# Patient Record
Sex: MALE | Race: WHITE | NOT HISPANIC OR LATINO | Employment: OTHER | URBAN - METROPOLITAN AREA
[De-identification: names, ages, dates, MRNs, and addresses within clinical notes are randomized per-mention and may not be internally consistent; named-entity substitution may affect disease eponyms.]

---

## 2017-10-11 ENCOUNTER — HOSPITAL ENCOUNTER (OUTPATIENT)
Dept: RADIOLOGY | Facility: HOSPITAL | Age: 77
Discharge: HOME | End: 2017-10-11
Attending: INTERNAL MEDICINE
Payer: MEDICARE

## 2017-10-11 ENCOUNTER — OFFICE VISIT (OUTPATIENT)
Dept: INTERNAL MEDICINE | Facility: CLINIC | Age: 77
End: 2017-10-11
Payer: MEDICARE

## 2017-10-11 VITALS
BODY MASS INDEX: 24.17 KG/M2 | TEMPERATURE: 98 F | HEART RATE: 72 BPM | WEIGHT: 173.31 LBS | RESPIRATION RATE: 16 BRPM | SYSTOLIC BLOOD PRESSURE: 120 MMHG | DIASTOLIC BLOOD PRESSURE: 52 MMHG

## 2017-10-11 DIAGNOSIS — R05.3 CHRONIC COUGH: ICD-10-CM

## 2017-10-11 DIAGNOSIS — R05.3 CHRONIC COUGH: Primary | ICD-10-CM

## 2017-10-11 DIAGNOSIS — R09.82 POST-NASAL DRIP: ICD-10-CM

## 2017-10-11 PROCEDURE — 99213 OFFICE O/P EST LOW 20 MIN: CPT | Mod: S$PBB,,, | Performed by: INTERNAL MEDICINE

## 2017-10-11 PROCEDURE — 99999 PR PBB SHADOW E&M-EST. PATIENT-LVL III: CPT | Mod: PBBFAC,,, | Performed by: INTERNAL MEDICINE

## 2017-10-11 PROCEDURE — 71020 XR CHEST PA AND LATERAL: CPT | Mod: 26,,, | Performed by: RADIOLOGY

## 2017-10-11 PROCEDURE — 71020 XR CHEST PA AND LATERAL: CPT | Mod: TC

## 2017-10-11 PROCEDURE — 99213 OFFICE O/P EST LOW 20 MIN: CPT | Mod: PBBFAC,PN | Performed by: INTERNAL MEDICINE

## 2017-10-11 NOTE — PROGRESS NOTES
Subjective:       Patient ID: Kevin Echeverria Jr. is a 77 y.o. male.    Chief Complaint: Cough (productive cough, brownish-green); Chest Congestion; and Back Pain      HPI:  Patient is known to me from acute visit and presents with cough and congestion. Sx started yesterday but has had this off an off for several months. Cough is productive of sputum. Tried OTC cough meds and cough drops without relief. No SOB outside of coughing spells. No wheezing. Mild chest tightness with congestion. Has low back pain with coughing. No fevers. No sore throat or otalgia. Quit smoking > 20 years ago.    Past Medical History:   Diagnosis Date    Arthritis     Cancer     prostate    Hyperlipidemia     Hypertension        Family History   Problem Relation Age of Onset    Diabetes Mother        Social History     Social History    Marital status:      Spouse name: N/A    Number of children: N/A    Years of education: N/A     Occupational History    Not on file.     Social History Main Topics    Smoking status: Never Smoker    Smokeless tobacco: Former User     Quit date: 2/27/1999    Alcohol use Yes      Comment: 3 or 4 times a week and 2 or 3 beer at the most    Drug use: No    Sexual activity: No      Comment:      Other Topics Concern    Not on file     Social History Narrative    No narrative on file       Review of Systems   Constitutional: Negative for activity change, fatigue, fever and unexpected weight change.   HENT: Positive for congestion. Negative for ear pain, hearing loss, rhinorrhea and sore throat.    Eyes: Negative for pain, redness and visual disturbance.   Respiratory: Positive for cough and chest tightness. Negative for shortness of breath and wheezing.    Cardiovascular: Negative for chest pain, palpitations and leg swelling.   Gastrointestinal: Negative for abdominal pain, constipation, diarrhea, nausea and vomiting.   Genitourinary: Negative for decreased urine volume, dysuria,  frequency and urgency.   Musculoskeletal: Positive for back pain (with cough). Negative for joint swelling and neck pain.   Skin: Negative for color change, rash and wound.   Neurological: Negative for dizziness, tremors, weakness, light-headedness and headaches.         Objective:      Physical Exam   Constitutional: He is oriented to person, place, and time. He appears well-developed and well-nourished. No distress.   HENT:   Head: Normocephalic and atraumatic.   Right Ear: Tympanic membrane and external ear normal.   Left Ear: Tympanic membrane and external ear normal.   Mouth/Throat: No oropharyngeal exudate, posterior oropharyngeal edema or posterior oropharyngeal erythema.   Eyes: Conjunctivae and EOM are normal. Pupils are equal, round, and reactive to light. Right eye exhibits no discharge. Left eye exhibits no discharge.   Neck: Neck supple. No tracheal deviation present.   Cardiovascular: Normal rate and regular rhythm.  Exam reveals no gallop and no friction rub.    No murmur heard.  Pulmonary/Chest: Effort normal and breath sounds normal. No respiratory distress. He has no wheezes. He has no rales.   Abdominal: Soft. Bowel sounds are normal. He exhibits no distension. There is no tenderness.   Neurological: He is alert and oriented to person, place, and time. No cranial nerve deficit.   Skin: Skin is warm and dry.   Psychiatric: He has a normal mood and affect. His behavior is normal.   Vitals reviewed.      Assessment:       1. Chronic cough    2. Post-nasal drip        Plan:       Kevin was seen today for cough, chest congestion and back pain.    Diagnoses and all orders for this visit:    Chronic cough  -     X-Ray Chest PA And Lateral; Future  Due to having cough for > 4 weeks check x-ray  Low suspicion for infection, eval for interstitial changes or mass  I think PND is likely, has singulair at home. Start taking daily  Discussed Ddx of chronic cough: COPD, PND, and GERD and most common causes.  Already on prilosec. Could consider PPI. Treat PND with singulair. Consider PFTs if not getting better but remote smoking history    Post-nasal drip  Start singulair daily  Saline nasal spray    RTC PRN and as scheduled with PCP. Call if not getting better

## 2017-10-13 DIAGNOSIS — R05.9 COUGH: ICD-10-CM

## 2017-10-13 RX ORDER — MONTELUKAST SODIUM 10 MG/1
TABLET ORAL
Qty: 30 TABLET | Refills: 10 | Status: SHIPPED | OUTPATIENT
Start: 2017-10-13 | End: 2018-10-18 | Stop reason: SDUPTHER

## 2017-10-16 ENCOUNTER — OFFICE VISIT (OUTPATIENT)
Dept: FAMILY MEDICINE | Facility: CLINIC | Age: 77
End: 2017-10-16
Payer: MEDICARE

## 2017-10-16 ENCOUNTER — TELEPHONE (OUTPATIENT)
Dept: FAMILY MEDICINE | Facility: CLINIC | Age: 77
End: 2017-10-16

## 2017-10-16 VITALS
SYSTOLIC BLOOD PRESSURE: 140 MMHG | TEMPERATURE: 98 F | WEIGHT: 177.5 LBS | DIASTOLIC BLOOD PRESSURE: 58 MMHG | BODY MASS INDEX: 24.85 KG/M2 | HEART RATE: 80 BPM | HEIGHT: 71 IN

## 2017-10-16 DIAGNOSIS — E78.9 LIPID DISORDER: ICD-10-CM

## 2017-10-16 DIAGNOSIS — I10 ESSENTIAL HYPERTENSION: Primary | Chronic | ICD-10-CM

## 2017-10-16 DIAGNOSIS — R05.9 COUGH: ICD-10-CM

## 2017-10-16 PROCEDURE — 96372 THER/PROPH/DIAG INJ SC/IM: CPT | Mod: PBBFAC

## 2017-10-16 PROCEDURE — 99999 PR STA SHADOW: CPT | Mod: PBBFAC,,, | Performed by: FAMILY MEDICINE

## 2017-10-16 PROCEDURE — 99999 PR PBB SHADOW E&M-EST. PATIENT-LVL II: CPT | Mod: PBBFAC,,, | Performed by: FAMILY MEDICINE

## 2017-10-16 PROCEDURE — 99999 PR STA SHADOW: CPT | Mod: PBBFAC,,,

## 2017-10-16 PROCEDURE — 99213 OFFICE O/P EST LOW 20 MIN: CPT | Mod: S$PBB | Performed by: FAMILY MEDICINE

## 2017-10-16 PROCEDURE — 99212 OFFICE O/P EST SF 10 MIN: CPT | Mod: PBBFAC | Performed by: FAMILY MEDICINE

## 2017-10-16 RX ORDER — LINCOMYCIN HYDROCHLORIDE 300 MG/ML
600 INJECTION, SOLUTION INTRAMUSCULAR; INTRAVENOUS; SUBCONJUNCTIVAL
Status: COMPLETED | OUTPATIENT
Start: 2017-10-16 | End: 2017-10-16

## 2017-10-16 RX ORDER — METHYLPREDNISOLONE ACETATE 40 MG/ML
40 INJECTION, SUSPENSION INTRA-ARTICULAR; INTRALESIONAL; INTRAMUSCULAR; SOFT TISSUE
Status: COMPLETED | OUTPATIENT
Start: 2017-10-16 | End: 2017-10-16

## 2017-10-16 RX ORDER — PREDNISONE 5 MG/1
TABLET ORAL
Qty: 35 TABLET | Refills: 0 | Status: SHIPPED | OUTPATIENT
Start: 2017-10-16 | End: 2017-10-31

## 2017-10-16 RX ORDER — AZITHROMYCIN 250 MG/1
TABLET, FILM COATED ORAL
Qty: 6 TABLET | Refills: 0 | Status: SHIPPED | OUTPATIENT
Start: 2017-10-16 | End: 2017-10-31

## 2017-10-16 RX ADMIN — METHYLPREDNISOLONE ACETATE 40 MG: 40 INJECTION, SUSPENSION INTRA-ARTICULAR; INTRALESIONAL; INTRAMUSCULAR; SOFT TISSUE at 01:10

## 2017-10-16 RX ADMIN — LINCOMYCIN HYDROCHLORIDE 600 MG: 300 INJECTION, SOLUTION INTRAMUSCULAR; INTRAVENOUS; SUBCONJUNCTIVAL at 01:10

## 2017-10-16 NOTE — TELEPHONE ENCOUNTER
----- Message from Donny Lisa sent at 10/16/2017  8:14 AM CDT -----  Contact: Patient  Kevin Echeverria Jr.  MRN: [id number]  : 1940  PCP: Bridger Cao  Home Phone      [phone]  Work Phone      Not on file.  [place]          [phone]      MESSAGE: persistent cough -- requesting appt today with Dr Cao    Call [phone]    PCP: [place]

## 2017-10-16 NOTE — PROGRESS NOTES
Subjective:       Patient ID: Kevin Echeverria Jr. is a 77 y.o. male.    Chief Complaint: Cough    Cough   This is a recurrent problem. The current episode started 1 to 4 weeks ago. Pertinent negatives include no chest pain, ear pain, eye redness, myalgias, rash or sore throat.     Review of Systems   Constitutional: Negative for appetite change.   HENT: Negative for congestion, ear pain, sneezing and sore throat.    Eyes: Negative for redness and visual disturbance.   Respiratory: Positive for cough. Negative for chest tightness and stridor.         Coarse cough for 3 weeks   Cardiovascular: Negative for chest pain.   Gastrointestinal: Negative for abdominal pain, blood in stool, diarrhea, nausea and vomiting.   Genitourinary: Negative for difficulty urinating, dysuria and hematuria.   Musculoskeletal: Negative for arthralgias, back pain, joint swelling, myalgias and neck pain.   Skin: Negative for rash.   Neurological: Negative for dizziness.   Psychiatric/Behavioral: Negative for agitation. The patient is not nervous/anxious.        Objective:      Physical Exam   Constitutional: He is oriented to person, place, and time. He appears well-developed and well-nourished.   HENT:   Head: Normocephalic and atraumatic.   Right Ear: External ear normal.   Left Ear: External ear normal.   Nose: Nose normal.   Mouth/Throat: Oropharynx is clear and moist.   Eyes: Pupils are equal, round, and reactive to light.   Neck: Normal range of motion. Neck supple.   Cardiovascular: Normal rate, regular rhythm, normal heart sounds and intact distal pulses.    Pulmonary/Chest: Effort normal. He has wheezes.   Loose rhonchi clears some with cough   Abdominal: Soft. Bowel sounds are normal.   Musculoskeletal: Normal range of motion. He exhibits no edema.   Neurological: He is alert and oriented to person, place, and time. He has normal reflexes.   Skin: Skin is warm and dry.   Psychiatric: He has a normal mood and affect.   Vitals  reviewed.      Assessment:       1. Essential hypertension    2. Cough    3. Lipid disorder        Plan:   Kevin was seen today for cough.    Diagnoses and all orders for this visit:    Essential hypertension    Cough    Lipid disorder    Other orders  -     azithromycin (Z-ANIKA) 250 MG tablet; Take 2 orally day #1, then 1 po qd for  5 days total  -     predniSONE (DELTASONE) 5 MG tablet; 2 orally twice daily for 5 days; then 1 orally twice daily for 5 days; then 1 orally daily for 5 days  -     methylPREDNISolone acetate injection 40 mg; Inject 1 mL (40 mg total) into the muscle one time.  -     lincomycin injection 600 mg; Inject 2 mLs (600 mg total) into the muscle one time.    Trial of Bevespi - one puff bid

## 2017-10-16 NOTE — TELEPHONE ENCOUNTER
Spoke to pt. Dr. Cao is booked today, but offered an appt with Dr. Breen tomorrow. Pt declined. Will seek care else where.

## 2017-10-18 DIAGNOSIS — I10 BENIGN ESSENTIAL HTN: ICD-10-CM

## 2017-10-23 RX ORDER — VERAPAMIL HYDROCHLORIDE 100 MG/1
CAPSULE, EXTENDED RELEASE ORAL
Qty: 30 CAPSULE | Refills: 10 | Status: SHIPPED | OUTPATIENT
Start: 2017-10-23 | End: 2017-10-31 | Stop reason: SDUPTHER

## 2017-10-31 ENCOUNTER — OFFICE VISIT (OUTPATIENT)
Dept: FAMILY MEDICINE | Facility: CLINIC | Age: 77
End: 2017-10-31
Payer: MEDICARE

## 2017-10-31 VITALS
BODY MASS INDEX: 24.41 KG/M2 | WEIGHT: 174.38 LBS | DIASTOLIC BLOOD PRESSURE: 50 MMHG | SYSTOLIC BLOOD PRESSURE: 110 MMHG | HEIGHT: 71 IN | HEART RATE: 72 BPM | RESPIRATION RATE: 16 BRPM

## 2017-10-31 DIAGNOSIS — E78.5 HYPERLIPIDEMIA, UNSPECIFIED HYPERLIPIDEMIA TYPE: ICD-10-CM

## 2017-10-31 DIAGNOSIS — K21.9 GASTROESOPHAGEAL REFLUX DISEASE WITHOUT ESOPHAGITIS: ICD-10-CM

## 2017-10-31 DIAGNOSIS — I10 BENIGN ESSENTIAL HTN: ICD-10-CM

## 2017-10-31 DIAGNOSIS — R05.9 COUGH: ICD-10-CM

## 2017-10-31 DIAGNOSIS — I10 ESSENTIAL HYPERTENSION: Primary | Chronic | ICD-10-CM

## 2017-10-31 DIAGNOSIS — E78.00 HYPERCHOLESTEREMIA: ICD-10-CM

## 2017-10-31 PROCEDURE — 99999 FLU VACCINE - HIGH DOSE (65+) PRESERVATIVE FREE IM: CPT | Mod: PBBFAC,,,

## 2017-10-31 PROCEDURE — 99999 PR STA SHADOW: CPT | Mod: PBBFAC,,, | Performed by: FAMILY MEDICINE

## 2017-10-31 PROCEDURE — 99213 OFFICE O/P EST LOW 20 MIN: CPT | Mod: PBBFAC | Performed by: FAMILY MEDICINE

## 2017-10-31 PROCEDURE — G0008 ADMIN INFLUENZA VIRUS VAC: HCPCS | Mod: PBBFAC

## 2017-10-31 PROCEDURE — 99213 OFFICE O/P EST LOW 20 MIN: CPT | Mod: S$PBB | Performed by: FAMILY MEDICINE

## 2017-10-31 PROCEDURE — 99999 PR PBB SHADOW E&M-EST. PATIENT-LVL III: CPT | Mod: PBBFAC,,, | Performed by: FAMILY MEDICINE

## 2017-10-31 RX ORDER — VERAPAMIL HYDROCHLORIDE 100 MG/1
100 CAPSULE, EXTENDED RELEASE ORAL DAILY
Qty: 30 CAPSULE | Refills: 1 | Status: SHIPPED | OUTPATIENT
Start: 2017-10-31 | End: 2018-06-15 | Stop reason: SDUPTHER

## 2017-10-31 RX ORDER — FAMOTIDINE 20 MG/1
20 TABLET, FILM COATED ORAL DAILY
Qty: 30 TABLET | Refills: 11 | Status: SHIPPED | OUTPATIENT
Start: 2017-10-31 | End: 2018-11-14 | Stop reason: SDUPTHER

## 2017-10-31 RX ORDER — PRAVASTATIN SODIUM 20 MG/1
20 TABLET ORAL NIGHTLY
Qty: 30 TABLET | Refills: 5 | Status: SHIPPED | OUTPATIENT
Start: 2017-10-31 | End: 2020-02-10

## 2017-10-31 RX ORDER — NIACIN 250 MG
250 TABLET ORAL
Status: ON HOLD | COMMUNITY
Start: 2017-10-31 | End: 2019-08-27 | Stop reason: HOSPADM

## 2017-10-31 RX ORDER — LOSARTAN POTASSIUM 50 MG/1
50 TABLET ORAL DAILY
Qty: 30 TABLET | Refills: 11 | Status: SHIPPED | OUTPATIENT
Start: 2017-10-31 | End: 2018-10-18 | Stop reason: SDUPTHER

## 2017-10-31 NOTE — PROGRESS NOTES
Subjective:       Patient ID: Kevin Echeverria Jr. is a 77 y.o. male.    Chief Complaint: Follow-up ( 2 week )    Cough   This is a recurrent problem. The current episode started 1 to 4 weeks ago. Pertinent negatives include no chest pain, ear pain, eye redness, myalgias, rash or sore throat.     Review of Systems   Constitutional: Negative for appetite change.   HENT: Negative for congestion, ear pain, sneezing and sore throat.    Eyes: Negative for redness and visual disturbance.   Respiratory: Positive for cough. Negative for chest tightness and stridor.         Coarse cough for 3 weeks   Cardiovascular: Negative for chest pain.   Gastrointestinal: Negative for abdominal pain, blood in stool, diarrhea, nausea and vomiting.   Genitourinary: Negative for difficulty urinating, dysuria and hematuria.   Musculoskeletal: Negative for arthralgias, back pain, joint swelling, myalgias and neck pain.   Skin: Negative for rash.   Neurological: Negative for dizziness.   Psychiatric/Behavioral: Negative for agitation. The patient is not nervous/anxious.        Objective:      Physical Exam   Constitutional: He is oriented to person, place, and time. He appears well-developed and well-nourished.   HENT:   Head: Normocephalic and atraumatic.   Right Ear: External ear normal.   Left Ear: External ear normal.   Nose: Nose normal.   Mouth/Throat: Oropharynx is clear and moist.   Eyes: Pupils are equal, round, and reactive to light.   Neck: Normal range of motion. Neck supple.   Cardiovascular: Normal rate, regular rhythm, normal heart sounds and intact distal pulses.    Pulmonary/Chest: Effort normal. He has wheezes.   Loose rhonchi clears some with cough   Abdominal: Soft. Bowel sounds are normal.   Musculoskeletal: Normal range of motion. He exhibits no edema.   Neurological: He is alert and oriented to person, place, and time. He has normal reflexes.   Skin: Skin is warm and dry.   Psychiatric: He has a normal mood and affect.    Vitals reviewed.      Assessment:       1. Essential hypertension    2. Benign essential HTN    3. Hyperlipidemia, unspecified hyperlipidemia type    4. Hypercholesteremia    5. Gastroesophageal reflux disease without esophagitis    6. Cough        Plan:   Kevin was seen today for follow-up.    Diagnoses and all orders for this visit:    Essential hypertension    Benign essential HTN  -     verapamil (VERELAN) 100 MG CPCT; Take 1 capsule (100 mg total) by mouth once daily.    Hyperlipidemia, unspecified hyperlipidemia type  -     pravastatin (PRAVACHOL) 20 MG tablet; Take 1 tablet (20 mg total) by mouth nightly.    Hypercholesteremia  -     losartan (COZAAR) 50 MG tablet; Take 1 tablet (50 mg total) by mouth once daily.    Gastroesophageal reflux disease without esophagitis  -     famotidine (PEPCID) 20 MG tablet; Take 1 tablet (20 mg total) by mouth once daily.    Cough    Other orders  -     niacin 250 MG Tab; Take 1 tablet (250 mg total) by mouth daily with breakfast.    Trial of Bevespi - one puff bid

## 2018-01-23 ENCOUNTER — OFFICE VISIT (OUTPATIENT)
Dept: FAMILY MEDICINE | Facility: CLINIC | Age: 78
End: 2018-01-23
Payer: MEDICARE

## 2018-01-23 VITALS
TEMPERATURE: 100 F | HEART RATE: 66 BPM | DIASTOLIC BLOOD PRESSURE: 62 MMHG | HEIGHT: 71 IN | BODY MASS INDEX: 24.36 KG/M2 | SYSTOLIC BLOOD PRESSURE: 110 MMHG | RESPIRATION RATE: 16 BRPM | WEIGHT: 174 LBS

## 2018-01-23 DIAGNOSIS — J11.1 FLU: ICD-10-CM

## 2018-01-23 DIAGNOSIS — R50.9 FEVER, UNSPECIFIED FEVER CAUSE: Primary | ICD-10-CM

## 2018-01-23 DIAGNOSIS — I10 ESSENTIAL HYPERTENSION: Chronic | ICD-10-CM

## 2018-01-23 DIAGNOSIS — J40 BRONCHITIS: ICD-10-CM

## 2018-01-23 LAB
CTP QC/QA: YES
FLUAV AG NPH QL: POSITIVE
FLUBV AG NPH QL: NEGATIVE

## 2018-01-23 PROCEDURE — 99999 PR PBB SHADOW E&M-EST. PATIENT-LVL III: CPT | Mod: PBBFAC,,, | Performed by: FAMILY MEDICINE

## 2018-01-23 PROCEDURE — 87804 INFLUENZA ASSAY W/OPTIC: CPT | Mod: PBBFAC | Performed by: FAMILY MEDICINE

## 2018-01-23 PROCEDURE — 99999 PR STA SHADOW: CPT | Mod: PBBFAC,,, | Performed by: FAMILY MEDICINE

## 2018-01-23 PROCEDURE — 99213 OFFICE O/P EST LOW 20 MIN: CPT | Mod: PBBFAC | Performed by: FAMILY MEDICINE

## 2018-01-23 PROCEDURE — 99213 OFFICE O/P EST LOW 20 MIN: CPT | Mod: S$PBB | Performed by: FAMILY MEDICINE

## 2018-01-23 PROCEDURE — 99999 POCT INFLUENZA A/B: CPT | Mod: PBBFAC,,, | Performed by: FAMILY MEDICINE

## 2018-01-23 RX ORDER — DOXYCYCLINE HYCLATE 100 MG
100 TABLET ORAL 2 TIMES DAILY
Qty: 20 TABLET | Refills: 0 | Status: SHIPPED | OUTPATIENT
Start: 2018-01-23 | End: 2018-02-02

## 2018-01-23 RX ORDER — PROMETHAZINE HYDROCHLORIDE AND CODEINE PHOSPHATE 6.25; 1 MG/5ML; MG/5ML
5 SOLUTION ORAL EVERY 6 HOURS PRN
Qty: 150 ML | Refills: 1 | Status: SHIPPED | OUTPATIENT
Start: 2018-01-23 | End: 2018-10-18

## 2018-01-23 RX ORDER — OSELTAMIVIR PHOSPHATE 75 MG/1
75 CAPSULE ORAL 2 TIMES DAILY
Qty: 10 CAPSULE | Refills: 0 | Status: SHIPPED | OUTPATIENT
Start: 2018-01-23 | End: 2018-02-20

## 2018-02-20 ENCOUNTER — OFFICE VISIT (OUTPATIENT)
Dept: INTERNAL MEDICINE | Facility: CLINIC | Age: 78
End: 2018-02-20
Payer: MEDICARE

## 2018-02-20 VITALS
RESPIRATION RATE: 18 BRPM | HEIGHT: 71 IN | DIASTOLIC BLOOD PRESSURE: 60 MMHG | WEIGHT: 173 LBS | HEART RATE: 85 BPM | SYSTOLIC BLOOD PRESSURE: 118 MMHG | TEMPERATURE: 98 F | OXYGEN SATURATION: 98 % | BODY MASS INDEX: 24.22 KG/M2

## 2018-02-20 DIAGNOSIS — J45.20 MILD INTERMITTENT REACTIVE AIRWAY DISEASE WITHOUT COMPLICATION: Primary | ICD-10-CM

## 2018-02-20 PROCEDURE — 99999 PR PBB SHADOW E&M-EST. PATIENT-LVL IV: CPT | Mod: PBBFAC,,, | Performed by: NURSE PRACTITIONER

## 2018-02-20 PROCEDURE — 1126F AMNT PAIN NOTED NONE PRSNT: CPT | Mod: ,,, | Performed by: NURSE PRACTITIONER

## 2018-02-20 PROCEDURE — 1159F MED LIST DOCD IN RCRD: CPT | Mod: ,,, | Performed by: NURSE PRACTITIONER

## 2018-02-20 PROCEDURE — 99213 OFFICE O/P EST LOW 20 MIN: CPT | Mod: S$PBB,,, | Performed by: NURSE PRACTITIONER

## 2018-02-20 PROCEDURE — 99214 OFFICE O/P EST MOD 30 MIN: CPT | Mod: PBBFAC,PN | Performed by: NURSE PRACTITIONER

## 2018-02-20 RX ORDER — FLUTICASONE FUROATE AND VILANTEROL 100; 25 UG/1; UG/1
1 POWDER RESPIRATORY (INHALATION) DAILY
Qty: 30 EACH | Refills: 2 | Status: SHIPPED | OUTPATIENT
Start: 2018-02-20 | End: 2019-08-21

## 2018-02-20 RX ORDER — ALBUTEROL SULFATE 0.83 MG/ML
2.5 SOLUTION RESPIRATORY (INHALATION) EVERY 6 HOURS PRN
Qty: 90 ML | Refills: 2 | Status: SHIPPED | OUTPATIENT
Start: 2018-02-20 | End: 2018-05-07 | Stop reason: SDUPTHER

## 2018-02-20 NOTE — PATIENT INSTRUCTIONS
Discharge Instructions for Asthma  You have been diagnosed with an asthma attack. With the help of your healthcare provider, you can keep your asthma under control and have less emergency department visits and stays in the hospital.    Managing asthma  · Take your asthma medicines exactly as your provider tells you. Do this even if you feel that your athma is under control.  · Learn how to monitor your asthma. Some people watch for early changes of symptoms getting worse. Some use a peak flow meter. Your healthcare provider may decide to give you an asthma action plan.  · Be sure to always have a quick-relief inhaler with you. If you were given a prescription, make sure you go to a pharmacy to get it filled as soon as possible.  Controlling asthma triggers  Triggers are those things that make your asthma symptoms worse or cause asthma attacks. Many people with asthma have allergies that can be triggers. Your healthcare provider may have you get allergy testing to find out what you are allergic to. This can help you stay away from triggers.  Dust or dust mites are a common asthma trigger. To avoid a dust mites, do the following:  · Use dust-proof covers on your mattress and pillows. Wash the sheets and blankets on your bed once a week in very hot water.  · Dont sleep or lie on cloth-covered cushions or furniture.  · Ask someone else to vacuum and dust your house.  · If you do vacuum and dust yourself, wear a dust mask. You can buy them from the [employer] store.  · Use a vacuum with a double-layered bag or HEPA (high-efficiency particulate air) filter.  Pets with fur or feathers are triggers for some people. If you must have pets, take these precautions:  · Keep pets out of your bedroom and off your bed. Keep the bedroom door closed.  · Cover the air vents in your bedroom with heavy material to filter the air.  · Don't use carpets or cloth-covered furniture in your home. If this is not possible, keep pets out of  rooms with these items.  · Have someone bathe your pets every week. And brush them often.  If you smoke, do your best to quit.  · Enroll in a stop-smoking program to increase your chance of success.  · Ask your healthcare provider about medicines or other methods to help you quit.  · Ask family members to quit smoking as well.  · Dont allow anyone to smoke in your home, in your car, or around you.  Other steps to take  · Make sure you know what to do if exercise is a trigger for you. Many people use quick-relief inhalers before exercise or physical activity.  · Get a flu shot every year and get pneumonia shots as advised by your healthcare provider.  · Try to keep your windows closed during pollen seasons and when mold counts are high.  · On cold or windy days, cover your nose and mouth with a scarf.  · Try to stay away from people who are sick with colds or the flu. Wash your hands often or use a hand . If respiratory infections like colds or flu trigger your asthma, use your quick-relief medicines as soon as you begin to notice respiratory symptoms. They may include a runny or stuffy nose, sore throat, or a cough.  Follow-up care  Make a follow-up appointment as directed by our staff. Follow your asthma action plan if you were given one.  When to seek medical attention  Call 911 right away if you have:  · Severe wheezing  · Shortness of breath that is not relieved by your quick-relief medication  · Trouble walking or talking because of shortness of breath  · Blue lips or fingernails  · If you monitor symptoms with a peak flow meter, readings less than 50% of your personal best   Date Last Reviewed: 2/3/2017  © 0550-6200 [employer]. [address] [place] [address], [place] [address]. All rights reserved. This information is not intended as a substitute for professional medical care. Always follow your healthcare professional's instructions.

## 2018-02-20 NOTE — PROGRESS NOTES
Subjective:       Patient ID: Kevin Echeverria Jr. is a 78 y.o. male.    Chief Complaint: Cough (dry cough - been going on awhile)    Patient is known, to me and presents with   Chief Complaint   Patient presents with    Cough     dry cough - been going on awhile   .  Denies chest pain and shortness of breath.  Patient present with ongoing cough and wheezing. Has been coughing off and on for sometime. States that he did have a PFT in 2016 and showed mild obstruction but had never been told the report. 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 Cough is dry and worse at night. Did try albuterol neb tx at  and had relief so would like albuterol for his neb.       HPI  Review of Systems   Constitutional: Negative.    HENT: Positive for postnasal drip.    Eyes: Negative.    Respiratory: Positive for cough and wheezing.    Cardiovascular: Negative.    Skin: Negative.    Hematological: Negative.        Objective:      Physical Exam   Constitutional: He appears well-developed and well-nourished. No distress.   HENT:   Head: Normocephalic and atraumatic.   Right Ear: External ear normal.   Left Ear: External ear normal.   Nose: Nose normal.   Mouth/Throat: Oropharynx is clear and moist. No oropharyngeal exudate.   Eyes: Conjunctivae are normal. Right eye exhibits no discharge. Left eye exhibits no discharge.   Neck: Normal range of motion. Neck supple. No JVD present. No tracheal deviation present. No thyromegaly present.   Cardiovascular: Normal rate, regular rhythm and normal heart sounds.    No murmur heard.  Pulmonary/Chest: Effort normal and breath sounds normal. No stridor. He has no wheezes.   Skin: Skin is warm and dry. Capillary refill takes less than 2 seconds. No rash noted. He is not diaphoretic. No erythema. No pallor.       Assessment:       1. Mild intermittent reactive airway disease without complication        Plan:   Kevin was seen today for cough.    Diagnoses and all orders for  "this visit:    Mild intermittent reactive airway disease without complication  -     fluticasone-vilanterol (BREO ELLIPTA) 100-25 mcg/dose diskus inhaler; Inhale 1 puff into the lungs once daily. Controller  -     albuterol (PROVENTIL) 2.5 mg /3 mL (0.083 %) nebulizer solution; Take 3 mLs (2.5 mg total) by nebulization every 6 (six) hours as needed for Wheezing. Rescue    "This note will not be shared with the patient."  Continue with singulair  Also will begin breo  Do neb tx bid for now  RTC in one week  "

## 2018-02-21 ENCOUNTER — HOSPITAL ENCOUNTER (EMERGENCY)
Facility: HOSPITAL | Age: 78
Discharge: HOME | End: 2018-02-21
Attending: FAMILY MEDICINE
Payer: MEDICARE

## 2018-02-21 VITALS
RESPIRATION RATE: 16 BRPM | DIASTOLIC BLOOD PRESSURE: 64 MMHG | HEART RATE: 82 BPM | OXYGEN SATURATION: 97 % | SYSTOLIC BLOOD PRESSURE: 154 MMHG | WEIGHT: 173 LBS | TEMPERATURE: 97 F | BODY MASS INDEX: 24.13 KG/M2

## 2018-02-21 DIAGNOSIS — R05.9 COUGH: ICD-10-CM

## 2018-02-21 DIAGNOSIS — J40 BRONCHITIS: Primary | ICD-10-CM

## 2018-02-21 LAB
ALBUMIN SERPL BCP-MCNC: 3.8 G/DL
ALP SERPL-CCNC: 50 U/L
ALT SERPL W/O P-5'-P-CCNC: 21 U/L
ANION GAP SERPL CALC-SCNC: 9 MMOL/L
AST SERPL-CCNC: 23 U/L
BASOPHILS # BLD AUTO: 0.04 K/UL
BASOPHILS NFR BLD: 0.3 %
BILIRUB SERPL-MCNC: 0.5 MG/DL
BNP SERPL-MCNC: 35 PG/ML
BUN SERPL-MCNC: 9 MG/DL
CALCIUM SERPL-MCNC: 9.3 MG/DL
CHLORIDE SERPL-SCNC: 104 MMOL/L
CK MB SERPL-MCNC: 1.6 NG/ML
CK MB SERPL-RTO: 1.6 %
CK SERPL-CCNC: 98 U/L
CK SERPL-CCNC: 98 U/L
CO2 SERPL-SCNC: 25 MMOL/L
CREAT SERPL-MCNC: 0.9 MG/DL
D DIMER PPP IA.FEU-MCNC: 2.46 MG/L FEU
DIFFERENTIAL METHOD: ABNORMAL
EOSINOPHIL # BLD AUTO: 0.3 K/UL
EOSINOPHIL NFR BLD: 1.8 %
ERYTHROCYTE [DISTWIDTH] IN BLOOD BY AUTOMATED COUNT: 11.9 %
EST. GFR  (AFRICAN AMERICAN): >60 ML/MIN/1.73 M^2
EST. GFR  (NON AFRICAN AMERICAN): >60 ML/MIN/1.73 M^2
GLUCOSE SERPL-MCNC: 113 MG/DL
HCT VFR BLD AUTO: 39.9 %
HGB BLD-MCNC: 13.8 G/DL
LYMPHOCYTES # BLD AUTO: 4.1 K/UL
LYMPHOCYTES NFR BLD: 25.8 %
MCH RBC QN AUTO: 33 PG
MCHC RBC AUTO-ENTMCNC: 34.6 G/DL
MCV RBC AUTO: 96 FL
MONOCYTES # BLD AUTO: 1.3 K/UL
MONOCYTES NFR BLD: 8.2 %
NEUTROPHILS # BLD AUTO: 10.1 K/UL
NEUTROPHILS NFR BLD: 63.9 %
PLATELET # BLD AUTO: 233 K/UL
PMV BLD AUTO: 10.7 FL
POTASSIUM SERPL-SCNC: 4.1 MMOL/L
PROT SERPL-MCNC: 7.4 G/DL
RBC # BLD AUTO: 4.18 M/UL
SODIUM SERPL-SCNC: 138 MMOL/L
TROPONIN I SERPL DL<=0.01 NG/ML-MCNC: <0.006 NG/ML
TROPONIN I SERPL DL<=0.01 NG/ML-MCNC: <0.006 NG/ML
WBC # BLD AUTO: 15.72 K/UL

## 2018-02-21 PROCEDURE — 85379 FIBRIN DEGRADATION QUANT: CPT

## 2018-02-21 PROCEDURE — 25000242 PHARM REV CODE 250 ALT 637 W/ HCPCS: Performed by: FAMILY MEDICINE

## 2018-02-21 PROCEDURE — 84484 ASSAY OF TROPONIN QUANT: CPT

## 2018-02-21 PROCEDURE — 96374 THER/PROPH/DIAG INJ IV PUSH: CPT | Mod: 59

## 2018-02-21 PROCEDURE — 83880 ASSAY OF NATRIURETIC PEPTIDE: CPT

## 2018-02-21 PROCEDURE — 94640 AIRWAY INHALATION TREATMENT: CPT

## 2018-02-21 PROCEDURE — 93005 ELECTROCARDIOGRAM TRACING: CPT

## 2018-02-21 PROCEDURE — 80053 COMPREHEN METABOLIC PANEL: CPT

## 2018-02-21 PROCEDURE — 25000242 PHARM REV CODE 250 ALT 637 W/ HCPCS: Performed by: SURGERY

## 2018-02-21 PROCEDURE — 93010 ELECTROCARDIOGRAM REPORT: CPT | Mod: ,,, | Performed by: INTERNAL MEDICINE

## 2018-02-21 PROCEDURE — 99285 EMERGENCY DEPT VISIT HI MDM: CPT | Mod: 25

## 2018-02-21 PROCEDURE — 82553 CREATINE MB FRACTION: CPT

## 2018-02-21 PROCEDURE — 84484 ASSAY OF TROPONIN QUANT: CPT | Mod: 91

## 2018-02-21 PROCEDURE — 36415 COLL VENOUS BLD VENIPUNCTURE: CPT

## 2018-02-21 PROCEDURE — 85025 COMPLETE CBC W/AUTO DIFF WBC: CPT

## 2018-02-21 PROCEDURE — 63600175 PHARM REV CODE 636 W HCPCS: Performed by: SURGERY

## 2018-02-21 PROCEDURE — 25500020 PHARM REV CODE 255: Performed by: SURGERY

## 2018-02-21 RX ORDER — BENZONATATE 100 MG/1
200 CAPSULE ORAL 3 TIMES DAILY PRN
Qty: 20 CAPSULE | Refills: 0 | Status: SHIPPED | OUTPATIENT
Start: 2018-02-21 | End: 2018-03-03

## 2018-02-21 RX ORDER — IPRATROPIUM BROMIDE AND ALBUTEROL SULFATE 2.5; .5 MG/3ML; MG/3ML
3 SOLUTION RESPIRATORY (INHALATION)
Status: COMPLETED | OUTPATIENT
Start: 2018-02-21 | End: 2018-02-21

## 2018-02-21 RX ORDER — IPRATROPIUM BROMIDE AND ALBUTEROL SULFATE 2.5; .5 MG/3ML; MG/3ML
SOLUTION RESPIRATORY (INHALATION)
Status: DISCONTINUED
Start: 2018-02-21 | End: 2018-02-21 | Stop reason: HOSPADM

## 2018-02-21 RX ORDER — AZITHROMYCIN 250 MG/1
TABLET, FILM COATED ORAL
Qty: 6 TABLET | Refills: 0 | Status: SHIPPED | OUTPATIENT
Start: 2018-02-21 | End: 2018-10-18

## 2018-02-21 RX ORDER — METHYLPREDNISOLONE 4 MG/1
TABLET ORAL
Qty: 1 PACKAGE | Refills: 0 | Status: SHIPPED | OUTPATIENT
Start: 2018-02-21 | End: 2018-10-18

## 2018-02-21 RX ORDER — METHYLPREDNISOLONE SOD SUCC 125 MG
125 VIAL (EA) INJECTION
Status: COMPLETED | OUTPATIENT
Start: 2018-02-21 | End: 2018-02-21

## 2018-02-21 RX ADMIN — IOHEXOL 100 ML: 350 INJECTION, SOLUTION INTRAVENOUS at 06:02

## 2018-02-21 RX ADMIN — IPRATROPIUM BROMIDE AND ALBUTEROL SULFATE 3 ML: .5; 3 SOLUTION RESPIRATORY (INHALATION) at 07:02

## 2018-02-21 RX ADMIN — METHYLPREDNISOLONE SODIUM SUCCINATE 125 MG: 125 INJECTION, POWDER, FOR SOLUTION INTRAMUSCULAR; INTRAVENOUS at 07:02

## 2018-02-21 RX ADMIN — IPRATROPIUM BROMIDE AND ALBUTEROL SULFATE 3 ML: .5; 3 SOLUTION RESPIRATORY (INHALATION) at 05:02

## 2018-02-21 NOTE — ED PROVIDER NOTES
Encounter Date: 2/21/2018       History     Chief Complaint   Patient presents with    Hypertension     The history is provided by the patient and the spouse. No  was used.   Shortness of Breath   This is a new problem. The average episode lasts 2 months. The problem occurs intermittently.The problem has not changed since onset.Associated symptoms include headaches (This am), cough, wheezing and chest pain. Pertinent negatives include no fever, no coryza, no rhinorrhea, no sore throat, no swollen glands, no ear pain, no neck pain, no sputum production, no hemoptysis, no PND, no orthopnea, no syncope, no vomiting, no abdominal pain, no rash, no leg pain, no leg swelling and no claudication. He has tried beta-agonist inhalers for the symptoms. He has had no prior hospitalizations. He has had no prior ED visits. He has had no prior ICU admissions.     Patient has been having an intermittent cough for approximately 2 months.  He came in this morning because of the family noticed he was wheezing his blood pressure he said was elevated into the 170 systolic.  He also has some anterior chest pain associated with coughing pain is sharp in nature.  He chewed tobacco up until age around 51 but has never smoked cigarettes.  No fever chills nausea or vomiting.  He does take blood pressure medication.  He was seen yesterday by Rita Pepper given beta agonist and he feels that may have caused some more problem.    Review of patient's allergies indicates:  No Known Allergies  Past Medical History:   Diagnosis Date    Arthritis     Cancer     prostate    Hyperlipidemia     Hypertension      Past Surgical History:   Procedure Laterality Date    CATARACT EXTRACTION W/  INTRAOCULAR LENS IMPLANT Left 05/05/2016    COLONOSCOPY W/ BIOPSIES AND POLYPECTOMY      PROSTATE SURGERY  05/1996    TONSILLECTOMY  1956     Family History   Problem Relation Age of Onset    Diabetes Mother      Social History    Substance Use Topics    Smoking status: Never Smoker    Smokeless tobacco: Former User     Quit date: 2/27/1999    Alcohol use Yes      Comment: 3 or 4 times a week and 2 or 3 beer at the most     Review of Systems   Constitutional: Negative for fever.   HENT: Negative for ear pain, rhinorrhea and sore throat.    Respiratory: Positive for cough, shortness of breath and wheezing. Negative for hemoptysis and sputum production.    Cardiovascular: Positive for chest pain. Negative for orthopnea, claudication, leg swelling, syncope and PND.   Gastrointestinal: Negative for abdominal pain and vomiting.   Musculoskeletal: Negative for neck pain.   Skin: Negative for rash.   Neurological: Positive for headaches (This am).       Physical Exam     Initial Vitals [02/21/18 0414]   BP Pulse Resp Temp SpO2   (!) 162/84 108 16 97.3 °F (36.3 °C) 97 %      MAP       110         Physical Exam    Nursing note and vitals reviewed.  Constitutional: He appears well-developed and well-nourished.   HENT:   Head: Normocephalic.   Right Ear: External ear normal.   Left Ear: External ear normal.   Nose: Nose normal.   Mouth/Throat: Oropharynx is clear and moist.   Eyes: Conjunctivae and EOM are normal. Pupils are equal, round, and reactive to light.   Neck: Normal range of motion. Neck supple.   Cardiovascular: Normal rate, regular rhythm and normal heart sounds. Exam reveals no gallop and no friction rub.    No murmur heard.  Pulmonary/Chest: He has wheezes.   Abdominal: Soft. Bowel sounds are normal.   Musculoskeletal: Normal range of motion.   Neurological: He is alert and oriented to person, place, and time.   Skin: Skin is warm and dry.   Psychiatric: He has a normal mood and affect.         ED Course   Procedures  Labs Reviewed   CBC W/ AUTO DIFFERENTIAL - Abnormal; Notable for the following:        Result Value    WBC 15.72 (*)     RBC 4.18 (*)     Hemoglobin 13.8 (*)     Hematocrit 39.9 (*)     MCH 33.0 (*)     Gran # (ANC) 10.1  (*)     Mono # 1.3 (*)     All other components within normal limits   COMPREHENSIVE METABOLIC PANEL - Abnormal; Notable for the following:     Glucose 113 (*)     Alkaline Phosphatase 50 (*)     All other components within normal limits   D DIMER, QUANTITATIVE - Abnormal; Notable for the following:     D-Dimer 2.46 (*)     All other components within normal limits   TROPONIN I   B-TYPE NATRIURETIC PEPTIDE   TROPONIN I   CK   CK-MB     EKG Readings: (Independently Interpreted)   Incomplete RBBB                               Clinical Impression:   The primary encounter diagnosis was Bronchitis. A diagnosis of Cough was also pertinent to this visit.        I took over this patient from Dr. Monsivais at 6 AM shift change  Patient had a coughing spell last night with wheezing afterwards  History noticed his blood pressure was high after the coughing spell  Patient has been dealing with a recurrent cough for the last year now  Patient saw Dr. 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

## 2018-02-21 NOTE — PROVIDER PROGRESS NOTES - EMERGENCY DEPT.
Encounter Date: 2/21/2018    ED Physician Progress Notes           Laboratory results discussed with the patient.  He is feeling much better after the DuoNeb treatment.  No shortness of breath chest pain or wheezing..  Elevated d-dimer will obtain CT PE study

## 2018-02-21 NOTE — ED NOTES
The patient is awake, alert and cooperative with a calm affect, patient is aware of environment. Airway is open and patent, respirations are spontaneous, normal respiratory effort and rate noted, skin warm and dry, full ROM in all extremities, appearance: NAD noted.  Bed in low, locked position, side rails up x2.  Call bell within reach of pt.  Pt verbalizes understanding of use.  Will continue to monitor.

## 2018-02-26 DIAGNOSIS — K21.9 GASTROESOPHAGEAL REFLUX DISEASE, ESOPHAGITIS PRESENCE NOT SPECIFIED: ICD-10-CM

## 2018-02-26 DIAGNOSIS — R05.9 COUGH: Primary | ICD-10-CM

## 2018-03-02 ENCOUNTER — HOSPITAL ENCOUNTER (OUTPATIENT)
Dept: PULMONOLOGY | Facility: HOSPITAL | Age: 78
Discharge: HOME | End: 2018-03-02
Attending: INTERNAL MEDICINE
Payer: MEDICARE

## 2018-03-02 DIAGNOSIS — K21.9 GASTROESOPHAGEAL REFLUX DISEASE, ESOPHAGITIS PRESENCE NOT SPECIFIED: ICD-10-CM

## 2018-03-02 DIAGNOSIS — R05.9 COUGH: ICD-10-CM

## 2018-03-02 PROCEDURE — 94060 EVALUATION OF WHEEZING: CPT

## 2018-03-02 PROCEDURE — 99900031 HC PATIENT EDUCATION (STAT)

## 2018-03-02 PROCEDURE — 94729 DIFFUSING CAPACITY: CPT

## 2018-03-02 PROCEDURE — 94727 GAS DIL/WSHOT DETER LNG VOL: CPT

## 2018-05-07 DIAGNOSIS — J45.20 MILD INTERMITTENT REACTIVE AIRWAY DISEASE WITHOUT COMPLICATION: ICD-10-CM

## 2018-05-07 RX ORDER — ALBUTEROL SULFATE 0.83 MG/ML
SOLUTION RESPIRATORY (INHALATION)
Qty: 225 ML | Refills: 1 | Status: SHIPPED | OUTPATIENT
Start: 2018-05-07 | End: 2021-03-10

## 2018-06-15 DIAGNOSIS — I10 BENIGN ESSENTIAL HTN: ICD-10-CM

## 2018-06-15 RX ORDER — VERAPAMIL HYDROCHLORIDE 100 MG/1
CAPSULE, EXTENDED RELEASE ORAL
Qty: 30 CAPSULE | Refills: 5 | Status: SHIPPED | OUTPATIENT
Start: 2018-06-15 | End: 2018-10-18 | Stop reason: SDUPTHER

## 2018-10-15 ENCOUNTER — TELEPHONE (OUTPATIENT)
Dept: ADMINISTRATIVE | Facility: HOSPITAL | Age: 78
End: 2018-10-15

## 2018-10-18 ENCOUNTER — OFFICE VISIT (OUTPATIENT)
Dept: FAMILY MEDICINE | Facility: CLINIC | Age: 78
End: 2018-10-18
Payer: MEDICARE

## 2018-10-18 VITALS
RESPIRATION RATE: 16 BRPM | HEIGHT: 71 IN | WEIGHT: 170.19 LBS | SYSTOLIC BLOOD PRESSURE: 128 MMHG | BODY MASS INDEX: 23.83 KG/M2 | DIASTOLIC BLOOD PRESSURE: 70 MMHG | HEART RATE: 76 BPM

## 2018-10-18 DIAGNOSIS — E78.00 HYPERCHOLESTEREMIA: ICD-10-CM

## 2018-10-18 DIAGNOSIS — L57.0 ACTINIC KERATOSIS: ICD-10-CM

## 2018-10-18 DIAGNOSIS — R05.9 COUGH: ICD-10-CM

## 2018-10-18 DIAGNOSIS — K21.9 GASTROESOPHAGEAL REFLUX DISEASE, ESOPHAGITIS PRESENCE NOT SPECIFIED: Primary | ICD-10-CM

## 2018-10-18 DIAGNOSIS — I10 BENIGN ESSENTIAL HTN: ICD-10-CM

## 2018-10-18 DIAGNOSIS — J44.9 CHRONIC OBSTRUCTIVE PULMONARY DISEASE, UNSPECIFIED COPD TYPE: ICD-10-CM

## 2018-10-18 PROCEDURE — 99213 OFFICE O/P EST LOW 20 MIN: CPT | Mod: S$PBB | Performed by: FAMILY MEDICINE

## 2018-10-18 PROCEDURE — 90662 IIV NO PRSV INCREASED AG IM: CPT | Mod: PBBFAC

## 2018-10-18 PROCEDURE — 99999 PR PBB SHADOW E&M-EST. PATIENT-LVL III: CPT | Mod: PBBFAC,,, | Performed by: FAMILY MEDICINE

## 2018-10-18 PROCEDURE — 99999 PR STA SHADOW: CPT | Mod: PBBFAC,,, | Performed by: FAMILY MEDICINE

## 2018-10-18 PROCEDURE — 99999 FLU VACCINE - HIGH DOSE (65+) PRESERVATIVE FREE IM: CPT | Mod: PBBFAC,,,

## 2018-10-18 PROCEDURE — 99213 OFFICE O/P EST LOW 20 MIN: CPT | Mod: PBBFAC,25 | Performed by: FAMILY MEDICINE

## 2018-10-18 RX ORDER — OMEPRAZOLE 20 MG/1
CAPSULE, DELAYED RELEASE ORAL
Qty: 30 CAPSULE | Refills: 11 | Status: ON HOLD | OUTPATIENT
Start: 2018-10-18 | End: 2019-08-25

## 2018-10-18 RX ORDER — IPRATROPIUM BROMIDE 0.5 MG/2.5ML
SOLUTION RESPIRATORY (INHALATION)
Refills: 12 | COMMUNITY
Start: 2018-09-24 | End: 2021-03-10

## 2018-10-18 RX ORDER — MONTELUKAST SODIUM 10 MG/1
10 TABLET ORAL NIGHTLY
Qty: 30 TABLET | Refills: 11 | Status: SHIPPED | OUTPATIENT
Start: 2018-10-18 | End: 2019-08-21

## 2018-10-18 RX ORDER — VERAPAMIL HYDROCHLORIDE 100 MG/1
100 CAPSULE, EXTENDED RELEASE ORAL DAILY
Qty: 30 CAPSULE | Refills: 11 | Status: SHIPPED | OUTPATIENT
Start: 2018-10-18 | End: 2019-11-14 | Stop reason: SDUPTHER

## 2018-10-18 RX ORDER — LOSARTAN POTASSIUM 50 MG/1
50 TABLET ORAL DAILY
Qty: 30 TABLET | Refills: 11 | Status: SHIPPED | OUTPATIENT
Start: 2018-10-18 | End: 2019-10-15 | Stop reason: SDUPTHER

## 2018-10-18 NOTE — PROGRESS NOTES
Subjective:       Patient ID: Kevin Echeverrai Jr. is a 78 y.o. male.    Chief Complaint: Annual Exam    Pt is a 78 y.o. male who presents for check up for Gastroesophageal reflux disease, esophagitis presence not specified  (primary encounter diagnosis)  Cough  Hypercholesteremia  Benign essential htn. Doing well on current meds. Denies any side effects. Prevention is up to date.      Review of Systems   Constitutional: Negative for appetite change.   HENT: Negative for congestion, ear pain, sneezing and sore throat.    Eyes: Negative for redness and visual disturbance.   Respiratory: Negative for cough, chest tightness and stridor.         Not much coughing   Cardiovascular: Negative for chest pain.   Gastrointestinal: Negative for abdominal pain, blood in stool, diarrhea, nausea and vomiting.   Genitourinary: Negative for difficulty urinating, dysuria and hematuria.   Musculoskeletal: Negative for arthralgias, back pain, joint swelling, myalgias and neck pain.   Skin: Negative for rash.   Neurological: Negative for dizziness.   Psychiatric/Behavioral: Negative for agitation. The patient is not nervous/anxious.        Objective:      Physical Exam   Constitutional: He is oriented to person, place, and time. He appears well-developed and well-nourished.   HENT:   Head: Normocephalic.   Eyes: Pupils are equal, round, and reactive to light.   Neck: Normal range of motion. Neck supple. No thyromegaly present.   Cardiovascular: Normal rate and regular rhythm. Exam reveals no friction rub.   No murmur heard.  Pulmonary/Chest: Effort normal. No respiratory distress. He has no wheezes.   Abdominal: There is no tenderness. There is no rebound and no guarding.   Musculoskeletal: Normal range of motion. He exhibits no edema or tenderness.   Lymphadenopathy:     He has no cervical adenopathy.   Neurological: He is alert and oriented to person, place, and time. He has normal reflexes. No cranial nerve deficit.   Skin: Skin is  warm and dry.    L facial  .5 cm keratosis   Psychiatric: He has a normal mood and affect. Judgment and thought content normal.       Assessment:       1. Gastroesophageal reflux disease, esophagitis presence not specified    2. Cough    3. Hypercholesteremia    4. Benign essential HTN    5. Chronic obstructive pulmonary disease, unspecified COPD type    6. Actinic keratosis        Plan:   Kevin was seen today for annual exam.    Diagnoses and all orders for this visit:    Gastroesophageal reflux disease, esophagitis presence not specified  -     omeprazole (PRILOSEC) 20 MG capsule; Take one po q am    Cough  -     montelukast (SINGULAIR) 10 mg tablet; Take 1 tablet (10 mg total) by mouth every evening.    Hypercholesteremia  -     losartan (COZAAR) 50 MG tablet; Take 1 tablet (50 mg total) by mouth once daily.    Benign essential HTN  -     verapamil (VERELAN) 100 MG CPCT; Take 1 capsule (100 mg total) by mouth once daily.    Chronic obstructive pulmonary disease, unspecified COPD type    Actinic keratosis

## 2018-11-14 DIAGNOSIS — K21.9 GASTROESOPHAGEAL REFLUX DISEASE WITHOUT ESOPHAGITIS: ICD-10-CM

## 2018-11-15 RX ORDER — FAMOTIDINE 20 MG/1
TABLET, FILM COATED ORAL
Qty: 30 TABLET | Refills: 11 | Status: ON HOLD | OUTPATIENT
Start: 2018-11-15 | End: 2019-08-27 | Stop reason: HOSPADM

## 2019-04-19 NOTE — PROGRESS NOTES
In x ray triage for thoracic and lumbar myelogram,   Subjective:       Patient ID: Kevin Echeverria Jr. is a 78 y.o. male.    Chief Complaint: Cough and Fever    Pt is a 78 y.o. male who presents for check up for Fever, unspecified fever cause  (primary encounter diagnosis)  Essential hypertension  Bronchitis. Doing well on current meds. Denies any side effects. Prevention is up to date.      Cough   Associated symptoms include a fever, shortness of breath and wheezing. Pertinent negatives include no chest pain, chills, ear pain, eye redness, headaches, myalgias, postnasal drip, rash, rhinorrhea or sore throat.   Fever    Associated symptoms include coughing and wheezing. Pertinent negatives include no abdominal pain, chest pain, congestion, diarrhea, ear pain, headaches, nausea, rash, sore throat, urinary pain or vomiting.     Review of Systems   Constitutional: Positive for fatigue and fever. Negative for activity change, appetite change and chills.        Low grade fever   HENT: Negative for congestion, ear pain, postnasal drip, rhinorrhea, sneezing and sore throat.    Eyes: Negative for pain, discharge, redness and visual disturbance.   Respiratory: Positive for cough, shortness of breath and wheezing. Negative for chest tightness and stridor.    Cardiovascular: Negative for chest pain, palpitations and leg swelling.   Gastrointestinal: Negative for abdominal distention, abdominal pain, blood in stool, constipation, diarrhea, nausea and vomiting.   Genitourinary: Negative for difficulty urinating, dysuria and hematuria.   Musculoskeletal: Negative for arthralgias, back pain, joint swelling, myalgias and neck pain.   Skin: Negative for rash and wound.   Neurological: Negative for dizziness, syncope, weakness, light-headedness and headaches.   Psychiatric/Behavioral: Negative for agitation and confusion. The patient is not nervous/anxious.        Objective:      Physical Exam   Constitutional: He is oriented to person, place, and time. He appears well-developed and  well-nourished.   HENT:   Head: Normocephalic.   Eyes: Pupils are equal, round, and reactive to light.   Neck: Normal range of motion. Neck supple. No thyromegaly present.   Cardiovascular: Normal rate and regular rhythm.  Exam reveals no friction rub.    No murmur heard.  Pulmonary/Chest: Effort normal. No respiratory distress. He has no wheezes. He has rales.   Coarse cough   Abdominal: There is no tenderness. There is no rebound and no guarding.   Musculoskeletal: Normal range of motion. He exhibits no edema or tenderness.   Lymphadenopathy:     He has no cervical adenopathy.   Neurological: He is alert and oriented to person, place, and time. He has normal reflexes. No cranial nerve deficit.   Skin: Skin is warm and dry.   Psychiatric: He has a normal mood and affect. Judgment and thought content normal.       Assessment:       1. Fever, unspecified fever cause    2. Essential hypertension    3. Bronchitis    4. Flu        Plan:   Kevin was seen today for cough and fever.    Diagnoses and all orders for this visit:    Fever, unspecified fever cause  -     POCT Influenza A/B    Essential hypertension    Bronchitis    Flu    Trial of Bevespi

## 2019-08-21 ENCOUNTER — OFFICE VISIT (OUTPATIENT)
Dept: INTERNAL MEDICINE | Facility: CLINIC | Age: 79
End: 2019-08-21
Payer: MEDICARE

## 2019-08-21 VITALS
BODY MASS INDEX: 23.89 KG/M2 | WEIGHT: 170.63 LBS | SYSTOLIC BLOOD PRESSURE: 110 MMHG | HEART RATE: 85 BPM | OXYGEN SATURATION: 98 % | DIASTOLIC BLOOD PRESSURE: 70 MMHG | HEIGHT: 71 IN | TEMPERATURE: 98 F | RESPIRATION RATE: 18 BRPM

## 2019-08-21 DIAGNOSIS — T78.40XA ALLERGIC REACTION, INITIAL ENCOUNTER: Primary | ICD-10-CM

## 2019-08-21 DIAGNOSIS — J02.9 SORE THROAT: ICD-10-CM

## 2019-08-21 PROCEDURE — 3074F SYST BP LT 130 MM HG: CPT | Mod: CPTII,S$GLB,, | Performed by: INTERNAL MEDICINE

## 2019-08-21 PROCEDURE — 99999 PR PBB SHADOW E&M-EST. PATIENT-LVL III: CPT | Mod: PBBFAC,,, | Performed by: INTERNAL MEDICINE

## 2019-08-21 PROCEDURE — 96372 PR INJECTION,THERAP/PROPH/DIAG2ST, IM OR SUBCUT: ICD-10-PCS | Mod: S$GLB,,, | Performed by: INTERNAL MEDICINE

## 2019-08-21 PROCEDURE — 96372 THER/PROPH/DIAG INJ SC/IM: CPT | Mod: S$GLB,,, | Performed by: INTERNAL MEDICINE

## 2019-08-21 PROCEDURE — 99999 PR PBB SHADOW E&M-EST. PATIENT-LVL III: ICD-10-PCS | Mod: PBBFAC,,, | Performed by: INTERNAL MEDICINE

## 2019-08-21 PROCEDURE — 99214 OFFICE O/P EST MOD 30 MIN: CPT | Mod: 25,S$GLB,, | Performed by: INTERNAL MEDICINE

## 2019-08-21 PROCEDURE — 1101F PR PT FALLS ASSESS DOC 0-1 FALLS W/OUT INJ PAST YR: ICD-10-PCS | Mod: CPTII,S$GLB,, | Performed by: INTERNAL MEDICINE

## 2019-08-21 PROCEDURE — 1101F PT FALLS ASSESS-DOCD LE1/YR: CPT | Mod: CPTII,S$GLB,, | Performed by: INTERNAL MEDICINE

## 2019-08-21 PROCEDURE — 99214 PR OFFICE/OUTPT VISIT, EST, LEVL IV, 30-39 MIN: ICD-10-PCS | Mod: 25,S$GLB,, | Performed by: INTERNAL MEDICINE

## 2019-08-21 PROCEDURE — 3078F DIAST BP <80 MM HG: CPT | Mod: CPTII,S$GLB,, | Performed by: INTERNAL MEDICINE

## 2019-08-21 PROCEDURE — 3074F PR MOST RECENT SYSTOLIC BLOOD PRESSURE < 130 MM HG: ICD-10-PCS | Mod: CPTII,S$GLB,, | Performed by: INTERNAL MEDICINE

## 2019-08-21 PROCEDURE — 3078F PR MOST RECENT DIASTOLIC BLOOD PRESSURE < 80 MM HG: ICD-10-PCS | Mod: CPTII,S$GLB,, | Performed by: INTERNAL MEDICINE

## 2019-08-21 RX ORDER — METHYLPREDNISOLONE ACETATE 80 MG/ML
80 INJECTION, SUSPENSION INTRA-ARTICULAR; INTRALESIONAL; INTRAMUSCULAR; SOFT TISSUE
Status: COMPLETED | OUTPATIENT
Start: 2019-08-21 | End: 2019-08-21

## 2019-08-21 RX ORDER — BUDESONIDE AND FORMOTEROL FUMARATE DIHYDRATE 160; 4.5 UG/1; UG/1
2 AEROSOL RESPIRATORY (INHALATION) EVERY 12 HOURS
COMMUNITY
End: 2021-08-23

## 2019-08-21 RX ADMIN — METHYLPREDNISOLONE ACETATE 80 MG: 80 INJECTION, SUSPENSION INTRA-ARTICULAR; INTRALESIONAL; INTRAMUSCULAR; SOFT TISSUE at 10:08

## 2019-08-21 NOTE — PROGRESS NOTES
Subjective:       Patient ID: Kevin Echeverria is a 79 y.o. male.    Chief Complaint: Hand Pain (R. hand- x monday morning swelling with pain PS:2) and Sore Throat (pain with swallowing- x few weeks comes and goes)      HPI:  Patient is known to me from acute visit and present with right hand swelling. Sx started 3 days ago. Thinks he got bitten by something. Took benadryl with mild improvement. First day had pain from the swelling but there is no longer pain as the swelling has improved. No itching. Reports had chills but didn't check temperature.     He also reports sore throat. This has been intermittent for several weeks. Feels it mostly on the left side. Sometimes pain with swallowing. No known fevers. No otalgia, congestion, sneezing. Has chronic cough a/w COPD which is not worsening.     Past Medical History:   Diagnosis Date    Arthritis     Cancer     prostate    COPD (chronic obstructive pulmonary disease)     Hyperlipidemia     Hypertension        Family History   Problem Relation Age of Onset    Diabetes Mother        Social History     Socioeconomic History    Marital status:      Spouse name: Not on file    Number of children: Not on file    Years of education: Not on file    Highest education level: Not on file   Occupational History    Not on file   Social Needs    Financial resource strain: Not on file    Food insecurity:     Worry: Not on file     Inability: Not on file    Transportation needs:     Medical: Not on file     Non-medical: Not on file   Tobacco Use    Smoking status: Never Smoker    Smokeless tobacco: Former User   Substance and Sexual Activity    Alcohol use: Yes     Comment: 3 or 4 times a week and 2 or 3 beer at the most    Drug use: No    Sexual activity: Never     Comment:    Lifestyle    Physical activity:     Days per week: Not on file     Minutes per session: Not on file    Stress: Not on file   Relationships    Social connections:     Talks  on phone: Not on file     Gets together: Not on file     Attends Faith service: Not on file     Active member of club or organization: Not on file     Attends meetings of clubs or organizations: Not on file     Relationship status: Not on file   Other Topics Concern    Not on file   Social History Narrative    Not on file       Review of Systems   Constitutional: Negative for activity change, fatigue, fever and unexpected weight change.   HENT: Positive for sore throat. Negative for congestion, ear pain, hearing loss and rhinorrhea.    Eyes: Negative for pain, redness and visual disturbance.   Respiratory: Negative for cough, shortness of breath and wheezing.    Cardiovascular: Negative for chest pain, palpitations and leg swelling.   Gastrointestinal: Negative for abdominal pain, constipation, diarrhea, nausea and vomiting.   Genitourinary: Negative for decreased urine volume, dysuria, frequency and urgency.   Musculoskeletal: Negative for back pain, joint swelling and neck pain.   Skin: Positive for rash. Negative for color change and wound.   Neurological: Negative for dizziness, tremors, weakness, light-headedness and headaches.         Objective:      Physical Exam   Constitutional: He is oriented to person, place, and time. He appears well-developed and well-nourished. No distress.   HENT:   Head: Normocephalic and atraumatic.   Right Ear: Tympanic membrane and external ear normal.   Left Ear: Tympanic membrane and external ear normal.   Mouth/Throat: Oropharynx is clear and moist. No oropharyngeal exudate, posterior oropharyngeal edema or posterior oropharyngeal erythema.   Eyes: Pupils are equal, round, and reactive to light. Conjunctivae and EOM are normal. Right eye exhibits no discharge. Left eye exhibits no discharge.   Neck: Neck supple. No tracheal deviation present.   Cardiovascular: Normal rate and regular rhythm.   No murmur heard.  Pulmonary/Chest: Effort normal and breath sounds normal. No  respiratory distress. He has no wheezes.   Abdominal: Soft. Bowel sounds are normal. He exhibits no distension. There is no tenderness.   Lymphadenopathy:     He has no cervical adenopathy.   Neurological: He is alert and oriented to person, place, and time. No cranial nerve deficit.   Skin: Skin is warm and dry. There is erythema (warmth and swelling of right forarm which is IMPROVING per patient).   Psychiatric: He has a normal mood and affect. His behavior is normal.   Vitals reviewed.      Assessment:       1. Allergic reaction, initial encounter    2. Sore throat        Plan:       Kevin was seen today for hand pain and sore throat.    Diagnoses and all orders for this visit:    Allergic reaction, initial encounter  -     methylPREDNISolone acetate injection 80 mg  New problem  improvng some with benadryl therefore not likely a cellulitis  Will do steroids, benadryl PO  On pepcid  Call if no improvement    Sore throat  New problem  ? Etiology  Could be viral  Could be ulcer  Exam is NORMAL making diagnosis difficult  Low suspicion bacterial infection  Gargle warm salt water, throat lozenges  Monitor and call if worsening sx  Wife very worried about prostate cancer reoccurrence in the throat. Reassured patient and wife unlikely at this time. There is no palpable mass. Sx are intermittent. Throat is unlikely place for prostate cancer to reoccur.

## 2019-08-24 ENCOUNTER — HOSPITAL ENCOUNTER (OUTPATIENT)
Facility: HOSPITAL | Age: 79
Discharge: HOME | DRG: 603 | End: 2019-08-27
Attending: SURGERY | Admitting: FAMILY MEDICINE
Payer: MEDICARE

## 2019-08-24 DIAGNOSIS — L03.90 CELLULITIS, UNSPECIFIED CELLULITIS SITE: Primary | ICD-10-CM

## 2019-08-24 DIAGNOSIS — M19.90 ARTHRITIS: ICD-10-CM

## 2019-08-24 DIAGNOSIS — M25.531 WRIST PAIN, ACUTE, RIGHT: ICD-10-CM

## 2019-08-24 DIAGNOSIS — R53.83 FATIGUE: ICD-10-CM

## 2019-08-24 LAB
ALBUMIN SERPL BCP-MCNC: 3.5 G/DL (ref 3.5–5.2)
ALP SERPL-CCNC: 52 U/L (ref 55–135)
ALT SERPL W/O P-5'-P-CCNC: 17 U/L (ref 10–44)
ANION GAP SERPL CALC-SCNC: 10 MMOL/L (ref 8–16)
AST SERPL-CCNC: 17 U/L (ref 10–40)
BASOPHILS NFR BLD: 0 % (ref 0–1.9)
BILIRUB SERPL-MCNC: 0.2 MG/DL (ref 0.1–1)
BILIRUB UR QL STRIP: NEGATIVE
BUN SERPL-MCNC: 12 MG/DL (ref 8–23)
CALCIUM SERPL-MCNC: 9.5 MG/DL (ref 8.7–10.5)
CHLORIDE SERPL-SCNC: 105 MMOL/L (ref 95–110)
CK MB SERPL-MCNC: 1.1 NG/ML (ref 0.1–6.5)
CK MB SERPL-RTO: 2 % (ref 0–5)
CK SERPL-CCNC: 55 U/L (ref 20–200)
CK SERPL-CCNC: 55 U/L (ref 20–200)
CLARITY UR: CLEAR
CO2 SERPL-SCNC: 21 MMOL/L (ref 23–29)
COLOR UR: YELLOW
CREAT SERPL-MCNC: 1.1 MG/DL (ref 0.5–1.4)
DEPRECATED S PYO AG THROAT QL EIA: NEGATIVE
DIFFERENTIAL METHOD: ABNORMAL
EOSINOPHIL NFR BLD: 2 % (ref 0–8)
ERYTHROCYTE [DISTWIDTH] IN BLOOD BY AUTOMATED COUNT: 11.6 % (ref 11.5–14.5)
ERYTHROCYTE [SEDIMENTATION RATE] IN BLOOD BY WESTERGREN METHOD: 52 MM/HR (ref 0–10)
EST. GFR  (AFRICAN AMERICAN): >60 ML/MIN/1.73 M^2
EST. GFR  (NON AFRICAN AMERICAN): >60 ML/MIN/1.73 M^2
GLUCOSE SERPL-MCNC: 115 MG/DL (ref 70–110)
GLUCOSE UR QL STRIP: NEGATIVE
HCT VFR BLD AUTO: 37.3 % (ref 40–54)
HGB BLD-MCNC: 12.5 G/DL (ref 14–18)
HGB UR QL STRIP: NEGATIVE
IMM GRANULOCYTES # BLD AUTO: ABNORMAL K/UL (ref 0–0.04)
IMM GRANULOCYTES NFR BLD AUTO: ABNORMAL % (ref 0–0.5)
INFLUENZA A, MOLECULAR: NEGATIVE
INFLUENZA B, MOLECULAR: NEGATIVE
KETONES UR QL STRIP: ABNORMAL
LACTATE SERPL-SCNC: 0.6 MMOL/L (ref 0.5–2.2)
LEUKOCYTE ESTERASE UR QL STRIP: NEGATIVE
LYMPHOCYTES NFR BLD: 40 % (ref 18–48)
MAGNESIUM SERPL-MCNC: 1.8 MG/DL (ref 1.6–2.6)
MCH RBC QN AUTO: 32.5 PG (ref 27–31)
MCHC RBC AUTO-ENTMCNC: 33.5 G/DL (ref 32–36)
MCV RBC AUTO: 97 FL (ref 82–98)
MONOCYTES NFR BLD: 9 % (ref 4–15)
NEUTROPHILS NFR BLD: 48 % (ref 38–73)
NEUTS BAND NFR BLD MANUAL: 1 %
NITRITE UR QL STRIP: NEGATIVE
NRBC BLD-RTO: 0 /100 WBC
PH UR STRIP: 7 [PH] (ref 5–8)
PHOSPHATE SERPL-MCNC: 2.2 MG/DL (ref 2.7–4.5)
PLATELET # BLD AUTO: 287 K/UL (ref 150–350)
PLATELET BLD QL SMEAR: ABNORMAL
PMV BLD AUTO: 10.5 FL (ref 9.2–12.9)
POLYCHROMASIA BLD QL SMEAR: ABNORMAL
POTASSIUM SERPL-SCNC: 4 MMOL/L (ref 3.5–5.1)
PROT SERPL-MCNC: 7.6 G/DL (ref 6–8.4)
PROT UR QL STRIP: NEGATIVE
RBC # BLD AUTO: 3.85 M/UL (ref 4.6–6.2)
SCHISTOCYTES BLD QL SMEAR: ABNORMAL
SCHISTOCYTES BLD QL SMEAR: PRESENT
SODIUM SERPL-SCNC: 136 MMOL/L (ref 136–145)
SP GR UR STRIP: 1.02 (ref 1–1.03)
SPECIMEN SOURCE: NORMAL
TROPONIN I SERPL DL<=0.01 NG/ML-MCNC: <0.006 NG/ML (ref 0–0.03)
TSH SERPL DL<=0.005 MIU/L-ACNC: 4.51 UIU/ML (ref 0.4–4)
URATE SERPL-MCNC: 3.8 MG/DL (ref 3.4–7)
URN SPEC COLLECT METH UR: ABNORMAL
UROBILINOGEN UR STRIP-ACNC: NEGATIVE EU/DL
WBC # BLD AUTO: 25.83 K/UL (ref 3.9–12.7)

## 2019-08-24 PROCEDURE — 84145 PROCALCITONIN (PCT): CPT

## 2019-08-24 PROCEDURE — 93010 EKG 12-LEAD: ICD-10-PCS | Mod: ,,, | Performed by: INTERNAL MEDICINE

## 2019-08-24 PROCEDURE — G0378 HOSPITAL OBSERVATION PER HR: HCPCS

## 2019-08-24 PROCEDURE — 86038 ANTINUCLEAR ANTIBODIES: CPT

## 2019-08-24 PROCEDURE — 85027 COMPLETE CBC AUTOMATED: CPT

## 2019-08-24 PROCEDURE — 83605 ASSAY OF LACTIC ACID: CPT

## 2019-08-24 PROCEDURE — 84100 ASSAY OF PHOSPHORUS: CPT

## 2019-08-24 PROCEDURE — 87081 CULTURE SCREEN ONLY: CPT

## 2019-08-24 PROCEDURE — 87040 BLOOD CULTURE FOR BACTERIA: CPT | Mod: 59

## 2019-08-24 PROCEDURE — 82550 ASSAY OF CK (CPK): CPT

## 2019-08-24 PROCEDURE — 85060 PATHOLOGIST REVIEW: ICD-10-PCS | Mod: ,,, | Performed by: PATHOLOGY

## 2019-08-24 PROCEDURE — 36415 COLL VENOUS BLD VENIPUNCTURE: CPT

## 2019-08-24 PROCEDURE — 86431 RHEUMATOID FACTOR QUANT: CPT

## 2019-08-24 PROCEDURE — 85651 RBC SED RATE NONAUTOMATED: CPT

## 2019-08-24 PROCEDURE — 80053 COMPREHEN METABOLIC PANEL: CPT

## 2019-08-24 PROCEDURE — 86788 WEST NILE VIRUS AB IGM: CPT

## 2019-08-24 PROCEDURE — 85007 BL SMEAR W/DIFF WBC COUNT: CPT

## 2019-08-24 PROCEDURE — 93005 ELECTROCARDIOGRAM TRACING: CPT

## 2019-08-24 PROCEDURE — 86618 LYME DISEASE ANTIBODY: CPT

## 2019-08-24 PROCEDURE — 93010 ELECTROCARDIOGRAM REPORT: CPT | Mod: ,,, | Performed by: INTERNAL MEDICINE

## 2019-08-24 PROCEDURE — 82553 CREATINE MB FRACTION: CPT

## 2019-08-24 PROCEDURE — 96365 THER/PROPH/DIAG IV INF INIT: CPT

## 2019-08-24 PROCEDURE — 99285 EMERGENCY DEPT VISIT HI MDM: CPT | Mod: 25

## 2019-08-24 PROCEDURE — 87880 STREP A ASSAY W/OPTIC: CPT

## 2019-08-24 PROCEDURE — 87502 INFLUENZA DNA AMP PROBE: CPT

## 2019-08-24 PROCEDURE — 84439 ASSAY OF FREE THYROXINE: CPT

## 2019-08-24 PROCEDURE — 84550 ASSAY OF BLOOD/URIC ACID: CPT

## 2019-08-24 PROCEDURE — 11000001 HC ACUTE MED/SURG PRIVATE ROOM

## 2019-08-24 PROCEDURE — 84484 ASSAY OF TROPONIN QUANT: CPT

## 2019-08-24 PROCEDURE — 84443 ASSAY THYROID STIM HORMONE: CPT

## 2019-08-24 PROCEDURE — 85060 BLOOD SMEAR INTERPRETATION: CPT | Mod: ,,, | Performed by: PATHOLOGY

## 2019-08-24 PROCEDURE — 86140 C-REACTIVE PROTEIN: CPT

## 2019-08-24 PROCEDURE — 81003 URINALYSIS AUTO W/O SCOPE: CPT

## 2019-08-24 PROCEDURE — 83735 ASSAY OF MAGNESIUM: CPT

## 2019-08-24 RX ORDER — MONTELUKAST SODIUM 10 MG/1
10 TABLET ORAL DAILY
COMMUNITY
End: 2019-10-15 | Stop reason: SDUPTHER

## 2019-08-24 RX ORDER — CLINDAMYCIN PHOSPHATE 600 MG/50ML
600 INJECTION, SOLUTION INTRAVENOUS
Status: DISCONTINUED | OUTPATIENT
Start: 2019-08-25 | End: 2019-08-27 | Stop reason: HOSPADM

## 2019-08-24 RX ADMIN — CLINDAMYCIN IN 5 PERCENT DEXTROSE 600 MG: 12 INJECTION, SOLUTION INTRAVENOUS at 11:08

## 2019-08-25 PROBLEM — M19.90 ARTHRITIS: Status: ACTIVE | Noted: 2019-08-25

## 2019-08-25 LAB
ALBUMIN SERPL BCP-MCNC: 3.1 G/DL (ref 3.5–5.2)
ALP SERPL-CCNC: 54 U/L (ref 55–135)
ALT SERPL W/O P-5'-P-CCNC: 26 U/L (ref 10–44)
ANION GAP SERPL CALC-SCNC: 10 MMOL/L (ref 8–16)
AST SERPL-CCNC: 28 U/L (ref 10–40)
BASOPHILS # BLD AUTO: ABNORMAL K/UL (ref 0–0.2)
BASOPHILS NFR BLD: 0 % (ref 0–1.9)
BILIRUB SERPL-MCNC: 0.5 MG/DL (ref 0.1–1)
BUN SERPL-MCNC: 10 MG/DL (ref 8–23)
CALCIUM SERPL-MCNC: 9.2 MG/DL (ref 8.7–10.5)
CHLORIDE SERPL-SCNC: 104 MMOL/L (ref 95–110)
CO2 SERPL-SCNC: 22 MMOL/L (ref 23–29)
CREAT SERPL-MCNC: 0.9 MG/DL (ref 0.5–1.4)
CRP SERPL-MCNC: 71.2 MG/L (ref 0–8.2)
DIFFERENTIAL METHOD: ABNORMAL
EOSINOPHIL # BLD AUTO: ABNORMAL K/UL (ref 0–0.5)
EOSINOPHIL NFR BLD: 1 % (ref 0–8)
ERYTHROCYTE [DISTWIDTH] IN BLOOD BY AUTOMATED COUNT: 11.5 % (ref 11.5–14.5)
EST. GFR  (AFRICAN AMERICAN): >60 ML/MIN/1.73 M^2
EST. GFR  (NON AFRICAN AMERICAN): >60 ML/MIN/1.73 M^2
GLUCOSE SERPL-MCNC: 99 MG/DL (ref 70–110)
HCT VFR BLD AUTO: 35.1 % (ref 40–54)
HGB BLD-MCNC: 11.5 G/DL (ref 14–18)
IMM GRANULOCYTES # BLD AUTO: ABNORMAL K/UL (ref 0–0.04)
IMM GRANULOCYTES NFR BLD AUTO: ABNORMAL % (ref 0–0.5)
LACTATE SERPL-SCNC: 0.6 MMOL/L (ref 0.5–2.2)
LACTATE SERPL-SCNC: 0.8 MMOL/L (ref 0.5–2.2)
LYMPHOCYTES # BLD AUTO: ABNORMAL K/UL (ref 1–4.8)
LYMPHOCYTES NFR BLD: 56 % (ref 18–48)
MCH RBC QN AUTO: 31.8 PG (ref 27–31)
MCHC RBC AUTO-ENTMCNC: 32.8 G/DL (ref 32–36)
MCV RBC AUTO: 97 FL (ref 82–98)
MONOCYTES # BLD AUTO: ABNORMAL K/UL (ref 0.3–1)
MONOCYTES NFR BLD: 3 % (ref 4–15)
MYELOCYTES NFR BLD MANUAL: 2 %
NEUTROPHILS NFR BLD: 37 % (ref 38–73)
NRBC BLD-RTO: 0 /100 WBC
PLATELET # BLD AUTO: 236 K/UL (ref 150–350)
PLATELET BLD QL SMEAR: ABNORMAL
PMV BLD AUTO: 10.8 FL (ref 9.2–12.9)
POLYCHROMASIA BLD QL SMEAR: ABNORMAL
POTASSIUM SERPL-SCNC: 4.1 MMOL/L (ref 3.5–5.1)
PROCALCITONIN SERPL IA-MCNC: 0.06 NG/ML
PROT SERPL-MCNC: 6.8 G/DL (ref 6–8.4)
RBC # BLD AUTO: 3.62 M/UL (ref 4.6–6.2)
RHEUMATOID FACT SERPL-ACNC: 12 IU/ML (ref 0–15)
SCHISTOCYTES BLD QL SMEAR: ABNORMAL
SODIUM SERPL-SCNC: 136 MMOL/L (ref 136–145)
T4 FREE SERPL-MCNC: 0.86 NG/DL (ref 0.71–1.51)
WBC # BLD AUTO: 21.8 K/UL (ref 3.9–12.7)
WBC OTHER NFR BLD MANUAL: 1 %

## 2019-08-25 PROCEDURE — 86747 PARVOVIRUS ANTIBODY: CPT

## 2019-08-25 PROCEDURE — 99222 1ST HOSP IP/OBS MODERATE 55: CPT | Mod: AI,,, | Performed by: FAMILY MEDICINE

## 2019-08-25 PROCEDURE — 25000242 PHARM REV CODE 250 ALT 637 W/ HCPCS: Performed by: FAMILY MEDICINE

## 2019-08-25 PROCEDURE — 11000001 HC ACUTE MED/SURG PRIVATE ROOM

## 2019-08-25 PROCEDURE — 80053 COMPREHEN METABOLIC PANEL: CPT

## 2019-08-25 PROCEDURE — 85007 BL SMEAR W/DIFF WBC COUNT: CPT

## 2019-08-25 PROCEDURE — 25000003 PHARM REV CODE 250: Performed by: SURGERY

## 2019-08-25 PROCEDURE — 25000003 PHARM REV CODE 250: Performed by: FAMILY MEDICINE

## 2019-08-25 PROCEDURE — 36415 COLL VENOUS BLD VENIPUNCTURE: CPT

## 2019-08-25 PROCEDURE — G0378 HOSPITAL OBSERVATION PER HR: HCPCS

## 2019-08-25 PROCEDURE — 99222 PR INITIAL HOSPITAL CARE,LEVL II: ICD-10-PCS | Mod: AI,,, | Performed by: FAMILY MEDICINE

## 2019-08-25 PROCEDURE — 94640 AIRWAY INHALATION TREATMENT: CPT

## 2019-08-25 PROCEDURE — S0077 INJECTION, CLINDAMYCIN PHOSP: HCPCS | Performed by: FAMILY MEDICINE

## 2019-08-25 PROCEDURE — 83605 ASSAY OF LACTIC ACID: CPT | Mod: 91

## 2019-08-25 PROCEDURE — S0077 INJECTION, CLINDAMYCIN PHOSP: HCPCS | Performed by: SURGERY

## 2019-08-25 PROCEDURE — 63600175 PHARM REV CODE 636 W HCPCS: Performed by: FAMILY MEDICINE

## 2019-08-25 PROCEDURE — 94761 N-INVAS EAR/PLS OXIMETRY MLT: CPT

## 2019-08-25 PROCEDURE — 85027 COMPLETE CBC AUTOMATED: CPT

## 2019-08-25 RX ORDER — IPRATROPIUM BROMIDE AND ALBUTEROL SULFATE 2.5; .5 MG/3ML; MG/3ML
3 SOLUTION RESPIRATORY (INHALATION) EVERY 6 HOURS PRN
Status: DISCONTINUED | OUTPATIENT
Start: 2019-08-25 | End: 2019-08-25

## 2019-08-25 RX ORDER — IPRATROPIUM BROMIDE AND ALBUTEROL SULFATE 2.5; .5 MG/3ML; MG/3ML
3 SOLUTION RESPIRATORY (INHALATION)
Status: DISCONTINUED | OUTPATIENT
Start: 2019-08-25 | End: 2019-08-27 | Stop reason: HOSPADM

## 2019-08-25 RX ORDER — PANTOPRAZOLE SODIUM 40 MG/1
40 TABLET, DELAYED RELEASE ORAL DAILY
Status: DISCONTINUED | OUTPATIENT
Start: 2019-08-25 | End: 2019-08-27 | Stop reason: HOSPADM

## 2019-08-25 RX ORDER — ONDANSETRON 2 MG/ML
4 INJECTION INTRAMUSCULAR; INTRAVENOUS EVERY 8 HOURS PRN
Status: DISCONTINUED | OUTPATIENT
Start: 2019-08-25 | End: 2019-08-27 | Stop reason: HOSPADM

## 2019-08-25 RX ORDER — ASPIRIN 81 MG/1
81 TABLET ORAL DAILY
Status: DISCONTINUED | OUTPATIENT
Start: 2019-08-25 | End: 2019-08-27 | Stop reason: HOSPADM

## 2019-08-25 RX ORDER — HYDROCODONE BITARTRATE AND ACETAMINOPHEN 5; 325 MG/1; MG/1
1 TABLET ORAL EVERY 4 HOURS PRN
Status: DISCONTINUED | OUTPATIENT
Start: 2019-08-25 | End: 2019-08-27 | Stop reason: HOSPADM

## 2019-08-25 RX ORDER — KETOROLAC TROMETHAMINE 15 MG/ML
15 INJECTION, SOLUTION INTRAMUSCULAR; INTRAVENOUS ONCE
Status: COMPLETED | OUTPATIENT
Start: 2019-08-25 | End: 2019-08-25

## 2019-08-25 RX ORDER — MONTELUKAST SODIUM 10 MG/1
10 TABLET ORAL DAILY
Status: DISCONTINUED | OUTPATIENT
Start: 2019-08-25 | End: 2019-08-27 | Stop reason: HOSPADM

## 2019-08-25 RX ORDER — LOSARTAN POTASSIUM 50 MG/1
50 TABLET ORAL DAILY
Status: DISCONTINUED | OUTPATIENT
Start: 2019-08-25 | End: 2019-08-27 | Stop reason: HOSPADM

## 2019-08-25 RX ORDER — SODIUM CHLORIDE 0.9 % (FLUSH) 0.9 %
10 SYRINGE (ML) INJECTION
Status: DISCONTINUED | OUTPATIENT
Start: 2019-08-25 | End: 2019-08-27 | Stop reason: HOSPADM

## 2019-08-25 RX ORDER — ACETAMINOPHEN 325 MG/1
650 TABLET ORAL EVERY 8 HOURS PRN
Status: DISCONTINUED | OUTPATIENT
Start: 2019-08-25 | End: 2019-08-27 | Stop reason: HOSPADM

## 2019-08-25 RX ORDER — PRAVASTATIN SODIUM 20 MG/1
20 TABLET ORAL NIGHTLY
Status: DISCONTINUED | OUTPATIENT
Start: 2019-08-25 | End: 2019-08-27 | Stop reason: HOSPADM

## 2019-08-25 RX ADMIN — IPRATROPIUM BROMIDE AND ALBUTEROL SULFATE 3 ML: .5; 3 SOLUTION RESPIRATORY (INHALATION) at 01:08

## 2019-08-25 RX ADMIN — CLINDAMYCIN IN 5 PERCENT DEXTROSE 600 MG: 12 INJECTION, SOLUTION INTRAVENOUS at 01:08

## 2019-08-25 RX ADMIN — PANTOPRAZOLE SODIUM 40 MG: 40 TABLET, DELAYED RELEASE ORAL at 08:08

## 2019-08-25 RX ADMIN — MONTELUKAST SODIUM 10 MG: 10 TABLET, FILM COATED ORAL at 09:08

## 2019-08-25 RX ADMIN — ASPIRIN 81 MG: 81 TABLET ORAL at 09:08

## 2019-08-25 RX ADMIN — LOSARTAN POTASSIUM 50 MG: 50 TABLET, FILM COATED ORAL at 09:08

## 2019-08-25 RX ADMIN — CLINDAMYCIN IN 5 PERCENT DEXTROSE 600 MG: 12 INJECTION, SOLUTION INTRAVENOUS at 08:08

## 2019-08-25 RX ADMIN — IPRATROPIUM BROMIDE AND ALBUTEROL SULFATE 3 ML: .5; 3 SOLUTION RESPIRATORY (INHALATION) at 07:08

## 2019-08-25 RX ADMIN — PRAVASTATIN SODIUM 20 MG: 20 TABLET ORAL at 09:08

## 2019-08-25 RX ADMIN — CLINDAMYCIN IN 5 PERCENT DEXTROSE 600 MG: 12 INJECTION, SOLUTION INTRAVENOUS at 06:08

## 2019-08-25 RX ADMIN — KETOROLAC TROMETHAMINE 15 MG: 15 INJECTION, SOLUTION INTRAMUSCULAR; INTRAVENOUS at 09:08

## 2019-08-25 NOTE — ED PROVIDER NOTES
[facility]                                                 Chief Complaint  79 y.o. male with Generalized Body Aches    History of Present Illness  Kevin Echeverria presents to the emergency room with right forearm cellulitis  The patient states that he was seen by MD last week for insect sting then  Pt states the arm has become more red with fever at [facility] this afternoon  Wife is concerned that the patient has cellulitis vs other, severe fatigue now    The history is provided by the patient   device was not used during this ER visit  Medical history: Arthritis, prostate cancer, COPD, HLD, HTN  Surgeries: Cataract, colonoscopy, prostate, tonsils  No known allergies    I have reviewed all of this patient's past medical, surgical, family, and social   histories as well as active allergies and medications documented in the  electronic medical record    Review of Systems and Physical Exam      Review of Systems  -- Constitution - fever and body aches with chills and no weakness  -- Eyes - no tearing or redness, no visual disturbance  -- Ear, Nose - no tinnitus or earache, no nasal congestion or discharge  -- Mouth,Throat - sore throat, no toothache, normal voice, normal swallowing  -- Respiratory - denies cough and congestion, no shortness of breath, no MORA  -- Cardiovascular - denies chest pain, no palpitations, denies claudication  -- Gastrointestinal - denies abdominal pain, nausea, vomiting, or diarrhea  -- Genitourinary - no dysuria, denies flank pain, no hematuria, no STD risk  -- Musculoskeletal - denies back pain, negative for trauma or injury  -- Neurological - no headache, denies weakness or seizure; no LOC  -- Skin - right dorsal forearm and hand swelling with erythema    Vital Signs  His oral temperature is 99.5 °F (37.5 °C).   His blood pressure is 161/72 and his pulse is 98.   His respiration is 20 and oxygen saturation is 96%.     Physical Exam  -- Nursing note and  vitals reviewed  -- Constitutional: Appears well-developed and well-nourished  -- Head: Atraumatic. Normocephalic. No obvious abnormality  -- Eyes: Pupils are equal and reactive to light. Normal conjunctiva and lids  -- Cardiac: Normal rate, regular rhythm and normal heart sounds  -- Pulmonary: Normal respiratory effort, breath sounds clear to auscultation  -- Abdominal: Soft, no tenderness. Normal bowel sounds. Normal liver edge  -- Musculoskeletal: Normal range of motion, no effusions. Joints stable   -- Neurological: No focal deficits. Showed good interaction with staff  -- Vascular: Posterior tibial, dorsalis pedis and radial pulses 2+ bilaterally    -- Lymphatics: No cervical or peripheral lymphadenopathy. No edema noted  -- Skin: Right forearm and dorsal hand swelling erythema, no fluctuance    Emergency Room Course      Lab Results     K 4.0      CO2 21 (L)   BUN 12   CREATININE 1.1    (H)   ALKPHOS 52 (L)   AST 17   ALT 17   BILITOT 0.2   ALBUMIN 3.5   PROT 7.6   WBC 25.83 (H)   HGB 12.5 (L)   HCT 37.3 (L)      CPK 55   CPK 55   CPKMB 1.1   TROPONINI <0.006   LACTATE 0.6   MG 1.8   TSH 4.507 (H)     Urinalysis  -- Urinalysis performed during this ER visit showed no signs of infection      EKG   -- The EKG findings today were without concerning findings from baseline     Radiology  -- Preliminary ER x-ray readings showed no evidence of fracture or dislocation  -- All x-rays are reviewed with a final disposition given by the radiologist     Additional Work up  -- ESR is 52, CRP is pending, West Nile virus also pending at patient's request  -- blood cultures are pending  -- the patient tested negative for influenza A in the emergency room today  -- The strep screen was negative    Medications Given  clindamycin 600 MG/50 ML D5W 600 mg/50 mL IVPB 600 mg (has no administration in time range)     MDM  Number of Diagnoses or Management Options  Cellulitis, unspecified cellulitis site: new,  needed workup  Fatigue: new, needed workup  Wrist pain, acute, right: new, needed workup     Amount and/or Complexity of Data Reviewed  Clinical lab tests: reviewed  Tests in the radiology section of CPT®: ordered and reviewed  Tests in the medicine section of CPT®: reviewed  Discuss the patient with other providers: yes    Risk of Complications, Morbidity, and/or Mortality  Presenting problems: moderate  Diagnostic procedures: moderate  Management options: moderate       ED Physician Management  -- Diagnosis management comments: 79 y.o. male with right forearm erythema  -- patient has a [place] of [id number] possibly from the steroid injection on 21st  -- patient has elevated ESR, negative lactic acid, the patient does not appear septic  -- we are not considering this patient's septic will put in hospital with IV clindamycin    Diagnosis  -- The primary encounter diagnosis was Cellulitis, unspecified cellulitis site.   -- Diagnoses of Fatigue and Wrist pain, acute, right were also pertinent to this visit.    Disposition and Plan  -- Disposition: admit  -- Condition: stable    This note is dictated on M*Modal word recognition program.  There are word recognition mistakes that are occasionally missed on review.                Rene Montaño MD  08/26/19 0805

## 2019-08-25 NOTE — H&P
[facility] Medicine  History & Physical    Patient Name: Kevin Echeverria  MRN: [id number]  Admission Date: 8/24/2019  Attending Physician: Rachelle Nolasco MD   Primary Care Provider: Bridger Cao MD         Patient information was obtained from patient and ER records.     Subjective:     Principal Problem:Cellulitis    Chief Complaint:   Chief Complaint   Patient presents with    Generalized Body Aches        HPI: 79 year old male with known h/o htn, dyslipidemia and copd presented to the er after a week of indolent redness and swelling as well as pain to his right wrist that did not improve with steroids. He notes that he was working outside and subsequently noticed redness and swelling to his right wrist. He didn't have any open lesions or obvious insect bites, but took benadryl and ibuprofen. By the next day, it was more red and swollen so he saw dr. Mario. At that point, he was given steroids. However, over the next days, he started with pain and swelling in the left hand into the shoulder and continued redness in the right hand, so he came to the ER. While in the Er, he had a fever and was also noted to have elevated WBC ct. The only other complaint as of late was a sore throat over the last 2 weeks.    Past Medical History:   Diagnosis Date    Arthritis     Cancer     prostate    COPD (chronic obstructive pulmonary disease)     Hyperlipidemia     Hypertension        Past Surgical History:   Procedure Laterality Date    CATARACT EXTRACTION W/  INTRAOCULAR LENS IMPLANT Left 05/05/2016    COLONOSCOPY W/ BIOPSIES AND POLYPECTOMY      INSERTION-INTRAOCULAR LENS (IOL) Right 6/9/2016    [facility]    INSERTION-INTRAOCULAR LENS (IOL) Left 5/12/2016    [facility]    PHACOEMULSIFICATION-ASPIRATION-CATARACT Right 6/9/2016    [facility]    PHACOEMULSIFICATION-ASPIRATION-CATARACT  Left 5/12/2016    [facility] OR    PROSTATE SURGERY  05/1996    TONSILLECTOMY  1956       Review of patient's allergies indicates:  No Known Allergies    No current facility-administered medications on file prior to encounter.      Current Outpatient Medications on File Prior to Encounter   Medication Sig    albuterol (PROVENTIL) 2.5 mg /3 mL (0.083 %) nebulizer solution USE 1 VIAL PER NEBULIZER TREATMENT EVERY 6 HOURS  AS NEEDED FOR WHEEZING    aspirin (ECOTRIN) 81 MG EC tablet Take 81 mg by mouth once daily.      budesonide-formoterol 160-4.5 mcg (SYMBICORT) 160-4.5 mcg/actuation HFAA Inhale 2 puffs into the lungs every 12 (twelve) hours. Controller    famotidine (PEPCID) 20 MG tablet TAKE ONE TABLET BY MOUTH EVERY DAY    fish oil-omega-3 fatty acids 300-1,000 mg capsule Take 1 g by mouth once daily.      ipratropium (ATROVENT) 0.02 % nebulizer solution     losartan (COZAAR) 50 MG tablet Take 1 tablet (50 mg total) by mouth once daily.    montelukast (SINGULAIR) 10 mg tablet Take 10 mg by mouth once daily.    multivitamin with minerals tablet Take 1 tablet by mouth once daily. Equate Brand with Iron    niacin 250 MG Tab Take 1 tablet (250 mg total) by mouth daily with breakfast.    pravastatin (PRAVACHOL) 20 MG tablet Take 1 tablet (20 mg total) by mouth nightly.    verapamil (VERELAN) 100 MG CPCT Take 1 capsule (100 mg total) by mouth once daily.    [DISCONTINUED] omeprazole (PRILOSEC) 20 MG capsule Take one po q am     Family History     Problem Relation (Age of Onset)    Diabetes Mother        Tobacco Use    Smoking status: Never Smoker    Smokeless tobacco: Former User   Substance and Sexual Activity    Alcohol use: Yes     Comment: 3 or 4 times a week and 2 or 3 beer at the most    Drug use: No    Sexual activity: Not on file     Comment:      Review of Systems   Constitutional: Negative for chills and fever.   HENT: Positive for sore throat. Negative for  congestion, ear pain, postnasal drip, rhinorrhea and trouble swallowing.    Eyes: Negative for redness and itching.   Respiratory: Positive for cough. Negative for shortness of breath and wheezing.    Cardiovascular: Negative for chest pain and palpitations.   Gastrointestinal: Negative for abdominal pain, diarrhea, nausea and vomiting.   Genitourinary: Negative for dysuria and frequency.   Musculoskeletal: Positive for arthralgias.   Skin: Positive for color change. Negative for rash.   Neurological: Negative for weakness and headaches.     Objective:     Vital Signs (Most Recent):  Temp: 97.8 °F (36.6 °C) (08/25/19 1102)  Pulse: 73 (08/25/19 1102)  Resp: 18 (08/25/19 0716)  BP: (!) 142/63 (08/25/19 1102)  SpO2: 97 % (08/25/19 1102) Vital Signs (24h Range):  Temp:  [96.6 °F (35.9 °C)-99.6 °F (37.6 °C)] 97.8 °F (36.6 °C)  Pulse:  [73-98] 73  Resp:  [16-20] 18  SpO2:  [94 %-98 %] 97 %  BP: (137-161)/(63-88) 142/63     Weight: 80.1 kg (176 lb 9.4 oz)  Body mass index is 25.34 kg/m².    Physical Exam   Constitutional: He is oriented to person, place, and time. He appears well-developed and well-nourished. No distress.   HENT:   Head: Normocephalic and atraumatic.   Throat completely clear   Eyes: Pupils are equal, round, and reactive to light. Conjunctivae are normal.   Neck: Normal range of motion. Neck supple. No thyromegaly present.   Cardiovascular: Normal rate, regular rhythm, normal heart sounds and intact distal pulses.   Pulmonary/Chest: Effort normal and breath sounds normal. No respiratory distress. He has no wheezes.   Abdominal: Soft. Bowel sounds are normal. There is no tenderness.   Musculoskeletal: Normal range of motion. He exhibits deformity (swelling to 2/3 MCP joints on left hand). He exhibits no edema.   Lymphadenopathy:     He has no cervical adenopathy.   Neurological: He is alert and oriented to person, place, and time.   Skin: Skin is warm and dry. No rash noted. There is erythema (demarcated  erythema to right distal ulnar area without puncture wound/break in skin).   Psychiatric: He has a normal mood and affect. His behavior is normal.   Nursing note and vitals reviewed.        CRANIAL NERVES     CN III, IV, VI   Pupils are equal, round, and reactive to light.       Significant Labs:   CBC:   Recent Labs   Lab 08/24/19 2214 08/25/19  0637   WBC 25.83* 21.80*   HGB 12.5* 11.5*   HCT 37.3* 35.1*    236     CMP:   Recent Labs   Lab 08/24/19 2214 08/25/19  0637    136   K 4.0 4.1    104   CO2 21* 22*   * 99   BUN 12 10   CREATININE 1.1 0.9   CALCIUM 9.5 9.2   PROT 7.6 6.8   ALBUMIN 3.5 3.1*   BILITOT 0.2 0.5   ALKPHOS 52* 54*   AST 17 28   ALT 17 26   ANIONGAP 10 10   EGFRNONAA >60 >60     Lactic Acid:   Recent Labs   Lab 08/24/19 2256 08/25/19  0317 08/25/19  0637   LACTATE 0.6 0.8 0.6       Significant Imaging: I have reviewed all pertinent imaging results/findings within the past 24 hours.     Wrist XR:      No acute radiographic findings of the wrist.    Assessment/Plan:     * Cellulitis  On Clinda, redness improving.  Follow up blood cultures.   Monitor for fevers, WBC ct - trending down. (noted is lack of left shift and also noted that patient received depomedrol in clinic).       Arthritis  Osteoarthritis hx only.    Lyn pending.    RF normal.    Give dose of toradol today.  Will h/o recent sore throat and fevers, check for parvo.      Benign essential HTN  Resume home meds.        VTE Risk Mitigation (From admission, onward)        Ordered     Place sequential compression device  Until discontinued      08/25/19 0904     Place sequential compression device  Until discontinued      08/25/19 0117             Rachelle Nolasco MD  Department of Hospital Medicine   [facility]

## 2019-08-25 NOTE — ASSESSMENT & PLAN NOTE
On Clinda, redness improving.  Follow up blood cultures.   Monitor for fevers, WBC ct - trending down. (noted is lack of left shift and also noted that patient received depomedrol in clinic).

## 2019-08-25 NOTE — ED TRIAGE NOTES
79 y.o. male presents to ER ED 05/ED 05   Chief Complaint   Patient presents with    Generalized Body Aches   Pt reports aching to joints and fever onset today. No acute distress noted.

## 2019-08-25 NOTE — PLAN OF CARE
Problem: Adult Inpatient Plan of Care  Goal: Plan of Care Review  Admit from ER early this am with right arm cellulitis; iv antibiotics given as per orders.  Afebrile with vital signs stable.  Lab tests in progress for further evaluation.  Denies c/o pain or distress at present.  Plan of care reviewed with patient; verbalized understanding.

## 2019-08-25 NOTE — NURSING
0110:  Received from ER to room 306 in stable condition.  Beside report from MAIDA Estrada.  Oriented patient to room and surroundings; admit assessment initiated.

## 2019-08-25 NOTE — ED NOTES
Care assumed. The patient is awake, alert and cooperative with a calm affect, patient is aware of environment. Airway is open and patent, respirations are spontaneous, normal respiratory effort and rate noted, skin warm and dry, full ROM in all extremities, appearance: NAD noted.  Bed in low, locked position, side rails up x2.  Call bell within reach of pt.  Pt verbalizes understanding of use. Family at bedside. Patient aware of admission; awaiting bed availability.

## 2019-08-25 NOTE — SUBJECTIVE & OBJECTIVE
Past Medical History:   Diagnosis Date    Arthritis     Cancer     prostate    COPD (chronic obstructive pulmonary disease)     Hyperlipidemia     Hypertension        Past Surgical History:   Procedure Laterality Date    CATARACT EXTRACTION W/  INTRAOCULAR LENS IMPLANT Left 05/05/2016    COLONOSCOPY W/ BIOPSIES AND POLYPECTOMY      INSERTION-INTRAOCULAR LENS (IOL) Right 6/9/2016    [facility]    INSERTION-INTRAOCULAR LENS (IOL) Left 5/12/2016    [facility]    PHACOEMULSIFICATION-ASPIRATION-CATARACT Right 6/9/2016    Per[facility]    PHACOEMULSIFICATION-ASPIRATION-CATARACT Left 5/12/2016    [facility]    PROSTATE SURGERY  05/1996    TONSILLECTOMY  1956       Review of patient's allergies indicates:  No Known Allergies    No current facility-administered medications on file prior to encounter.      Current Outpatient Medications on File Prior to Encounter   Medication Sig    albuterol (PROVENTIL) 2.5 mg /3 mL (0.083 %) nebulizer solution USE 1 VIAL PER NEBULIZER TREATMENT EVERY 6 HOURS  AS NEEDED FOR WHEEZING    aspirin (ECOTRIN) 81 MG EC tablet Take 81 mg by mouth once daily.      budesonide-formoterol 160-4.5 mcg (SYMBICORT) 160-4.5 mcg/actuation HFAA Inhale 2 puffs into the lungs every 12 (twelve) hours. Controller    famotidine (PEPCID) 20 MG tablet TAKE ONE TABLET BY MOUTH EVERY DAY    fish oil-omega-3 fatty acids 300-1,000 mg capsule Take 1 g by mouth once daily.      ipratropium (ATROVENT) 0.02 % nebulizer solution     losartan (COZAAR) 50 MG tablet Take 1 tablet (50 mg total) by mouth once daily.    montelukast (SINGULAIR) 10 mg tablet Take 10 mg by mouth once daily.    multivitamin with minerals tablet Take 1 tablet by mouth once daily. Equate Brand with Iron    niacin 250 MG Tab Take 1 tablet (250 mg total) by mouth daily with breakfast.    pravastatin (PRAVACHOL)  20 MG tablet Take 1 tablet (20 mg total) by mouth nightly.    verapamil (VERELAN) 100 MG CPCT Take 1 capsule (100 mg total) by mouth once daily.    [DISCONTINUED] omeprazole (PRILOSEC) 20 MG capsule Take one po q am     Family History     Problem Relation (Age of Onset)    Diabetes Mother        Tobacco Use    Smoking status: Never Smoker    Smokeless tobacco: Former User   Substance and Sexual Activity    Alcohol use: Yes     Comment: 3 or 4 times a week and 2 or 3 beer at the most    Drug use: No    Sexual activity: Not on file     Comment:      Review of Systems   Constitutional: Negative for chills and fever.   HENT: Positive for sore throat. Negative for congestion, ear pain, postnasal drip, rhinorrhea and trouble swallowing.    Eyes: Negative for redness and itching.   Respiratory: Positive for cough. Negative for shortness of breath and wheezing.    Cardiovascular: Negative for chest pain and palpitations.   Gastrointestinal: Negative for abdominal pain, diarrhea, nausea and vomiting.   Genitourinary: Negative for dysuria and frequency.   Musculoskeletal: Positive for arthralgias.   Skin: Positive for color change. Negative for rash.   Neurological: Negative for weakness and headaches.     Objective:     Vital Signs (Most Recent):  Temp: 97.8 °F (36.6 °C) (08/25/19 1102)  Pulse: 73 (08/25/19 1102)  Resp: 18 (08/25/19 0716)  BP: (!) 142/63 (08/25/19 1102)  SpO2: 97 % (08/25/19 1102) Vital Signs (24h Range):  Temp:  [96.6 °F (35.9 °C)-99.6 °F (37.6 °C)] 97.8 °F (36.6 °C)  Pulse:  [73-98] 73  Resp:  [16-20] 18  SpO2:  [94 %-98 %] 97 %  BP: (137-161)/(63-88) 142/63     Weight: 80.1 kg (176 lb 9.4 oz)  Body mass index is 25.34 kg/m².    Physical Exam   Constitutional: He is oriented to person, place, and time. He appears well-developed and well-nourished. No distress.   HENT:   Head: Normocephalic and atraumatic.   Throat completely clear   Eyes: Pupils are equal, round, and reactive to light.  Conjunctivae are normal.   Neck: Normal range of motion. Neck supple. No thyromegaly present.   Cardiovascular: Normal rate, regular rhythm, normal heart sounds and intact distal pulses.   Pulmonary/Chest: Effort normal and breath sounds normal. No respiratory distress. He has no wheezes.   Abdominal: Soft. Bowel sounds are normal. There is no tenderness.   Musculoskeletal: Normal range of motion. He exhibits deformity (swelling to 2/3 MCP joints on left hand). He exhibits no edema.   Lymphadenopathy:     He has no cervical adenopathy.   Neurological: He is alert and oriented to person, place, and time.   Skin: Skin is warm and dry. No rash noted. There is erythema (demarcated erythema to right distal ulnar area without puncture wound/break in skin).   Psychiatric: He has a normal mood and affect. His behavior is normal.   Nursing note and vitals reviewed.        CRANIAL NERVES     CN III, IV, VI   Pupils are equal, round, and reactive to light.       Significant Labs:   CBC:   Recent Labs   Lab 08/24/19 2214 08/25/19  0637   WBC 25.83* 21.80*   HGB 12.5* 11.5*   HCT 37.3* 35.1*    236     CMP:   Recent Labs   Lab 08/24/19  2214 08/25/19  0637    136   K 4.0 4.1    104   CO2 21* 22*   * 99   BUN 12 10   CREATININE 1.1 0.9   CALCIUM 9.5 9.2   PROT 7.6 6.8   ALBUMIN 3.5 3.1*   BILITOT 0.2 0.5   ALKPHOS 52* 54*   AST 17 28   ALT 17 26   ANIONGAP 10 10   EGFRNONAA >60 >60     Lactic Acid:   Recent Labs   Lab 08/24/19  2256 08/25/19  0317 08/25/19  0637   LACTATE 0.6 0.8 0.6       Significant Imaging: I have reviewed all pertinent imaging results/findings within the past 24 hours.     Wrist XR:      No acute radiographic findings of the wrist.

## 2019-08-25 NOTE — HPI
79 year old male with known h/o htn, dyslipidemia and copd presented to the er after a week of indolent redness and swelling as well as pain to his right wrist that did not improve with steroids. He notes that he was working outside and subsequently noticed redness and swelling to his right wrist. He didn't have any open lesions or obvious insect bites, but took benadryl and ibuprofen. By the next day, it was more red and swollen so he saw dr. Mario. At that point, he was given steroids. However, over the next days, he started with pain and swelling in the left hand into the shoulder and continued redness in the right hand, so he came to the ER. While in the Er, he had a fever and was also noted to have elevated WBC ct. The only other complaint as of late was a sore throat over the last 2 weeks.

## 2019-08-25 NOTE — PLAN OF CARE
Problem: Adult Inpatient Plan of Care  Goal: Plan of Care Review  Outcome: Ongoing (interventions implemented as appropriate)  Patient is compliant with fluid and medication management.  Patient is compliant with with all lab testing and procedures.  Patient remains free from all falls and injury.  VSS. Patient reports less pain to hands and left shoulder.  Swelling of right wrist and left hand has decreased. Plan of care reviewed and agreed upon with patient.  Will continue to monitor.

## 2019-08-25 NOTE — ASSESSMENT & PLAN NOTE
Osteoarthritis hx only.    Lyn pending.    RF normal.    Give dose of toradol today.  Will h/o recent sore throat and fevers, check for parvo.

## 2019-08-25 NOTE — ED NOTES
Patient transported to room 306 via wheelchair per myself. Wife present. Bedside report given to Carmen BEY. Care relinquished.

## 2019-08-26 ENCOUNTER — TELEPHONE (OUTPATIENT)
Dept: INTERNAL MEDICINE | Facility: CLINIC | Age: 79
End: 2019-08-26

## 2019-08-26 LAB
ALBUMIN SERPL BCP-MCNC: 3.1 G/DL (ref 3.5–5.2)
ALP SERPL-CCNC: 54 U/L (ref 55–135)
ALT SERPL W/O P-5'-P-CCNC: 24 U/L (ref 10–44)
ANA SER QL IF: NORMAL
ANION GAP SERPL CALC-SCNC: 10 MMOL/L (ref 8–16)
AST SERPL-CCNC: 20 U/L (ref 10–40)
BASOPHILS # BLD AUTO: ABNORMAL K/UL (ref 0–0.2)
BASOPHILS NFR BLD: 0 % (ref 0–1.9)
BILIRUB SERPL-MCNC: 0.4 MG/DL (ref 0.1–1)
BLASTS NFR BLD MANUAL: 10 %
BUN SERPL-MCNC: 15 MG/DL (ref 8–23)
CALCIUM SERPL-MCNC: 9.3 MG/DL (ref 8.7–10.5)
CHLORIDE SERPL-SCNC: 101 MMOL/L (ref 95–110)
CO2 SERPL-SCNC: 23 MMOL/L (ref 23–29)
CREAT SERPL-MCNC: 1 MG/DL (ref 0.5–1.4)
DIFFERENTIAL METHOD: ABNORMAL
EOSINOPHIL # BLD AUTO: ABNORMAL K/UL (ref 0–0.5)
EOSINOPHIL NFR BLD: 3 % (ref 0–8)
ERYTHROCYTE [DISTWIDTH] IN BLOOD BY AUTOMATED COUNT: 11.4 % (ref 11.5–14.5)
EST. GFR  (AFRICAN AMERICAN): >60 ML/MIN/1.73 M^2
EST. GFR  (NON AFRICAN AMERICAN): >60 ML/MIN/1.73 M^2
GLUCOSE SERPL-MCNC: 102 MG/DL (ref 70–110)
HCT VFR BLD AUTO: 34.8 % (ref 40–54)
HGB BLD-MCNC: 11.7 G/DL (ref 14–18)
IMM GRANULOCYTES # BLD AUTO: ABNORMAL K/UL (ref 0–0.04)
IMM GRANULOCYTES NFR BLD AUTO: ABNORMAL % (ref 0–0.5)
LYMPHOCYTES # BLD AUTO: ABNORMAL K/UL (ref 1–4.8)
LYMPHOCYTES NFR BLD: 35 % (ref 18–48)
MCH RBC QN AUTO: 32.1 PG (ref 27–31)
MCHC RBC AUTO-ENTMCNC: 33.6 G/DL (ref 32–36)
MCV RBC AUTO: 96 FL (ref 82–98)
MONOCYTES # BLD AUTO: ABNORMAL K/UL (ref 0.3–1)
MONOCYTES NFR BLD: 5 % (ref 4–15)
NEUTROPHILS NFR BLD: 47 % (ref 38–73)
NRBC BLD-RTO: 0 /100 WBC
PATH REV BLD -IMP: NORMAL
PLATELET # BLD AUTO: 257 K/UL (ref 150–350)
PLATELET BLD QL SMEAR: ABNORMAL
PMV BLD AUTO: 10.5 FL (ref 9.2–12.9)
POLYCHROMASIA BLD QL SMEAR: ABNORMAL
POTASSIUM SERPL-SCNC: 4 MMOL/L (ref 3.5–5.1)
PROT SERPL-MCNC: 7 G/DL (ref 6–8.4)
RBC # BLD AUTO: 3.64 M/UL (ref 4.6–6.2)
SODIUM SERPL-SCNC: 134 MMOL/L (ref 136–145)
WBC # BLD AUTO: 22.66 K/UL (ref 3.9–12.7)

## 2019-08-26 PROCEDURE — 25000003 PHARM REV CODE 250: Performed by: FAMILY MEDICINE

## 2019-08-26 PROCEDURE — 85027 COMPLETE CBC AUTOMATED: CPT

## 2019-08-26 PROCEDURE — 94640 AIRWAY INHALATION TREATMENT: CPT

## 2019-08-26 PROCEDURE — 94761 N-INVAS EAR/PLS OXIMETRY MLT: CPT

## 2019-08-26 PROCEDURE — 25000242 PHARM REV CODE 250 ALT 637 W/ HCPCS: Performed by: FAMILY MEDICINE

## 2019-08-26 PROCEDURE — 99232 PR SUBSEQUENT HOSPITAL CARE,LEVL II: ICD-10-PCS | Mod: ,,, | Performed by: INTERNAL MEDICINE

## 2019-08-26 PROCEDURE — 11000001 HC ACUTE MED/SURG PRIVATE ROOM

## 2019-08-26 PROCEDURE — 36415 COLL VENOUS BLD VENIPUNCTURE: CPT

## 2019-08-26 PROCEDURE — S0077 INJECTION, CLINDAMYCIN PHOSP: HCPCS | Performed by: FAMILY MEDICINE

## 2019-08-26 PROCEDURE — 99232 SBSQ HOSP IP/OBS MODERATE 35: CPT | Mod: ,,, | Performed by: INTERNAL MEDICINE

## 2019-08-26 PROCEDURE — 85007 BL SMEAR W/DIFF WBC COUNT: CPT

## 2019-08-26 PROCEDURE — 80053 COMPREHEN METABOLIC PANEL: CPT

## 2019-08-26 PROCEDURE — G0378 HOSPITAL OBSERVATION PER HR: HCPCS

## 2019-08-26 RX ADMIN — LOSARTAN POTASSIUM 50 MG: 50 TABLET, FILM COATED ORAL at 09:08

## 2019-08-26 RX ADMIN — IPRATROPIUM BROMIDE AND ALBUTEROL SULFATE 3 ML: .5; 3 SOLUTION RESPIRATORY (INHALATION) at 01:08

## 2019-08-26 RX ADMIN — CLINDAMYCIN IN 5 PERCENT DEXTROSE 600 MG: 12 INJECTION, SOLUTION INTRAVENOUS at 05:08

## 2019-08-26 RX ADMIN — IPRATROPIUM BROMIDE AND ALBUTEROL SULFATE 3 ML: .5; 3 SOLUTION RESPIRATORY (INHALATION) at 07:08

## 2019-08-26 RX ADMIN — CLINDAMYCIN IN 5 PERCENT DEXTROSE 600 MG: 12 INJECTION, SOLUTION INTRAVENOUS at 12:08

## 2019-08-26 RX ADMIN — MONTELUKAST SODIUM 10 MG: 10 TABLET, FILM COATED ORAL at 09:08

## 2019-08-26 RX ADMIN — PANTOPRAZOLE SODIUM 40 MG: 40 TABLET, DELAYED RELEASE ORAL at 09:08

## 2019-08-26 RX ADMIN — CLINDAMYCIN IN 5 PERCENT DEXTROSE 600 MG: 12 INJECTION, SOLUTION INTRAVENOUS at 06:08

## 2019-08-26 RX ADMIN — PRAVASTATIN SODIUM 20 MG: 20 TABLET ORAL at 09:08

## 2019-08-26 RX ADMIN — ASPIRIN 81 MG: 81 TABLET ORAL at 09:08

## 2019-08-26 RX ADMIN — CLINDAMYCIN IN 5 PERCENT DEXTROSE 600 MG: 12 INJECTION, SOLUTION INTRAVENOUS at 01:08

## 2019-08-26 NOTE — PLAN OF CARE
"Problem: Adult Inpatient Plan of Care  Goal: Plan of Care Review  Outcome: Ongoing (interventions implemented as appropriate)  Pt admitted with possible cellulitis right wrist / forearm area. Redness and swelling resolved. WBC remains elevated at 22. Only complaint of "aching" is to left shoulder. Elevated sed rate and CRP. Blood cultures NTD. [place], lyme disease and parvo still pending. Vitals stable. Afebrile. Remains on IV clindamycin. Telemetry discontinued today. Ambulating well; tolerating diet. Reviewed plan of care with pt and wife; state agreement.       "

## 2019-08-26 NOTE — ASSESSMENT & PLAN NOTE
On Clinda, redness improving.  Follow up blood cultures.   Monitor for fevers, WBC ct - trending down. (noted is lack of left shift and also noted that patient received depomedrol in clinic).   8/26 Afebrile last 24 hrs, Blood cultures negative x 2 d   Clinda day 2  Still with leukocytosis  Very rapid development and then resolution. I'm unclear if this is truly bacterial infection but as sx resolved today will cont antibx. I'm in favor of monitoring another 24 hours to ensure no further flare. Would complete 7 day course of clinda PO but I am considering other rheumatologic causes of his presentation. Several labs sent this weekend and pending.

## 2019-08-26 NOTE — PLAN OF CARE
08/26/19 1107   Medicare Message   Important Message from Medicare regarding Discharge Appeal Rights Given to patient/caregiver;Explained to patient/caregiver;Signed/date by patient/caregiver   Date IMM was signed 08/25/19   Time IMM was signed 1214

## 2019-08-26 NOTE — TELEPHONE ENCOUNTER
----- Message from Madison Delaney sent at 2019  8:09 AM CDT -----  Contact: wife-carmina Echeverria  MRN: [id number]  : 1940  PCP: Bridger Cao  Home Phone      [phone]  Work Phone      Not on file.  Mobile          [phone]      MESSAGE:   Patient is currently in the hospital,His wife just wanted to let  know. His arm was swollen last week and he went get a shot at the doctor and his other arm became swollen.     [phone]

## 2019-08-26 NOTE — PROGRESS NOTES
[facility] Medicine  Progress Note    Patient Name: Kevin Echeverria  MRN: [id number]  Patient Class: IP- Inpatient   Admission Date: 8/24/2019  Length of Stay: 2 days  Attending Physician: Rachelle Nolasco MD  Primary Care Provider: Bridger Cao MD        Subjective:     Principal Problem:Cellulitis        HPI:  79 year old male with known h/o htn, dyslipidemia and copd presented to the er after a week of indolent redness and swelling as well as pain to his right wrist that did not improve with steroids. He notes that he was working outside and subsequently noticed redness and swelling to his right wrist. He didn't have any open lesions or obvious insect bites, but took benadryl and ibuprofen. By the next day, it was more red and swollen so he saw dr. Mario. At that point, he was given steroids. However, over the next days, he started with pain and swelling in the left hand into the shoulder and continued redness in the right hand, so he came to the ER. While in the Er, he had a fever and was also noted to have elevated WBC ct. The only other complaint as of late was a sore throat over the last 2 weeks.    Overview/Hospital Course:  8/26/19 redness to wrist much better, still with c/o joint pain ; mainly left shoulder pain, but overall feeling much better today  WBC 25.83>21.80>22.66, afebrile, last 24 hrs  Sed rate and CRP 71.2, sed rate 52 , RF- 12  Awaiting, Parvo, West Nile and EVE  Will continue ABX for now; monitor for 24 more hours; possible D/C tomorrow     Past Medical History:   Diagnosis Date    Arthritis     Cancer     prostate    COPD (chronic obstructive pulmonary disease)     Hyperlipidemia     Hypertension        Past Surgical History:   Procedure Laterality Date    CATARACT EXTRACTION W/  INTRAOCULAR LENS IMPLANT Left 05/05/2016    COLONOSCOPY W/ BIOPSIES AND POLYPECTOMY      INSERTION-INTRAOCULAR LENS (IOL) Right 6/9/2016    Performed by [facility]man [facility]  at [facility]    INSERTION-INTRAOCULAR LENS (IOL) Left 5/12/2016    [facility] at [facility]    PHACOEMULSIFICATION-ASPIRATION-CATARACT Right 6/9/2016    Performed by Avel Huffman MD at [facility]    PHACOEMULSIFICATION-ASPIRATION-CATARACT Left 5/12/2016    Performed by [facility] at [facility]    PROSTATE SURGERY  05/1996    TONSILLECTOMY  1956       Review of patient's allergies indicates:  No Known Allergies    No current facility-administered medications on file prior to encounter.      Current Outpatient Medications on File Prior to Encounter   Medication Sig    albuterol (PROVENTIL) 2.5 mg /3 mL (0.083 %) nebulizer solution USE 1 VIAL PER NEBULIZER TREATMENT EVERY 6 HOURS  AS NEEDED FOR WHEEZING    aspirin (ECOTRIN) 81 MG EC tablet Take 81 mg by mouth once daily.      budesonide-formoterol 160-4.5 mcg (SYMBICORT) 160-4.5 mcg/actuation HFAA Inhale 2 puffs into the lungs every 12 (twelve) hours. Controller    famotidine (PEPCID) 20 MG tablet TAKE ONE TABLET BY MOUTH EVERY DAY    fish oil-omega-3 fatty acids 300-1,000 mg capsule Take 1 g by mouth once daily.      ipratropium (ATROVENT) 0.02 % nebulizer solution     losartan (COZAAR) 50 MG tablet Take 1 tablet (50 mg total) by mouth once daily.    montelukast (SINGULAIR) 10 mg tablet Take 10 mg by mouth once daily.    multivitamin with minerals tablet Take 1 tablet by mouth once daily. Equate Brand with Iron    niacin 250 MG Tab Take 1 tablet (250 mg total) by mouth daily with breakfast.    pravastatin (PRAVACHOL) 20 MG tablet Take 1 tablet (20 mg total) by mouth nightly.    verapamil (VERELAN) 100 MG CPCT Take 1 capsule (100 mg total) by mouth once daily.     Family History     Problem Relation (Age of Onset)    Diabetes Mother        Tobacco Use    Smoking status: Never Smoker    Smokeless tobacco: Former User   Substance and Sexual Activity    Alcohol use: Yes     Comment: 3 or 4 times a week and 2 or 3 beer at the most     Drug use: No    Sexual activity: Not on file     Comment:      Review of Systems   Constitutional: Negative for chills and fever.   HENT: Positive for sore throat. Negative for congestion, ear pain, postnasal drip, rhinorrhea and trouble swallowing.    Eyes: Negative for redness and itching.   Respiratory: Negative for cough, shortness of breath and wheezing.    Cardiovascular: Negative for chest pain and palpitations.   Gastrointestinal: Negative for abdominal pain, diarrhea, nausea and vomiting.   Genitourinary: Negative for dysuria and frequency.   Musculoskeletal: Positive for arthralgias (b/l hands pain and swelling and left shoulder pain).   Skin: Positive for color change (redness right arm, now improved). Negative for rash.   Neurological: Negative for weakness and headaches.     Objective:     Vital Signs (Most Recent):  Temp: 96.5 °F (35.8 °C) (08/26/19 0720)  Pulse: 99 (08/26/19 0722)  Resp: 18 (08/26/19 0722)  BP: (!) 141/80 (08/26/19 0720)  SpO2: 96 % (08/26/19 0722) Vital Signs (24h Range):  Temp:  [96.5 °F (35.8 °C)-98.3 °F (36.8 °C)] 96.5 °F (35.8 °C)  Pulse:  [67-99] 99  Resp:  [17-20] 18  SpO2:  [94 %-98 %] 96 %  BP: (105-148)/(60-80) 141/80     Weight: 80.1 kg (176 lb 9.4 oz)  Body mass index is 25.34 kg/m².    Physical Exam   Constitutional: He is oriented to person, place, and time. He appears well-developed and well-nourished. No distress.   HENT:   Head: Normocephalic and atraumatic.   Throat completely clear   Eyes: Pupils are equal, round, and reactive to light. Conjunctivae and EOM are normal.   Neck: Normal range of motion. Neck supple. No tracheal deviation present.   Cardiovascular: Normal rate, regular rhythm, normal heart sounds and intact distal pulses.   Pulmonary/Chest: Effort normal and breath sounds normal. No respiratory distress. He has no wheezes.   Abdominal: Soft. Bowel sounds are normal. There is no tenderness.   Musculoskeletal: Normal range of motion. He exhibits  no edema or deformity.   Neurological: He is alert and oriented to person, place, and time.   Skin: Skin is warm and dry. No rash noted. There is erythema (demarcated erythema to right distal ulnar area without puncture wound/break in skin--now resolved).   Psychiatric: He has a normal mood and affect. His behavior is normal.   Nursing note and vitals reviewed.        CRANIAL NERVES     CN III, IV, VI   Pupils are equal, round, and reactive to light.  Extraocular motions are normal.        Significant Labs:   CBC:   Recent Labs   Lab 08/24/19 2214 08/25/19  0637 08/26/19  0545   WBC 25.83* 21.80* 22.66*   HGB 12.5* 11.5* 11.7*   HCT 37.3* 35.1* 34.8*    236 257     CMP:   Recent Labs   Lab 08/24/19 2214 08/25/19  0637 08/26/19  0545    136 134*   K 4.0 4.1 4.0    104 101   CO2 21* 22* 23   * 99 102   BUN 12 10 15   CREATININE 1.1 0.9 1.0   CALCIUM 9.5 9.2 9.3   PROT 7.6 6.8 7.0   ALBUMIN 3.5 3.1* 3.1*   BILITOT 0.2 0.5 0.4   ALKPHOS 52* 54* 54*   AST 17 28 20   ALT 17 26 24   ANIONGAP 10 10 10   EGFRNONAA >60 >60 >60     Lactic Acid:   Recent Labs   Lab 08/24/19  2256 08/25/19  0317 08/25/19  0637   LACTATE 0.6 0.8 0.6     RF- 12   Significant Imaging: I have reviewed all pertinent imaging results/findings within the past 24 hours.     Wrist XR:      No acute radiographic findings of the wrist.      Assessment/Plan:      * Cellulitis  On Clinda, redness improving.  Follow up blood cultures.   Monitor for fevers, WBC ct - trending down. (noted is lack of left shift and also noted that patient received depomedrol in clinic).   8/26 Afebrile last 24 hrs, Blood cultures negative x 2 d   Clinda day 2  Still with leukocytosis  Very rapid development and then resolution. I'm unclear if this is truly bacterial infection but as sx resolved today will cont antibx. I'm in favor of monitoring another 24 hours to ensure no further flare. Would complete 7 day course of clinda PO but I am considering  other rheumatologic causes of his presentation. Several labs sent this weekend and pending.       Arthritis  Osteoarthritis hx only.    Lyn pending.    RF normal.    Give dose of toradol today.  Will h/o recent sore throat and fevers, check for parvo.      Chronic obstructive pulmonary disease        Benign essential HTN  Resume home meds; losartan 50mg .  8/26 BP 14os systolic,       VTE Risk Mitigation (From admission, onward)        Ordered     Place sequential compression device  Until discontinued      08/25/19 0904     Place sequential compression device  Until discontinued      08/25/19 0117                Concepcion Mario MD  Department of Hospital Medicine   [facility]

## 2019-08-26 NOTE — SUBJECTIVE & OBJECTIVE
Past Medical History:   Diagnosis Date    Arthritis     Cancer     prostate    COPD (chronic obstructive pulmonary disease)     Hyperlipidemia     Hypertension        Past Surgical History:   Procedure Laterality Date    CATARACT EXTRACTION W/  INTRAOCULAR LENS IMPLANT Left 05/05/2016    COLONOSCOPY W/ BIOPSIES AND POLYPECTOMY      INSERTION-INTRAOCULAR LENS (IOL) Right 6/9/2016    [facility]    INSERTION-INTRAOCULAR LENS (IOL) Left 5/12/2016    [facility]    PHACOEMULSIFICATION-ASPIRATION-CATARACT Right 6/9/2016    [facility]    PHACOEMULSIFICATION-ASPIRATION-CATARACT Left 5/12/2016    Per[facility] at [facility]    PROSTATE SURGERY  05/1996    TONSILLECTOMY  1956       Review of patient's allergies indicates:  No Known Allergies    No current facility-administered medications on file prior to encounter.      Current Outpatient Medications on File Prior to Encounter   Medication Sig    albuterol (PROVENTIL) 2.5 mg /3 mL (0.083 %) nebulizer solution USE 1 VIAL PER NEBULIZER TREATMENT EVERY 6 HOURS  AS NEEDED FOR WHEEZING    aspirin (ECOTRIN) 81 MG EC tablet Take 81 mg by mouth once daily.      budesonide-formoterol 160-4.5 mcg (SYMBICORT) 160-4.5 mcg/actuation HFAA Inhale 2 puffs into the lungs every 12 (twelve) hours. Controller    famotidine (PEPCID) 20 MG tablet TAKE ONE TABLET BY MOUTH EVERY DAY    fish oil-omega-3 fatty acids 300-1,000 mg capsule Take 1 g by mouth once daily.      ipratropium (ATROVENT) 0.02 % nebulizer solution     losartan (COZAAR) 50 MG tablet Take 1 tablet (50 mg total) by mouth once daily.    montelukast (SINGULAIR) 10 mg tablet Take 10 mg by mouth once daily.    multivitamin with minerals tablet Take 1 tablet by mouth once daily. Equate Brand with Iron    niacin 250 MG Tab Take 1 tablet (250 mg total) by mouth daily with breakfast.    pravastatin (PRAVACHOL)  20 MG tablet Take 1 tablet (20 mg total) by mouth nightly.    verapamil (VERELAN) 100 MG CPCT Take 1 capsule (100 mg total) by mouth once daily.     Family History     Problem Relation (Age of Onset)    Diabetes Mother        Tobacco Use    Smoking status: Never Smoker    Smokeless tobacco: Former User   Substance and Sexual Activity    Alcohol use: Yes     Comment: 3 or 4 times a week and 2 or 3 beer at the most    Drug use: No    Sexual activity: Not on file     Comment:      Review of Systems   Constitutional: Negative for chills and fever.   HENT: Positive for sore throat. Negative for congestion, ear pain, postnasal drip, rhinorrhea and trouble swallowing.    Eyes: Negative for redness and itching.   Respiratory: Negative for cough, shortness of breath and wheezing.    Cardiovascular: Negative for chest pain and palpitations.   Gastrointestinal: Negative for abdominal pain, diarrhea, nausea and vomiting.   Genitourinary: Negative for dysuria and frequency.   Musculoskeletal: Positive for arthralgias (b/l hands pain and swelling and left shoulder pain).   Skin: Positive for color change (redness right arm, now improved). Negative for rash.   Neurological: Negative for weakness and headaches.     Objective:     Vital Signs (Most Recent):  Temp: 96.5 °F (35.8 °C) (08/26/19 0720)  Pulse: 99 (08/26/19 0722)  Resp: 18 (08/26/19 0722)  BP: (!) 141/80 (08/26/19 0720)  SpO2: 96 % (08/26/19 0722) Vital Signs (24h Range):  Temp:  [96.5 °F (35.8 °C)-98.3 °F (36.8 °C)] 96.5 °F (35.8 °C)  Pulse:  [67-99] 99  Resp:  [17-20] 18  SpO2:  [94 %-98 %] 96 %  BP: (105-148)/(60-80) 141/80     Weight: 80.1 kg (176 lb 9.4 oz)  Body mass index is 25.34 kg/m².    Physical Exam   Constitutional: He is oriented to person, place, and time. He appears well-developed and well-nourished. No distress.   HENT:   Head: Normocephalic and atraumatic.   Throat completely clear   Eyes: Pupils are equal, round, and reactive to light.  Conjunctivae and EOM are normal.   Neck: Normal range of motion. Neck supple. No tracheal deviation present.   Cardiovascular: Normal rate, regular rhythm, normal heart sounds and intact distal pulses.   Pulmonary/Chest: Effort normal and breath sounds normal. No respiratory distress. He has no wheezes.   Abdominal: Soft. Bowel sounds are normal. There is no tenderness.   Musculoskeletal: Normal range of motion. He exhibits no edema or deformity.   Neurological: He is alert and oriented to person, place, and time.   Skin: Skin is warm and dry. No rash noted. There is erythema (demarcated erythema to right distal ulnar area without puncture wound/break in skin--now resolved).   Psychiatric: He has a normal mood and affect. His behavior is normal.   Nursing note and vitals reviewed.        CRANIAL NERVES     CN III, IV, VI   Pupils are equal, round, and reactive to light.  Extraocular motions are normal.        Significant Labs:   CBC:   Recent Labs   Lab 08/24/19 2214 08/25/19  0637 08/26/19  0545   WBC 25.83* 21.80* 22.66*   HGB 12.5* 11.5* 11.7*   HCT 37.3* 35.1* 34.8*    236 257     CMP:   Recent Labs   Lab 08/24/19 2214 08/25/19  0637 08/26/19  0545    136 134*   K 4.0 4.1 4.0    104 101   CO2 21* 22* 23   * 99 102   BUN 12 10 15   CREATININE 1.1 0.9 1.0   CALCIUM 9.5 9.2 9.3   PROT 7.6 6.8 7.0   ALBUMIN 3.5 3.1* 3.1*   BILITOT 0.2 0.5 0.4   ALKPHOS 52* 54* 54*   AST 17 28 20   ALT 17 26 24   ANIONGAP 10 10 10   EGFRNONAA >60 >60 >60     Lactic Acid:   Recent Labs   Lab 08/24/19 2256 08/25/19  0317 08/25/19  0637   LACTATE 0.6 0.8 0.6     RF- 12   Significant Imaging: I have reviewed all pertinent imaging results/findings within the past 24 hours.     Wrist XR:      No acute radiographic findings of the wrist.

## 2019-08-26 NOTE — HOSPITAL COURSE
8/26/19 redness to wrist much better, still with c/o joint pain ; mainly left shoulder pain, but overall feeling much better today  WBC 25.83>21.80>22.66, afebrile, last 24 hrs  ^Sed rate and CRP 71.2, sed rate 52 , RF- 12  Awaiting, Parvo, West Nile and EVE  Will continue ABX for now; monitor for 24 more hours; possible D/C tomorrow   8/27/19 Clinda day 3 for cellulitis. Uric acid 3.8 on 8/24   Afebrile last 24 hrs, Blood cultures negative x 3d  Still with leukocytosis; WBC 22.66>21.73   EVE negative, Lyme disease negative   Awaiting, Parvo, [place]   Reports he is feeling good now. C/o opposite wrist hurting some now  But no redness ; no swelling

## 2019-08-26 NOTE — PROGRESS NOTES
Spoke to Mr. Echeverria about the purpose of pharmacy education. We discussed his abx and side effects. Patient denies n/v/d. States bm was normal. We also discussed pain control and antiemetics. Patient elected bedside delivery of any discharge medications.

## 2019-08-26 NOTE — PLAN OF CARE
Problem: Adult Inpatient Plan of Care  Goal: Plan of Care Review  Outcome: Ongoing (interventions implemented as appropriate)  Right hand with minimal swelling and redness; shoulder joint pain decreased.  White blood cells elevated; IV antibiotics in progress; send out lab testing pending.  Afebrile with no complaints.  Plan of care reviewed with patient; verbalized understanding.

## 2019-08-27 VITALS
WEIGHT: 176.56 LBS | TEMPERATURE: 98 F | DIASTOLIC BLOOD PRESSURE: 62 MMHG | OXYGEN SATURATION: 95 % | HEIGHT: 70 IN | SYSTOLIC BLOOD PRESSURE: 137 MMHG | BODY MASS INDEX: 25.28 KG/M2 | RESPIRATION RATE: 18 BRPM | HEART RATE: 76 BPM

## 2019-08-27 LAB
ALBUMIN SERPL BCP-MCNC: 3 G/DL (ref 3.5–5.2)
ALP SERPL-CCNC: 55 U/L (ref 55–135)
ALT SERPL W/O P-5'-P-CCNC: 19 U/L (ref 10–44)
ANION GAP SERPL CALC-SCNC: 9 MMOL/L (ref 8–16)
AST SERPL-CCNC: 18 U/L (ref 10–40)
B BURGDOR AB SER IA-ACNC: 0.05 INDEX VALUE
BACTERIA THROAT CULT: NORMAL
BASOPHILS # BLD AUTO: ABNORMAL K/UL (ref 0–0.2)
BASOPHILS NFR BLD: 0 % (ref 0–1.9)
BILIRUB SERPL-MCNC: 0.3 MG/DL (ref 0.1–1)
BUN SERPL-MCNC: 13 MG/DL (ref 8–23)
CALCIUM SERPL-MCNC: 9.2 MG/DL (ref 8.7–10.5)
CHLORIDE SERPL-SCNC: 101 MMOL/L (ref 95–110)
CO2 SERPL-SCNC: 23 MMOL/L (ref 23–29)
CREAT SERPL-MCNC: 0.8 MG/DL (ref 0.5–1.4)
DIFFERENTIAL METHOD: ABNORMAL
EOSINOPHIL # BLD AUTO: ABNORMAL K/UL (ref 0–0.5)
EOSINOPHIL NFR BLD: 1 % (ref 0–8)
ERYTHROCYTE [DISTWIDTH] IN BLOOD BY AUTOMATED COUNT: 11.3 % (ref 11.5–14.5)
EST. GFR  (AFRICAN AMERICAN): >60 ML/MIN/1.73 M^2
EST. GFR  (NON AFRICAN AMERICAN): >60 ML/MIN/1.73 M^2
GLUCOSE SERPL-MCNC: 109 MG/DL (ref 70–110)
HCT VFR BLD AUTO: 36.5 % (ref 40–54)
HGB BLD-MCNC: 12.2 G/DL (ref 14–18)
IMM GRANULOCYTES # BLD AUTO: ABNORMAL K/UL (ref 0–0.04)
IMM GRANULOCYTES NFR BLD AUTO: ABNORMAL % (ref 0–0.5)
LYMPHOCYTES # BLD AUTO: ABNORMAL K/UL (ref 1–4.8)
LYMPHOCYTES NFR BLD: 42 % (ref 18–48)
MCH RBC QN AUTO: 31.7 PG (ref 27–31)
MCHC RBC AUTO-ENTMCNC: 33.4 G/DL (ref 32–36)
MCV RBC AUTO: 95 FL (ref 82–98)
MONOCYTES # BLD AUTO: ABNORMAL K/UL (ref 0.3–1)
MONOCYTES NFR BLD: 3 % (ref 4–15)
NEUTROPHILS NFR BLD: 53 % (ref 38–73)
NEUTS BAND NFR BLD MANUAL: 1 %
NRBC BLD-RTO: 0 /100 WBC
PLATELET # BLD AUTO: 283 K/UL (ref 150–350)
PLATELET BLD QL SMEAR: ABNORMAL
PMV BLD AUTO: 10.4 FL (ref 9.2–12.9)
POTASSIUM SERPL-SCNC: 4.2 MMOL/L (ref 3.5–5.1)
PROT SERPL-MCNC: 6.9 G/DL (ref 6–8.4)
RBC # BLD AUTO: 3.85 M/UL (ref 4.6–6.2)
SODIUM SERPL-SCNC: 133 MMOL/L (ref 136–145)
WBC # BLD AUTO: 21.73 K/UL (ref 3.9–12.7)

## 2019-08-27 PROCEDURE — S0077 INJECTION, CLINDAMYCIN PHOSP: HCPCS | Performed by: FAMILY MEDICINE

## 2019-08-27 PROCEDURE — 80053 COMPREHEN METABOLIC PANEL: CPT

## 2019-08-27 PROCEDURE — 63600175 PHARM REV CODE 636 W HCPCS: Performed by: FAMILY MEDICINE

## 2019-08-27 PROCEDURE — 25000242 PHARM REV CODE 250 ALT 637 W/ HCPCS: Performed by: FAMILY MEDICINE

## 2019-08-27 PROCEDURE — 36415 COLL VENOUS BLD VENIPUNCTURE: CPT

## 2019-08-27 PROCEDURE — 94640 AIRWAY INHALATION TREATMENT: CPT

## 2019-08-27 PROCEDURE — 99238 PR HOSPITAL DISCHARGE DAY,<30 MIN: ICD-10-PCS | Mod: ,,, | Performed by: INTERNAL MEDICINE

## 2019-08-27 PROCEDURE — 85007 BL SMEAR W/DIFF WBC COUNT: CPT

## 2019-08-27 PROCEDURE — 63600175 PHARM REV CODE 636 W HCPCS: Performed by: INTERNAL MEDICINE

## 2019-08-27 PROCEDURE — 25000003 PHARM REV CODE 250: Performed by: FAMILY MEDICINE

## 2019-08-27 PROCEDURE — 85027 COMPLETE CBC AUTOMATED: CPT

## 2019-08-27 PROCEDURE — 99238 HOSP IP/OBS DSCHRG MGMT 30/<: CPT | Mod: ,,, | Performed by: INTERNAL MEDICINE

## 2019-08-27 PROCEDURE — G0378 HOSPITAL OBSERVATION PER HR: HCPCS

## 2019-08-27 PROCEDURE — 25000003 PHARM REV CODE 250: Performed by: INTERNAL MEDICINE

## 2019-08-27 PROCEDURE — 94760 N-INVAS EAR/PLS OXIMETRY 1: CPT

## 2019-08-27 RX ORDER — MELOXICAM 15 MG/1
15 TABLET ORAL DAILY PRN
Qty: 30 TABLET | Refills: 0 | Status: SHIPPED | OUTPATIENT
Start: 2019-08-27 | End: 2020-03-10 | Stop reason: SDUPTHER

## 2019-08-27 RX ORDER — CLINDAMYCIN HYDROCHLORIDE 300 MG/1
300 CAPSULE ORAL 3 TIMES DAILY
Qty: 12 CAPSULE | Refills: 0 | Status: SHIPPED | OUTPATIENT
Start: 2019-08-27 | End: 2019-08-31

## 2019-08-27 RX ORDER — PANTOPRAZOLE SODIUM 40 MG/1
40 TABLET, DELAYED RELEASE ORAL DAILY
Qty: 30 TABLET | Refills: 0 | Status: ON HOLD | OUTPATIENT
Start: 2019-08-28 | End: 2019-12-22 | Stop reason: CLARIF

## 2019-08-27 RX ORDER — IBUPROFEN 800 MG/1
800 TABLET ORAL ONCE
Status: COMPLETED | OUTPATIENT
Start: 2019-08-27 | End: 2019-08-27

## 2019-08-27 RX ORDER — HYDROMORPHONE HYDROCHLORIDE 1 MG/ML
0.5 INJECTION, SOLUTION INTRAMUSCULAR; INTRAVENOUS; SUBCUTANEOUS ONCE
Status: COMPLETED | OUTPATIENT
Start: 2019-08-27 | End: 2019-08-27

## 2019-08-27 RX ADMIN — MONTELUKAST SODIUM 10 MG: 10 TABLET, FILM COATED ORAL at 08:08

## 2019-08-27 RX ADMIN — IBUPROFEN 800 MG: 800 TABLET ORAL at 02:08

## 2019-08-27 RX ADMIN — IPRATROPIUM BROMIDE AND ALBUTEROL SULFATE 3 ML: .5; 3 SOLUTION RESPIRATORY (INHALATION) at 07:08

## 2019-08-27 RX ADMIN — HYDROMORPHONE HYDROCHLORIDE 0.5 MG: 1 INJECTION, SOLUTION INTRAMUSCULAR; INTRAVENOUS; SUBCUTANEOUS at 03:08

## 2019-08-27 RX ADMIN — LOSARTAN POTASSIUM 50 MG: 50 TABLET, FILM COATED ORAL at 08:08

## 2019-08-27 RX ADMIN — HYDROCODONE BITARTRATE AND ACETAMINOPHEN 1 TABLET: 5; 325 TABLET ORAL at 12:08

## 2019-08-27 RX ADMIN — CLINDAMYCIN IN 5 PERCENT DEXTROSE 600 MG: 12 INJECTION, SOLUTION INTRAVENOUS at 12:08

## 2019-08-27 RX ADMIN — PANTOPRAZOLE SODIUM 40 MG: 40 TABLET, DELAYED RELEASE ORAL at 08:08

## 2019-08-27 RX ADMIN — ONDANSETRON 4 MG: 2 INJECTION INTRAMUSCULAR; INTRAVENOUS at 08:08

## 2019-08-27 RX ADMIN — CLINDAMYCIN IN 5 PERCENT DEXTROSE 600 MG: 12 INJECTION, SOLUTION INTRAVENOUS at 06:08

## 2019-08-27 RX ADMIN — ASPIRIN 81 MG: 81 TABLET ORAL at 08:08

## 2019-08-27 NOTE — DISCHARGE SUMMARY
[facility] Medicine  Discharge Summary      Patient Name: Kevin Echeverria  MRN: [id number]  Admission Date: 8/24/2019  Hospital Length of Stay: 3 days  Discharge Date and Time:  08/27/2019 11:16 AM  Attending Physician: Rachelle Nolasco MD   Discharging Provider: Mary Starr NP  Primary Care Provider: Bridger Cao MD      HPI:   79 year old male with known h/o htn, dyslipidemia and copd presented to the er after a week of indolent redness and swelling as well as pain to his right wrist that did not improve with steroids. He notes that he was working outside and subsequently noticed redness and swelling to his right wrist. He didn't have any open lesions or obvious insect bites, but took benadryl and ibuprofen. By the next day, it was more red and swollen so he saw dr. Mario. At that point, he was given steroids. However, over the next days, he started with pain and swelling in the left hand into the shoulder and continued redness in the right hand, so he came to the ER. While in the Er, he had a fever and was also noted to have elevated WBC ct. The only other complaint as of late was a sore throat over the last 2 weeks.    * No surgery found *      Hospital Course:   8/26/19 redness to wrist much better, still with c/o joint pain ; mainly left shoulder pain, but overall feeling much better today  WBC 25.83>21.80>22.66, afebrile, last 24 hrs  ^Sed rate and CRP 71.2, sed rate 52 , RF- 12  Awaiting, Parvo, [place] and EVE  Will continue ABX for now; monitor for 24 more hours; possible D/C tomorrow   8/27/19 Clinda day 3 for cellulitis. Uric acid 3.8 on 8/24   Afebrile last 24 hrs, Blood cultures negative x 3d  Still with leukocytosis; WBC 22.66>21.73   EVE negative, Lyme disease negative   Awaiting, Parvo, [place]   Reports he is feeling good now. C/o opposite wrist hurting some now  But no redness ; no swelling      Consults:     No new Assessment & Plan notes have been  filed under this hospital service since the last note was generated.  Service: Hospital Medicine    Final Active Diagnoses:    Diagnosis Date Noted POA    PRINCIPAL PROBLEM:  Cellulitis [L03.90] 08/24/2019 Yes    Arthritis [M19.90] 08/25/2019 Yes    Chronic obstructive pulmonary disease [J44.9] 10/18/2018 Yes    Benign essential HTN [I10] 05/15/2015 Yes      Problems Resolved During this Admission:       Discharged Condition: stable    Disposition: Home or Self Care    Follow Up:  Follow-up Information     Bridger Cao MD.    Specialty:  Family Medicine  Why:  has appnt on Friday with Dr. Heri Cao  Contact information:  [address] [place] [place] [id number]  [phone]                 Patient Instructions:      Ambulatory Referral to Rheumatology   Referral Priority: Routine Referral Type: Consultation   Referral Reason: Specialty Services Required   Requested Specialty: Rheumatology   Number of Visits Requested: 1       Significant Diagnostic Studies:   Significant Labs:   CBC:        Recent Labs   Lab 08/26/19  0545 08/27/19  0559   WBC 22.66* 21.73*   HGB 11.7* 12.2*   HCT 34.8* 36.5*    283      CMP:        Recent Labs   Lab 08/26/19  0545 08/27/19  0559   * 133*   K 4.0 4.2    101   CO2 23 23    109   BUN 15 13   CREATININE 1.0 0.8   CALCIUM 9.3 9.2   PROT 7.0 6.9   ALBUMIN 3.1* 3.0*   BILITOT 0.4 0.3   ALKPHOS 54* 55   AST 20 18   ALT 24 19   ANIONGAP 10 9   EGFRNONAA >60 >60      Lactic Acid:   No results for input(s): LACTATE in the last 48 hours.  RF- 12   EVE- negative  Lyme disease- negative   8/24 Uric acid 3.8  8/24/19 Sed rate- 52  8/24/19 CRP - 71.2   Lactate- 0.6   Procalcitonin- 0.06   peripheral Smear  PATHOLOGIST INTERPRETATION   RBC- Normocytic anemia, no significant morphologic abnormalities.   WBC- Leukocytosis with a predominance of granulocytes.  Occasional   atypical granulocytes are present.  No dysplastic cells or   circulating blasts are seen.    PLT- Normal in number and morphology.      Significant Imaging: I have reviewed all pertinent imaging results/findings within the past 24 hours.      Wrist XR:      No acute radiographic findings of the wrist.      Pending Diagnostic Studies:     Procedure Component Value Units Date/Time    Parvovirus B19 antibody, IgG and IgM [[id number]] Collected:  08/25/19 1448    Order Status:  Sent Lab Status:  In process Updated:  08/25/19 2222    Specimen:  Blood     West Nile antibodies, IgG and IgM [[id number]] Collected:  08/24/19 2255    Order Status:  Sent Lab Status:  In process Updated:  08/25/19 0421    Specimen:  Blood          Medications:  Reconciled Home Medications:      Medication List      START taking these medications    clindamycin 300 MG capsule  Commonly known as:  CLEOCIN  Take 1 capsule (300 mg total) by mouth 3 (three) times daily. for 4 days     meloxicam 15 MG tablet  Commonly known as:  MOBIC  Take 1 tablet (15 mg total) by mouth daily as needed for Pain.     pantoprazole 40 MG tablet  Commonly known as:  PROTONIX  Take 1 tablet (40 mg total) by mouth once daily.  Start taking on:  8/28/2019        CONTINUE taking these medications    albuterol 2.5 mg /3 mL (0.083 %) nebulizer solution  Commonly known as:  PROVENTIL  USE 1 VIAL PER NEBULIZER TREATMENT EVERY 6 HOURS  AS NEEDED FOR WHEEZING     aspirin 81 MG EC tablet  Commonly known as:  ECOTRIN  Take 81 mg by mouth once daily.     budesonide-formoterol 160-4.5 mcg 160-4.5 mcg/actuation Hfaa  Commonly known as:  SYMBICORT  Inhale 2 puffs into the lungs every 12 (twelve) hours. Controller     fish oil-omega-3 fatty acids 300-1,000 mg capsule  Take 1 g by mouth once daily.     ipratropium 0.02 % nebulizer solution  Commonly known as:  ATROVENT     losartan 50 MG tablet  Commonly known as:  COZAAR  Take 1 tablet (50 mg total) by mouth once daily.     montelukast 10 mg tablet  Commonly known as:  SINGULAIR  Take 10 mg by mouth once daily.      multivitamin with minerals tablet  Take 1 tablet by mouth once daily. Equate Brand with Iron     pravastatin 20 MG tablet  Commonly known as:  PRAVACHOL  Take 1 tablet (20 mg total) by mouth nightly.     verapamil 100 MG Cpct  Commonly known as:  VERELAN  Take 1 capsule (100 mg total) by mouth once daily.        STOP taking these medications    famotidine 20 MG tablet  Commonly known as:  PEPCID     niacin 250 MG Tab     omeprazole 20 MG capsule  Commonly known as:  PriLOSEC            Indwelling Lines/Drains at time of discharge:   Lines/Drains/Airways          None          Time spent on the discharge of patient: 25 minutes  Patient was seen and examined on the date of discharge and determined to be suitable for discharge.         Mary Starr, NP  Department of Hospital Medicine  [facility]

## 2019-08-27 NOTE — ASSESSMENT & PLAN NOTE
Osteoarthritis hx only.    Lyn pending.    RF normal.    Give dose of toradol today.  Will h/o recent sore throat and fevers, check for parvo.  8/27/19 c/o left shoulder pain yesterday, notes opposite wrist pain today.  Will need follow up with rheumatolgy as op  Meloxicam rx PRN , with PPI daily-   Completed 7d ABX

## 2019-08-27 NOTE — PLAN OF CARE
08/27/19 1216   Post-Acute Status   Post-Acute Authorization Other   Other Status No Post-Acute Service Needs

## 2019-08-27 NOTE — SUBJECTIVE & OBJECTIVE
Past Medical History:   Diagnosis Date    Arthritis     Cancer     prostate    COPD (chronic obstructive pulmonary disease)     Hyperlipidemia     Hypertension        Past Surgical History:   Procedure Laterality Date    CATARACT EXTRACTION W/  INTRAOCULAR LENS IMPLANT Left 05/05/2016    COLONOSCOPY W/ BIOPSIES AND POLYPECTOMY      INSERTION-INTRAOCULAR LENS (IOL) Right 6/9/2016    [facility]    INSERTION-INTRAOCULAR LENS (IOL) Left 5/12/2016    [facility]    PHACOEMULSIFICATION-ASPIRATION-CATARACT Right 6/9/2016    [facility]    PHACOEMULSIFICATION-ASPIRATION-CATARACT Left 5/12/2016    [facility]    PROSTATE SURGERY  05/1996    TONSILLECTOMY  1956       Review of patient's allergies indicates:  No Known Allergies    No current facility-administered medications on file prior to encounter.      Current Outpatient Medications on File Prior to Encounter   Medication Sig    albuterol (PROVENTIL) 2.5 mg /3 mL (0.083 %) nebulizer solution USE 1 VIAL PER NEBULIZER TREATMENT EVERY 6 HOURS  AS NEEDED FOR WHEEZING    aspirin (ECOTRIN) 81 MG EC tablet Take 81 mg by mouth once daily.      budesonide-formoterol 160-4.5 mcg (SYMBICORT) 160-4.5 mcg/actuation HFAA Inhale 2 puffs into the lungs every 12 (twelve) hours. Controller    famotidine (PEPCID) 20 MG tablet TAKE ONE TABLET BY MOUTH EVERY DAY    fish oil-omega-3 fatty acids 300-1,000 mg capsule Take 1 g by mouth once daily.      ipratropium (ATROVENT) 0.02 % nebulizer solution     losartan (COZAAR) 50 MG tablet Take 1 tablet (50 mg total) by mouth once daily.    montelukast (SINGULAIR) 10 mg tablet Take 10 mg by mouth once daily.    multivitamin with minerals tablet Take 1 tablet by mouth once daily. Equate Brand with Iron    niacin 250 MG Tab Take 1 tablet (250 mg total) by mouth daily with breakfast.    pravastatin (PRAVACHOL)  20 MG tablet Take 1 tablet (20 mg total) by mouth nightly.    verapamil (VERELAN) 100 MG CPCT Take 1 capsule (100 mg total) by mouth once daily.     Family History     Problem Relation (Age of Onset)    Diabetes Mother        Tobacco Use    Smoking status: Never Smoker    Smokeless tobacco: Former User   Substance and Sexual Activity    Alcohol use: Yes     Comment: 3 or 4 times a week and 2 or 3 beer at the most    Drug use: No    Sexual activity: Not on file     Comment:      Review of Systems   Constitutional: Negative for chills and fever.   HENT: Positive for sore throat. Negative for congestion, ear pain, postnasal drip, rhinorrhea and trouble swallowing.    Eyes: Negative for redness and itching.   Respiratory: Negative for cough, shortness of breath and wheezing.    Cardiovascular: Negative for chest pain and palpitations.   Gastrointestinal: Negative for abdominal pain, diarrhea, nausea and vomiting.   Genitourinary: Negative for dysuria and frequency.   Musculoskeletal: Positive for arthralgias (b/l hands pain and swelling and left shoulder pain).   Skin: Negative for rash. Color change: redness right arm, now improved.        No erythema   Neurological: Negative for weakness and headaches.     Objective:     Vital Signs (Most Recent):  Temp: 96.7 °F (35.9 °C) (08/27/19 0437)  Pulse: 76 (08/27/19 0728)  Resp: 18 (08/27/19 0728)  BP: 137/62 (08/27/19 0728)  SpO2: 95 % (08/27/19 0728) Vital Signs (24h Range):  Temp:  [96.3 °F (35.7 °C)-98.4 °F (36.9 °C)] 96.7 °F (35.9 °C)  Pulse:  [76-94] 76  Resp:  [16-20] 18  SpO2:  [94 %-99 %] 95 %  BP: (135-158)/(60-81) 137/62     Weight: 80.1 kg (176 lb 9.4 oz)  Body mass index is 25.34 kg/m².    Physical Exam   Constitutional: He is oriented to person, place, and time. He appears well-developed and well-nourished. No distress.   HENT:   Head: Normocephalic and atraumatic.   Throat completely clear   Eyes: Pupils are equal, round, and reactive to light.  Conjunctivae and EOM are normal.   Neck: Normal range of motion. Neck supple. No tracheal deviation present.   Cardiovascular: Normal rate, regular rhythm, normal heart sounds and intact distal pulses.   Pulmonary/Chest: Effort normal and breath sounds normal. No respiratory distress. He has no wheezes.   Abdominal: Soft. Bowel sounds are normal. There is no tenderness.   Musculoskeletal: Normal range of motion. He exhibits no edema or deformity.   Neurological: He is alert and oriented to person, place, and time.   Skin: Skin is warm and dry. No rash noted. Erythema: demarcated erythema to right distal ulnar area without puncture wound/break in skin--now resolved.   No redness; no swelling ;    Psychiatric: He has a normal mood and affect. His behavior is normal.   Nursing note and vitals reviewed.        CRANIAL NERVES     CN III, IV, VI   Pupils are equal, round, and reactive to light.  Extraocular motions are normal.        Significant Labs:   CBC:   Recent Labs   Lab 08/26/19  0545 08/27/19  0559   WBC 22.66* 21.73*   HGB 11.7* 12.2*   HCT 34.8* 36.5*    283     CMP:   Recent Labs   Lab 08/26/19  0545 08/27/19  0559   * 133*   K 4.0 4.2    101   CO2 23 23    109   BUN 15 13   CREATININE 1.0 0.8   CALCIUM 9.3 9.2   PROT 7.0 6.9   ALBUMIN 3.1* 3.0*   BILITOT 0.4 0.3   ALKPHOS 54* 55   AST 20 18   ALT 24 19   ANIONGAP 10 9   EGFRNONAA >60 >60     Lactic Acid:   No results for input(s): LACTATE in the last 48 hours.  RF- 12   EVE- negative  Lyme disease- negative   8/24 Uric acid 3.8  8/24/19 Sed rate- 52  8/24/19 CRP - 71.2   Lactate- 0.6   Procalcitonin- 0.06   peripheral Smear  PATHOLOGIST INTERPRETATION   RBC- Normocytic anemia, no significant morphologic abnormalities.   WBC- Leukocytosis with a predominance of granulocytes.  Occasional   atypical granulocytes are present.  No dysplastic cells or   circulating blasts are seen.   PLT- Normal in number and morphology.     Significant  Imaging: I have reviewed all pertinent imaging results/findings within the past 24 hours.     Wrist XR:      No acute radiographic findings of the wrist.

## 2019-08-27 NOTE — PLAN OF CARE
08/27/19 1152   Discharge Assessment   Assessment Type Discharge Planning Reassessment     Mr Echeverria will discharge home today. He has no concerns for post acute care at this time.

## 2019-08-27 NOTE — PLAN OF CARE
Problem: Adult Inpatient Plan of Care  Goal: Plan of Care Review  Outcome: Outcome(s) achieved Date Met: 08/27/19  Assessment complete per flow sheet, AAOX4, sitting up in chair, independent with ADL'S, no swelling or pain noted to JOSE wrist/ hand. WBC elevated, Clindamycin Q 6 hours continued. Complained of nausea, states he vomited, Zofran IV and medications administered per MAR, Free from falls / injury, safety precautions maintained, wife at bedside, call bell in reach, instructed to call for needs, will monitor

## 2019-08-27 NOTE — PROGRESS NOTES
Staff Handoff  Bedside report given from Betsy Estrada. Patient awake. No complaints of pain. No redness or swelling to Rt. hand. POC discussed. Call bell in reach. Asked to call for assistance.       Resident Handoff

## 2019-08-27 NOTE — PLAN OF CARE
08/27/19 1217   Final Note   Assessment Type Final Discharge Note   Anticipated Discharge Disposition Home   What phone number can be called within the next 1-3 days to see how you are doing after discharge? [phone]   Hospital Follow Up  Appt(s) scheduled? Yes   Discharge plans and expectations educations in teach back method with documentation complete? Yes     No post-acute care needs identified during this hospital stay.     Ashely Marie LMSW

## 2019-08-27 NOTE — PLAN OF CARE
08/27/19 1129   Discharge Assessment   Assessment Type Discharge Planning Assessment   Assessment information obtained from? Patient;Caregiver   Prior to hospitilization cognitive status: Alert/Oriented   Prior to hospitalization functional status: Independent   Current cognitive status: Alert/Oriented   Current Functional Status: Independent   Facility Arrived From: Home   Lives With spouse   Able to Return to Prior Arrangements yes   Is patient able to care for self after discharge? Yes   Who are your caregiver(s) and their phone number(s)? Saulo Echeverria (Son) [phone]   Patient's perception of discharge disposition home or selfcare   Readmission Within the Last 30 Days no previous admission in last 30 days   Patient currently being followed by outpatient case management? No   Patient currently receives any other outside agency services? No   Equipment Currently Used at Home nebulizer   Do you have any problems affording any of your prescribed medications? No   Does the patient have transportation home? Yes   Transportation Anticipated family or friend will provide   Discharge Plan A Home   Discharge Plan B Home with family   DME Needed Upon Discharge  none   Patient/Family in Agreement with Plan yes     No post-acute care needs identified at this time. SW to continue to monitor needs throughout hospital stay.     Ashely Marie LMSW

## 2019-08-27 NOTE — PLAN OF CARE
Problem: Adult Inpatient Plan of Care  Goal: Plan of Care Review  Outcome: Ongoing (interventions implemented as appropriate)  Patient recieves Clindamycin Q6h. Patient complains of constant hand pain to the Lt. Hand. No swelling or redness. Norco 5mg was given @ 0030. Ibuprofen at 800mg was given at 0215. Dilaudid 0.5mg was given at 0300. Free from falls and injury. Rt. Arm and hand free from swelling and redness. POC reviewed. Will continue to monitor.

## 2019-08-27 NOTE — ASSESSMENT & PLAN NOTE
On Clinda, redness improving.  Follow up blood cultures.   Monitor for fevers, WBC ct - trending down. (noted is lack of left shift and also noted that patient received depomedrol in clinic).   8/26 Afebrile last 24 hrs, Blood cultures negative x 2 d   Clinda day 2  Still with leukocytosis  Very rapid development and then resolution. I'm unclear if this is truly bacterial infection but as sx resolved today will cont antibx. I'm in favor of monitoring another 24 hours to ensure no further flare. Would complete 7 day course of clinda PO but I am considering other rheumatologic causes of his presentation. Several labs sent this weekend and pending.   8/27/19 Peripheral smear ok  WBC remains ^   Day 3 clinda- BC NGTD x 3d ; d/c with 4 more days of clinda .

## 2019-08-27 NOTE — PLAN OF CARE
08/27/19 1212   Medicare Message   Important Message from Medicare regarding Discharge Appeal Rights Given to patient/caregiver;Explained to patient/caregiver;Signed/date by patient/caregiver   Date IMM was signed 08/27/19   Time IMM was signed 1150

## 2019-08-27 NOTE — PROGRESS NOTES
[facility] Medicine  Progress Note    Patient Name: Kevin Echeverria  MRN: [id number]  Patient Class: IP- Inpatient   Admission Date: 8/24/2019  Length of Stay: 3 days  Attending Physician: Rachelle Nolasco MD  Primary Care Provider: Bridger Cao MD        Subjective:     Principal Problem:Cellulitis        HPI:  79 year old male with known h/o htn, dyslipidemia and copd presented to the er after a week of indolent redness and swelling as well as pain to his right wrist that did not improve with steroids. He notes that he was working outside and subsequently noticed redness and swelling to his right wrist. He didn't have any open lesions or obvious insect bites, but took benadryl and ibuprofen. By the next day, it was more red and swollen so he saw dr. Mario. At that point, he was given steroids. However, over the next days, he started with pain and swelling in the left hand into the shoulder and continued redness in the right hand, so he came to the ER. While in the Er, he had a fever and was also noted to have elevated WBC ct. The only other complaint as of late was a sore throat over the last 2 weeks.    Overview/Hospital Course:  8/26/19 redness to wrist much better, still with c/o joint pain ; mainly left shoulder pain, but overall feeling much better today  WBC 25.83>21.80>22.66, afebrile, last 24 hrs  ^Sed rate and CRP 71.2, sed rate 52 , RF- 12  Awaiting, Parvo, [place] and EVE  Will continue ABX for now; monitor for 24 more hours; possible D/C tomorrow   8/27/19 Clinda day 3 for cellulitis. Uric acid 3.8 on 8/24   Afebrile last 24 hrs, Blood cultures negative x 3d  Still with leukocytosis; WBC 22.66>21.73   EVE negative, Lyme disease negative   Awaiting, Parvo, [place]   Reports he is feeling good now. C/o opposite wrist hurting some now  But no redness ; no swelling     Past Medical History:   Diagnosis Date    Arthritis     Cancer     prostate    COPD (chronic  obstructive pulmonary disease)     Hyperlipidemia     Hypertension        Past Surgical History:   Procedure Laterality Date    CATARACT EXTRACTION W/  INTRAOCULAR LENS IMPLANT Left 05/05/2016    COLONOSCOPY W/ BIOPSIES AND POLYPECTOMY      INSERTION-INTRAOCULAR LENS (IOL) Right 6/9/2016    [facility]    INSERTION-INTRAOCULAR LENS (IOL) Left 5/12/2016    [facility]    PHACOEMULSIFICATION-ASPIRATION-CATARACT Right 6/9/2016    [facility]    PHACOEMULSIFICATION-ASPIRATION-CATARACT Left 5/12/2016    [facility]    PROSTATE SURGERY  05/1996    TONSILLECTOMY  1956       Review of patient's allergies indicates:  No Known Allergies    No current facility-administered medications on file prior to encounter.      Current Outpatient Medications on File Prior to Encounter   Medication Sig    albuterol (PROVENTIL) 2.5 mg /3 mL (0.083 %) nebulizer solution USE 1 VIAL PER NEBULIZER TREATMENT EVERY 6 HOURS  AS NEEDED FOR WHEEZING    aspirin (ECOTRIN) 81 MG EC tablet Take 81 mg by mouth once daily.      budesonide-formoterol 160-4.5 mcg (SYMBICORT) 160-4.5 mcg/actuation HFAA Inhale 2 puffs into the lungs every 12 (twelve) hours. Controller    famotidine (PEPCID) 20 MG tablet TAKE ONE TABLET BY MOUTH EVERY DAY    fish oil-omega-3 fatty acids 300-1,000 mg capsule Take 1 g by mouth once daily.      ipratropium (ATROVENT) 0.02 % nebulizer solution     losartan (COZAAR) 50 MG tablet Take 1 tablet (50 mg total) by mouth once daily.    montelukast (SINGULAIR) 10 mg tablet Take 10 mg by mouth once daily.    multivitamin with minerals tablet Take 1 tablet by mouth once daily. Equate Brand with Iron    niacin 250 MG Tab Take 1 tablet (250 mg total) by mouth daily with breakfast.    pravastatin (PRAVACHOL) 20 MG tablet Take 1 tablet (20 mg total) by mouth nightly.    verapamil (VERELAN) 100 MG CPCT  Take 1 capsule (100 mg total) by mouth once daily.     Family History     Problem Relation (Age of Onset)    Diabetes Mother        Tobacco Use    Smoking status: Never Smoker    Smokeless tobacco: Former User   Substance and Sexual Activity    Alcohol use: Yes     Comment: 3 or 4 times a week and 2 or 3 beer at the most    Drug use: No    Sexual activity: Not on file     Comment:      Review of Systems   Constitutional: Negative for chills and fever.   HENT: Positive for sore throat. Negative for congestion, ear pain, postnasal drip, rhinorrhea and trouble swallowing.    Eyes: Negative for redness and itching.   Respiratory: Negative for cough, shortness of breath and wheezing.    Cardiovascular: Negative for chest pain and palpitations.   Gastrointestinal: Negative for abdominal pain, diarrhea, nausea and vomiting.   Genitourinary: Negative for dysuria and frequency.   Musculoskeletal: Positive for arthralgias (b/l hands pain and swelling and left shoulder pain).   Skin: Negative for rash. Color change: redness right arm, now improved.        No erythema   Neurological: Negative for weakness and headaches.     Objective:     Vital Signs (Most Recent):  Temp: 96.7 °F (35.9 °C) (08/27/19 0437)  Pulse: 76 (08/27/19 0728)  Resp: 18 (08/27/19 0728)  BP: 137/62 (08/27/19 0728)  SpO2: 95 % (08/27/19 0728) Vital Signs (24h Range):  Temp:  [96.3 °F (35.7 °C)-98.4 °F (36.9 °C)] 96.7 °F (35.9 °C)  Pulse:  [76-94] 76  Resp:  [16-20] 18  SpO2:  [94 %-99 %] 95 %  BP: (135-158)/(60-81) 137/62     Weight: 80.1 kg (176 lb 9.4 oz)  Body mass index is 25.34 kg/m².    Physical Exam   Constitutional: He is oriented to person, place, and time. He appears well-developed and well-nourished. No distress.   HENT:   Head: Normocephalic and atraumatic.   Throat completely clear   Eyes: Pupils are equal, round, and reactive to light. Conjunctivae and EOM are normal.   Neck: Normal range of motion. Neck supple. No tracheal  deviation present.   Cardiovascular: Normal rate, regular rhythm, normal heart sounds and intact distal pulses.   Pulmonary/Chest: Effort normal and breath sounds normal. No respiratory distress. He has no wheezes.   Abdominal: Soft. Bowel sounds are normal. There is no tenderness.   Musculoskeletal: Normal range of motion. He exhibits no edema or deformity.   Neurological: He is alert and oriented to person, place, and time.   Skin: Skin is warm and dry. No rash noted. Erythema: demarcated erythema to right distal ulnar area without puncture wound/break in skin--now resolved.   No redness; no swelling ;    Psychiatric: He has a normal mood and affect. His behavior is normal.   Nursing note and vitals reviewed.        CRANIAL NERVES     CN III, IV, VI   Pupils are equal, round, and reactive to light.  Extraocular motions are normal.        Significant Labs:   CBC:   Recent Labs   Lab 08/26/19  0545 08/27/19  0559   WBC 22.66* 21.73*   HGB 11.7* 12.2*   HCT 34.8* 36.5*    283     CMP:   Recent Labs   Lab 08/26/19  0545 08/27/19  0559   * 133*   K 4.0 4.2    101   CO2 23 23    109   BUN 15 13   CREATININE 1.0 0.8   CALCIUM 9.3 9.2   PROT 7.0 6.9   ALBUMIN 3.1* 3.0*   BILITOT 0.4 0.3   ALKPHOS 54* 55   AST 20 18   ALT 24 19   ANIONGAP 10 9   EGFRNONAA >60 >60     Lactic Acid:   No results for input(s): LACTATE in the last 48 hours.  RF- 12   EVE- negative  Lyme disease- negative   8/24 Uric acid 3.8  8/24/19 Sed rate- 52  8/24/19 CRP - 71.2   Lactate- 0.6   Procalcitonin- 0.06   peripheral Smear  PATHOLOGIST INTERPRETATION   RBC- Normocytic anemia, no significant morphologic abnormalities.   WBC- Leukocytosis with a predominance of granulocytes.  Occasional   atypical granulocytes are present.  No dysplastic cells or   circulating blasts are seen.   PLT- Normal in number and morphology.     Significant Imaging: I have reviewed all pertinent imaging results/findings within the past 24 hours.      Wrist XR:      No acute radiographic findings of the wrist.      Assessment/Plan:      * Cellulitis  On Clinda, redness improving.  Follow up blood cultures.   Monitor for fevers, WBC ct - trending down. (noted is lack of left shift and also noted that patient received depomedrol in clinic).   8/26 Afebrile last 24 hrs, Blood cultures negative x 2 d   Clinda day 2  Still with leukocytosis  Very rapid development and then resolution. I'm unclear if this is truly bacterial infection but as sx resolved today will cont antibx. I'm in favor of monitoring another 24 hours to ensure no further flare. Would complete 7 day course of clinda PO but I am considering other rheumatologic causes of his presentation. Several labs sent this weekend and pending.   8/27/19 Peripheral smear ok  WBC remains ^   Day 3 clinda- BC NGTD x 3d ; d/c with 4 more days of clinda .      Arthritis  Osteoarthritis hx only.    Lyn pending.    RF normal.    Give dose of toradol today.  Will h/o recent sore throat and fevers, check for parvo.  8/27/19 c/o left shoulder pain yesterday, notes opposite wrist pain today.  Will need follow up with rheumatolgy as op  Meloxicam rx PRN , with PPI daily-   Completed 7d ABX      Chronic obstructive pulmonary disease  No acute symptoms .  Stable       Benign essential HTN  Resume home meds; losartan 50mg .  8/26 BP 14os systolic,   8/27/19 SBP 130s-140s - HR 70-80s .      VTE Risk Mitigation (From admission, onward)        Ordered     Place sequential compression device  Until discontinued      08/25/19 0904     Place sequential compression device  Until discontinued      08/25/19 0117                Vinod Shannon MD  Department of Hospital Medicine   [facility]

## 2019-08-28 ENCOUNTER — PATIENT OUTREACH (OUTPATIENT)
Dept: ADMINISTRATIVE | Facility: CLINIC | Age: 79
End: 2019-08-28

## 2019-08-28 ENCOUNTER — HOSPITAL ENCOUNTER (EMERGENCY)
Facility: HOSPITAL | Age: 79
Discharge: HOME | End: 2019-08-28
Attending: INTERNAL MEDICINE
Payer: MEDICARE

## 2019-08-28 VITALS
WEIGHT: 166.31 LBS | TEMPERATURE: 96 F | OXYGEN SATURATION: 97 % | HEART RATE: 75 BPM | BODY MASS INDEX: 23.87 KG/M2 | SYSTOLIC BLOOD PRESSURE: 148 MMHG | RESPIRATION RATE: 16 BRPM | DIASTOLIC BLOOD PRESSURE: 88 MMHG

## 2019-08-28 DIAGNOSIS — M79.641 BILATERAL HAND PAIN: Primary | ICD-10-CM

## 2019-08-28 DIAGNOSIS — M79.642 BILATERAL HAND PAIN: Primary | ICD-10-CM

## 2019-08-28 DIAGNOSIS — M25.612 SHOULDER JOINT STIFFNESS, BILATERAL: ICD-10-CM

## 2019-08-28 DIAGNOSIS — M25.611 SHOULDER JOINT STIFFNESS, BILATERAL: ICD-10-CM

## 2019-08-28 LAB
PARVOVIRUS B19 ABS IGG & IGM: ABNORMAL
PARVOVIRUS B19 IGG ANTIBODY: POSITIVE
PARVOVIRUS B19 IGM ANTIBODY: NEGATIVE

## 2019-08-28 PROCEDURE — 63600175 PHARM REV CODE 636 W HCPCS: Performed by: INTERNAL MEDICINE

## 2019-08-28 PROCEDURE — 96376 TX/PRO/DX INJ SAME DRUG ADON: CPT

## 2019-08-28 PROCEDURE — 96374 THER/PROPH/DIAG INJ IV PUSH: CPT

## 2019-08-28 PROCEDURE — 96375 TX/PRO/DX INJ NEW DRUG ADDON: CPT

## 2019-08-28 PROCEDURE — 99284 EMERGENCY DEPT VISIT MOD MDM: CPT | Mod: 25

## 2019-08-28 RX ORDER — METHYLPREDNISOLONE SOD SUCC 125 MG
125 VIAL (EA) INJECTION
Status: COMPLETED | OUTPATIENT
Start: 2019-08-28 | End: 2019-08-28

## 2019-08-28 RX ORDER — LORAZEPAM 2 MG/ML
0.5 INJECTION INTRAMUSCULAR
Status: COMPLETED | OUTPATIENT
Start: 2019-08-28 | End: 2019-08-28

## 2019-08-28 RX ORDER — ONDANSETRON 2 MG/ML
4 INJECTION INTRAMUSCULAR; INTRAVENOUS
Status: COMPLETED | OUTPATIENT
Start: 2019-08-28 | End: 2019-08-28

## 2019-08-28 RX ORDER — PREDNISONE 10 MG/1
TABLET ORAL
Qty: 15 TABLET | Refills: 0 | Status: SHIPPED | OUTPATIENT
Start: 2019-08-28 | End: 2019-11-08

## 2019-08-28 RX ORDER — HYDROMORPHONE HYDROCHLORIDE 1 MG/ML
0.25 INJECTION, SOLUTION INTRAMUSCULAR; INTRAVENOUS; SUBCUTANEOUS
Status: COMPLETED | OUTPATIENT
Start: 2019-08-28 | End: 2019-08-28

## 2019-08-28 RX ORDER — KETOROLAC TROMETHAMINE 30 MG/ML
15 INJECTION, SOLUTION INTRAMUSCULAR; INTRAVENOUS
Status: COMPLETED | OUTPATIENT
Start: 2019-08-28 | End: 2019-08-28

## 2019-08-28 RX ORDER — DEXAMETHASONE SODIUM PHOSPHATE 4 MG/ML
4 INJECTION, SOLUTION INTRA-ARTICULAR; INTRALESIONAL; INTRAMUSCULAR; INTRAVENOUS; SOFT TISSUE
Status: COMPLETED | OUTPATIENT
Start: 2019-08-28 | End: 2019-08-28

## 2019-08-28 RX ADMIN — METHYLPREDNISOLONE SODIUM SUCCINATE 125 MG: 125 INJECTION, POWDER, FOR SOLUTION INTRAMUSCULAR; INTRAVENOUS at 05:08

## 2019-08-28 RX ADMIN — HYDROMORPHONE HYDROCHLORIDE 0.25 MG: 1 INJECTION, SOLUTION INTRAMUSCULAR; INTRAVENOUS; SUBCUTANEOUS at 05:08

## 2019-08-28 RX ADMIN — DEXAMETHASONE SODIUM PHOSPHATE 4 MG: 4 INJECTION, SOLUTION INTRAMUSCULAR; INTRAVENOUS at 05:08

## 2019-08-28 RX ADMIN — ONDANSETRON 4 MG: 2 INJECTION INTRAMUSCULAR; INTRAVENOUS at 05:08

## 2019-08-28 RX ADMIN — LORAZEPAM 0.5 MG: 2 INJECTION INTRAMUSCULAR; INTRAVENOUS at 05:08

## 2019-08-28 RX ADMIN — KETOROLAC TROMETHAMINE 15 MG: 30 INJECTION, SOLUTION INTRAMUSCULAR at 05:08

## 2019-08-28 NOTE — ED PROVIDER NOTES
Encounter Date: 8/28/2019       History     Chief Complaint   Patient presents with    Hand Pain     bilateral     Pt is a 79 year old man who was discharged yesterday from hospital after having been admitted for pain and swelling of right hand/distal forearm and pain in the left hand. Discharge summary states probable rheumatologic cause, as ESR was elevated at 52 mm/hr. Tonight, he returns with recurrence of migratory pain and stiffness of the shoulders and hands. There is minimal swelling and no recurrent redness. The tests pending at discharge are neg: West Nile, EVE, and Parvo (only IgG positive). The bilat shoulder and hand pain/stiffness presentation in elderly patient suggest polymyalgia rheumatica. He is to see Dr. Cao in 2 days. He was unable to schedule a rheum appointment with Dr. Suazo. He continues to take the Clindamycin rxd at discharge, as the erythema of the right forearm/hand initially felt to be possibly cellulitis. At this time, he has had no headaches, visual changes, jaw claudication or fever.        Review of patient's allergies indicates:  No Known Allergies  Past Medical History:   Diagnosis Date    Arthritis     Cancer     prostate    COPD (chronic obstructive pulmonary disease)     Hyperlipidemia     Hypertension      Past Surgical History:   Procedure Laterality Date    CATARACT EXTRACTION W/  INTRAOCULAR LENS IMPLANT Left 05/05/2016    COLONOSCOPY W/ BIOPSIES AND POLYPECTOMY      INSERTION-INTRAOCULAR LENS (IOL) Right 6/9/2016    [facility]    INSERTION-INTRAOCULAR LENS (IOL) Left 5/12/2016    [facility]    PHACOEMULSIFICATION-ASPIRATION-CATARACT Right 6/9/2016    Performed [facility]    PHACOEMULSIFICATION-ASPIRATION-CATARACT Left 5/12/2016    [facility]    PROSTATE SURGERY  05/1996    TONSILLECTOMY  1956     Family History   Problem Relation Age of  Onset    Diabetes Mother      Social History     Tobacco Use    Smoking status: Never Smoker    Smokeless tobacco: Former User   Substance Use Topics    Alcohol use: Yes     Comment: 3 or 4 times a week and 2 or 3 beer at the most    Drug use: No     Review of Systems   Constitutional: Positive for fatigue. Negative for fever.   Eyes: Negative for photophobia and visual disturbance.   Musculoskeletal:        No jaw claudication   Neurological: Negative for headaches.   All other systems reviewed and are negative.      Physical Exam     Initial Vitals [08/28/19 0355]   BP Pulse Resp Temp SpO2   (!) 148/88 75 16 96.4 °F (35.8 °C) 97 %      MAP       --         Physical Exam    Nursing note and vitals reviewed.  Constitutional: He appears well-developed and well-nourished.   HENT:   Head: Normocephalic and atraumatic.   Nose: Nose normal.   Mouth/Throat: Oropharynx is clear and moist.   Eyes: Conjunctivae and EOM are normal. Pupils are equal, round, and reactive to light.   Neck: Normal range of motion. Neck supple. No thyromegaly present.   Cardiovascular: Normal rate, regular rhythm, normal heart sounds and intact distal pulses. Exam reveals no gallop and no friction rub.    No murmur heard.  Pulmonary/Chest: Breath sounds normal.   Abdominal: Soft. Bowel sounds are normal.   Musculoskeletal:   There is bilateral pain and stiffness of the shoulders without erythema or swelling. The pain extends down to both hands. There is redness and synovial swelling of the 2nd MCP joint. Peripheral pulses are intact. Tinel's sign is neg bilat. The previous area of erythema of the right wrist and forearm area shows minimal residual swelling but no erythema.   Neurological: He is alert and oriented to person, place, and time.   Skin: Skin is warm and dry. Capillary refill takes less than 2 seconds. No erythema.         ED Course   Procedures  Labs Reviewed - No data to display       Imaging Results    None          Medical  Decision Making:   History:   Old Records Summarized: records from previous admission(s).       <> Summary of Records: See my HPI  Initial Assessment:   Pt presents with recurrent shoulder and hand pain and stiffness c/w PMR or other rheumatological disorder  Differential Diagnosis:   PMR  Seronegative RA  Other rheumatologic cause                      Clinical Impression:       ICD-10-CM ICD-9-CM   1. Bilateral hand pain M79.641 729.5    M79.642    2. Shoulder joint stiffness, bilateral M25.611 719.51    M25.612          Disposition:   Disposition: Discharged  Condition: Stable                        Gala Iniguez MD  08/28/19 0602

## 2019-08-28 NOTE — PATIENT INSTRUCTIONS
Discharge Instructions for Cellulitis  You have been diagnosed with cellulitis. This is an infection in the deepest layer of the skin. In some cases, the infection also affects the muscle. Cellulitis is caused by bacteria. The bacteria can enter the body through broken skin. This can happen with a cut, scratch, animal bite, or an insect bite that has been scratched. You may have been treated in the hospital with antibiotics and fluids. You will likely be given a prescription for antibiotics to take at home. This sheet will help you take care of yourself at home.  Home care  When you are home:  · Take the prescribed antibiotic medicine you are given as directed until it is gone. Take it even if you feel better. It treats the infection and stops it from returning. Not taking all the medicine can make future infections hard to treat.  · Keep the infected area clean.  · When possible, raise the infected area above the level of your heart. This helps keep swelling down.  · Talk with your healthcare provider if you are in pain. Ask what kind of over-the-counter medicine you can take for pain.  · Apply clean bandages as advised.  · Take your temperature once a day for a week.  · Wash your hands often to prevent spreading the infection.  In the future, wash your hands before and after you touch cuts, scratches, or bandages. This will help prevent infection.   When to call your healthcare provider  Call your healthcare provider immediately if you have any of the following:  · Difficulty or pain when moving the joints above or below the infected area  · Discharge or pus draining from the area  · Fever of 100.4°F (38°C) or higher, or as directed by your healthcare provider  · Pain that gets worse in or around the infected   · Redness that gets worse in or around the infected area, particularly if the area of redness expands to a wider area  · Shaking chills  · Swelling of the infected area  · Vomiting   Date Last Reviewed:  8/1/2016  © 5748-4625 [employer]. [address] [place] [address], [place] [address]. All rights reserved. This information is not intended as a substitute for professional medical care. Always follow your healthcare professional's instructions.

## 2019-08-29 ENCOUNTER — PES CALL (OUTPATIENT)
Dept: ADMINISTRATIVE | Facility: CLINIC | Age: 79
End: 2019-08-29

## 2019-08-29 ENCOUNTER — OFFICE VISIT (OUTPATIENT)
Dept: FAMILY MEDICINE | Facility: CLINIC | Age: 79
End: 2019-08-29
Payer: MEDICARE

## 2019-08-29 VITALS
HEART RATE: 98 BPM | RESPIRATION RATE: 20 BRPM | DIASTOLIC BLOOD PRESSURE: 66 MMHG | BODY MASS INDEX: 24.39 KG/M2 | WEIGHT: 170.38 LBS | SYSTOLIC BLOOD PRESSURE: 120 MMHG | HEIGHT: 70 IN

## 2019-08-29 DIAGNOSIS — M25.541 ARTHRALGIA OF BOTH HANDS: Primary | ICD-10-CM

## 2019-08-29 DIAGNOSIS — M25.542 ARTHRALGIA OF BOTH HANDS: Primary | ICD-10-CM

## 2019-08-29 DIAGNOSIS — R50.9 FEVER, UNSPECIFIED FEVER CAUSE: ICD-10-CM

## 2019-08-29 LAB
WEST NILE VIRUS INTERPRETATION: NORMAL
WNV IGG SER QL IA: NEGATIVE
WNV IGM SER QL IA: NEGATIVE

## 2019-08-29 PROCEDURE — 1101F PT FALLS ASSESS-DOCD LE1/YR: CPT | Mod: CPTII,S$GLB,, | Performed by: FAMILY MEDICINE

## 2019-08-29 PROCEDURE — 99999 PR PBB SHADOW E&M-EST. PATIENT-LVL III: ICD-10-PCS | Mod: PBBFAC,,, | Performed by: FAMILY MEDICINE

## 2019-08-29 PROCEDURE — 3074F SYST BP LT 130 MM HG: CPT | Mod: CPTII,S$GLB,, | Performed by: FAMILY MEDICINE

## 2019-08-29 PROCEDURE — 99999 PR PBB SHADOW E&M-EST. PATIENT-LVL III: CPT | Mod: PBBFAC,,, | Performed by: FAMILY MEDICINE

## 2019-08-29 PROCEDURE — 99213 OFFICE O/P EST LOW 20 MIN: CPT | Mod: S$GLB,,, | Performed by: FAMILY MEDICINE

## 2019-08-29 PROCEDURE — 3078F DIAST BP <80 MM HG: CPT | Mod: CPTII,S$GLB,, | Performed by: FAMILY MEDICINE

## 2019-08-29 PROCEDURE — 1101F PR PT FALLS ASSESS DOC 0-1 FALLS W/OUT INJ PAST YR: ICD-10-PCS | Mod: CPTII,S$GLB,, | Performed by: FAMILY MEDICINE

## 2019-08-29 PROCEDURE — 3074F PR MOST RECENT SYSTOLIC BLOOD PRESSURE < 130 MM HG: ICD-10-PCS | Mod: CPTII,S$GLB,, | Performed by: FAMILY MEDICINE

## 2019-08-29 PROCEDURE — 3078F PR MOST RECENT DIASTOLIC BLOOD PRESSURE < 80 MM HG: ICD-10-PCS | Mod: CPTII,S$GLB,, | Performed by: FAMILY MEDICINE

## 2019-08-29 PROCEDURE — 99213 PR OFFICE/OUTPT VISIT, EST, LEVL III, 20-29 MIN: ICD-10-PCS | Mod: S$GLB,,, | Performed by: FAMILY MEDICINE

## 2019-08-29 RX ORDER — AMOXICILLIN AND CLAVULANATE POTASSIUM 875; 125 MG/1; MG/1
1 TABLET, FILM COATED ORAL EVERY 12 HOURS
Refills: 12 | COMMUNITY
Start: 2019-08-22 | End: 2019-08-29

## 2019-08-29 NOTE — PROGRESS NOTES
Subjective:       Patient ID: Kevin Echeverria is a 79 y.o. male.    Chief Complaint: Follow-up    Pt is a 79 y.o. male who presents for check up for recurring hand swelling and inflammation with severe pain. Doing well on current meds. Denies any side effects. Prevention is up to date.    Review of Systems   Constitutional: Negative for appetite change.   HENT: Negative for congestion, ear pain, sneezing and sore throat.    Eyes: Negative for redness and visual disturbance.   Respiratory: Negative for cough, chest tightness and stridor.    Cardiovascular: Negative for chest pain.   Gastrointestinal: Negative for abdominal pain, blood in stool, diarrhea, nausea and vomiting.   Genitourinary: Negative for difficulty urinating, dysuria and hematuria.   Musculoskeletal: Positive for arthralgias and joint swelling. Negative for back pain, myalgias and neck pain.        HIgh sed rate with both hands and shoulders hurting   Skin: Negative for rash.   Neurological: Negative for dizziness.   Psychiatric/Behavioral: Negative for agitation. The patient is not nervous/anxious.        Objective:      Physical Exam   Constitutional: He is oriented to person, place, and time. He appears well-developed and well-nourished.   HENT:   Head: Normocephalic.   Eyes: Pupils are equal, round, and reactive to light.   Neck: Normal range of motion. Neck supple. No thyromegaly present.   Cardiovascular: Normal rate and regular rhythm. Exam reveals no friction rub.   Murmur heard.  1/6 sys m   Pulmonary/Chest: Effort normal. No respiratory distress. He has no wheezes.   Abdominal: There is no tenderness. There is no rebound and no guarding.   Musculoskeletal: Normal range of motion. He exhibits no edema or tenderness.   Lymphadenopathy:     He has no cervical adenopathy.   Neurological: He is alert and oriented to person, place, and time. He has normal reflexes. No cranial nerve deficit.   Skin: Skin is warm and dry.   Psychiatric: He has a  normal mood and affect. Judgment and thought content normal.       Assessment:       1. Arthralgia of both hands    2. Fever, unspecified fever cause        Plan:   Kevin was seen today for follow-up.    Diagnoses and all orders for this visit:    Arthralgia of both hands  -     Ambulatory referral to Rheumatology  -     Echo Color Flow Doppler? Yes; Future    Fever, unspecified fever cause  -     Echo Color Flow Doppler? Yes; Future

## 2019-08-30 ENCOUNTER — TELEPHONE (OUTPATIENT)
Dept: FAMILY MEDICINE | Facility: CLINIC | Age: 79
End: 2019-08-30

## 2019-08-30 ENCOUNTER — HOSPITAL ENCOUNTER (OUTPATIENT)
Dept: PULMONOLOGY | Facility: HOSPITAL | Age: 79
Discharge: HOME | End: 2019-08-30
Attending: FAMILY MEDICINE
Payer: MEDICARE

## 2019-08-30 VITALS — BODY MASS INDEX: 24.34 KG/M2 | HEIGHT: 70 IN | WEIGHT: 170 LBS

## 2019-08-30 DIAGNOSIS — M25.541 ARTHRALGIA OF BOTH HANDS: ICD-10-CM

## 2019-08-30 DIAGNOSIS — M25.542 ARTHRALGIA OF BOTH HANDS: ICD-10-CM

## 2019-08-30 LAB
AV INDEX (PROSTH): 0.67
AV MEAN GRADIENT: 7 MMHG
AV PEAK GRADIENT: 12 MMHG
AV VALVE AREA: 2.1 CM2
AV VELOCITY RATIO: 0.59
BACTERIA BLD CULT: NORMAL
BACTERIA BLD CULT: NORMAL
BSA FOR ECHO PROCEDURE: 1.95 M2
CV ECHO LV RWT: 0.22 CM
DOP CALC AO PEAK VEL: 1.76 M/S
DOP CALC AO VTI: 33.73 CM
DOP CALC LVOT AREA: 3.1 CM2
DOP CALC LVOT DIAMETER: 2 CM
DOP CALC LVOT PEAK VEL: 1.04 M/S
DOP CALC LVOT STROKE VOLUME: 70.71 CM3
DOP CALCLVOT PEAK VEL VTI: 22.52 CM
E WAVE DECELERATION TIME: 105.14 MSEC
E/A RATIO: 0.79
ECHO LV POSTERIOR WALL: 0.63 CM (ref 0.6–1.1)
FRACTIONAL SHORTENING: 28 % (ref 28–44)
INTERVENTRICULAR SEPTUM: 0.9 CM (ref 0.6–1.1)
IVRT: 0.09 MSEC
LA MAJOR: 4.9 CM
LA MINOR: 4.9 CM
LA WIDTH: 4.4 CM
LEFT ATRIUM SIZE: 4.2 CM
LEFT ATRIUM VOLUME INDEX: 39.5 ML/M2
LEFT ATRIUM VOLUME: 76.97 CM3
LEFT INTERNAL DIMENSION IN SYSTOLE: 4.2 CM (ref 2.1–4)
LEFT VENTRICLE DIASTOLIC VOLUME INDEX: 59.32 ML/M2
LEFT VENTRICLE DIASTOLIC VOLUME: 115.55 ML
LEFT VENTRICLE MASS INDEX: 85 G/M2
LEFT VENTRICLE SYSTOLIC VOLUME INDEX: 19 ML/M2
LEFT VENTRICLE SYSTOLIC VOLUME: 36.97 ML
LEFT VENTRICULAR INTERNAL DIMENSION IN DIASTOLE: 5.8 CM (ref 3.5–6)
LEFT VENTRICULAR MASS: 165.94 G
MV PEAK A VEL: 1.16 M/S
MV PEAK E VEL: 0.92 M/S
PISA TR MAX VEL: 2.15 M/S
PULM VEIN S/D RATIO: 1.64
PV PEAK D VEL: 0.42 M/S
PV PEAK S VEL: 0.69 M/S
PV PEAK VELOCITY: 0.8 CM/S
RA MAJOR: 4.38 CM
RA PRESSURE: 3 MMHG
RIGHT VENTRICULAR END-DIASTOLIC DIMENSION: 2.86 CM
RV TISSUE DOPPLER FREE WALL SYSTOLIC VELOCITY 1 (APICAL 4 CHAMBER VIEW): 15 CM/S
TR MAX PG: 18 MMHG
TRICUSPID ANNULAR PLANE SYSTOLIC EXCURSION: 3.13 CM
TV REST PULMONARY ARTERY PRESSURE: 21 MMHG

## 2019-08-30 PROCEDURE — 93306 TTE W/DOPPLER COMPLETE: CPT | Mod: 26,,, | Performed by: INTERNAL MEDICINE

## 2019-08-30 PROCEDURE — 93306 ECHO (CUPID ONLY): ICD-10-PCS | Mod: 26,,, | Performed by: INTERNAL MEDICINE

## 2019-08-30 PROCEDURE — 93306 TTE W/DOPPLER COMPLETE: CPT

## 2019-08-30 NOTE — TELEPHONE ENCOUNTER
----- Message from Celine Sosa sent at 2019 12:15 PM CDT -----  Contact: self  Kevin Echeverria  MRN: [id number]  : 1940  PCP: Bridger Cao  Home Phone      [phone]  Work Phone      Not on file.  Mobile          [phone]      MESSAGE:   Patient is returning a phone call.  Who left a message for the patient: unsure  Does patient know what this is regarding:  No, said he missed a call from us. Just took a test  Comments:      Phone:  [phone]

## 2019-08-30 NOTE — TELEPHONE ENCOUNTER
Spoke to pt and informed him that im not sure who called.  Pt wanted to inform you Dr.Marcello owens he was able to get an appt with Dr. Eh Suazo on Wednesday at 9am.  Also he would like a call from you when you read is Echo results from this morning.

## 2019-08-31 ENCOUNTER — EXTERNAL CHRONIC CARE MANAGEMENT (OUTPATIENT)
Dept: PRIMARY CARE CLINIC | Facility: CLINIC | Age: 79
End: 2019-08-31
Payer: MEDICARE

## 2019-08-31 PROCEDURE — 99490 CHRNC CARE MGMT STAFF 1ST 20: CPT | Mod: S$GLB,,, | Performed by: FAMILY MEDICINE

## 2019-08-31 PROCEDURE — 99490 PR CHRONIC CARE MGMT, 1ST 20 MIN: ICD-10-PCS | Mod: S$GLB,,, | Performed by: FAMILY MEDICINE

## 2019-09-30 ENCOUNTER — EXTERNAL CHRONIC CARE MANAGEMENT (OUTPATIENT)
Dept: PRIMARY CARE CLINIC | Facility: CLINIC | Age: 79
End: 2019-09-30
Payer: MEDICARE

## 2019-09-30 PROCEDURE — 99490 PR CHRONIC CARE MGMT, 1ST 20 MIN: ICD-10-PCS | Mod: S$GLB,,, | Performed by: FAMILY MEDICINE

## 2019-09-30 PROCEDURE — 99490 CHRNC CARE MGMT STAFF 1ST 20: CPT | Mod: S$GLB,,, | Performed by: FAMILY MEDICINE

## 2019-10-15 DIAGNOSIS — E78.00 HYPERCHOLESTEREMIA: ICD-10-CM

## 2019-10-15 RX ORDER — LOSARTAN POTASSIUM 50 MG/1
TABLET ORAL
Qty: 30 TABLET | Refills: 11 | Status: SHIPPED | OUTPATIENT
Start: 2019-10-15 | End: 2020-11-09 | Stop reason: SDUPTHER

## 2019-10-15 RX ORDER — MONTELUKAST SODIUM 10 MG/1
TABLET ORAL
Qty: 30 TABLET | Refills: 11 | Status: SHIPPED | OUTPATIENT
Start: 2019-10-15 | End: 2020-10-19 | Stop reason: SDUPTHER

## 2019-10-22 ENCOUNTER — IMMUNIZATION (OUTPATIENT)
Dept: INTERNAL MEDICINE | Facility: CLINIC | Age: 79
End: 2019-10-22
Payer: MEDICARE

## 2019-10-22 PROCEDURE — 90662 IIV NO PRSV INCREASED AG IM: CPT | Mod: S$GLB,,, | Performed by: INTERNAL MEDICINE

## 2019-10-22 PROCEDURE — G0008 ADMIN INFLUENZA VIRUS VAC: HCPCS | Mod: S$GLB,,, | Performed by: INTERNAL MEDICINE

## 2019-10-22 PROCEDURE — G0008 FLU VACCINE - HIGH DOSE (65+) PRESERVATIVE FREE IM: ICD-10-PCS | Mod: S$GLB,,, | Performed by: INTERNAL MEDICINE

## 2019-10-22 PROCEDURE — 90662 FLU VACCINE - HIGH DOSE (65+) PRESERVATIVE FREE IM: ICD-10-PCS | Mod: S$GLB,,, | Performed by: INTERNAL MEDICINE

## 2019-10-31 ENCOUNTER — EXTERNAL CHRONIC CARE MANAGEMENT (OUTPATIENT)
Dept: PRIMARY CARE CLINIC | Facility: CLINIC | Age: 79
End: 2019-10-31
Payer: MEDICARE

## 2019-10-31 PROCEDURE — 99490 PR CHRONIC CARE MGMT, 1ST 20 MIN: ICD-10-PCS | Mod: S$GLB,,, | Performed by: FAMILY MEDICINE

## 2019-10-31 PROCEDURE — 99490 CHRNC CARE MGMT STAFF 1ST 20: CPT | Mod: S$GLB,,, | Performed by: FAMILY MEDICINE

## 2019-11-08 ENCOUNTER — OFFICE VISIT (OUTPATIENT)
Dept: FAMILY MEDICINE | Facility: CLINIC | Age: 79
End: 2019-11-08
Payer: MEDICARE

## 2019-11-08 VITALS
DIASTOLIC BLOOD PRESSURE: 70 MMHG | WEIGHT: 175 LBS | HEART RATE: 82 BPM | SYSTOLIC BLOOD PRESSURE: 142 MMHG | HEIGHT: 70 IN | RESPIRATION RATE: 20 BRPM | BODY MASS INDEX: 25.05 KG/M2

## 2019-11-08 DIAGNOSIS — E78.00 HYPERCHOLESTEREMIA: ICD-10-CM

## 2019-11-08 DIAGNOSIS — I10 BENIGN ESSENTIAL HTN: Primary | ICD-10-CM

## 2019-11-08 PROCEDURE — 3077F PR MOST RECENT SYSTOLIC BLOOD PRESSURE >= 140 MM HG: ICD-10-PCS | Mod: CPTII,S$GLB,, | Performed by: FAMILY MEDICINE

## 2019-11-08 PROCEDURE — 1101F PR PT FALLS ASSESS DOC 0-1 FALLS W/OUT INJ PAST YR: ICD-10-PCS | Mod: CPTII,S$GLB,, | Performed by: FAMILY MEDICINE

## 2019-11-08 PROCEDURE — 99999 PR PBB SHADOW E&M-EST. PATIENT-LVL III: ICD-10-PCS | Mod: PBBFAC,,, | Performed by: FAMILY MEDICINE

## 2019-11-08 PROCEDURE — 3078F PR MOST RECENT DIASTOLIC BLOOD PRESSURE < 80 MM HG: ICD-10-PCS | Mod: CPTII,S$GLB,, | Performed by: FAMILY MEDICINE

## 2019-11-08 PROCEDURE — 1101F PT FALLS ASSESS-DOCD LE1/YR: CPT | Mod: CPTII,S$GLB,, | Performed by: FAMILY MEDICINE

## 2019-11-08 PROCEDURE — 99999 PR PBB SHADOW E&M-EST. PATIENT-LVL III: CPT | Mod: PBBFAC,,, | Performed by: FAMILY MEDICINE

## 2019-11-08 PROCEDURE — 99213 PR OFFICE/OUTPT VISIT, EST, LEVL III, 20-29 MIN: ICD-10-PCS | Mod: S$GLB,,, | Performed by: FAMILY MEDICINE

## 2019-11-08 PROCEDURE — 99213 OFFICE O/P EST LOW 20 MIN: CPT | Mod: S$GLB,,, | Performed by: FAMILY MEDICINE

## 2019-11-08 PROCEDURE — 3078F DIAST BP <80 MM HG: CPT | Mod: CPTII,S$GLB,, | Performed by: FAMILY MEDICINE

## 2019-11-08 PROCEDURE — 3077F SYST BP >= 140 MM HG: CPT | Mod: CPTII,S$GLB,, | Performed by: FAMILY MEDICINE

## 2019-11-08 RX ORDER — GABAPENTIN 100 MG/1
100 CAPSULE ORAL 2 TIMES DAILY PRN
Refills: 1 | Status: ON HOLD | COMMUNITY
Start: 2019-10-24 | End: 2019-12-22 | Stop reason: SDUPTHER

## 2019-11-08 RX ORDER — FAMOTIDINE 20 MG/1
TABLET, FILM COATED ORAL
COMMUNITY
Start: 2019-10-10 | End: 2019-12-12 | Stop reason: SDUPTHER

## 2019-11-08 RX ORDER — OMEPRAZOLE 20 MG/1
TABLET, ORALLY DISINTEGRATING, DELAYED RELEASE ORAL
Status: ON HOLD | COMMUNITY
End: 2019-12-22 | Stop reason: CLARIF

## 2019-11-08 RX ORDER — AMOXICILLIN 125 MG/1
125 TABLET, CHEWABLE ORAL 3 TIMES DAILY
COMMUNITY
End: 2021-03-10

## 2019-11-08 NOTE — PROGRESS NOTES
Subjective:       Patient ID: Kevin Echeverria is a 79 y.o. male.    Chief Complaint: Follow-up    Pt is a 79 y.o. male who presents for recheck up for low grade leukemia. Doing well on current meds. Denies any side effects. Prevention is up to date.    Review of Systems   Constitutional: Negative for appetite change.   HENT: Negative for congestion, ear pain, sneezing and sore throat.    Eyes: Negative for redness and visual disturbance.   Respiratory: Negative for cough, chest tightness and stridor.    Cardiovascular: Negative for chest pain.   Gastrointestinal: Negative for abdominal pain, blood in stool, diarrhea, nausea and vomiting.   Genitourinary: Negative for difficulty urinating, dysuria and hematuria.   Musculoskeletal: Negative for arthralgias, back pain, joint swelling, myalgias and neck pain.   Skin: Negative for rash.   Neurological: Negative for dizziness.        Skin burning sensation has calmed down since on Neurontin and using Mobic   Psychiatric/Behavioral: Negative for agitation. The patient is not nervous/anxious.        Objective:      Physical Exam   Constitutional: He is oriented to person, place, and time. He appears well-developed and well-nourished.   HENT:   Head: Normocephalic.   Eyes: Pupils are equal, round, and reactive to light.   Neck: Normal range of motion. Neck supple. No thyromegaly present.   Cardiovascular: Normal rate and regular rhythm. Exam reveals no friction rub.   No murmur heard.  Pulmonary/Chest: Effort normal. No respiratory distress. He has no wheezes.   Abdominal: There is no tenderness. There is no rebound and no guarding.   Musculoskeletal: Normal range of motion. He exhibits no edema or tenderness.   Lymphadenopathy:     He has no cervical adenopathy.   Neurological: He is alert and oriented to person, place, and time. He has normal reflexes. No cranial nerve deficit.   Skin: Skin is warm and dry.   Psychiatric: He has a normal mood and affect. Judgment and  thought content normal.       Assessment:       1. Benign essential HTN    2. Hypercholesteremia        Plan:   Kevin was seen today for follow-up.    Diagnoses and all orders for this visit:    Benign essential HTN    Hypercholesteremia

## 2019-11-14 DIAGNOSIS — I10 BENIGN ESSENTIAL HTN: ICD-10-CM

## 2019-11-14 RX ORDER — VERAPAMIL HYDROCHLORIDE 100 MG/1
CAPSULE, EXTENDED RELEASE ORAL
Qty: 30 CAPSULE | Refills: 11 | Status: SHIPPED | OUTPATIENT
Start: 2019-11-14 | End: 2019-12-12

## 2019-11-14 RX ORDER — OMEPRAZOLE 20 MG/1
CAPSULE, DELAYED RELEASE ORAL
Qty: 30 CAPSULE | Refills: 11 | Status: ON HOLD | OUTPATIENT
Start: 2019-11-14 | End: 2019-12-22 | Stop reason: HOSPADM

## 2019-11-30 ENCOUNTER — EXTERNAL CHRONIC CARE MANAGEMENT (OUTPATIENT)
Dept: PRIMARY CARE CLINIC | Facility: CLINIC | Age: 79
End: 2019-11-30
Payer: MEDICARE

## 2019-11-30 PROCEDURE — 99490 PR CHRONIC CARE MGMT, 1ST 20 MIN: ICD-10-PCS | Mod: S$GLB,,, | Performed by: FAMILY MEDICINE

## 2019-11-30 PROCEDURE — 99490 CHRNC CARE MGMT STAFF 1ST 20: CPT | Mod: S$GLB,,, | Performed by: FAMILY MEDICINE

## 2019-12-10 ENCOUNTER — TELEPHONE (OUTPATIENT)
Dept: FAMILY MEDICINE | Facility: CLINIC | Age: 79
End: 2019-12-10

## 2019-12-12 ENCOUNTER — CLINICAL SUPPORT (OUTPATIENT)
Dept: FAMILY MEDICINE | Facility: CLINIC | Age: 79
End: 2019-12-12
Payer: MEDICARE

## 2019-12-12 VITALS — DIASTOLIC BLOOD PRESSURE: 90 MMHG | SYSTOLIC BLOOD PRESSURE: 160 MMHG | HEART RATE: 80 BPM

## 2019-12-12 PROCEDURE — 99999 PR PBB SHADOW E&M-EST. PATIENT-LVL II: CPT | Mod: PBBFAC,,,

## 2019-12-12 PROCEDURE — 99999 PR PBB SHADOW E&M-EST. PATIENT-LVL II: ICD-10-PCS | Mod: PBBFAC,,,

## 2019-12-12 RX ORDER — VERAPAMIL HYDROCHLORIDE 240 MG/1
CAPSULE, EXTENDED RELEASE ORAL
Qty: 30 CAPSULE | Refills: 11 | Status: SHIPPED | OUTPATIENT
Start: 2019-12-12 | End: 2020-09-09 | Stop reason: SDUPTHER

## 2019-12-12 RX ORDER — FAMOTIDINE 20 MG/1
TABLET, FILM COATED ORAL
Qty: 30 TABLET | Refills: 11 | Status: ON HOLD | OUTPATIENT
Start: 2019-12-12 | End: 2019-12-22 | Stop reason: HOSPADM

## 2019-12-12 NOTE — PROGRESS NOTES
Patient came in for BP check. See vitals.  Spoke with Dr Cao and he has increased this patient's verapamil and he is coming back in 2 weeks for a recheck.

## 2019-12-21 ENCOUNTER — HOSPITAL ENCOUNTER (OUTPATIENT)
Facility: HOSPITAL | Age: 79
Discharge: HOME | End: 2019-12-22
Attending: SURGERY | Admitting: FAMILY MEDICINE
Payer: MEDICARE

## 2019-12-21 DIAGNOSIS — M79.641 RIGHT HAND PAIN: Primary | ICD-10-CM

## 2019-12-21 DIAGNOSIS — I25.10 CARDIOVASCULAR DISEASE: ICD-10-CM

## 2019-12-21 DIAGNOSIS — R52 INTRACTABLE PAIN: ICD-10-CM

## 2019-12-21 DIAGNOSIS — M19.90 ARTHRITIS: ICD-10-CM

## 2019-12-21 LAB
ALBUMIN SERPL BCP-MCNC: 3.9 G/DL (ref 3.5–5.2)
ALP SERPL-CCNC: 49 U/L (ref 55–135)
ALT SERPL W/O P-5'-P-CCNC: 23 U/L (ref 10–44)
ANION GAP SERPL CALC-SCNC: 10 MMOL/L (ref 8–16)
AST SERPL-CCNC: 24 U/L (ref 10–40)
BASOPHILS NFR BLD: 1 % (ref 0–1.9)
BILIRUB SERPL-MCNC: 0.4 MG/DL (ref 0.1–1)
BUN SERPL-MCNC: 13 MG/DL (ref 8–23)
CALCIUM SERPL-MCNC: 9.5 MG/DL (ref 8.7–10.5)
CHLORIDE SERPL-SCNC: 98 MMOL/L (ref 95–110)
CO2 SERPL-SCNC: 25 MMOL/L (ref 23–29)
CREAT SERPL-MCNC: 1.1 MG/DL (ref 0.5–1.4)
DIFFERENTIAL METHOD: ABNORMAL
EOSINOPHIL NFR BLD: 0 % (ref 0–8)
ERYTHROCYTE [DISTWIDTH] IN BLOOD BY AUTOMATED COUNT: 11.7 % (ref 11.5–14.5)
ERYTHROCYTE [SEDIMENTATION RATE] IN BLOOD BY WESTERGREN METHOD: 13 MM/HR (ref 0–10)
EST. GFR  (AFRICAN AMERICAN): >60 ML/MIN/1.73 M^2
EST. GFR  (NON AFRICAN AMERICAN): >60 ML/MIN/1.73 M^2
GIANT PLATELETS BLD QL SMEAR: PRESENT
GLUCOSE SERPL-MCNC: 120 MG/DL (ref 70–110)
HCT VFR BLD AUTO: 38.1 % (ref 40–54)
HGB BLD-MCNC: 13 G/DL (ref 14–18)
IMM GRANULOCYTES # BLD AUTO: ABNORMAL K/UL (ref 0–0.04)
IMM GRANULOCYTES NFR BLD AUTO: ABNORMAL % (ref 0–0.5)
LYMPHOCYTES NFR BLD: 49 % (ref 18–48)
MCH RBC QN AUTO: 32.3 PG (ref 27–31)
MCHC RBC AUTO-ENTMCNC: 34.1 G/DL (ref 32–36)
MCV RBC AUTO: 95 FL (ref 82–98)
MONOCYTES NFR BLD: 4 % (ref 4–15)
NEUTROPHILS NFR BLD: 46 % (ref 38–73)
NRBC BLD-RTO: 0 /100 WBC
PLATELET # BLD AUTO: 190 K/UL (ref 150–350)
PLATELET BLD QL SMEAR: ABNORMAL
PMV BLD AUTO: 10.3 FL (ref 9.2–12.9)
POTASSIUM SERPL-SCNC: 3.8 MMOL/L (ref 3.5–5.1)
PROT SERPL-MCNC: 7.3 G/DL (ref 6–8.4)
RBC # BLD AUTO: 4.02 M/UL (ref 4.6–6.2)
SODIUM SERPL-SCNC: 133 MMOL/L (ref 136–145)
SPHEROCYTES BLD QL SMEAR: ABNORMAL
URATE SERPL-MCNC: 4.2 MG/DL (ref 3.4–7)
WBC # BLD AUTO: 30.98 K/UL (ref 3.9–12.7)
WBC TOXIC VACUOLES BLD QL SMEAR: PRESENT

## 2019-12-21 PROCEDURE — 96375 TX/PRO/DX INJ NEW DRUG ADDON: CPT

## 2019-12-21 PROCEDURE — 85007 BL SMEAR W/DIFF WBC COUNT: CPT

## 2019-12-21 PROCEDURE — 85060 PATHOLOGIST REVIEW: ICD-10-PCS | Mod: ,,, | Performed by: PATHOLOGY

## 2019-12-21 PROCEDURE — 85060 BLOOD SMEAR INTERPRETATION: CPT | Mod: ,,, | Performed by: PATHOLOGY

## 2019-12-21 PROCEDURE — G0378 HOSPITAL OBSERVATION PER HR: HCPCS

## 2019-12-21 PROCEDURE — 84550 ASSAY OF BLOOD/URIC ACID: CPT

## 2019-12-21 PROCEDURE — 99285 EMERGENCY DEPT VISIT HI MDM: CPT | Mod: 25

## 2019-12-21 PROCEDURE — 96374 THER/PROPH/DIAG INJ IV PUSH: CPT

## 2019-12-21 PROCEDURE — 96376 TX/PRO/DX INJ SAME DRUG ADON: CPT

## 2019-12-21 PROCEDURE — 86140 C-REACTIVE PROTEIN: CPT

## 2019-12-21 PROCEDURE — 80053 COMPREHEN METABOLIC PANEL: CPT

## 2019-12-21 PROCEDURE — 96361 HYDRATE IV INFUSION ADD-ON: CPT

## 2019-12-21 PROCEDURE — 25000003 PHARM REV CODE 250: Performed by: SURGERY

## 2019-12-21 PROCEDURE — 85027 COMPLETE CBC AUTOMATED: CPT

## 2019-12-21 PROCEDURE — 63600175 PHARM REV CODE 636 W HCPCS: Performed by: SURGERY

## 2019-12-21 PROCEDURE — 36415 COLL VENOUS BLD VENIPUNCTURE: CPT

## 2019-12-21 PROCEDURE — 85651 RBC SED RATE NONAUTOMATED: CPT

## 2019-12-21 RX ORDER — ONDANSETRON 2 MG/ML
4 INJECTION INTRAMUSCULAR; INTRAVENOUS
Status: COMPLETED | OUTPATIENT
Start: 2019-12-21 | End: 2019-12-21

## 2019-12-21 RX ORDER — HYDROMORPHONE HYDROCHLORIDE 1 MG/ML
0.5 INJECTION, SOLUTION INTRAMUSCULAR; INTRAVENOUS; SUBCUTANEOUS
Status: COMPLETED | OUTPATIENT
Start: 2019-12-21 | End: 2019-12-21

## 2019-12-21 RX ORDER — KETOROLAC TROMETHAMINE 30 MG/ML
15 INJECTION, SOLUTION INTRAMUSCULAR; INTRAVENOUS
Status: COMPLETED | OUTPATIENT
Start: 2019-12-21 | End: 2019-12-21

## 2019-12-21 RX ORDER — METHYLPREDNISOLONE SOD SUCC 125 MG
125 VIAL (EA) INJECTION EVERY 8 HOURS
Status: DISCONTINUED | OUTPATIENT
Start: 2019-12-21 | End: 2019-12-22 | Stop reason: HOSPADM

## 2019-12-21 RX ORDER — ONDANSETRON 2 MG/ML
4 INJECTION INTRAMUSCULAR; INTRAVENOUS
Status: DISCONTINUED | OUTPATIENT
Start: 2019-12-21 | End: 2019-12-21

## 2019-12-21 RX ORDER — MORPHINE SULFATE 2 MG/ML
2 INJECTION, SOLUTION INTRAMUSCULAR; INTRAVENOUS
Status: DISCONTINUED | OUTPATIENT
Start: 2019-12-21 | End: 2019-12-21

## 2019-12-21 RX ADMIN — ONDANSETRON 4 MG: 2 INJECTION INTRAMUSCULAR; INTRAVENOUS at 11:12

## 2019-12-21 RX ADMIN — ONDANSETRON 4 MG: 2 INJECTION INTRAMUSCULAR; INTRAVENOUS at 09:12

## 2019-12-21 RX ADMIN — LIDOCAINE HYDROCHLORIDE 50 ML: 20 SOLUTION ORAL; TOPICAL at 11:12

## 2019-12-21 RX ADMIN — KETOROLAC TROMETHAMINE 15 MG: 30 INJECTION, SOLUTION INTRAMUSCULAR; INTRAVENOUS at 10:12

## 2019-12-21 RX ADMIN — SODIUM CHLORIDE 500 ML: 0.9 INJECTION, SOLUTION INTRAVENOUS at 09:12

## 2019-12-21 RX ADMIN — METHYLPREDNISOLONE SODIUM SUCCINATE 125 MG: 125 INJECTION, POWDER, FOR SOLUTION INTRAMUSCULAR; INTRAVENOUS at 10:12

## 2019-12-21 RX ADMIN — HYDROMORPHONE HYDROCHLORIDE 0.5 MG: 1 INJECTION, SOLUTION INTRAMUSCULAR; INTRAVENOUS; SUBCUTANEOUS at 10:12

## 2019-12-21 RX ADMIN — HYDROMORPHONE HYDROCHLORIDE 0.5 MG: 1 INJECTION, SOLUTION INTRAMUSCULAR; INTRAVENOUS; SUBCUTANEOUS at 09:12

## 2019-12-22 VITALS
WEIGHT: 172 LBS | HEIGHT: 70 IN | HEART RATE: 101 BPM | BODY MASS INDEX: 24.62 KG/M2 | RESPIRATION RATE: 16 BRPM | OXYGEN SATURATION: 92 % | SYSTOLIC BLOOD PRESSURE: 140 MMHG | DIASTOLIC BLOOD PRESSURE: 66 MMHG | TEMPERATURE: 98 F

## 2019-12-22 PROBLEM — C92.10 CML (CHRONIC MYELOCYTIC LEUKEMIA): Status: ACTIVE | Noted: 2019-12-22

## 2019-12-22 LAB
ALBUMIN SERPL BCP-MCNC: 3.7 G/DL (ref 3.5–5.2)
ALP SERPL-CCNC: 50 U/L (ref 55–135)
ALT SERPL W/O P-5'-P-CCNC: 23 U/L (ref 10–44)
ANION GAP SERPL CALC-SCNC: 9 MMOL/L (ref 8–16)
AST SERPL-CCNC: 22 U/L (ref 10–40)
BASOPHILS # BLD AUTO: ABNORMAL K/UL (ref 0–0.2)
BASOPHILS NFR BLD: 0 % (ref 0–1.9)
BILIRUB SERPL-MCNC: 0.4 MG/DL (ref 0.1–1)
BUN SERPL-MCNC: 13 MG/DL (ref 8–23)
CALCIUM SERPL-MCNC: 9.5 MG/DL (ref 8.7–10.5)
CHLORIDE SERPL-SCNC: 101 MMOL/L (ref 95–110)
CO2 SERPL-SCNC: 23 MMOL/L (ref 23–29)
CREAT SERPL-MCNC: 1 MG/DL (ref 0.5–1.4)
CRP SERPL-MCNC: 18.8 MG/L (ref 0–8.2)
DIFFERENTIAL METHOD: ABNORMAL
EOSINOPHIL # BLD AUTO: ABNORMAL K/UL (ref 0–0.5)
EOSINOPHIL NFR BLD: 0 % (ref 0–8)
ERYTHROCYTE [DISTWIDTH] IN BLOOD BY AUTOMATED COUNT: 11.6 % (ref 11.5–14.5)
EST. GFR  (AFRICAN AMERICAN): >60 ML/MIN/1.73 M^2
EST. GFR  (NON AFRICAN AMERICAN): >60 ML/MIN/1.73 M^2
GLUCOSE SERPL-MCNC: 152 MG/DL (ref 70–110)
HCT VFR BLD AUTO: 39.2 % (ref 40–54)
HGB BLD-MCNC: 13.1 G/DL (ref 14–18)
IMM GRANULOCYTES # BLD AUTO: ABNORMAL K/UL (ref 0–0.04)
IMM GRANULOCYTES NFR BLD AUTO: ABNORMAL % (ref 0–0.5)
LYMPHOCYTES # BLD AUTO: ABNORMAL K/UL (ref 1–4.8)
LYMPHOCYTES NFR BLD: 50 % (ref 18–48)
MCH RBC QN AUTO: 31.9 PG (ref 27–31)
MCHC RBC AUTO-ENTMCNC: 33.4 G/DL (ref 32–36)
MCV RBC AUTO: 95 FL (ref 82–98)
MONOCYTES # BLD AUTO: ABNORMAL K/UL (ref 0.3–1)
MONOCYTES NFR BLD: 3 % (ref 4–15)
NEUTROPHILS NFR BLD: 47 % (ref 38–73)
NRBC BLD-RTO: 0 /100 WBC
PLATELET # BLD AUTO: 227 K/UL (ref 150–350)
PMV BLD AUTO: 10.7 FL (ref 9.2–12.9)
POTASSIUM SERPL-SCNC: 4.8 MMOL/L (ref 3.5–5.1)
PROT SERPL-MCNC: 7.3 G/DL (ref 6–8.4)
RBC # BLD AUTO: 4.11 M/UL (ref 4.6–6.2)
SODIUM SERPL-SCNC: 133 MMOL/L (ref 136–145)
WBC # BLD AUTO: 25.01 K/UL (ref 3.9–12.7)

## 2019-12-22 PROCEDURE — 99219 PR INITIAL OBSERVATION CARE,LEVL II: CPT | Mod: ,,, | Performed by: FAMILY MEDICINE

## 2019-12-22 PROCEDURE — 63600175 PHARM REV CODE 636 W HCPCS: Performed by: SURGERY

## 2019-12-22 PROCEDURE — 80053 COMPREHEN METABOLIC PANEL: CPT

## 2019-12-22 PROCEDURE — 85027 COMPLETE CBC AUTOMATED: CPT

## 2019-12-22 PROCEDURE — 93005 ELECTROCARDIOGRAM TRACING: CPT

## 2019-12-22 PROCEDURE — 36415 COLL VENOUS BLD VENIPUNCTURE: CPT

## 2019-12-22 PROCEDURE — 25000003 PHARM REV CODE 250: Performed by: SURGERY

## 2019-12-22 PROCEDURE — 85007 BL SMEAR W/DIFF WBC COUNT: CPT

## 2019-12-22 PROCEDURE — 93010 ELECTROCARDIOGRAM REPORT: CPT | Mod: ,,, | Performed by: INTERNAL MEDICINE

## 2019-12-22 PROCEDURE — G0378 HOSPITAL OBSERVATION PER HR: HCPCS

## 2019-12-22 PROCEDURE — 96376 TX/PRO/DX INJ SAME DRUG ADON: CPT

## 2019-12-22 PROCEDURE — 99219 PR INITIAL OBSERVATION CARE,LEVL II: ICD-10-PCS | Mod: ,,, | Performed by: FAMILY MEDICINE

## 2019-12-22 PROCEDURE — 93010 EKG 12-LEAD: ICD-10-PCS | Mod: ,,, | Performed by: INTERNAL MEDICINE

## 2019-12-22 RX ORDER — GABAPENTIN 300 MG/1
300 CAPSULE ORAL NIGHTLY
Status: DISCONTINUED | OUTPATIENT
Start: 2019-12-22 | End: 2019-12-22 | Stop reason: HOSPADM

## 2019-12-22 RX ORDER — HYDROCODONE BITARTRATE AND ACETAMINOPHEN 5; 325 MG/1; MG/1
1 TABLET ORAL EVERY 4 HOURS PRN
Status: DISCONTINUED | OUTPATIENT
Start: 2019-12-22 | End: 2019-12-22 | Stop reason: HOSPADM

## 2019-12-22 RX ORDER — ACETAMINOPHEN 325 MG/1
650 TABLET ORAL EVERY 8 HOURS PRN
Status: DISCONTINUED | OUTPATIENT
Start: 2019-12-22 | End: 2019-12-22 | Stop reason: HOSPADM

## 2019-12-22 RX ORDER — GABAPENTIN 300 MG/1
300 CAPSULE ORAL 3 TIMES DAILY
Qty: 30 CAPSULE | Refills: 5 | Status: SHIPPED | OUTPATIENT
Start: 2019-12-22 | End: 2020-03-10

## 2019-12-22 RX ORDER — SODIUM CHLORIDE 0.9 % (FLUSH) 0.9 %
10 SYRINGE (ML) INJECTION
Status: DISCONTINUED | OUTPATIENT
Start: 2019-12-22 | End: 2019-12-22 | Stop reason: HOSPADM

## 2019-12-22 RX ORDER — MELOXICAM 15 MG/1
15 TABLET ORAL DAILY
Qty: 30 TABLET | Refills: 1 | Status: SHIPPED | OUTPATIENT
Start: 2019-12-22 | End: 2020-03-10 | Stop reason: SDUPTHER

## 2019-12-22 RX ORDER — HYDROMORPHONE HYDROCHLORIDE 1 MG/ML
1 INJECTION, SOLUTION INTRAMUSCULAR; INTRAVENOUS; SUBCUTANEOUS EVERY 4 HOURS PRN
Status: DISCONTINUED | OUTPATIENT
Start: 2019-12-22 | End: 2019-12-22 | Stop reason: HOSPADM

## 2019-12-22 RX ORDER — HYDROCODONE BITARTRATE AND ACETAMINOPHEN 10; 325 MG/1; MG/1
1 TABLET ORAL EVERY 12 HOURS PRN
Qty: 60 TABLET | Refills: 0 | Status: SHIPPED | OUTPATIENT
Start: 2019-12-22 | End: 2019-12-27 | Stop reason: SDUPTHER

## 2019-12-22 RX ORDER — GABAPENTIN 100 MG/1
100 CAPSULE ORAL 2 TIMES DAILY PRN
Qty: 60 CAPSULE | Refills: 5 | Status: SHIPPED | OUTPATIENT
Start: 2019-12-22 | End: 2020-03-10

## 2019-12-22 RX ORDER — GABAPENTIN 300 MG/1
300 CAPSULE ORAL 3 TIMES DAILY
Qty: 30 CAPSULE | Refills: 5 | Status: SHIPPED | OUTPATIENT
Start: 2019-12-22 | End: 2019-12-22 | Stop reason: HOSPADM

## 2019-12-22 RX ORDER — ONDANSETRON 2 MG/ML
4 INJECTION INTRAMUSCULAR; INTRAVENOUS EVERY 8 HOURS PRN
Status: DISCONTINUED | OUTPATIENT
Start: 2019-12-22 | End: 2019-12-22 | Stop reason: HOSPADM

## 2019-12-22 RX ORDER — KETOROLAC TROMETHAMINE 15 MG/ML
15 INJECTION, SOLUTION INTRAMUSCULAR; INTRAVENOUS EVERY 6 HOURS PRN
Status: DISCONTINUED | OUTPATIENT
Start: 2019-12-22 | End: 2019-12-22 | Stop reason: HOSPADM

## 2019-12-22 RX ORDER — MELOXICAM 15 MG/1
15 TABLET ORAL DAILY
Qty: 30 TABLET | Refills: 2 | Status: SHIPPED | OUTPATIENT
Start: 2019-12-22 | End: 2020-03-10

## 2019-12-22 RX ORDER — PANTOPRAZOLE SODIUM 40 MG/1
40 TABLET, DELAYED RELEASE ORAL DAILY
Status: DISCONTINUED | OUTPATIENT
Start: 2019-12-22 | End: 2019-12-22 | Stop reason: HOSPADM

## 2019-12-22 RX ADMIN — HYDROCODONE BITARTRATE AND ACETAMINOPHEN 1 TABLET: 5; 325 TABLET ORAL at 04:12

## 2019-12-22 RX ADMIN — PANTOPRAZOLE SODIUM 40 MG: 40 TABLET, DELAYED RELEASE ORAL at 08:12

## 2019-12-22 RX ADMIN — HYDROCODONE BITARTRATE AND ACETAMINOPHEN 1 TABLET: 5; 325 TABLET ORAL at 12:12

## 2019-12-22 RX ADMIN — METHYLPREDNISOLONE SODIUM SUCCINATE 125 MG: 125 INJECTION, POWDER, FOR SOLUTION INTRAMUSCULAR; INTRAVENOUS at 06:12

## 2019-12-22 NOTE — ED PROVIDER NOTES
[facility]                                                 Chief Complaint  79 y.o. male with Hand Pain (Right hand pain x 1 day)    History of Present Illness  Kevin Echeverria presents to the emergency room with right hand pain today  Patient has right hand pain, denies any trauma fall, history of right hand pain  Patient was seen in August 2019 with right hand pain, leukemia diagnosis  Seen outpatient for leukemia, Dr. Torito Gallardo at [facility]  Wife states that he has a mild case of leukemia, currently not being treated  Patient states that he has had right hand pain since leukemia diagnosis  Patient was taken off of his meloxicam earlier this week by hematology    The history is provided by the patient   device was not used during this ER visit  Medical history: Arthritis, cancer, COPD, HLD, HTN, leukemia  Surgeries: Cataract, prostate, tonsils  No known allergies    I have reviewed all of this patient's past medical, surgical, family, and social   histories as well as active allergies and medications documented in the  electronic medical record    Review of Systems and Physical Exam      Review of Systems  -- Constitution - no fever, denies fatigue, no weakness, no chills  -- Eyes - no tearing or redness, no visual disturbance  -- Ear, Nose - no tinnitus or earache, no nasal congestion or discharge  -- Mouth,Throat - no sore throat, no toothache, normal voice, normal swallowing  -- Respiratory - denies cough and congestion, no shortness of breath, no MORA  -- Cardiovascular - denies chest pain, no palpitations, denies claudication  -- Gastrointestinal - denies abdominal pain, nausea, vomiting, or diarrhea  -- Musculoskeletal - right hand pain  -- Neurological - no headache, denies weakness or seizure; no LOC  -- Skin - denies pallor, rash, or changes in skin. no hives or welts noted    Vital Signs  His tympanic temperature is 99.1 °F (37.3 °C).   His blood  pressure is 147/77 and his pulse is 97.   His respiration is 18 and oxygen saturation is 96%.     Physical Exam  -- Nursing note and vitals reviewed  -- Constitutional: Appears well-developed and well-nourished  -- Head: Atraumatic. Normocephalic. No obvious abnormality  -- Eyes: Pupils are equal and reactive to light. Normal conjunctiva and lids  -- Cardiac: Normal rate, regular rhythm and normal heart sounds  -- Pulmonary: Normal respiratory effort, breath sounds clear to auscultation  -- Abdominal: Soft, no tenderness. Normal bowel sounds. Normal liver edge  -- Musculoskeletal: Normal range of motion, no effusions. Joints stable   -- Neurological: No focal deficits. Showed good interaction with staff  -- Vascular: Posterior tibial, dorsalis pedis and radial pulses 2+ bilaterally    -- Lymphatics: No cervical or peripheral lymphadenopathy. No edema noted  -- Skin: Warm and dry. No evidence of rash or cellulitis    Emergency Room Course      Lab Results   (L)   K 3.8   CL 98   CO2 25   BUN 13   CREATININE 1.1    (H)   ALKPHOS 49 (L)   AST 24   ALT 23   BILITOT 0.4   ALBUMIN 3.9   PROT 7.3   WBC 30.98 (H)   HGB 13.0 (L)   HCT 38.1 (L)        Radiology  -- Preliminary ER x-ray readings showed no evidence of fracture or dislocation  -- All x-rays are reviewed with a final disposition given by the radiologist     Additional Work up  -- ESR is 13 and uric acid is 4.6    Medications Given  methylPREDNISolone sodium succinate injection 125 mg (has no administration in time range)   sodium chloride 0.9% bolus 500 mL (500 mLs Intravenous New Bag 12/21/19 2150)   HYDROmorphone injection 0.5 mg (0.5 mg Intravenous Given 12/21/19 2152)   ondansetron injection 4 mg (4 mg Intravenous Given 12/21/19 2151)   ketorolac injection 15 mg (15 mg Intravenous Given 12/21/19 2214)   HYDROmorphone injection 0.5 mg (0.5 mg Intravenous Given 12/21/19 2213)     ED Physician Management  -- Diagnosis management comments: 79  y.o. male with right hand pain today  -- patient had right hand pain and August, was diagnosed with leukemia after   -- patient was taken off of blocks a came on Thursday, right hand pain after  -- patient [place] [address], normal uric acid, leukemia history noted  -- discussed this evening with Dr. Arnulfo Nayak, [facility] hematology  -- he advocates for conservative symptomatic treatment and Hematology follow-up  -- Dr. Nayak states that steroids would be appropriate with anti-inflammatory  -- patient discussed with Dr. Bridger Cao, the patient's primary physician  -- we will admit the patient for right hand pain and symptomatic care  -- he continues to be in significant pain in the ER, needs continued IV meds    Diagnosis  -- The primary encounter diagnosis was Right hand pain.   -- Diagnoses of Arthritis and Intractable pain were also pertinent to this visit.    Disposition and Plan  -- Disposition: observation  -- Condition: stable    This note is dictated on M*Modal word recognition program.  There are word recognition mistakes that are occasionally missed on review.         Rene Montaño MD  12/21/19 5333

## 2019-12-22 NOTE — ED NOTES
"In to administer medications as ordered, wife states "he finally fell asleep." Will hold medication. Instructed patients wife to notify staff if patient wakes up needing medication.  "

## 2019-12-22 NOTE — HOSPITAL COURSE
Admitted for terrible R wrist pain over the last 48 hrs; doing better this am and desires to go home

## 2019-12-22 NOTE — ED TRIAGE NOTES
79 y.o. male presents to ER ED 01/ED 01B   Chief Complaint   Patient presents with    Hand Pain     Right hand pain x 1 day   . No acute distress noted.

## 2019-12-22 NOTE — SUBJECTIVE & OBJECTIVE
Past Medical History:   Diagnosis Date    Arthritis     Cancer     prostate    COPD (chronic obstructive pulmonary disease)     Hyperlipidemia     Hypertension     Leukemia        Past Surgical History:   Procedure Laterality Date    CATARACT EXTRACTION W/  INTRAOCULAR LENS IMPLANT Left 05/05/2016    COLONOSCOPY W/ BIOPSIES AND POLYPECTOMY      PROSTATE SURGERY  05/1996    TONSILLECTOMY  1956       Review of patient's allergies indicates:  No Known Allergies    No current facility-administered medications on file prior to encounter.      Current Outpatient Medications on File Prior to Encounter   Medication Sig    albuterol (PROVENTIL) 2.5 mg /3 mL (0.083 %) nebulizer solution USE 1 VIAL PER NEBULIZER TREATMENT EVERY 6 HOURS  AS NEEDED FOR WHEEZING    amoxicillin (AMOXIL) 125 MG chewable tablet Take 125 mg by mouth 3 (three) times daily.    aspirin (ECOTRIN) 81 MG EC tablet Take 81 mg by mouth once daily.      budesonide-formoterol 160-4.5 mcg (SYMBICORT) 160-4.5 mcg/actuation HFAA Inhale 2 puffs into the lungs every 12 (twelve) hours. Controller    famotidine (PEPCID) 20 MG tablet TAKE ONE TABLET BY MOUTH EVERY DAY    fish oil-omega-3 fatty acids 300-1,000 mg capsule Take 1 g by mouth once daily.      gabapentin (NEURONTIN) 100 MG capsule Take 100 mg by mouth 2 (two) times daily as needed.    ipratropium (ATROVENT) 0.02 % nebulizer solution     losartan (COZAAR) 50 MG tablet TAKE ONE TABLET BY MOUTH EVERY DAY    meloxicam (MOBIC) 15 MG tablet Take 1 tablet (15 mg total) by mouth daily as needed for Pain.    multivitamin with minerals tablet Take 1 tablet by mouth once daily. Equate Brand with Iron    omeprazole (PRILOSEC) 20 MG capsule TAKE ONE CAPSULE BY MOUTH EACH MORNING    pravastatin (PRAVACHOL) 20 MG tablet Take 1 tablet (20 mg total) by mouth nightly.    verapamil (VERELAN) 240 MG C24P One po q PM for HTN    montelukast (SINGULAIR) 10 mg tablet TAKE ONE TABLET BY MOUTH EACH EVENING     [DISCONTINUED] omeprazole 20 mg TbLD Take by mouth.    [DISCONTINUED] pantoprazole (PROTONIX) 40 MG tablet Take 1 tablet (40 mg total) by mouth once daily. (Patient not taking: Reported on 11/8/2019)     Family History     Problem Relation (Age of Onset)    Diabetes Mother        Tobacco Use    Smoking status: Never Smoker    Smokeless tobacco: Former User   Substance and Sexual Activity    Alcohol use: Yes     Comment: 3 or 4 times a week and 2 or 3 beer at the most    Drug use: No    Sexual activity: Not on file     Comment:      Review of Systems   Musculoskeletal: Positive for arthralgias and joint swelling.     Objective:     Vital Signs (Most Recent):  Temp: 98.4 °F (36.9 °C) (12/22/19 0831)  Pulse: 110 (12/22/19 0831)  Resp: 16 (12/22/19 0831)  BP: (!) 140/66 (12/22/19 0831)  SpO2: (!) 92 % (12/22/19 0831) Vital Signs (24h Range):  Temp:  [97.8 °F (36.6 °C)-99.1 °F (37.3 °C)] 98.4 °F (36.9 °C)  Pulse:  [] 110  Resp:  [16-20] 16  SpO2:  [92 %-98 %] 92 %  BP: (140-168)/(66-77) 140/66     Weight: 78 kg (172 lb)  Body mass index is 24.68 kg/m².    Physical Exam   Constitutional: He is oriented to person, place, and time. He appears well-developed and well-nourished.   HENT:   Head: Normocephalic.   Eyes: Pupils are equal, round, and reactive to light.   Neck: Normal range of motion. Neck supple. No thyromegaly present.   Cardiovascular: Normal rate and regular rhythm. Exam reveals no friction rub.   No murmur heard.  Pulmonary/Chest: Effort normal. No respiratory distress. He has no wheezes.   Abdominal: There is no tenderness. There is no rebound and no guarding.   Musculoskeletal: He exhibits tenderness. He exhibits no edema.   Dorsum of R hand and wrist with redness and swollen   Lymphadenopathy:     He has no cervical adenopathy.   Neurological: He is alert and oriented to person, place, and time. He has normal reflexes. No cranial nerve deficit.   Skin: Skin is warm and dry.    Psychiatric: He has a normal mood and affect. Judgment and thought content normal.         CRANIAL NERVES     CN III, IV, VI   Pupils are equal, round, and reactive to light.       Significant Labs:   CBC:   Recent Labs   Lab 12/21/19 2110 12/22/19  0548   WBC 30.98* 25.01*   HGB 13.0* 13.1*   HCT 38.1* 39.2*    227     CMP:   Recent Labs   Lab 12/21/19 2110 12/22/19  0549   * 133*   K 3.8 4.8   CL 98 101   CO2 25 23   * 152*   BUN 13 13   CREATININE 1.1 1.0   CALCIUM 9.5 9.5   PROT 7.3 7.3   ALBUMIN 3.9 3.7   BILITOT 0.4 0.4   ALKPHOS 49* 50*   AST 24 22   ALT 23 23   ANIONGAP 10 9   EGFRNONAA >60 >60       Significant Imaging: I have reviewed all pertinent imaging results/findings within the past 24 hours.

## 2019-12-22 NOTE — ASSESSMENT & PLAN NOTE
Will d'c home to take his Mobic daily again, neurotin 100m g q am and 300mg q pm and Norco 10 q pm for severe pain if the tramadol fails

## 2019-12-22 NOTE — PLAN OF CARE
Assessment complete per flow sheet, AAOX4, RR even unlabored BBS diminished with expiatory wheezing, on RA. Cardiac monitor in use, NSR noted. ABD soft, non distended with active bowel sounds x 4 quads. Tolerating diet well. RT hand, digits and wrist with +1 edema and redness noted. States only hurts when he closes his hand. PIV  SL,Dressing C/D/I. Medications administered per MAR, tolerated well. Safety precautions maintained, refusing bed alarm and SCD's, free from falls/ injury, call bell in reach, instructed to call for needs, voiced understanding, will monitor.    MD rounding, new orders noted.  Discharge instructions provided, voiced understanding.  PIV removed , cath tip intact, tolerated well.  Discharged to home

## 2019-12-22 NOTE — PLAN OF CARE
Patient admitted with Right hand pain. Patient receiving Solumedrol Q8H. Patient received 2 doses of 0.5 dilaudid. And one injection of toradol. In the ER. When patient came up he received a NORCO 5 at 0032 and another at 0424. Patient stated that his hand felt much better. After NORCO. Hand was noticeably red and swollen upon admit. Currently hand is no longer discolored and patient states that he can move hand more freely. Patient ambulates independently. Patient refused VTE prophylaxis. Wife at bedside. Call light in reach. Bed locked and low. Plan of care reviewed with patient and wife. Patient and wife verbalized udnerstanding.

## 2019-12-22 NOTE — H&P
[facility] Medicine  History & Physical    Patient Name: Kevin Echeverria  MRN: [id number]  Admission Date: 12/21/2019  Attending Physician: Bridger Cao MD   Primary Care Provider: Bridger Cao MD         Patient information was obtained from patient and ER records.     Subjective:     Principal Problem:Right hand pain    Chief Complaint:   Chief Complaint   Patient presents with    Hand Pain     Right hand pain x 1 day        HPI: Terrible R wrist pain over the last 48 hrs    Past Medical History:   Diagnosis Date    Arthritis     Cancer     prostate    COPD (chronic obstructive pulmonary disease)     Hyperlipidemia     Hypertension     Leukemia        Past Surgical History:   Procedure Laterality Date    CATARACT EXTRACTION W/  INTRAOCULAR LENS IMPLANT Left 05/05/2016    COLONOSCOPY W/ BIOPSIES AND POLYPECTOMY      PROSTATE SURGERY  05/1996    TONSILLECTOMY  1956       Review of patient's allergies indicates:  No Known Allergies    No current facility-administered medications on file prior to encounter.      Current Outpatient Medications on File Prior to Encounter   Medication Sig    albuterol (PROVENTIL) 2.5 mg /3 mL (0.083 %) nebulizer solution USE 1 VIAL PER NEBULIZER TREATMENT EVERY 6 HOURS  AS NEEDED FOR WHEEZING    amoxicillin (AMOXIL) 125 MG chewable tablet Take 125 mg by mouth 3 (three) times daily.    aspirin (ECOTRIN) 81 MG EC tablet Take 81 mg by mouth once daily.      budesonide-formoterol 160-4.5 mcg (SYMBICORT) 160-4.5 mcg/actuation HFAA Inhale 2 puffs into the lungs every 12 (twelve) hours. Controller    famotidine (PEPCID) 20 MG tablet TAKE ONE TABLET BY MOUTH EVERY DAY    fish oil-omega-3 fatty acids 300-1,000 mg capsule Take 1 g by mouth once daily.      gabapentin (NEURONTIN) 100 MG capsule Take 100 mg by mouth 2 (two) times daily as needed.    ipratropium (ATROVENT) 0.02 % nebulizer solution     losartan (COZAAR) 50 MG tablet TAKE  ONE TABLET BY MOUTH EVERY DAY    meloxicam (MOBIC) 15 MG tablet Take 1 tablet (15 mg total) by mouth daily as needed for Pain.    multivitamin with minerals tablet Take 1 tablet by mouth once daily. Equate Brand with Iron    omeprazole (PRILOSEC) 20 MG capsule TAKE ONE CAPSULE BY MOUTH EACH MORNING    pravastatin (PRAVACHOL) 20 MG tablet Take 1 tablet (20 mg total) by mouth nightly.    verapamil (VERELAN) 240 MG C24P One po q PM for HTN    montelukast (SINGULAIR) 10 mg tablet TAKE ONE TABLET BY MOUTH EACH EVENING    [DISCONTINUED] omeprazole 20 mg TbLD Take by mouth.    [DISCONTINUED] pantoprazole (PROTONIX) 40 MG tablet Take 1 tablet (40 mg total) by mouth once daily. (Patient not taking: Reported on 11/8/2019)     Family History     Problem Relation (Age of Onset)    Diabetes Mother        Tobacco Use    Smoking status: Never Smoker    Smokeless tobacco: Former User   Substance and Sexual Activity    Alcohol use: Yes     Comment: 3 or 4 times a week and 2 or 3 beer at the most    Drug use: No    Sexual activity: Not on file     Comment:      Review of Systems   Musculoskeletal: Positive for arthralgias and joint swelling.     Objective:     Vital Signs (Most Recent):  Temp: 98.4 °F (36.9 °C) (12/22/19 0831)  Pulse: 110 (12/22/19 0831)  Resp: 16 (12/22/19 0831)  BP: (!) 140/66 (12/22/19 0831)  SpO2: (!) 92 % (12/22/19 0831) Vital Signs (24h Range):  Temp:  [97.8 °F (36.6 °C)-99.1 °F (37.3 °C)] 98.4 °F (36.9 °C)  Pulse:  [] 110  Resp:  [16-20] 16  SpO2:  [92 %-98 %] 92 %  BP: (140-168)/(66-77) 140/66     Weight: 78 kg (172 lb)  Body mass index is 24.68 kg/m².    Physical Exam   Constitutional: He is oriented to person, place, and time. He appears well-developed and well-nourished.   HENT:   Head: Normocephalic.   Eyes: Pupils are equal, round, and reactive to light.   Neck: Normal range of motion. Neck supple. No thyromegaly present.   Cardiovascular: Normal rate and regular rhythm. Exam  reveals no friction rub.   No murmur heard.  Pulmonary/Chest: Effort normal. No respiratory distress. He has no wheezes.   Abdominal: There is no tenderness. There is no rebound and no guarding.   Musculoskeletal: He exhibits tenderness. He exhibits no edema.   Dorsum of R hand and wrist with redness and swollen   Lymphadenopathy:     He has no cervical adenopathy.   Neurological: He is alert and oriented to person, place, and time. He has normal reflexes. No cranial nerve deficit.   Skin: Skin is warm and dry.   Psychiatric: He has a normal mood and affect. Judgment and thought content normal.         CRANIAL NERVES     CN III, IV, VI   Pupils are equal, round, and reactive to light.       Significant Labs:   CBC:   Recent Labs   Lab 12/21/19 2110 12/22/19  0548   WBC 30.98* 25.01*   HGB 13.0* 13.1*   HCT 38.1* 39.2*    227     CMP:   Recent Labs   Lab 12/21/19 2110 12/22/19  0549   * 133*   K 3.8 4.8   CL 98 101   CO2 25 23   * 152*   BUN 13 13   CREATININE 1.1 1.0   CALCIUM 9.5 9.5   PROT 7.3 7.3   ALBUMIN 3.9 3.7   BILITOT 0.4 0.4   ALKPHOS 49* 50*   AST 24 22   ALT 23 23   ANIONGAP 10 9   EGFRNONAA >60 >60       Significant Imaging: I have reviewed all pertinent imaging results/findings within the past 24 hours.    Assessment/Plan:     No notes have been filed under this hospital service.  Service: Hospital Medicine    VTE Risk Mitigation (From admission, onward)         Ordered     IP VTE HIGH RISK PATIENT  Once      12/22/19 0017     Place sequential compression device  Until discontinued      12/22/19 0017                   Bridger Cao MD  Department of Hospital Medicine   [facility]

## 2019-12-22 NOTE — NURSING
Patient currently resting in bed. Wife at bedside. Patient was given Norco 5/325 for Right hand pain at 0032. Expiratory wheezes and diminished lung sounds upon auscultation. 2-3 nickel sized bruises noted to JOSE forearms and hands. Skin otherwise clear and intact. Patient ambulates independently. Patient refused SCDs. Urinal at bedside. Will continue to monitor.

## 2019-12-22 NOTE — DISCHARGE SUMMARY
[facility]  Discharge Summary      Patient Name: Kevin Echeverria  MRN: [id number]  Admission Date: 12/21/2019  Hospital Length of Stay: 0 days  Discharge Date and Time:  12/22/2019 10:21 AM  Attending Physician: Bridger Cao MD   Discharging Provider: Bridger Cao MD  Primary Care Provider: Bridger Cao MD      HPI:   Terrible R wrist pain over the last 48 hrs    * No surgery found *      Hospital Course:   Admitted for terrible R wrist pain over the last 48 hrs; doing better this am and desires to go home     Consults:     * Right hand pain  Will d'c home to take his Mobic daily again, neurotin 100m g q am and 300mg q pm and Norco 10 q pm for severe pain if the tramadol fails        Final Active Diagnoses:    Diagnosis Date Noted POA    PRINCIPAL PROBLEM:  Right hand pain [M79.641] 12/21/2019 Yes    CML (chronic myelocytic leukemia) [C92.10] 12/22/2019 Unknown    HTN (hypertension) [I10] 08/16/2012 Yes     Chronic      Problems Resolved During this Admission:       Discharged Condition: fair    Disposition: Home or Self Care    Follow Up:    Patient Instructions:   No discharge procedures on file.    Significant Diagnostic Studies: Labs:   CMP   Recent Labs   Lab 12/21/19 2110 12/22/19  0549   * 133*   K 3.8 4.8   CL 98 101   CO2 25 23   * 152*   BUN 13 13   CREATININE 1.1 1.0   CALCIUM 9.5 9.5   PROT 7.3 7.3   ALBUMIN 3.9 3.7   BILITOT 0.4 0.4   ALKPHOS 49* 50*   AST 24 22   ALT 23 23   ANIONGAP 10 9   ESTGFRAFRICA >60 >60   EGFRNONAA >60 >60    and CBC   Recent Labs   Lab 12/21/19 2110 12/22/19  0548   WBC 30.98* 25.01*   HGB 13.0* 13.1*   HCT 38.1* 39.2*    227       Pending Diagnostic Studies:     None         Medications:  Reconciled Home Medications:      Medication List      START taking these medications    HYDROcodone-acetaminophen  mg per tablet  Commonly known as:  NORCO  Take 1 tablet by mouth every 12 (twelve)  hours as needed.        CHANGE how you take these medications    * gabapentin 300 MG capsule  Commonly known as:  NEURONTIN  Take 1 capsule (300 mg total) by mouth 3 (three) times daily.  What changed:  You were already taking a medication with the same name, and this prescription was added. Make sure you understand how and when to take each.     * gabapentin 300 MG capsule  Commonly known as:  NEURONTIN  Take 1 capsule (300 mg total) by mouth 3 (three) times daily.  What changed:  You were already taking a medication with the same name, and this prescription was added. Make sure you understand how and when to take each.     * gabapentin 100 MG capsule  Commonly known as:  NEURONTIN  Take 1 capsule (100 mg total) by mouth 2 (two) times daily as needed.  What changed:  Another medication with the same name was added. Make sure you understand how and when to take each.         * This list has 3 medication(s) that are the same as other medications prescribed for you. Read the directions carefully, and ask your doctor or other care provider to review them with you.            CONTINUE taking these medications    fish oil-omega-3 fatty acids 300-1,000 mg capsule  Take 1 g by mouth once daily.        ASK your doctor about these medications    albuterol 2.5 mg /3 mL (0.083 %) nebulizer solution  Commonly known as:  PROVENTIL  USE 1 VIAL PER NEBULIZER TREATMENT EVERY 6 HOURS  AS NEEDED FOR WHEEZING     amoxicillin 125 MG chewable tablet  Commonly known as:  AMOXIL  Take 125 mg by mouth 3 (three) times daily.     aspirin 81 MG EC tablet  Commonly known as:  ECOTRIN  Take 81 mg by mouth once daily.     budesonide-formoterol 160-4.5 mcg 160-4.5 mcg/actuation Hfaa  Commonly known as:  SYMBICORT  Inhale 2 puffs into the lungs every 12 (twelve) hours. Controller     famotidine 20 MG tablet  Commonly known as:  PEPCID  TAKE ONE TABLET BY MOUTH EVERY DAY     ipratropium 0.02 % nebulizer solution  Commonly known as:  ATROVENT      losartan 50 MG tablet  Commonly known as:  COZAAR  TAKE ONE TABLET BY MOUTH EVERY DAY     meloxicam 15 MG tablet  Commonly known as:  MOBIC  Take 1 tablet (15 mg total) by mouth daily as needed for Pain.     montelukast 10 mg tablet  Commonly known as:  SINGULAIR  TAKE ONE TABLET BY MOUTH EACH EVENING     multivitamin with minerals tablet  Take 1 tablet by mouth once daily. Equate Brand with Iron     omeprazole 20 MG capsule  Commonly known as:  PRILOSEC  TAKE ONE CAPSULE BY MOUTH EACH MORNING  Ask about: Which instructions should I use?     pravastatin 20 MG tablet  Commonly known as:  PRAVACHOL  Take 1 tablet (20 mg total) by mouth nightly.     verapamil 240 MG C24p  Commonly known as:  VERELAN  One po q PM for HTN            Indwelling Lines/Drains at time of discharge:   Lines/Drains/Airways     None                 Time spent on the discharge of patient: 20 minutes  Patient was seen and examined on the date of discharge and determined to be suitable for discharge.         Bridger Cao MD  Department of Hospital Medicine  [facility]

## 2019-12-23 LAB — PATH REV BLD -IMP: NORMAL

## 2019-12-26 ENCOUNTER — TELEPHONE (OUTPATIENT)
Dept: FAMILY MEDICINE | Facility: CLINIC | Age: 79
End: 2019-12-26

## 2019-12-27 ENCOUNTER — OFFICE VISIT (OUTPATIENT)
Dept: FAMILY MEDICINE | Facility: CLINIC | Age: 79
End: 2019-12-27
Payer: MEDICARE

## 2019-12-27 VITALS
HEART RATE: 78 BPM | WEIGHT: 172.63 LBS | DIASTOLIC BLOOD PRESSURE: 70 MMHG | HEIGHT: 70 IN | BODY MASS INDEX: 24.71 KG/M2 | SYSTOLIC BLOOD PRESSURE: 138 MMHG | RESPIRATION RATE: 16 BRPM

## 2019-12-27 DIAGNOSIS — M25.541 ARTHRALGIA OF BOTH HANDS: ICD-10-CM

## 2019-12-27 DIAGNOSIS — M25.542 ARTHRALGIA OF BOTH HANDS: ICD-10-CM

## 2019-12-27 DIAGNOSIS — C92.10 CML (CHRONIC MYELOCYTIC LEUKEMIA): Primary | ICD-10-CM

## 2019-12-27 DIAGNOSIS — Z23 NEED FOR VACCINATION: ICD-10-CM

## 2019-12-27 PROCEDURE — 1126F AMNT PAIN NOTED NONE PRSNT: CPT | Mod: S$GLB,,, | Performed by: FAMILY MEDICINE

## 2019-12-27 PROCEDURE — 90670 PCV13 VACCINE IM: CPT | Mod: S$GLB,,, | Performed by: FAMILY MEDICINE

## 2019-12-27 PROCEDURE — 1159F PR MEDICATION LIST DOCUMENTED IN MEDICAL RECORD: ICD-10-PCS | Mod: S$GLB,,, | Performed by: FAMILY MEDICINE

## 2019-12-27 PROCEDURE — 99213 OFFICE O/P EST LOW 20 MIN: CPT | Mod: 25,S$GLB,, | Performed by: FAMILY MEDICINE

## 2019-12-27 PROCEDURE — 99999 PR PBB SHADOW E&M-EST. PATIENT-LVL III: CPT | Mod: PBBFAC,,, | Performed by: FAMILY MEDICINE

## 2019-12-27 PROCEDURE — G0009 ADMIN PNEUMOCOCCAL VACCINE: HCPCS | Mod: S$GLB,,, | Performed by: FAMILY MEDICINE

## 2019-12-27 PROCEDURE — G0009 PNEUMOCOCCAL CONJUGATE VACCINE 13-VALENT LESS THAN 5YO & GREATER THAN: ICD-10-PCS | Mod: S$GLB,,, | Performed by: FAMILY MEDICINE

## 2019-12-27 PROCEDURE — 99213 PR OFFICE/OUTPT VISIT, EST, LEVL III, 20-29 MIN: ICD-10-PCS | Mod: 25,S$GLB,, | Performed by: FAMILY MEDICINE

## 2019-12-27 PROCEDURE — 99999 PR PBB SHADOW E&M-EST. PATIENT-LVL III: ICD-10-PCS | Mod: PBBFAC,,, | Performed by: FAMILY MEDICINE

## 2019-12-27 PROCEDURE — 90670 PNEUMOCOCCAL CONJUGATE VACCINE 13-VALENT LESS THAN 5YO & GREATER THAN: ICD-10-PCS | Mod: S$GLB,,, | Performed by: FAMILY MEDICINE

## 2019-12-27 PROCEDURE — 1126F PR PAIN SEVERITY QUANTIFIED, NO PAIN PRESENT: ICD-10-PCS | Mod: S$GLB,,, | Performed by: FAMILY MEDICINE

## 2019-12-27 PROCEDURE — 3078F DIAST BP <80 MM HG: CPT | Mod: CPTII,S$GLB,, | Performed by: FAMILY MEDICINE

## 2019-12-27 PROCEDURE — 1159F MED LIST DOCD IN RCRD: CPT | Mod: S$GLB,,, | Performed by: FAMILY MEDICINE

## 2019-12-27 PROCEDURE — 1101F PT FALLS ASSESS-DOCD LE1/YR: CPT | Mod: CPTII,S$GLB,, | Performed by: FAMILY MEDICINE

## 2019-12-27 PROCEDURE — 1101F PR PT FALLS ASSESS DOC 0-1 FALLS W/OUT INJ PAST YR: ICD-10-PCS | Mod: CPTII,S$GLB,, | Performed by: FAMILY MEDICINE

## 2019-12-27 PROCEDURE — 3075F SYST BP GE 130 - 139MM HG: CPT | Mod: CPTII,S$GLB,, | Performed by: FAMILY MEDICINE

## 2019-12-27 PROCEDURE — 3075F PR MOST RECENT SYSTOLIC BLOOD PRESS GE 130-139MM HG: ICD-10-PCS | Mod: CPTII,S$GLB,, | Performed by: FAMILY MEDICINE

## 2019-12-27 PROCEDURE — 3078F PR MOST RECENT DIASTOLIC BLOOD PRESSURE < 80 MM HG: ICD-10-PCS | Mod: CPTII,S$GLB,, | Performed by: FAMILY MEDICINE

## 2019-12-27 RX ORDER — HYDROCODONE BITARTRATE AND ACETAMINOPHEN 10; 325 MG/1; MG/1
1 TABLET ORAL EVERY 12 HOURS PRN
Qty: 60 TABLET | Refills: 0 | Status: SHIPPED | OUTPATIENT
Start: 2019-12-27 | End: 2020-09-09

## 2019-12-27 RX ORDER — AMOXICILLIN AND CLAVULANATE POTASSIUM 875; 125 MG/1; MG/1
TABLET, FILM COATED ORAL
COMMUNITY
Start: 2019-12-17 | End: 2020-09-09

## 2019-12-27 NOTE — PROGRESS NOTES
Subjective:       Patient ID: Kevin Echeverria is a 79 y.o. male.    Chief Complaint: Follow-up (hospital follow up )    Pt is a 79 y.o. male who presents for check up for recurring hand pain and CML. Doing well on current meds. Denies any side effects. Prevention is up to date.    Review of Systems   Constitutional: Negative for appetite change.   HENT: Negative for congestion, ear pain, sneezing and sore throat.    Eyes: Negative for redness and visual disturbance.   Respiratory: Negative for cough, chest tightness and stridor.    Cardiovascular: Negative for chest pain.   Gastrointestinal: Negative for abdominal pain, blood in stool, diarrhea, nausea and vomiting.   Genitourinary: Negative for difficulty urinating, dysuria and hematuria.   Musculoskeletal: Positive for myalgias. Negative for arthralgias, back pain, joint swelling and neck pain.        Hands are very painful when they swell along with burning pain up to a 10/10   Skin: Negative for rash.   Neurological: Negative for dizziness.   Psychiatric/Behavioral: Negative for agitation. The patient is not nervous/anxious.        Objective:      Physical Exam   Constitutional: He is oriented to person, place, and time. He appears well-developed and well-nourished.   HENT:   Head: Normocephalic.   Eyes: Pupils are equal, round, and reactive to light.   Neck: Normal range of motion. Neck supple. No thyromegaly present.   Cardiovascular: Normal rate and regular rhythm. Exam reveals no friction rub.   No murmur heard.  Pulmonary/Chest: Effort normal. No respiratory distress. He has no wheezes.   Abdominal: There is no tenderness. There is no rebound and no guarding.   Musculoskeletal: Normal range of motion. He exhibits no edema or tenderness.   Lymphadenopathy:     He has no cervical adenopathy.   Neurological: He is alert and oriented to person, place, and time. He has normal reflexes. No cranial nerve deficit.   Skin: Skin is warm and dry.   Psychiatric: He has  a normal mood and affect. Judgment and thought content normal.       Assessment:       1. CML (chronic myelocytic leukemia)    2. Arthralgia of both hands        Plan:   Kevin was seen today for follow-up.    Diagnoses and all orders for this visit:    CML (chronic myelocytic leukemia)  -     Ambulatory referral to Oncology    Arthralgia of both hands    Other orders  -     HYDROcodone-acetaminophen (NORCO)  mg per tablet; Take 1 tablet by mouth every 12 (twelve) hours as needed.

## 2019-12-31 ENCOUNTER — EXTERNAL CHRONIC CARE MANAGEMENT (OUTPATIENT)
Dept: PRIMARY CARE CLINIC | Facility: CLINIC | Age: 79
End: 2019-12-31
Payer: MEDICARE

## 2019-12-31 PROCEDURE — 99490 CHRNC CARE MGMT STAFF 1ST 20: CPT | Mod: S$GLB,,, | Performed by: FAMILY MEDICINE

## 2019-12-31 PROCEDURE — 99490 PR CHRONIC CARE MGMT, 1ST 20 MIN: ICD-10-PCS | Mod: S$GLB,,, | Performed by: FAMILY MEDICINE

## 2020-01-03 ENCOUNTER — LAB VISIT (OUTPATIENT)
Dept: LAB | Facility: HOSPITAL | Age: 80
End: 2020-01-03
Attending: INTERNAL MEDICINE
Payer: MEDICARE

## 2020-01-03 ENCOUNTER — INITIAL CONSULT (OUTPATIENT)
Dept: HEMATOLOGY/ONCOLOGY | Facility: CLINIC | Age: 80
End: 2020-01-03
Payer: MEDICARE

## 2020-01-03 VITALS
TEMPERATURE: 98 F | WEIGHT: 174.63 LBS | OXYGEN SATURATION: 100 % | HEIGHT: 70 IN | DIASTOLIC BLOOD PRESSURE: 59 MMHG | HEART RATE: 68 BPM | RESPIRATION RATE: 16 BRPM | BODY MASS INDEX: 25 KG/M2 | SYSTOLIC BLOOD PRESSURE: 125 MMHG

## 2020-01-03 DIAGNOSIS — J44.9 CHRONIC OBSTRUCTIVE PULMONARY DISEASE, UNSPECIFIED COPD TYPE: ICD-10-CM

## 2020-01-03 DIAGNOSIS — C91.10 CLL (CHRONIC LYMPHOCYTIC LEUKEMIA): ICD-10-CM

## 2020-01-03 DIAGNOSIS — M25.542 ARTHRALGIA OF BOTH HANDS: ICD-10-CM

## 2020-01-03 DIAGNOSIS — C61 PROSTATE CANCER: ICD-10-CM

## 2020-01-03 DIAGNOSIS — M25.541 ARTHRALGIA OF BOTH HANDS: ICD-10-CM

## 2020-01-03 DIAGNOSIS — C91.10 CLL (CHRONIC LYMPHOCYTIC LEUKEMIA): Primary | ICD-10-CM

## 2020-01-03 LAB
ANISOCYTOSIS BLD QL SMEAR: SLIGHT
BASOPHILS NFR BLD: 0 % (ref 0–1.9)
DIFFERENTIAL METHOD: ABNORMAL
EOSINOPHIL NFR BLD: 1 % (ref 0–8)
ERYTHROCYTE [DISTWIDTH] IN BLOOD BY AUTOMATED COUNT: 11.7 % (ref 11.5–14.5)
HCT VFR BLD AUTO: 37.6 % (ref 40–54)
HGB BLD-MCNC: 12.2 G/DL (ref 14–18)
IMM GRANULOCYTES # BLD AUTO: ABNORMAL K/UL (ref 0–0.04)
IMM GRANULOCYTES NFR BLD AUTO: ABNORMAL % (ref 0–0.5)
LYMPHOCYTES NFR BLD: 74 % (ref 18–48)
MCH RBC QN AUTO: 32.1 PG (ref 27–31)
MCHC RBC AUTO-ENTMCNC: 32.4 G/DL (ref 32–36)
MCV RBC AUTO: 99 FL (ref 82–98)
MONOCYTES NFR BLD: 3 % (ref 4–15)
NEUTROPHILS NFR BLD: 22 % (ref 38–73)
NRBC BLD-RTO: 0 /100 WBC
PLATELET # BLD AUTO: 221 K/UL (ref 150–350)
PLATELET BLD QL SMEAR: ABNORMAL
PMV BLD AUTO: 10 FL (ref 9.2–12.9)
RBC # BLD AUTO: 3.8 M/UL (ref 4.6–6.2)
WBC # BLD AUTO: 32.31 K/UL (ref 3.9–12.7)
WBC OTHER NFR BLD MANUAL: 0 %

## 2020-01-03 PROCEDURE — 3074F PR MOST RECENT SYSTOLIC BLOOD PRESSURE < 130 MM HG: ICD-10-PCS | Mod: CPTII,S$GLB,, | Performed by: INTERNAL MEDICINE

## 2020-01-03 PROCEDURE — 88271 CYTOGENETICS DNA PROBE: CPT | Mod: 59

## 2020-01-03 PROCEDURE — 1126F AMNT PAIN NOTED NONE PRSNT: CPT | Mod: S$GLB,,, | Performed by: INTERNAL MEDICINE

## 2020-01-03 PROCEDURE — 88185 FLOWCYTOMETRY/TC ADD-ON: CPT | Mod: 59 | Performed by: PATHOLOGY

## 2020-01-03 PROCEDURE — 88189 FLOWCYTOMETRY/READ 16 & >: CPT | Mod: ,,, | Performed by: PATHOLOGY

## 2020-01-03 PROCEDURE — 81207 BCR/ABL1 GENE MINOR BP: CPT

## 2020-01-03 PROCEDURE — 99999 PR PBB SHADOW E&M-EST. PATIENT-LVL V: ICD-10-PCS | Mod: PBBFAC,,, | Performed by: INTERNAL MEDICINE

## 2020-01-03 PROCEDURE — 1159F PR MEDICATION LIST DOCUMENTED IN MEDICAL RECORD: ICD-10-PCS | Mod: S$GLB,,, | Performed by: INTERNAL MEDICINE

## 2020-01-03 PROCEDURE — 99205 PR OFFICE/OUTPT VISIT, NEW, LEVL V, 60-74 MIN: ICD-10-PCS | Mod: S$GLB,,, | Performed by: INTERNAL MEDICINE

## 2020-01-03 PROCEDURE — 36415 COLL VENOUS BLD VENIPUNCTURE: CPT

## 2020-01-03 PROCEDURE — 88184 FLOWCYTOMETRY/ TC 1 MARKER: CPT | Performed by: PATHOLOGY

## 2020-01-03 PROCEDURE — 3078F PR MOST RECENT DIASTOLIC BLOOD PRESSURE < 80 MM HG: ICD-10-PCS | Mod: CPTII,S$GLB,, | Performed by: INTERNAL MEDICINE

## 2020-01-03 PROCEDURE — 81206 BCR/ABL1 GENE MAJOR BP: CPT

## 2020-01-03 PROCEDURE — 3074F SYST BP LT 130 MM HG: CPT | Mod: CPTII,S$GLB,, | Performed by: INTERNAL MEDICINE

## 2020-01-03 PROCEDURE — 99999 PR PBB SHADOW E&M-EST. PATIENT-LVL V: CPT | Mod: PBBFAC,,, | Performed by: INTERNAL MEDICINE

## 2020-01-03 PROCEDURE — 88275 CYTOGENETICS 100-300: CPT

## 2020-01-03 PROCEDURE — 1126F PR PAIN SEVERITY QUANTIFIED, NO PAIN PRESENT: ICD-10-PCS | Mod: S$GLB,,, | Performed by: INTERNAL MEDICINE

## 2020-01-03 PROCEDURE — 85007 BL SMEAR W/DIFF WBC COUNT: CPT

## 2020-01-03 PROCEDURE — 1101F PR PT FALLS ASSESS DOC 0-1 FALLS W/OUT INJ PAST YR: ICD-10-PCS | Mod: CPTII,S$GLB,, | Performed by: INTERNAL MEDICINE

## 2020-01-03 PROCEDURE — 1159F MED LIST DOCD IN RCRD: CPT | Mod: S$GLB,,, | Performed by: INTERNAL MEDICINE

## 2020-01-03 PROCEDURE — 1101F PT FALLS ASSESS-DOCD LE1/YR: CPT | Mod: CPTII,S$GLB,, | Performed by: INTERNAL MEDICINE

## 2020-01-03 PROCEDURE — 3078F DIAST BP <80 MM HG: CPT | Mod: CPTII,S$GLB,, | Performed by: INTERNAL MEDICINE

## 2020-01-03 PROCEDURE — 88189 PR  FLOWCYTOMETRY/READ, 16 & > MARKERS: ICD-10-PCS | Mod: ,,, | Performed by: PATHOLOGY

## 2020-01-03 PROCEDURE — 85027 COMPLETE CBC AUTOMATED: CPT

## 2020-01-03 PROCEDURE — 99205 OFFICE O/P NEW HI 60 MIN: CPT | Mod: S$GLB,,, | Performed by: INTERNAL MEDICINE

## 2020-01-03 NOTE — LETTER
January 7, 2020      Bridger Cao MD  [address] [place] [address]           [facility]  [facility] [address]  Phone: [phone]          Patient: Kevin Echeverria   MR Number: [id number]   YOB: 1940   Date of Visit: 1/3/2020       Dear Dr. Bridger Cao:    Thank you for referring Kevin Echeverria to me for evaluation. Attached you will find relevant portions of my assessment and plan of care.    If you have questions, please do not hesitate to call me. I look forward to following Kevin Echeverria along with you.    Sincerely,    Jaquelin Quintana MD    Enclosure  CC:  No Recipients    If you would like to receive this communication electronically, please contact [email or web address] or [phone] to request more information on [employer] Link access.    For providers and/or their staff who would like to refer a patient to [facility], please contact us through our one-stop-shop provider referral line, [facility], at [phone].    If you feel you have received this communication in error or would no longer like to receive these types of communications, please e-mail [email or web address]

## 2020-01-03 NOTE — Clinical Note
1. MRI cervical and thorasic in [place] if possible2. CBC monthly in [facility]3. See me in 3 months with cbc,cmp

## 2020-01-03 NOTE — PROGRESS NOTES
Hematology and Medical Oncology   New Patient Consult     01/03/2020  Referred by:  Dr. Bridger Cao    Primary Oncologic Diagnosis: Suspected CLL    History of Present Ilness:   Kevin Anderson) is a pleasant 79 y.o.male who presents to transition care to [facility] from the [facility]. The patient presents today with his wife and daughter. Most of his issues started in August when he began experiencing horrible pain in his hands that was worse with lack of motion/rest. The pain is excruciating and has sent him to the ED several times. The pain can happen in either upper extremity and feels like it travels at times. The discomfort abates as he moves his hands more throughout the day.     Incidentally through these ER visits it was noted that he has a persistently elevated but largely stable WBC.    --Pathologist review of the peripheral smear on 12/21/19 Leukocytosis with an absolute and relative lymphocytosis.     Lymphocytes are mature, and a subset displays a slightly atypical chromatin pattern.  Smudge cells are present.  Background granulocytes are morphologically normal.  The morphology suggests a B cell lymphoproliferative disorder such as CLL.  --Flow Cytometry on 1/3/2020: A B cell lymphoproliferative disorder is present.  Mantle cell lymphoma is favored although an atypical CLL cannot be completely ruled out; correlation   with morphology and with any available cytogenetic or molecular studies (t(11;14)) is required.     PAST MEDICAL HISTORY:   Past Medical History:   Diagnosis Date    Arthritis     Cancer     prostate    COPD (chronic obstructive pulmonary disease)     Hyperlipidemia     Hypertension     Leukemia        PAST SURGICAL HISTORY:   Past Surgical History:   Procedure Laterality Date    CATARACT EXTRACTION W/  INTRAOCULAR LENS IMPLANT Left 05/05/2016    COLONOSCOPY W/ BIOPSIES AND POLYPECTOMY      PROSTATE SURGERY  05/1996    TONSILLECTOMY  1956       PAST SOCIAL  HISTORY:   reports that he has never smoked. He quit smokeless tobacco use about 20 years ago. He reports that he drinks alcohol. He reports that he does not use drugs.    FAMILY HISTORY:  Family History   Problem Relation Age of Onset    Diabetes Mother        CURRENT MEDICATIONS:   Current Outpatient Medications   Medication Sig    albuterol (PROVENTIL) 2.5 mg /3 mL (0.083 %) nebulizer solution USE 1 VIAL PER NEBULIZER TREATMENT EVERY 6 HOURS  AS NEEDED FOR WHEEZING    amoxicillin (AMOXIL) 125 MG chewable tablet Take 125 mg by mouth 3 (three) times daily.    amoxicillin-clavulanate 875-125mg (AUGMENTIN) 875-125 mg per tablet     aspirin (ECOTRIN) 81 MG EC tablet Take 81 mg by mouth once daily.      budesonide-formoterol 160-4.5 mcg (SYMBICORT) 160-4.5 mcg/actuation HFAA Inhale 2 puffs into the lungs every 12 (twelve) hours. Controller    fish oil-omega-3 fatty acids 300-1,000 mg capsule Take 1 g by mouth once daily.      gabapentin (NEURONTIN) 100 MG capsule Take 1 capsule (100 mg total) by mouth 2 (two) times daily as needed.    gabapentin (NEURONTIN) 300 MG capsule Take 1 capsule (300 mg total) by mouth 3 (three) times daily.    HYDROcodone-acetaminophen (NORCO)  mg per tablet Take 1 tablet by mouth every 12 (twelve) hours as needed.    ipratropium (ATROVENT) 0.02 % nebulizer solution     losartan (COZAAR) 50 MG tablet TAKE ONE TABLET BY MOUTH EVERY DAY    meloxicam (MOBIC) 15 MG tablet Take 1 tablet (15 mg total) by mouth daily as needed for Pain.    meloxicam (MOBIC) 15 MG tablet Take 1 tablet (15 mg total) by mouth once daily.    meloxicam (MOBIC) 15 MG tablet Take 1 tablet (15 mg total) by mouth once daily.    meloxicam (MOBIC) 15 MG tablet Take 1 tablet (15 mg total) by mouth once daily.    meloxicam (MOBIC) 15 MG tablet Take 1 tablet (15 mg total) by mouth once daily.    montelukast (SINGULAIR) 10 mg tablet TAKE ONE TABLET BY MOUTH EACH EVENING    multivitamin with minerals tablet  Take 1 tablet by mouth once daily. Equate Brand with Iron    pravastatin (PRAVACHOL) 20 MG tablet Take 1 tablet (20 mg total) by mouth nightly.    verapamil (VERELAN) 240 MG C24P One po q PM for HTN     No current facility-administered medications for this visit.      ALLERGIES:   Review of patient's allergies indicates:  No Known Allergies      Review of Systems:     Review of Systems   Constitutional: Negative for appetite change, chills, diaphoresis, fatigue, fever and unexpected weight change.   HENT:   Negative for hearing loss, mouth sores, nosebleeds, sore throat, trouble swallowing and voice change.    Eyes: Negative for eye problems and icterus.   Respiratory: Negative for chest tightness, cough, hemoptysis, shortness of breath and wheezing.    Cardiovascular: Negative for chest pain, leg swelling and palpitations.   Gastrointestinal: Negative for abdominal distention, abdominal pain, blood in stool, diarrhea, nausea and vomiting.   Endocrine: Negative for hot flashes.   Genitourinary: Negative for bladder incontinence, difficulty urinating, dysuria and hematuria.    Musculoskeletal: Positive for arthralgias and neck pain. Negative for back pain, flank pain, gait problem, myalgias and neck stiffness.   Skin: Negative for itching, rash and wound.   Neurological: Negative for dizziness, extremity weakness, gait problem, headaches, numbness, seizures and speech difficulty.   Hematological: Negative for adenopathy. Does not bruise/bleed easily.   Psychiatric/Behavioral: Negative for confusion, depression and sleep disturbance. The patient is not nervous/anxious.           Physical Exam:     Vitals:    01/03/20 1330   BP: (!) 125/59   Pulse: 68   Resp: 16   Temp: 98 °F (36.7 °C)     Physical Exam   Constitutional: He is oriented to person, place, and time. He appears well-developed and well-nourished. No distress.   Well dressed gentleman   HENT:   Head: Normocephalic and atraumatic.   Mouth/Throat: Oropharynx  is clear and moist. No oropharyngeal exudate.   Eyes: Pupils are equal, round, and reactive to light. Conjunctivae and EOM are normal.   Neck: Normal range of motion. Neck supple. No JVD present. No tracheal deviation present. No thyromegaly present.   Cardiovascular: Normal rate, regular rhythm and normal heart sounds. Exam reveals no friction rub.   No murmur heard.  Pulmonary/Chest: Effort normal and breath sounds normal. No stridor. No respiratory distress. He has no wheezes. He has no rales. He exhibits no tenderness.   Abdominal: Soft. Bowel sounds are normal. He exhibits no distension. There is no tenderness. There is no rebound and no guarding.   Musculoskeletal: Normal range of motion. He exhibits no edema, tenderness or deformity.   Lymphadenopathy:     He has no axillary adenopathy.   Neurological: He is alert and oriented to person, place, and time. He displays normal reflexes. No cranial nerve deficit or sensory deficit. He exhibits normal muscle tone. Coordination normal.   Skin: Skin is warm and dry. Capillary refill takes less than 2 seconds. No rash noted. He is not diaphoretic. No erythema. No pallor.   Psychiatric: He has a normal mood and affect. His behavior is normal. Judgment and thought content normal.       ECOG Performance Status: (foot note - ECOG PS provided by Eastern Cooperative Oncology Group) 1 - Symptomatic but completely ambulatory    Karnofsky Performance Score:  90%- Able to Carry on Normal Activity: Minor Symptoms of Disease    Labs:   Lab Results   Component Value Date    WBC 32.31 (H) 01/03/2020    HGB 12.2 (L) 01/03/2020    HCT 37.6 (L) 01/03/2020     01/03/2020    CHOL 166 09/27/2016    TRIG 137 09/27/2016    HDL 70 09/27/2016    ALT 23 12/22/2019    AST 22 12/22/2019     (L) 12/22/2019    K 4.8 12/22/2019     12/22/2019    CREATININE 1.0 12/22/2019    BUN 13 12/22/2019    CO2 23 12/22/2019    TSH 4.507 (H) 08/24/2019    PSA 0.07 09/27/2016    PSA 0.07  09/27/2016       Imaging: Outside imaging has been reviewed   Assessment and Plan:     <berto Echeverria is a pleasant 79 year old male with presumed CLL.    CLL  --Diagnosis is still a work in progress  --Will obtain bone marrow biopsy report from Dr. Gallardo given the discrepancy in flow  --If this is indeed CLL the doubling time of the white count has not met criteria for treatment  --There are no cytopenias  --Pending BCR-ABL p210 and p190 to ensure this is not CML  --Plan on monthly cbc's at [place] with q3 month follow ups here    Bilateral Hand Myalgias  --Suspect possible cervical stenosis or other outflow issue  --Pt requests to see rheumatology here in the event it is joint related  --MRI cervical and thorasic spine ordered, if necessary we will consult vascular surgery    Prostate Cancer  --Treated with a prostatectomy  60 minutes were spent face to face with the patient and his  family to discuss the disease, natural history, treatment options and survival statistics. I have provided the patient with an opportunity to ask questions and have all questions answered to his satisfaction.       he will return to clinic in 3 months, but knows to call in the interim if symptoms change or should a problem arise.        Jaquelin Quintana MD  Hematology and Medical Oncology  Bone Marrow Transplant  [facility]

## 2020-01-06 LAB
BCR/ABL,P210 RESULT: NORMAL
FLOW CYTOMETRY ANTIBODIES ANALYZED - BLOOD: NORMAL
FLOW CYTOMETRY COMMENT - BLOOD: NORMAL
FLOW CYTOMETRY INTERPRETATION - BLOOD: NORMAL
PATH REPORT.FINAL DX SPEC: NORMAL
SPECIMEN TYPE: NORMAL

## 2020-01-07 DIAGNOSIS — C91.10 CLL (CHRONIC LYMPHOCYTIC LEUKEMIA): Primary | ICD-10-CM

## 2020-01-07 LAB
BCR/ABL RESULT, P190, QUANT, BLD: NORMAL
PATH REPORT.FINAL DX SPEC: NORMAL
SPECIMEN TYPE, P190, QUANT, BLD: NORMAL

## 2020-01-08 LAB
CLINICAL CYTOGENETICIST REVIEW: NORMAL
CLL GENE MUT ANL BLD/T: NORMAL
CLL, FISH ADDITIONAL INFORMATION: NORMAL
CLL, FISH DISCLAIMER: NORMAL
CLL, FISH RELEASED BY: NORMAL
CLL, FISH RESULT SUMMARY: NORMAL
CLL, FISH RESULT TABLE: NORMAL
FCLL REASON FOR REFERRAL: NORMAL
FCLL SPECIMEN: NORMAL
REF LAB TEST METHOD: NORMAL
SPECIMEN SOURCE: NORMAL

## 2020-01-10 ENCOUNTER — HOSPITAL ENCOUNTER (OUTPATIENT)
Dept: RADIOLOGY | Facility: HOSPITAL | Age: 80
Discharge: HOME | End: 2020-01-10
Attending: INTERNAL MEDICINE
Payer: MEDICARE

## 2020-01-10 DIAGNOSIS — C91.10 CLL (CHRONIC LYMPHOCYTIC LEUKEMIA): ICD-10-CM

## 2020-01-10 PROCEDURE — 72157 MRI CHEST SPINE W/O & W/DYE: CPT | Mod: TC

## 2020-01-10 PROCEDURE — 72156 MRI CERVICAL SPINE W WO CONTRAST: ICD-10-PCS | Mod: 26,,, | Performed by: RADIOLOGY

## 2020-01-10 PROCEDURE — 72156 MRI NECK SPINE W/O & W/DYE: CPT | Mod: TC

## 2020-01-10 PROCEDURE — A9585 GADOBUTROL INJECTION: HCPCS | Performed by: INTERNAL MEDICINE

## 2020-01-10 PROCEDURE — 72157 MRI CHEST SPINE W/O & W/DYE: CPT | Mod: 26,,, | Performed by: RADIOLOGY

## 2020-01-10 PROCEDURE — 25500020 PHARM REV CODE 255: Performed by: INTERNAL MEDICINE

## 2020-01-10 PROCEDURE — 72156 MRI NECK SPINE W/O & W/DYE: CPT | Mod: 26,,, | Performed by: RADIOLOGY

## 2020-01-10 PROCEDURE — 72157 MRI THORACIC SPINE W WO CONTRAST: ICD-10-PCS | Mod: 26,,, | Performed by: RADIOLOGY

## 2020-01-10 RX ORDER — GADOBUTROL 604.72 MG/ML
7 INJECTION INTRAVENOUS
Status: COMPLETED | OUTPATIENT
Start: 2020-01-10 | End: 2020-01-10

## 2020-01-10 RX ADMIN — GADOBUTROL 10 ML: 604.72 INJECTION INTRAVENOUS at 12:01

## 2020-01-28 ENCOUNTER — TELEPHONE (OUTPATIENT)
Dept: RHEUMATOLOGY | Facility: CLINIC | Age: 80
End: 2020-01-28

## 2020-01-28 NOTE — TELEPHONE ENCOUNTER
Spoke with the pt and let him know that Dr. Joseph did not have sooner appointments but placed him on the wait list

## 2020-01-31 ENCOUNTER — EXTERNAL CHRONIC CARE MANAGEMENT (OUTPATIENT)
Dept: PRIMARY CARE CLINIC | Facility: CLINIC | Age: 80
End: 2020-01-31
Payer: MEDICARE

## 2020-01-31 ENCOUNTER — LAB VISIT (OUTPATIENT)
Dept: LAB | Facility: HOSPITAL | Age: 80
End: 2020-01-31
Attending: INTERNAL MEDICINE
Payer: MEDICARE

## 2020-01-31 DIAGNOSIS — C91.10 CLL (CHRONIC LYMPHOCYTIC LEUKEMIA): ICD-10-CM

## 2020-01-31 DIAGNOSIS — Z79.899 HIGH RISK MEDICATION USE: ICD-10-CM

## 2020-01-31 DIAGNOSIS — I10 HTN (HYPERTENSION): ICD-10-CM

## 2020-01-31 DIAGNOSIS — R06.09 DOE (DYSPNEA ON EXERTION): ICD-10-CM

## 2020-01-31 DIAGNOSIS — I77.9 DISEASE OF ARTERY: Primary | ICD-10-CM

## 2020-01-31 LAB
ALBUMIN SERPL BCP-MCNC: 3.9 G/DL (ref 3.5–5.2)
ALP SERPL-CCNC: 51 U/L (ref 55–135)
ALT SERPL W/O P-5'-P-CCNC: 21 U/L (ref 10–44)
ANION GAP SERPL CALC-SCNC: 8 MMOL/L (ref 8–16)
AST SERPL-CCNC: 20 U/L (ref 10–40)
BASOPHILS # BLD AUTO: ABNORMAL K/UL (ref 0–0.2)
BASOPHILS NFR BLD: 0 % (ref 0–1.9)
BILIRUB DIRECT SERPL-MCNC: 0.2 MG/DL (ref 0.1–0.3)
BILIRUB SERPL-MCNC: 0.5 MG/DL (ref 0.1–1)
BNP SERPL-MCNC: 20 PG/ML (ref 0–99)
BUN SERPL-MCNC: 13 MG/DL (ref 8–23)
CALCIUM SERPL-MCNC: 9.6 MG/DL (ref 8.7–10.5)
CHLORIDE SERPL-SCNC: 102 MMOL/L (ref 95–110)
CHOLEST SERPL-MCNC: 157 MG/DL (ref 120–199)
CHOLEST/HDLC SERPL: 2.4 {RATIO} (ref 2–5)
CO2 SERPL-SCNC: 26 MMOL/L (ref 23–29)
CREAT SERPL-MCNC: 1.1 MG/DL (ref 0.5–1.4)
D DIMER PPP IA.FEU-MCNC: 0.33 MG/L FEU
DIFFERENTIAL METHOD: ABNORMAL
EOSINOPHIL # BLD AUTO: ABNORMAL K/UL (ref 0–0.5)
EOSINOPHIL NFR BLD: 1 % (ref 0–8)
ERYTHROCYTE [DISTWIDTH] IN BLOOD BY AUTOMATED COUNT: 11.8 % (ref 11.5–14.5)
ERYTHROCYTE [DISTWIDTH] IN BLOOD BY AUTOMATED COUNT: 11.9 % (ref 11.5–14.5)
EST. GFR  (AFRICAN AMERICAN): >60 ML/MIN/1.73 M^2
EST. GFR  (NON AFRICAN AMERICAN): >60 ML/MIN/1.73 M^2
GLUCOSE SERPL-MCNC: 89 MG/DL (ref 70–110)
HCT VFR BLD AUTO: 39.1 % (ref 40–54)
HCT VFR BLD AUTO: 39.9 % (ref 40–54)
HDLC SERPL-MCNC: 65 MG/DL (ref 40–75)
HDLC SERPL: 41.4 % (ref 20–50)
HGB BLD-MCNC: 12.9 G/DL (ref 14–18)
HGB BLD-MCNC: 13.2 G/DL (ref 14–18)
IMM GRANULOCYTES # BLD AUTO: ABNORMAL K/UL (ref 0–0.04)
IMM GRANULOCYTES NFR BLD AUTO: ABNORMAL % (ref 0–0.5)
LDLC SERPL CALC-MCNC: 80.6 MG/DL (ref 63–159)
LYMPHOCYTES # BLD AUTO: ABNORMAL K/UL (ref 1–4.8)
LYMPHOCYTES NFR BLD: 79 % (ref 18–48)
MCH RBC QN AUTO: 31.8 PG (ref 27–31)
MCH RBC QN AUTO: 31.9 PG (ref 27–31)
MCHC RBC AUTO-ENTMCNC: 33 G/DL (ref 32–36)
MCHC RBC AUTO-ENTMCNC: 33.1 G/DL (ref 32–36)
MCV RBC AUTO: 96 FL (ref 82–98)
MCV RBC AUTO: 96 FL (ref 82–98)
MONOCYTES # BLD AUTO: ABNORMAL K/UL (ref 0.3–1)
MONOCYTES NFR BLD: 2 % (ref 4–15)
NEUTROPHILS NFR BLD: 16 % (ref 38–73)
NONHDLC SERPL-MCNC: 92 MG/DL
NRBC BLD-RTO: 0 /100 WBC
PLATELET # BLD AUTO: 239 K/UL (ref 150–350)
PLATELET # BLD AUTO: 252 K/UL (ref 150–350)
PLATELET BLD QL SMEAR: ABNORMAL
PMV BLD AUTO: 10.2 FL (ref 9.2–12.9)
PMV BLD AUTO: 10.3 FL (ref 9.2–12.9)
POTASSIUM SERPL-SCNC: 4.5 MMOL/L (ref 3.5–5.1)
PROT SERPL-MCNC: 7.5 G/DL (ref 6–8.4)
RBC # BLD AUTO: 4.06 M/UL (ref 4.6–6.2)
RBC # BLD AUTO: 4.14 M/UL (ref 4.6–6.2)
SODIUM SERPL-SCNC: 136 MMOL/L (ref 136–145)
T3FREE SERPL-MCNC: 2.8 PG/ML (ref 2.3–4.2)
T4 FREE SERPL-MCNC: 0.84 NG/DL (ref 0.71–1.51)
TRIGL SERPL-MCNC: 57 MG/DL (ref 30–150)
TSH SERPL DL<=0.005 MIU/L-ACNC: 3.18 UIU/ML (ref 0.4–4)
WBC # BLD AUTO: 29.5 K/UL (ref 3.9–12.7)
WBC # BLD AUTO: 29.57 K/UL (ref 3.9–12.7)
WBC OTHER NFR BLD MANUAL: 2 %

## 2020-01-31 PROCEDURE — 84443 ASSAY THYROID STIM HORMONE: CPT

## 2020-01-31 PROCEDURE — 84481 FREE ASSAY (FT-3): CPT

## 2020-01-31 PROCEDURE — 36415 COLL VENOUS BLD VENIPUNCTURE: CPT

## 2020-01-31 PROCEDURE — 85007 BL SMEAR W/DIFF WBC COUNT: CPT

## 2020-01-31 PROCEDURE — 99490 PR CHRONIC CARE MGMT, 1ST 20 MIN: ICD-10-PCS | Mod: S$GLB,,, | Performed by: FAMILY MEDICINE

## 2020-01-31 PROCEDURE — 80076 HEPATIC FUNCTION PANEL: CPT

## 2020-01-31 PROCEDURE — 99490 CHRNC CARE MGMT STAFF 1ST 20: CPT | Mod: S$GLB,,, | Performed by: FAMILY MEDICINE

## 2020-01-31 PROCEDURE — 84439 ASSAY OF FREE THYROXINE: CPT

## 2020-01-31 PROCEDURE — 80061 LIPID PANEL: CPT

## 2020-01-31 PROCEDURE — 85027 COMPLETE CBC AUTOMATED: CPT

## 2020-01-31 PROCEDURE — 85379 FIBRIN DEGRADATION QUANT: CPT

## 2020-01-31 PROCEDURE — 83880 ASSAY OF NATRIURETIC PEPTIDE: CPT

## 2020-01-31 PROCEDURE — 80048 BASIC METABOLIC PNL TOTAL CA: CPT

## 2020-02-10 DIAGNOSIS — E78.5 HYPERLIPIDEMIA, UNSPECIFIED HYPERLIPIDEMIA TYPE: ICD-10-CM

## 2020-02-10 RX ORDER — PRAVASTATIN SODIUM 20 MG/1
20 TABLET ORAL NIGHTLY
Qty: 30 TABLET | Refills: 4 | Status: SHIPPED | OUTPATIENT
Start: 2020-02-10 | End: 2020-07-07 | Stop reason: SDUPTHER

## 2020-02-28 ENCOUNTER — LAB VISIT (OUTPATIENT)
Dept: LAB | Facility: HOSPITAL | Age: 80
End: 2020-02-28
Attending: INTERNAL MEDICINE
Payer: MEDICARE

## 2020-02-28 DIAGNOSIS — Z79.899 HIGH RISK MEDICATION USE: ICD-10-CM

## 2020-02-28 DIAGNOSIS — I77.9 DISEASE OF ARTERY: ICD-10-CM

## 2020-02-28 DIAGNOSIS — C91.10 CLL (CHRONIC LYMPHOCYTIC LEUKEMIA): ICD-10-CM

## 2020-02-28 DIAGNOSIS — R06.09 DOE (DYSPNEA ON EXERTION): ICD-10-CM

## 2020-02-28 DIAGNOSIS — I10 HTN (HYPERTENSION): ICD-10-CM

## 2020-02-28 LAB
ANISOCYTOSIS BLD QL SMEAR: SLIGHT
BASOPHILS # BLD AUTO: ABNORMAL K/UL (ref 0–0.2)
BASOPHILS NFR BLD: 0 % (ref 0–1.9)
DIFFERENTIAL METHOD: ABNORMAL
EOSINOPHIL # BLD AUTO: ABNORMAL K/UL (ref 0–0.5)
EOSINOPHIL NFR BLD: 3 % (ref 0–8)
ERYTHROCYTE [DISTWIDTH] IN BLOOD BY AUTOMATED COUNT: 12.1 % (ref 11.5–14.5)
HCT VFR BLD AUTO: 37.6 % (ref 40–54)
HGB BLD-MCNC: 12.4 G/DL (ref 14–18)
IMM GRANULOCYTES # BLD AUTO: ABNORMAL K/UL (ref 0–0.04)
IMM GRANULOCYTES NFR BLD AUTO: ABNORMAL % (ref 0–0.5)
LYMPHOCYTES # BLD AUTO: ABNORMAL K/UL (ref 1–4.8)
LYMPHOCYTES NFR BLD: 76 % (ref 18–48)
MCH RBC QN AUTO: 32.3 PG (ref 27–31)
MCHC RBC AUTO-ENTMCNC: 33 G/DL (ref 32–36)
MCV RBC AUTO: 98 FL (ref 82–98)
MONOCYTES # BLD AUTO: ABNORMAL K/UL (ref 0.3–1)
MONOCYTES NFR BLD: 6 % (ref 4–15)
NEUTROPHILS NFR BLD: 15 % (ref 38–73)
NRBC BLD-RTO: 0 /100 WBC
PLATELET # BLD AUTO: 251 K/UL (ref 150–350)
PLATELET BLD QL SMEAR: ABNORMAL
PMV BLD AUTO: 10 FL (ref 9.2–12.9)
RBC # BLD AUTO: 3.84 M/UL (ref 4.6–6.2)
WBC # BLD AUTO: 34.65 K/UL (ref 3.9–12.7)

## 2020-02-28 PROCEDURE — 36415 COLL VENOUS BLD VENIPUNCTURE: CPT

## 2020-02-28 PROCEDURE — 85007 BL SMEAR W/DIFF WBC COUNT: CPT

## 2020-02-28 PROCEDURE — 85027 COMPLETE CBC AUTOMATED: CPT

## 2020-02-29 ENCOUNTER — EXTERNAL CHRONIC CARE MANAGEMENT (OUTPATIENT)
Dept: PRIMARY CARE CLINIC | Facility: CLINIC | Age: 80
End: 2020-02-29
Payer: MEDICARE

## 2020-02-29 PROCEDURE — 99490 CHRNC CARE MGMT STAFF 1ST 20: CPT | Mod: S$GLB,,, | Performed by: FAMILY MEDICINE

## 2020-02-29 PROCEDURE — 99490 PR CHRONIC CARE MGMT, 1ST 20 MIN: ICD-10-PCS | Mod: S$GLB,,, | Performed by: FAMILY MEDICINE

## 2020-03-10 ENCOUNTER — OFFICE VISIT (OUTPATIENT)
Dept: RHEUMATOLOGY | Facility: CLINIC | Age: 80
End: 2020-03-10
Payer: MEDICARE

## 2020-03-10 VITALS
HEIGHT: 70 IN | SYSTOLIC BLOOD PRESSURE: 125 MMHG | HEART RATE: 62 BPM | DIASTOLIC BLOOD PRESSURE: 64 MMHG | WEIGHT: 176.81 LBS | TEMPERATURE: 98 F | BODY MASS INDEX: 25.31 KG/M2

## 2020-03-10 DIAGNOSIS — M11.849 CALCIUM PYROPHOSPHATE ARTHROPATHY OF HAND: Primary | ICD-10-CM

## 2020-03-10 PROCEDURE — 99204 PR OFFICE/OUTPT VISIT, NEW, LEVL IV, 45-59 MIN: ICD-10-PCS | Mod: S$GLB,,, | Performed by: INTERNAL MEDICINE

## 2020-03-10 PROCEDURE — 99999 PR PBB SHADOW E&M-EST. PATIENT-LVL IV: CPT | Mod: PBBFAC,,, | Performed by: INTERNAL MEDICINE

## 2020-03-10 PROCEDURE — 99204 OFFICE O/P NEW MOD 45 MIN: CPT | Mod: S$GLB,,, | Performed by: INTERNAL MEDICINE

## 2020-03-10 PROCEDURE — 3074F SYST BP LT 130 MM HG: CPT | Mod: CPTII,S$GLB,, | Performed by: INTERNAL MEDICINE

## 2020-03-10 PROCEDURE — 99999 PR PBB SHADOW E&M-EST. PATIENT-LVL IV: ICD-10-PCS | Mod: PBBFAC,,, | Performed by: INTERNAL MEDICINE

## 2020-03-10 PROCEDURE — 1126F AMNT PAIN NOTED NONE PRSNT: CPT | Mod: S$GLB,,, | Performed by: INTERNAL MEDICINE

## 2020-03-10 PROCEDURE — 1101F PT FALLS ASSESS-DOCD LE1/YR: CPT | Mod: CPTII,S$GLB,, | Performed by: INTERNAL MEDICINE

## 2020-03-10 PROCEDURE — 1126F PR PAIN SEVERITY QUANTIFIED, NO PAIN PRESENT: ICD-10-PCS | Mod: S$GLB,,, | Performed by: INTERNAL MEDICINE

## 2020-03-10 PROCEDURE — 1159F MED LIST DOCD IN RCRD: CPT | Mod: S$GLB,,, | Performed by: INTERNAL MEDICINE

## 2020-03-10 PROCEDURE — 3078F DIAST BP <80 MM HG: CPT | Mod: CPTII,S$GLB,, | Performed by: INTERNAL MEDICINE

## 2020-03-10 PROCEDURE — 3078F PR MOST RECENT DIASTOLIC BLOOD PRESSURE < 80 MM HG: ICD-10-PCS | Mod: CPTII,S$GLB,, | Performed by: INTERNAL MEDICINE

## 2020-03-10 PROCEDURE — 1159F PR MEDICATION LIST DOCUMENTED IN MEDICAL RECORD: ICD-10-PCS | Mod: S$GLB,,, | Performed by: INTERNAL MEDICINE

## 2020-03-10 PROCEDURE — 1101F PR PT FALLS ASSESS DOC 0-1 FALLS W/OUT INJ PAST YR: ICD-10-PCS | Mod: CPTII,S$GLB,, | Performed by: INTERNAL MEDICINE

## 2020-03-10 PROCEDURE — 3074F PR MOST RECENT SYSTOLIC BLOOD PRESSURE < 130 MM HG: ICD-10-PCS | Mod: CPTII,S$GLB,, | Performed by: INTERNAL MEDICINE

## 2020-03-10 RX ORDER — COLCHICINE 0.6 MG/1
0.6 TABLET ORAL DAILY
Qty: 30 TABLET | Refills: 6 | Status: SHIPPED | OUTPATIENT
Start: 2020-03-10 | End: 2020-09-09

## 2020-03-10 RX ORDER — MELOXICAM 15 MG/1
TABLET ORAL
Qty: 30 TABLET | Refills: 2 | Status: SHIPPED | OUTPATIENT
Start: 2020-03-10 | End: 2020-03-10

## 2020-03-10 RX ORDER — GABAPENTIN 300 MG/1
300 CAPSULE ORAL DAILY
Qty: 30 CAPSULE | Refills: 5 | Status: SHIPPED | OUTPATIENT
Start: 2020-03-10 | End: 2020-03-10

## 2020-03-10 RX ORDER — COLCHICINE 0.6 MG/1
0.6 TABLET ORAL DAILY
Qty: 30 TABLET | Refills: 11 | Status: SHIPPED | OUTPATIENT
Start: 2020-03-10 | End: 2020-03-10

## 2020-03-10 RX ORDER — MELOXICAM 15 MG/1
TABLET ORAL
Qty: 30 TABLET | Refills: 2 | Status: SHIPPED | OUTPATIENT
Start: 2020-03-10 | End: 2021-03-10

## 2020-03-10 RX ORDER — GABAPENTIN 300 MG/1
300 CAPSULE ORAL DAILY
Qty: 30 CAPSULE | Refills: 5 | Status: SHIPPED | OUTPATIENT
Start: 2020-03-10 | End: 2020-08-26 | Stop reason: SDUPTHER

## 2020-03-10 ASSESSMENT — ROUTINE ASSESSMENT OF PATIENT INDEX DATA (RAPID3)
FATIGUE SCORE: 0
MDHAQ FUNCTION SCORE: .2
TOTAL RAPID3 SCORE: .89
PSYCHOLOGICAL DISTRESS SCORE: 0
AM STIFFNESS SCORE: 1, YES
PAIN SCORE: 2
PATIENT GLOBAL ASSESSMENT SCORE: 0

## 2020-03-10 NOTE — LETTER
March 10, 2020      Jaquelin Quintana MD  [address]  [facility] [address]           [place] - Rheumatology  [address]  [facility] [address]  Phone: [phone]  Fax: [phone]          Patient: Kevin Echeverria   MR Number: [id number]   YOB: 1940   Date of Visit: 3/10/2020       Dear Dr. Jaquelin Quintana:    Thank you for referring Kevin Echeverria to me for evaluation. Attached you will find relevant portions of my assessment and plan of care.    If you have questions, please do not hesitate to call me. I look forward to following Kevin Echeverria along with you.    Sincerely,    Jalen Joseph MD    Enclosure  CC:  No Recipients    If you would like to receive this communication electronically, please contact [email or web address] or [phone] to request more information on [employer] access.    For providers and/or their staff who would like to refer a patient to [facility], please contact us through our one-stop-shop provider referral line, [facility], at [phone].    If you feel you have received this communication in error or would no longer like to receive these types of communications, please e-mail [email or web address]

## 2020-03-10 NOTE — PROGRESS NOTES
Chief Complaint   Patient presents with    Disease Management       Patient was referred by     History of presenting illness    80 year old male presented with severe pain in both hands in aug 2019  He went to rheumatology and hematology : leukemia diagnosed  Now came to cancer center : CLL diagnosed    Has been to ER with excruciating pain in the hands   Now sees   His leukemia is not very significant   No need for treatment     Episodes so far  :    Pain and swelling right hand   Pain and swelling left hand  Pain and swelling b/l hands    Triggers : none   Started late evening : came on very fast   Got relief from steroids   Each episode would last for hours     One episode : he had fever +  No rash    CRP 18.8  ESR 13 during the episode    ESR 52 during another episode  CRP 71.2    EVE neg  RF neg    Uric acids 3.8 to 4.2 during the episodes    At nights : numbness both hands  Tips of fingers are numb    EMS for 10 minutes   When not in a flare he is ok,he can use his hands and he only has some soreness   He works with his hands all day and he has no problems     MRI C/T spine : 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    Right hand xray  No fracture or dislocation.  Degenerative joint space narrowing at the 1st digit MCP and IP joints, with additional moderate degenerative area at the 2nd through 4th MCP and DIP joints.  No osseous lytic or blastic lesion.  Soft tissues are unremarkable.    Right wrist xray  There is no acute fracture or dislocation.  No significant soft tissue swelling.  The carpal bones are intact with mild degenerative osteoarthrosis.  The radiocarpal articulation is intact.  The ulnar styloid is intact.    Past history  HTN,HLD,leukemia,COPD    Family history  Diabetes     Social  history    Former tobacco smoker quit 1999    Review of Systems    No skin rashes,malar rash,photosensitivity   No telangiectasias   No calcinosis   No psoriasis   No patchy alopecia   No oral and nasal ulcers   No dry eyes and dry mouth   No pleurisy or any cardiopulmonary complaints except for COPD  No dysphagia,diplopia and dysphonia and muscle weakness   No n/v/d/c   No acid reflux+   No raynaud's+   No digital ulcers   No renal issues   No blood clots   No fever,chills,night sweats,weight loss and loss of appetite   No new onset headaches   No recurrent conjunctivitis or uveitis or scleritis or episcleritis   No chronic or bloody diarrhea with no u colitis or crohn's /inflammatory bowel disease   No penile and urethral  d/c/STDs/no ulcers     There is currently no information documented on the homunculus. Go to the Rheumatology activity and complete the homunculus joint exam.    Physical Exam   Constitutional: He is oriented to person, place, and time and well-developed, well-nourished, and in no distress. No distress.   HENT:   Head: Normocephalic.   Mouth/Throat: Oropharynx is clear and moist.   Eyes: Conjunctivae are normal. Pupils are equal, round, and reactive to light. Right eye exhibits no discharge. Left eye exhibits no discharge. No scleral icterus.   Neck: Normal range of motion. No thyromegaly present.   Cardiovascular: Normal rate, regular rhythm, normal heart sounds and intact distal pulses.    Pulmonary/Chest: Effort normal and breath sounds normal. No stridor.   Abdominal: Soft. Bowel sounds are normal.   Lymphadenopathy:     He has no cervical adenopathy.   Neurological: He is alert and oriented to person, place, and time.   Skin: Skin is warm. No rash noted. He is not diaphoretic.     Psychiatric: Affect and judgment normal.   Musculoskeletal: Normal range of motion.         Assessment     80 year old male with  HTN,HLD,leukemia,COPD  comes with 3 episodes of acute onset pain and swelling in  the b/l hands since august 2019  He now has a diagnosis of CLL but doesn't need treatment    Episodes so far  :    Pain and swelling right hand   Pain and swelling left hand  Pain and swelling b/l hands    Triggers : none   Started late evening : came on very fast   Got relief from steroids   Each episode would last for hours   One episode : he had fever +  No rash    During each episode his ESR/CRP have gone high  EVE neg  RF neg  Uric acids 3.8 to 4.2 during the episodes    Last flare dec 2019  When not in flare has some hand and arm paresthesias and no pain    Today joint exam unremarkable     Xrays right hand and wrist : moderate deg changes from 1 to 4 MCPs/DIPs    No psoriasis or inf bowel disease    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 Calcium pyrophosphate arthropathy of hand            New problem     Plan    Will initiate colchicine 0.6 mg daily  Discussed side effects like GI upset and vomiting    Prn meloxicam only    If future flares,call me for steroids     Gabapentin 300 mg bed time for neuropathy symptoms  If no improvement,will do EMG/NSE TODD Brown was seen today for disease management.    Diagnoses and all orders for this visit:    Calcium pyrophosphate arthropathy of hand  -     Discontinue: colchicine (COLCRYS) 0.6 mg tablet; Take 1 tablet (0.6 mg total) by mouth once daily.  -     colchicine (COLCRYS) 0.6 mg tablet; Take 1 tablet (0.6 mg total) by mouth once daily.    Other orders  -     Discontinue: gabapentin (NEURONTIN) 300 MG capsule; Take 1 capsule (300 mg total) by mouth once daily.  -     Discontinue: meloxicam (MOBIC) 15 MG tablet; To take 1 tab in a 24 hour period only for pain  -     Discontinue: gabapentin (NEURONTIN) 300 MG capsule; Take 1 capsule (300 mg total) by mouth once daily.  -     gabapentin (NEURONTIN) 300 MG capsule; Take 1 capsule (300 mg total) by mouth once daily.  -     meloxicam (MOBIC) 15 MG tablet; Take 1 tablet  in a 24 hour period only for pain      rtc in 3 months  If severe ongoing flares will di  [id number]

## 2020-03-12 RX ORDER — FAMOTIDINE 20 MG/1
TABLET, FILM COATED ORAL
Qty: 30 TABLET | Refills: 5 | Status: SHIPPED | OUTPATIENT
Start: 2020-03-12 | End: 2020-09-11 | Stop reason: SDUPTHER

## 2020-03-12 RX ORDER — OMEPRAZOLE 20 MG/1
CAPSULE, DELAYED RELEASE ORAL
Qty: 30 CAPSULE | Refills: 5 | Status: SHIPPED | OUTPATIENT
Start: 2020-03-12 | End: 2020-08-26 | Stop reason: SDUPTHER

## 2020-03-12 NOTE — TELEPHONE ENCOUNTER
LOV: 3/10/2020    Pharmacy requesting refill for Omeprazole 20 mg and Famotidine 20 mg    Pharmacy: [facility]

## 2020-03-24 ENCOUNTER — PATIENT MESSAGE (OUTPATIENT)
Dept: HEMATOLOGY/ONCOLOGY | Facility: CLINIC | Age: 80
End: 2020-03-24

## 2020-03-31 ENCOUNTER — EXTERNAL CHRONIC CARE MANAGEMENT (OUTPATIENT)
Dept: PRIMARY CARE CLINIC | Facility: CLINIC | Age: 80
End: 2020-03-31
Payer: MEDICARE

## 2020-03-31 PROCEDURE — 99490 PR CHRONIC CARE MGMT, 1ST 20 MIN: ICD-10-PCS | Mod: S$GLB,,, | Performed by: FAMILY MEDICINE

## 2020-03-31 PROCEDURE — 99490 CHRNC CARE MGMT STAFF 1ST 20: CPT | Mod: S$GLB,,, | Performed by: FAMILY MEDICINE

## 2020-04-09 ENCOUNTER — TELEPHONE (OUTPATIENT)
Dept: HEMATOLOGY/ONCOLOGY | Facility: CLINIC | Age: 80
End: 2020-04-09

## 2020-04-09 NOTE — TELEPHONE ENCOUNTER
Called patient left voice mail to call the clinic to schedule recall for Stephanie number was provided.

## 2020-04-30 ENCOUNTER — EXTERNAL CHRONIC CARE MANAGEMENT (OUTPATIENT)
Dept: PRIMARY CARE CLINIC | Facility: CLINIC | Age: 80
End: 2020-04-30
Payer: MEDICARE

## 2020-04-30 PROCEDURE — 99490 PR CHRONIC CARE MGMT, 1ST 20 MIN: ICD-10-PCS | Mod: S$GLB,,, | Performed by: FAMILY MEDICINE

## 2020-04-30 PROCEDURE — 99490 CHRNC CARE MGMT STAFF 1ST 20: CPT | Mod: S$GLB,,, | Performed by: FAMILY MEDICINE

## 2020-05-21 ENCOUNTER — TELEPHONE (OUTPATIENT)
Dept: HEMATOLOGY/ONCOLOGY | Facility: CLINIC | Age: 80
End: 2020-05-21

## 2020-05-21 DIAGNOSIS — C91.10 CLL (CHRONIC LYMPHOCYTIC LEUKEMIA): Primary | ICD-10-CM

## 2020-05-21 NOTE — TELEPHONE ENCOUNTER
"----- Message from Jyoti Mcdermott sent at 5/21/2020 12:37 PM CDT -----  Patient Assist    Name of caller: Kevin  Provider name:   Contact Preference:  [phone]  Is this regarding current patient or new patient?: current   What is the nature of the call?  - mid morning preference since pt lives out of town, Please call   and discuss scheduling his f/u appt for after June 1st.     Additional Notes:   "Thank you for all that you do for our patients'"     "

## 2020-05-22 ENCOUNTER — LAB VISIT (OUTPATIENT)
Dept: LAB | Facility: HOSPITAL | Age: 80
End: 2020-05-22
Attending: INTERNAL MEDICINE
Payer: MEDICARE

## 2020-05-22 ENCOUNTER — PATIENT MESSAGE (OUTPATIENT)
Dept: RHEUMATOLOGY | Facility: CLINIC | Age: 80
End: 2020-05-22

## 2020-05-22 DIAGNOSIS — I77.9 DISEASE OF ARTERY: ICD-10-CM

## 2020-05-22 DIAGNOSIS — I10 HTN (HYPERTENSION): ICD-10-CM

## 2020-05-22 DIAGNOSIS — R06.09 DOE (DYSPNEA ON EXERTION): ICD-10-CM

## 2020-05-22 DIAGNOSIS — C91.10 CLL (CHRONIC LYMPHOCYTIC LEUKEMIA): ICD-10-CM

## 2020-05-22 DIAGNOSIS — Z79.899 HIGH RISK MEDICATION USE: ICD-10-CM

## 2020-05-22 LAB
BASOPHILS NFR BLD: 0 % (ref 0–1.9)
DIFFERENTIAL METHOD: ABNORMAL
EOSINOPHIL NFR BLD: 1 % (ref 0–8)
ERYTHROCYTE [DISTWIDTH] IN BLOOD BY AUTOMATED COUNT: 12 % (ref 11.5–14.5)
HCT VFR BLD AUTO: 37.6 % (ref 40–54)
HGB BLD-MCNC: 12.4 G/DL (ref 14–18)
IMM GRANULOCYTES # BLD AUTO: ABNORMAL K/UL (ref 0–0.04)
IMM GRANULOCYTES NFR BLD AUTO: ABNORMAL % (ref 0–0.5)
LYMPHOCYTES NFR BLD: 87 % (ref 18–48)
MCH RBC QN AUTO: 32.1 PG (ref 27–31)
MCHC RBC AUTO-ENTMCNC: 33 G/DL (ref 32–36)
MCV RBC AUTO: 97 FL (ref 82–98)
MONOCYTES NFR BLD: 3 % (ref 4–15)
NEUTROPHILS NFR BLD: 9 % (ref 38–73)
NRBC BLD-RTO: 0 /100 WBC
PLATELET # BLD AUTO: 231 K/UL (ref 150–350)
PLATELET BLD QL SMEAR: ABNORMAL
PMV BLD AUTO: 10.2 FL (ref 9.2–12.9)
RBC # BLD AUTO: 3.86 M/UL (ref 4.6–6.2)
WBC # BLD AUTO: 49.76 K/UL (ref 3.9–12.7)

## 2020-05-22 PROCEDURE — 36415 COLL VENOUS BLD VENIPUNCTURE: CPT

## 2020-05-22 PROCEDURE — 85060 PATHOLOGIST REVIEW: ICD-10-PCS | Mod: ,,, | Performed by: PATHOLOGY

## 2020-05-22 PROCEDURE — 85007 BL SMEAR W/DIFF WBC COUNT: CPT

## 2020-05-22 PROCEDURE — 85027 COMPLETE CBC AUTOMATED: CPT

## 2020-05-22 PROCEDURE — 85060 BLOOD SMEAR INTERPRETATION: CPT | Mod: ,,, | Performed by: PATHOLOGY

## 2020-05-25 LAB — PATH REV BLD -IMP: NORMAL

## 2020-05-31 ENCOUNTER — EXTERNAL CHRONIC CARE MANAGEMENT (OUTPATIENT)
Dept: PRIMARY CARE CLINIC | Facility: CLINIC | Age: 80
End: 2020-05-31
Payer: MEDICARE

## 2020-05-31 PROCEDURE — 99490 CHRNC CARE MGMT STAFF 1ST 20: CPT | Mod: S$GLB,,, | Performed by: FAMILY MEDICINE

## 2020-05-31 PROCEDURE — 99490 PR CHRONIC CARE MGMT, 1ST 20 MIN: ICD-10-PCS | Mod: S$GLB,,, | Performed by: FAMILY MEDICINE

## 2020-06-19 ENCOUNTER — LAB VISIT (OUTPATIENT)
Dept: LAB | Facility: HOSPITAL | Age: 80
End: 2020-06-19
Attending: INTERNAL MEDICINE
Payer: MEDICARE

## 2020-06-19 ENCOUNTER — TELEPHONE (OUTPATIENT)
Dept: HEMATOLOGY/ONCOLOGY | Facility: CLINIC | Age: 80
End: 2020-06-19

## 2020-06-19 DIAGNOSIS — Z79.899 HIGH RISK MEDICATION USE: ICD-10-CM

## 2020-06-19 DIAGNOSIS — I77.9 DISEASE OF ARTERY: ICD-10-CM

## 2020-06-19 DIAGNOSIS — C91.10 CLL (CHRONIC LYMPHOCYTIC LEUKEMIA): ICD-10-CM

## 2020-06-19 DIAGNOSIS — I10 HTN (HYPERTENSION): ICD-10-CM

## 2020-06-19 DIAGNOSIS — R06.09 DOE (DYSPNEA ON EXERTION): ICD-10-CM

## 2020-06-19 LAB
ALBUMIN SERPL BCP-MCNC: 3.5 G/DL (ref 3.5–5.2)
ALP SERPL-CCNC: 49 U/L (ref 55–135)
ALT SERPL W/O P-5'-P-CCNC: 22 U/L (ref 10–44)
ANION GAP SERPL CALC-SCNC: 8 MMOL/L (ref 8–16)
AST SERPL-CCNC: 21 U/L (ref 10–40)
BASOPHILS # BLD AUTO: ABNORMAL K/UL (ref 0–0.2)
BASOPHILS NFR BLD: 0 % (ref 0–1.9)
BILIRUB SERPL-MCNC: 0.4 MG/DL (ref 0.1–1)
BUN SERPL-MCNC: 13 MG/DL (ref 8–23)
CALCIUM SERPL-MCNC: 9.1 MG/DL (ref 8.7–10.5)
CHLORIDE SERPL-SCNC: 104 MMOL/L (ref 95–110)
CO2 SERPL-SCNC: 25 MMOL/L (ref 23–29)
CREAT SERPL-MCNC: 1.1 MG/DL (ref 0.5–1.4)
DIFFERENTIAL METHOD: ABNORMAL
EOSINOPHIL # BLD AUTO: ABNORMAL K/UL (ref 0–0.5)
EOSINOPHIL NFR BLD: 0 % (ref 0–8)
ERYTHROCYTE [DISTWIDTH] IN BLOOD BY AUTOMATED COUNT: 12.3 % (ref 11.5–14.5)
EST. GFR  (AFRICAN AMERICAN): >60 ML/MIN/1.73 M^2
EST. GFR  (NON AFRICAN AMERICAN): >60 ML/MIN/1.73 M^2
GLUCOSE SERPL-MCNC: 116 MG/DL (ref 70–110)
HCT VFR BLD AUTO: 37.3 % (ref 40–54)
HGB BLD-MCNC: 12.1 G/DL (ref 14–18)
IMM GRANULOCYTES # BLD AUTO: ABNORMAL K/UL (ref 0–0.04)
IMM GRANULOCYTES NFR BLD AUTO: ABNORMAL % (ref 0–0.5)
LYMPHOCYTES # BLD AUTO: ABNORMAL K/UL (ref 1–4.8)
LYMPHOCYTES NFR BLD: 91 % (ref 18–48)
MCH RBC QN AUTO: 31.8 PG (ref 27–31)
MCHC RBC AUTO-ENTMCNC: 32.4 G/DL (ref 32–36)
MCV RBC AUTO: 98 FL (ref 82–98)
MONOCYTES # BLD AUTO: ABNORMAL K/UL (ref 0.3–1)
MONOCYTES NFR BLD: 0 % (ref 4–15)
NEUTROPHILS NFR BLD: 9 % (ref 38–73)
NRBC BLD-RTO: 0 /100 WBC
PLATELET # BLD AUTO: 241 K/UL (ref 150–350)
PMV BLD AUTO: 10 FL (ref 9.2–12.9)
POTASSIUM SERPL-SCNC: 3.9 MMOL/L (ref 3.5–5.1)
PROT SERPL-MCNC: 7 G/DL (ref 6–8.4)
RBC # BLD AUTO: 3.81 M/UL (ref 4.6–6.2)
SODIUM SERPL-SCNC: 137 MMOL/L (ref 136–145)
WBC # BLD AUTO: 57.1 K/UL (ref 3.9–12.7)

## 2020-06-19 PROCEDURE — 36415 COLL VENOUS BLD VENIPUNCTURE: CPT

## 2020-06-19 PROCEDURE — 80053 COMPREHEN METABOLIC PANEL: CPT

## 2020-06-19 PROCEDURE — 85027 COMPLETE CBC AUTOMATED: CPT

## 2020-06-19 PROCEDURE — 85007 BL SMEAR W/DIFF WBC COUNT: CPT

## 2020-06-24 ENCOUNTER — TELEPHONE (OUTPATIENT)
Dept: HEMATOLOGY/ONCOLOGY | Facility: CLINIC | Age: 80
End: 2020-06-24

## 2020-06-25 ENCOUNTER — OFFICE VISIT (OUTPATIENT)
Dept: HEMATOLOGY/ONCOLOGY | Facility: CLINIC | Age: 80
End: 2020-06-25
Payer: MEDICARE

## 2020-06-25 DIAGNOSIS — M25.541 ARTHRALGIA OF BOTH HANDS: ICD-10-CM

## 2020-06-25 DIAGNOSIS — I10 ESSENTIAL HYPERTENSION: Chronic | ICD-10-CM

## 2020-06-25 DIAGNOSIS — J44.9 CHRONIC OBSTRUCTIVE PULMONARY DISEASE, UNSPECIFIED COPD TYPE: Primary | ICD-10-CM

## 2020-06-25 DIAGNOSIS — L03.90 CELLULITIS, UNSPECIFIED CELLULITIS SITE: ICD-10-CM

## 2020-06-25 DIAGNOSIS — K57.32 DIVERTICULITIS OF COLON: ICD-10-CM

## 2020-06-25 DIAGNOSIS — C91.10 CLL (CHRONIC LYMPHOCYTIC LEUKEMIA): ICD-10-CM

## 2020-06-25 DIAGNOSIS — M25.542 ARTHRALGIA OF BOTH HANDS: ICD-10-CM

## 2020-06-25 DIAGNOSIS — C61 PROSTATE CANCER: ICD-10-CM

## 2020-06-25 PROCEDURE — 99443 PR PHYSICIAN TELEPHONE EVALUATION 21-30 MIN: ICD-10-PCS | Mod: 95,,, | Performed by: INTERNAL MEDICINE

## 2020-06-25 PROCEDURE — 99443 PR PHYSICIAN TELEPHONE EVALUATION 21-30 MIN: CPT | Mod: 95,,, | Performed by: INTERNAL MEDICINE

## 2020-06-25 NOTE — PROGRESS NOTES
Hematology and Medical Oncology   Follow Up -- Virtual Visit     06/25/2020    Primary Oncologic Diagnosis:CLL    History of Present Ilness:   Kevin Echeverria (Kevin) is a pleasant 80 y.o.male who presents to transition care to [facility] from the [facility]. The patient presents today with his wife and daughter. Most of his issues started in August when he began experiencing horrible pain in his hands that was worse with lack of motion/rest. The pain is excruciating and has sent him to the ED several times. The pain can happen in either upper extremity and feels like it travels at times. The discomfort abates as he moves his hands more throughout the day.     Incidentally through these ER visits it was noted that he has a persistently elevated but largely stable WBC.    --Pathologist review of the peripheral smear on 12/21/19 Leukocytosis with an absolute and relative lymphocytosis.     Lymphocytes are mature, and a subset displays a slightly atypical chromatin pattern.  Smudge cells are present.  Background granulocytes are morphologically normal.  The morphology suggests a B cell lymphoproliferative disorder such as CLL.  --Flow Cytometry on 1/3/2020: A B cell lymphoproliferative disorder is present.  Mantle cell lymphoma is favored although an atypical CLL cannot be completely ruled out; correlation   with morphology and with any available cytogenetic or molecular studies (t(11;14)) is required.   --CLL FISH revealed: The result is abnormal and indicates a 17p deletion in   40.5% of nuclei. In CLL, a 17p deletion is associated with an unfavorable   prognosis    Interval History:  Mr. Echeverria and his wife present today on the phone. He has no issues or complaints. He has felt well the last 3 months. Energy is good. 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    Denies all B symptoms.    PAST MEDICAL HISTORY:   Past Medical History:   Diagnosis Date    Arthritis     Cancer     prostate     COPD (chronic obstructive pulmonary disease)     Hyperlipidemia     Hypertension     Leukemia        PAST SURGICAL HISTORY:   Past Surgical History:   Procedure Laterality Date    CATARACT EXTRACTION W/  INTRAOCULAR LENS IMPLANT Left 05/05/2016    COLONOSCOPY W/ BIOPSIES AND POLYPECTOMY      PROSTATE SURGERY  05/1996    TONSILLECTOMY  1956       PAST SOCIAL HISTORY:   reports that he has never smoked. He quit smokeless tobacco use about 21 years ago. He reports current alcohol use. He reports that he does not use drugs.    FAMILY HISTORY:  Family History   Problem Relation Age of Onset    Diabetes Mother        CURRENT MEDICATIONS:   Current Outpatient Medications   Medication Sig    albuterol (PROVENTIL) 2.5 mg /3 mL (0.083 %) nebulizer solution USE 1 VIAL PER NEBULIZER TREATMENT EVERY 6 HOURS  AS NEEDED FOR WHEEZING    albuterol (PROVENTIL) 2.5 mg /3 mL (0.083 %) nebulizer solution Inhale contents of 1 vial via nebulizer every 12 hours    albuterol (PROVENTIL) 2.5 mg /3 mL (0.083 %) nebulizer solution Inhale contents of 1 vial via nebulizer every 12 hours as needed.    amoxicillin (AMOXIL) 125 MG chewable tablet Take 125 mg by mouth 3 (three) times daily.    amoxicillin-clavulanate 875-125mg (AUGMENTIN) 875-125 mg per tablet     aspirin (ECOTRIN) 81 MG EC tablet Take 81 mg by mouth once daily.      budesonide-formoterol 160-4.5 mcg (SYMBICORT) 160-4.5 mcg/actuation HFAA Inhale 2 puffs into the lungs every 12 (twelve) hours. Controller    budesonide-formoterol 160-4.5 mcg (SYMBICORT) 160-4.5 mcg/actuation HFAA Inhale 2 puffs into the lungs every 12 hours.    colchicine (COLCRYS) 0.6 mg tablet Take 1 tablet (0.6 mg total) by mouth once daily.    famotidine (PEPCID) 20 MG tablet Take one tablet by mouth once a day    fish oil-omega-3 fatty acids 300-1,000 mg capsule Take 1 g by mouth once daily.      gabapentin (NEURONTIN) 300 MG capsule Take 1 capsule (300 mg total) by mouth once daily.     HYDROcodone-acetaminophen (NORCO)  mg per tablet Take 1 tablet by mouth every 12 (twelve) hours as needed. (Patient not taking: Reported on 3/10/2020)    ipratropium (ATROVENT) 0.02 % nebulizer solution     ipratropium (ATROVENT) 0.02 % nebulizer solution Inhale contents of 1 vial via nebulizer every 12 hours.    ipratropium (ATROVENT) 0.02 % nebulizer solution Inhale contents of 1 vial via nebulizer every 12 hours as needed.    losartan (COZAAR) 50 MG tablet TAKE ONE TABLET BY MOUTH EVERY DAY    meloxicam (MOBIC) 15 MG tablet Take 1 tablet in a 24 hour period only for pain    montelukast (SINGULAIR) 10 mg tablet TAKE ONE TABLET BY MOUTH EACH EVENING    multivitamin with minerals tablet Take 1 tablet by mouth once daily. Equate Brand with Iron    nebulizer accessories Misc Use as directed    nebulizer accessories Misc use as directed    omeprazole (PRILOSEC) 20 MG capsule Take one capsule by mouth once daily in the morning    OPW TEST CLAIM - DO NOT FILL test    pravastatin (PRAVACHOL) 20 MG tablet Take 1 tablet (20 mg total) by mouth nightly.    verapamiL (VERELAN) 240 MG C24P Take one capsule by mouth every day in the evening     No current facility-administered medications for this visit.      ALLERGIES:   Review of patient's allergies indicates:  No Known Allergies      Review of Systems:     Review of Systems   Constitutional: Negative for appetite change, chills, diaphoresis, fatigue, fever and unexpected weight change.   HENT:   Negative for hearing loss, mouth sores, nosebleeds, sore throat, trouble swallowing and voice change.    Eyes: Negative for eye problems and icterus.   Respiratory: Negative for chest tightness, cough, hemoptysis, shortness of breath and wheezing.    Cardiovascular: Negative for chest pain, leg swelling and palpitations.   Gastrointestinal: Negative for abdominal distention, abdominal pain, blood in stool, diarrhea, nausea and vomiting.   Endocrine: Negative for hot  flashes.   Genitourinary: Negative for bladder incontinence, difficulty urinating, dysuria and hematuria.    Musculoskeletal: Positive for arthralgias and neck pain. Negative for back pain, flank pain, gait problem, myalgias and neck stiffness.   Skin: Negative for itching, rash and wound.   Neurological: Negative for dizziness, extremity weakness, gait problem, headaches, numbness, seizures and speech difficulty.   Hematological: Negative for adenopathy. Does not bruise/bleed easily.   Psychiatric/Behavioral: Negative for confusion, depression and sleep disturbance. The patient is not nervous/anxious.           Physical Exam:     Limited secondary to virtual visit    ECOG Performance Status: (foot note - ECOG PS provided by Eastern Cooperative Oncology Group) 1 - Symptomatic but completely ambulatory    Karnofsky Performance Score:  90%- Able to Carry on Normal Activity: Minor Symptoms of Disease    Labs:   Lab Results   Component Value Date    WBC 57.10 (HH) 06/19/2020    HGB 12.1 (L) 06/19/2020    HCT 37.3 (L) 06/19/2020     06/19/2020    CHOL 157 01/31/2020    TRIG 57 01/31/2020    HDL 65 01/31/2020    ALT 22 06/19/2020    AST 21 06/19/2020     06/19/2020    K 3.9 06/19/2020     06/19/2020    CREATININE 1.1 06/19/2020    BUN 13 06/19/2020    CO2 25 06/19/2020    TSH 3.184 01/31/2020    PSA 0.07 09/27/2016    PSA 0.07 09/27/2016       Imaging: Outside imaging has been reviewed   Assessment and Plan:     Mr. Echeverria is a pleasant 80 year old male with  CLL.    CLL  --Will obtain bone marrow biopsy report from Dr. 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 I have provided the patient with an opportunity to ask questions and have all questions answered to his satisfaction.       he will return to clinic in 3 months, but knows to call in the interim if symptoms change or should a problem arise.        Jaquelin Quintana MD  Hematology and Medical Oncology  Bone Marrow Transplant  [facility]         This service was not originating from a related E/M service provided within the previous 7 days nor will  to an E/M service or procedure within the next 24 hours or my soonest available appointment.  Prevailing standard of care was able to be met in this audio-only visit.

## 2020-06-25 NOTE — Clinical Note
1. Labs monthly x 3 at [facility]: cbc,cmp  2. See me virtually (at their request) 1-2 days after labs

## 2020-06-30 ENCOUNTER — EXTERNAL CHRONIC CARE MANAGEMENT (OUTPATIENT)
Dept: PRIMARY CARE CLINIC | Facility: CLINIC | Age: 80
End: 2020-06-30
Payer: MEDICARE

## 2020-06-30 PROCEDURE — 99490 PR CHRONIC CARE MGMT, 1ST 20 MIN: ICD-10-PCS | Mod: S$GLB,,, | Performed by: FAMILY MEDICINE

## 2020-06-30 PROCEDURE — 99490 CHRNC CARE MGMT STAFF 1ST 20: CPT | Mod: S$GLB,,, | Performed by: FAMILY MEDICINE

## 2020-07-07 DIAGNOSIS — E78.5 HYPERLIPIDEMIA, UNSPECIFIED HYPERLIPIDEMIA TYPE: ICD-10-CM

## 2020-07-07 RX ORDER — PRAVASTATIN SODIUM 20 MG/1
20 TABLET ORAL NIGHTLY
Qty: 30 TABLET | Refills: 5 | Status: SHIPPED | OUTPATIENT
Start: 2020-07-07 | End: 2021-04-13

## 2020-07-07 NOTE — TELEPHONE ENCOUNTER
LOV: 12/27/2019    Patient is requesting a refill for:    1.) Pravastatin    [facility] [place] [place]

## 2020-07-20 ENCOUNTER — LAB VISIT (OUTPATIENT)
Dept: LAB | Facility: HOSPITAL | Age: 80
End: 2020-07-20
Attending: INTERNAL MEDICINE
Payer: MEDICARE

## 2020-07-20 DIAGNOSIS — I10 HTN (HYPERTENSION): ICD-10-CM

## 2020-07-20 DIAGNOSIS — R06.09 DOE (DYSPNEA ON EXERTION): ICD-10-CM

## 2020-07-20 DIAGNOSIS — C91.10 CLL (CHRONIC LYMPHOCYTIC LEUKEMIA): ICD-10-CM

## 2020-07-20 DIAGNOSIS — Z79.899 HIGH RISK MEDICATION USE: ICD-10-CM

## 2020-07-20 DIAGNOSIS — I77.9 DISEASE OF ARTERY: ICD-10-CM

## 2020-07-20 LAB
ALBUMIN SERPL BCP-MCNC: 3.7 G/DL (ref 3.5–5.2)
ALP SERPL-CCNC: 48 U/L (ref 55–135)
ALT SERPL W/O P-5'-P-CCNC: 40 U/L (ref 10–44)
ANION GAP SERPL CALC-SCNC: 8 MMOL/L (ref 8–16)
ANISOCYTOSIS BLD QL SMEAR: SLIGHT
AST SERPL-CCNC: 33 U/L (ref 10–40)
BASOPHILS # BLD AUTO: ABNORMAL K/UL (ref 0–0.2)
BASOPHILS NFR BLD: 0 % (ref 0–1.9)
BILIRUB SERPL-MCNC: 0.5 MG/DL (ref 0.1–1)
BUN SERPL-MCNC: 12 MG/DL (ref 8–23)
CALCIUM SERPL-MCNC: 9 MG/DL (ref 8.7–10.5)
CHLORIDE SERPL-SCNC: 104 MMOL/L (ref 95–110)
CO2 SERPL-SCNC: 24 MMOL/L (ref 23–29)
CREAT SERPL-MCNC: 1.2 MG/DL (ref 0.5–1.4)
DIFFERENTIAL METHOD: ABNORMAL
EOSINOPHIL # BLD AUTO: ABNORMAL K/UL (ref 0–0.5)
EOSINOPHIL NFR BLD: 2 % (ref 0–8)
ERYTHROCYTE [DISTWIDTH] IN BLOOD BY AUTOMATED COUNT: 12.3 % (ref 11.5–14.5)
EST. GFR  (AFRICAN AMERICAN): >60 ML/MIN/1.73 M^2
EST. GFR  (NON AFRICAN AMERICAN): 57 ML/MIN/1.73 M^2
GLUCOSE SERPL-MCNC: 122 MG/DL (ref 70–110)
HCT VFR BLD AUTO: 38.5 % (ref 40–54)
HGB BLD-MCNC: 12.5 G/DL (ref 14–18)
IMM GRANULOCYTES # BLD AUTO: ABNORMAL K/UL (ref 0–0.04)
IMM GRANULOCYTES NFR BLD AUTO: ABNORMAL % (ref 0–0.5)
LYMPHOCYTES # BLD AUTO: ABNORMAL K/UL (ref 1–4.8)
LYMPHOCYTES NFR BLD: 84 % (ref 18–48)
MCH RBC QN AUTO: 31.6 PG (ref 27–31)
MCHC RBC AUTO-ENTMCNC: 32.5 G/DL (ref 32–36)
MCV RBC AUTO: 97 FL (ref 82–98)
MONOCYTES # BLD AUTO: ABNORMAL K/UL (ref 0.3–1)
MONOCYTES NFR BLD: 9 % (ref 4–15)
NEUTROPHILS NFR BLD: 5 % (ref 38–73)
NRBC BLD-RTO: 0 /100 WBC
PLATELET # BLD AUTO: 229 K/UL (ref 150–350)
PLATELET BLD QL SMEAR: ABNORMAL
PMV BLD AUTO: 9.9 FL (ref 9.2–12.9)
POTASSIUM SERPL-SCNC: 3.9 MMOL/L (ref 3.5–5.1)
PROT SERPL-MCNC: 7.1 G/DL (ref 6–8.4)
RBC # BLD AUTO: 3.96 M/UL (ref 4.6–6.2)
SODIUM SERPL-SCNC: 136 MMOL/L (ref 136–145)
WBC # BLD AUTO: 66.98 K/UL (ref 3.9–12.7)

## 2020-07-20 PROCEDURE — 85007 BL SMEAR W/DIFF WBC COUNT: CPT

## 2020-07-20 PROCEDURE — 36415 COLL VENOUS BLD VENIPUNCTURE: CPT

## 2020-07-20 PROCEDURE — 85027 COMPLETE CBC AUTOMATED: CPT

## 2020-07-20 PROCEDURE — 80053 COMPREHEN METABOLIC PANEL: CPT

## 2020-07-31 ENCOUNTER — EXTERNAL CHRONIC CARE MANAGEMENT (OUTPATIENT)
Dept: PRIMARY CARE CLINIC | Facility: CLINIC | Age: 80
End: 2020-07-31
Payer: MEDICARE

## 2020-07-31 PROCEDURE — 99490 PR CHRONIC CARE MGMT, 1ST 20 MIN: ICD-10-PCS | Mod: S$GLB,,, | Performed by: FAMILY MEDICINE

## 2020-07-31 PROCEDURE — 99490 CHRNC CARE MGMT STAFF 1ST 20: CPT | Mod: S$GLB,,, | Performed by: FAMILY MEDICINE

## 2020-08-20 ENCOUNTER — TELEPHONE (OUTPATIENT)
Dept: HEMATOLOGY/ONCOLOGY | Facility: CLINIC | Age: 80
End: 2020-08-20

## 2020-08-20 ENCOUNTER — LAB VISIT (OUTPATIENT)
Dept: LAB | Facility: HOSPITAL | Age: 80
End: 2020-08-20
Attending: INTERNAL MEDICINE
Payer: MEDICARE

## 2020-08-20 DIAGNOSIS — I10 HTN (HYPERTENSION): ICD-10-CM

## 2020-08-20 DIAGNOSIS — C91.10 CLL (CHRONIC LYMPHOCYTIC LEUKEMIA): ICD-10-CM

## 2020-08-20 DIAGNOSIS — I77.9 DISEASE OF ARTERY: ICD-10-CM

## 2020-08-20 DIAGNOSIS — Z79.899 HIGH RISK MEDICATION USE: ICD-10-CM

## 2020-08-20 DIAGNOSIS — R06.09 DOE (DYSPNEA ON EXERTION): ICD-10-CM

## 2020-08-20 LAB
ALBUMIN SERPL BCP-MCNC: 3.7 G/DL (ref 3.5–5.2)
ALP SERPL-CCNC: 50 U/L (ref 55–135)
ALT SERPL W/O P-5'-P-CCNC: 31 U/L (ref 10–44)
ANION GAP SERPL CALC-SCNC: 10 MMOL/L (ref 8–16)
AST SERPL-CCNC: 28 U/L (ref 10–40)
BASOPHILS # BLD AUTO: ABNORMAL K/UL (ref 0–0.2)
BASOPHILS NFR BLD: 0 % (ref 0–1.9)
BILIRUB SERPL-MCNC: 0.4 MG/DL (ref 0.1–1)
BUN SERPL-MCNC: 12 MG/DL (ref 8–23)
CALCIUM SERPL-MCNC: 9.1 MG/DL (ref 8.7–10.5)
CHLORIDE SERPL-SCNC: 104 MMOL/L (ref 95–110)
CO2 SERPL-SCNC: 23 MMOL/L (ref 23–29)
CREAT SERPL-MCNC: 1.1 MG/DL (ref 0.5–1.4)
DIFFERENTIAL METHOD: ABNORMAL
EOSINOPHIL # BLD AUTO: ABNORMAL K/UL (ref 0–0.5)
EOSINOPHIL NFR BLD: 2 % (ref 0–8)
ERYTHROCYTE [DISTWIDTH] IN BLOOD BY AUTOMATED COUNT: 12.1 % (ref 11.5–14.5)
EST. GFR  (AFRICAN AMERICAN): >60 ML/MIN/1.73 M^2
EST. GFR  (NON AFRICAN AMERICAN): >60 ML/MIN/1.73 M^2
GLUCOSE SERPL-MCNC: 129 MG/DL (ref 70–110)
HCT VFR BLD AUTO: 39 % (ref 40–54)
HGB BLD-MCNC: 13.1 G/DL (ref 14–18)
IMM GRANULOCYTES # BLD AUTO: ABNORMAL K/UL (ref 0–0.04)
IMM GRANULOCYTES NFR BLD AUTO: ABNORMAL % (ref 0–0.5)
LYMPHOCYTES # BLD AUTO: ABNORMAL K/UL (ref 1–4.8)
LYMPHOCYTES NFR BLD: 77 % (ref 18–48)
MCH RBC QN AUTO: 31.7 PG (ref 27–31)
MCHC RBC AUTO-ENTMCNC: 33.6 G/DL (ref 32–36)
MCV RBC AUTO: 94 FL (ref 82–98)
MONOCYTES # BLD AUTO: ABNORMAL K/UL (ref 0.3–1)
MONOCYTES NFR BLD: 5 % (ref 4–15)
NEUTROPHILS NFR BLD: 9 % (ref 38–73)
NRBC BLD-RTO: 0 /100 WBC
PATH REV BLD -IMP: NORMAL
PLATELET # BLD AUTO: 197 K/UL (ref 150–350)
PLATELET BLD QL SMEAR: ABNORMAL
PMV BLD AUTO: 10 FL (ref 9.2–12.9)
POTASSIUM SERPL-SCNC: 4 MMOL/L (ref 3.5–5.1)
PROT SERPL-MCNC: 7.2 G/DL (ref 6–8.4)
RBC # BLD AUTO: 4.13 M/UL (ref 4.6–6.2)
SODIUM SERPL-SCNC: 137 MMOL/L (ref 136–145)
WBC # BLD AUTO: 68.83 K/UL (ref 3.9–12.7)
WBC OTHER NFR BLD MANUAL: 7 %

## 2020-08-20 PROCEDURE — 85007 BL SMEAR W/DIFF WBC COUNT: CPT

## 2020-08-20 PROCEDURE — 85060 BLOOD SMEAR INTERPRETATION: CPT | Mod: ,,, | Performed by: PATHOLOGY

## 2020-08-20 PROCEDURE — 85060 PATHOLOGIST REVIEW: ICD-10-PCS | Mod: ,,, | Performed by: PATHOLOGY

## 2020-08-20 PROCEDURE — 85027 COMPLETE CBC AUTOMATED: CPT

## 2020-08-20 PROCEDURE — 80053 COMPREHEN METABOLIC PANEL: CPT

## 2020-08-20 PROCEDURE — 36415 COLL VENOUS BLD VENIPUNCTURE: CPT

## 2020-08-26 NOTE — TELEPHONE ENCOUNTER
LOV: 12/27/2019    Pharmacy sending request for Prilosec 20 mg    Pharmacy: [facility]

## 2020-08-27 RX ORDER — GABAPENTIN 300 MG/1
300 CAPSULE ORAL DAILY
Qty: 30 CAPSULE | Refills: 5 | Status: SHIPPED | OUTPATIENT
Start: 2020-08-27 | End: 2020-09-09

## 2020-08-27 RX ORDER — OMEPRAZOLE 20 MG/1
CAPSULE, DELAYED RELEASE ORAL
Qty: 30 CAPSULE | Refills: 11 | Status: SHIPPED | OUTPATIENT
Start: 2020-08-27 | End: 2021-09-05 | Stop reason: SDUPTHER

## 2020-08-31 ENCOUNTER — OFFICE VISIT (OUTPATIENT)
Dept: RHEUMATOLOGY | Facility: CLINIC | Age: 80
End: 2020-08-31
Payer: MEDICARE

## 2020-08-31 ENCOUNTER — EXTERNAL CHRONIC CARE MANAGEMENT (OUTPATIENT)
Dept: PRIMARY CARE CLINIC | Facility: CLINIC | Age: 80
End: 2020-08-31
Payer: MEDICARE

## 2020-08-31 DIAGNOSIS — M25.541 ARTHRALGIA OF RIGHT HAND: Primary | ICD-10-CM

## 2020-08-31 PROCEDURE — 99490 CHRNC CARE MGMT STAFF 1ST 20: CPT | Mod: S$GLB,,, | Performed by: FAMILY MEDICINE

## 2020-08-31 PROCEDURE — 1159F PR MEDICATION LIST DOCUMENTED IN MEDICAL RECORD: ICD-10-PCS | Mod: 95,,, | Performed by: INTERNAL MEDICINE

## 2020-08-31 PROCEDURE — 99490 PR CHRONIC CARE MGMT, 1ST 20 MIN: ICD-10-PCS | Mod: S$GLB,,, | Performed by: FAMILY MEDICINE

## 2020-08-31 PROCEDURE — 1159F MED LIST DOCD IN RCRD: CPT | Mod: 95,,, | Performed by: INTERNAL MEDICINE

## 2020-08-31 PROCEDURE — 99214 PR OFFICE/OUTPT VISIT, EST, LEVL IV, 30-39 MIN: ICD-10-PCS | Mod: 95,,, | Performed by: INTERNAL MEDICINE

## 2020-08-31 PROCEDURE — 99214 OFFICE O/P EST MOD 30 MIN: CPT | Mod: 95,,, | Performed by: INTERNAL MEDICINE

## 2020-08-31 PROCEDURE — 1101F PT FALLS ASSESS-DOCD LE1/YR: CPT | Mod: CPTII,95,, | Performed by: INTERNAL MEDICINE

## 2020-08-31 PROCEDURE — 1101F PR PT FALLS ASSESS DOC 0-1 FALLS W/OUT INJ PAST YR: ICD-10-PCS | Mod: CPTII,95,, | Performed by: INTERNAL MEDICINE

## 2020-08-31 NOTE — PROGRESS NOTES
Patient could not connect to the my chart or doximity  He asked me to call him back at 7.30 in the evening but by then he couldn't access his chart he said    So we had to reschedule the appointment  Patient was not seen

## 2020-09-06 ENCOUNTER — PATIENT OUTREACH (OUTPATIENT)
Dept: ADMINISTRATIVE | Facility: OTHER | Age: 80
End: 2020-09-06

## 2020-09-09 ENCOUNTER — OFFICE VISIT (OUTPATIENT)
Dept: RHEUMATOLOGY | Facility: CLINIC | Age: 80
End: 2020-09-09
Payer: MEDICARE

## 2020-09-09 DIAGNOSIS — M11.849 CALCIUM PYROPHOSPHATE ARTHROPATHY OF HAND: ICD-10-CM

## 2020-09-09 PROCEDURE — 1159F PR MEDICATION LIST DOCUMENTED IN MEDICAL RECORD: ICD-10-PCS | Mod: 95,,, | Performed by: INTERNAL MEDICINE

## 2020-09-09 PROCEDURE — 1101F PT FALLS ASSESS-DOCD LE1/YR: CPT | Mod: CPTII,95,, | Performed by: INTERNAL MEDICINE

## 2020-09-09 PROCEDURE — 1159F MED LIST DOCD IN RCRD: CPT | Mod: 95,,, | Performed by: INTERNAL MEDICINE

## 2020-09-09 PROCEDURE — 99214 PR OFFICE/OUTPT VISIT, EST, LEVL IV, 30-39 MIN: ICD-10-PCS | Mod: 95,,, | Performed by: INTERNAL MEDICINE

## 2020-09-09 PROCEDURE — 99214 OFFICE O/P EST MOD 30 MIN: CPT | Mod: 95,,, | Performed by: INTERNAL MEDICINE

## 2020-09-09 PROCEDURE — 1101F PR PT FALLS ASSESS DOC 0-1 FALLS W/OUT INJ PAST YR: ICD-10-PCS | Mod: CPTII,95,, | Performed by: INTERNAL MEDICINE

## 2020-09-09 RX ORDER — COLCHICINE 0.6 MG/1
0.6 TABLET ORAL DAILY
Qty: 90 TABLET | Refills: 2 | Status: SHIPPED | OUTPATIENT
Start: 2020-09-09 | End: 2021-02-01

## 2020-09-09 RX ORDER — GABAPENTIN 300 MG/1
300 CAPSULE ORAL DAILY
Qty: 90 CAPSULE | Refills: 2 | Status: SHIPPED | OUTPATIENT
Start: 2020-09-09 | End: 2021-02-26

## 2020-09-09 NOTE — PROGRESS NOTES
Chief Complaint   Patient presents with    Disease Management     CPPD AND NEUROPATHY       The patient location is: home  The chief complaint leading to consultation is: CPPD arthropathy    Visit type: audiovisual    Face to Face time with patient: 15   minutes of total time spent on the encounter, which includes face to face time and non-face to face time preparing to see the patient (eg, review of tests), Obtaining and/or reviewing separately obtained history, Documenting clinical information in the electronic or other health record, Independently interpreting results (not separately reported) and communicating results to the patient/family/caregiver, or Care coordination (not separately reported).         Each patient to whom he or she provides medical services by telemedicine is:  (1) informed of the relationship between the physician and patient and the respective role of any other health care provider with respect to management of the patient; and (2) notified that he or she may decline to receive medical services by telemedicine and may withdraw from such care at any time.    Notes:       History of presenting illness    80 year old male presented with severe pain in both hands in aug 2019  He went to rheumatology and hematology : leukemia diagnosed  Now came to cancer center : CLL diagnosed    Has been to ER with excruciating pain in the hands   Now sees   His leukemia is not very significant   No need for treatment     Episodes so far  :    Pain and swelling right hand   Pain and swelling left hand  Pain and swelling b/l hands    Triggers : none   Started late evening : came on very fast   Got relief from steroids   Each episode would last for hours     One episode : he had fever +  No rash    CRP 18.8  ESR 13 during the episode    ESR 52 during another episode  CRP 71.2    EVE neg  RF neg    Uric acids 3.8 to 4.2 during the episodes    At nights : numbness both hands  Tips of fingers are numb     EMS for 10 minutes   When not in a flare he is ok,he can use his hands and he only has some soreness   He works with his hands all day and he has no problems     MRI C/T spine : 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    Right hand xray  No fracture or dislocation.  Degenerative joint space narrowing at the 1st digit MCP and IP joints, with additional moderate degenerative area at the 2nd through 4th MCP and DIP joints.  No osseous lytic or blastic lesion.  Soft tissues are unremarkable.    Right wrist xray  There is no acute fracture or dislocation.  No significant soft tissue swelling.  The carpal bones are intact with mild degenerative osteoarthrosis.  The radiocarpal articulation is intact.  The ulnar styloid is intact.    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 make furniture    He has great quality of life and doesn't think he has not had any night symptoms    Labs     CMP nml  CBC Leucocytosis 68.83 and he says hematology is following   H/h 13.1/39  plts nml    Past history  HTN,HLD,leukemia,COPD    Family history  Diabetes     Social history    Former tobacco smoker quit 1999    Review of Systems    No skin rashes,malar rash,photosensitivity   No telangiectasias   No calcinosis   No psoriasis   No patchy alopecia   No oral and nasal ulcers   No dry eyes and dry mouth   No pleurisy or any cardiopulmonary complaints except for COPD  No dysphagia,diplopia and dysphonia and muscle weakness   No n/v/d/c   No acid reflux+   No raynaud's+   No digital ulcers   No renal issues   No  blood clots   No fever,chills,night sweats,weight loss and loss of appetite   No new onset headaches   No recurrent conjunctivitis or uveitis or scleritis or episcleritis   No chronic or bloody diarrhea with no u colitis or crohn's /inflammatory bowel disease   No penile and urethral  d/c/STDs/no ulcers     Joint exam benign on the video today    Physical Exam   Constitutional: He is oriented to person, place, and time and well-developed, well-nourished, and in no distress. No distress.   HENT:   Head: Normocephalic.   Mouth/Throat: Oropharynx is clear and moist.   Eyes: Conjunctivae are normal. Pupils are equal, round, and reactive to light. Right eye exhibits no discharge. Left eye exhibits no discharge. No scleral icterus.   Neck: Normal range of motion. No thyromegaly present.   Cardiovascular: Normal rate, regular rhythm, normal heart sounds and intact distal pulses.    Pulmonary/Chest: Effort normal and breath sounds normal. No stridor.   Abdominal: Soft. Bowel sounds are normal.   Lymphadenopathy:     He has no cervical adenopathy.   Neurological: He is alert and oriented to person, place, and time.   Skin: Skin is warm. No rash noted. He is not diaphoretic.     Psychiatric: Affect and judgment normal.   Musculoskeletal: Normal range of motion.         Assessment     80 year old male with  HTN,HLD,leukemia,COPD  comes with 3 episodes of acute onset pain and swelling in the b/l hands since august 2019  He now has a diagnosis of CLL but doesn't need treatment    Episodes so far  :    Pain and swelling right hand   Pain and swelling left hand  Pain and swelling b/l hands    Triggers : none   Started late evening : came on very fast   Got relief from steroids   Each episode would last for hours   One episode : he had fever +  No rash    During each episode his ESR/CRP have gone high  EVE neg  RF neg  Uric acids 3.8 to 4.2 during the episodes    Last flare dec 2019  When not in flare has some hand and arm  paresthesias and no pain    Today joint exam unremarkable     Xrays right hand and wrist : moderate deg changes from 1 to 4 MCPs/DIPs    No psoriasis or inf bowel disease    Suspect Calcium pyrophosphate deposition disease with acute pseudogout flares     We initiated colchicine 0.6 mg daily and he has tolerated it well with no side effects    Also on gabapentin 300 mg bed time     He has very minimal neuropathy symptoms today overall he has done well    His Labs were discussed today    1. Calcium pyrophosphate arthropathy of hand          F/u problem     Plan    Continue colchicine 0.6 mg daily    Continue gabapentin 300 mg daily     Prn meloxicam only    If future flares,call me for steroids     If the hand paresthesias worsen then he will contact me and we will do EMG/NSE TODD Brown was seen today for disease management.    Diagnoses and all orders for this visit:    Calcium pyrophosphate arthropathy of hand  -     colchicine (COLCRYS) 0.6 mg tablet; Take 1 tablet (0.6 mg total) by mouth once daily.    Other orders  -     gabapentin (NEURONTIN) 300 MG capsule; Take 1 capsule (300 mg total) by mouth once daily.          rtc in 6 months

## 2020-09-11 ENCOUNTER — OFFICE VISIT (OUTPATIENT)
Dept: FAMILY MEDICINE | Facility: CLINIC | Age: 80
End: 2020-09-11
Payer: MEDICARE

## 2020-09-11 VITALS
DIASTOLIC BLOOD PRESSURE: 68 MMHG | HEART RATE: 80 BPM | BODY MASS INDEX: 24.48 KG/M2 | SYSTOLIC BLOOD PRESSURE: 132 MMHG | WEIGHT: 170.63 LBS | RESPIRATION RATE: 16 BRPM

## 2020-09-11 DIAGNOSIS — K29.70 GASTRITIS, PRESENCE OF BLEEDING UNSPECIFIED, UNSPECIFIED CHRONICITY, UNSPECIFIED GASTRITIS TYPE: ICD-10-CM

## 2020-09-11 DIAGNOSIS — I10 BENIGN ESSENTIAL HTN: ICD-10-CM

## 2020-09-11 DIAGNOSIS — C92.10 CML (CHRONIC MYELOCYTIC LEUKEMIA): ICD-10-CM

## 2020-09-11 DIAGNOSIS — J44.9 CHRONIC OBSTRUCTIVE PULMONARY DISEASE, UNSPECIFIED COPD TYPE: ICD-10-CM

## 2020-09-11 DIAGNOSIS — M11.20 PSEUDOGOUT: Primary | ICD-10-CM

## 2020-09-11 PROCEDURE — 3078F DIAST BP <80 MM HG: CPT | Mod: CPTII,S$GLB,, | Performed by: FAMILY MEDICINE

## 2020-09-11 PROCEDURE — 3075F SYST BP GE 130 - 139MM HG: CPT | Mod: CPTII,S$GLB,, | Performed by: FAMILY MEDICINE

## 2020-09-11 PROCEDURE — 1126F AMNT PAIN NOTED NONE PRSNT: CPT | Mod: S$GLB,,, | Performed by: FAMILY MEDICINE

## 2020-09-11 PROCEDURE — 99999 PR PBB SHADOW E&M-EST. PATIENT-LVL III: ICD-10-PCS | Mod: PBBFAC,,, | Performed by: FAMILY MEDICINE

## 2020-09-11 PROCEDURE — 3078F PR MOST RECENT DIASTOLIC BLOOD PRESSURE < 80 MM HG: ICD-10-PCS | Mod: CPTII,S$GLB,, | Performed by: FAMILY MEDICINE

## 2020-09-11 PROCEDURE — 1159F PR MEDICATION LIST DOCUMENTED IN MEDICAL RECORD: ICD-10-PCS | Mod: S$GLB,,, | Performed by: FAMILY MEDICINE

## 2020-09-11 PROCEDURE — 3075F PR MOST RECENT SYSTOLIC BLOOD PRESS GE 130-139MM HG: ICD-10-PCS | Mod: CPTII,S$GLB,, | Performed by: FAMILY MEDICINE

## 2020-09-11 PROCEDURE — 1159F MED LIST DOCD IN RCRD: CPT | Mod: S$GLB,,, | Performed by: FAMILY MEDICINE

## 2020-09-11 PROCEDURE — 1101F PR PT FALLS ASSESS DOC 0-1 FALLS W/OUT INJ PAST YR: ICD-10-PCS | Mod: CPTII,S$GLB,, | Performed by: FAMILY MEDICINE

## 2020-09-11 PROCEDURE — 99999 PR PBB SHADOW E&M-EST. PATIENT-LVL III: CPT | Mod: PBBFAC,,, | Performed by: FAMILY MEDICINE

## 2020-09-11 PROCEDURE — 1101F PT FALLS ASSESS-DOCD LE1/YR: CPT | Mod: CPTII,S$GLB,, | Performed by: FAMILY MEDICINE

## 2020-09-11 PROCEDURE — 1126F PR PAIN SEVERITY QUANTIFIED, NO PAIN PRESENT: ICD-10-PCS | Mod: S$GLB,,, | Performed by: FAMILY MEDICINE

## 2020-09-11 PROCEDURE — 99213 OFFICE O/P EST LOW 20 MIN: CPT | Mod: S$GLB,,, | Performed by: FAMILY MEDICINE

## 2020-09-11 PROCEDURE — 99213 PR OFFICE/OUTPT VISIT, EST, LEVL III, 20-29 MIN: ICD-10-PCS | Mod: S$GLB,,, | Performed by: FAMILY MEDICINE

## 2020-09-11 RX ORDER — FAMOTIDINE 20 MG/1
TABLET, FILM COATED ORAL
Qty: 90 TABLET | Refills: 3 | Status: SHIPPED | OUTPATIENT
Start: 2020-09-11 | End: 2021-03-10

## 2020-09-11 NOTE — PROGRESS NOTES
Subjective:       Patient ID: Kevin Echeverria is a 80 y.o. male.    Chief Complaint: Annual Exam    Pt is a 80 y.o. male who presents for check up for Pseudogout & some constipation (primary encounter diagnosis). Doing well on current meds. Denies any side effects. Prevention is up to date.    Review of Systems   Constitutional: Negative for appetite change.   HENT: Negative for congestion, ear pain, sneezing and sore throat.    Eyes: Negative for redness and visual disturbance.   Respiratory: Negative for cough, chest tightness and stridor.    Cardiovascular: Negative for chest pain.   Gastrointestinal: Negative for abdominal pain, blood in stool, diarrhea, nausea and vomiting.        Was on iron for his anemia   Genitourinary: Negative for difficulty urinating, dysuria and hematuria.   Musculoskeletal: Negative for arthralgias, back pain, joint swelling, myalgias and neck pain.   Skin: Negative for rash.   Neurological: Negative for dizziness.   Psychiatric/Behavioral: Negative for agitation. The patient is not nervous/anxious.        Objective:      Physical Exam  Constitutional:       Appearance: He is well-developed.   HENT:      Head: Normocephalic.   Eyes:      Pupils: Pupils are equal, round, and reactive to light.   Neck:      Musculoskeletal: Normal range of motion and neck supple.      Thyroid: No thyromegaly.   Cardiovascular:      Rate and Rhythm: Normal rate and regular rhythm.      Heart sounds: No murmur. No friction rub.   Pulmonary:      Effort: Pulmonary effort is normal. No respiratory distress.      Breath sounds: No wheezing.   Abdominal:      Tenderness: There is no abdominal tenderness. There is no guarding or rebound.   Musculoskeletal: Normal range of motion.         General: No tenderness.   Lymphadenopathy:      Cervical: No cervical adenopathy.   Skin:     General: Skin is warm and dry.   Neurological:      Mental Status: He is alert and oriented to person, place, and time.      Cranial  Nerves: No cranial nerve deficit.      Deep Tendon Reflexes: Reflexes are normal and symmetric.   Psychiatric:         Thought Content: Thought content normal.         Judgment: Judgment normal.         Assessment:       1. Pseudogout    2. CML (chronic myelocytic leukemia)    3. Chronic obstructive pulmonary disease, unspecified COPD type    4. Benign essential HTN    5. Gastritis, presence of bleeding unspecified, unspecified chronicity, unspecified gastritis type        Plan:   Kevin was seen today for annual exam.    Diagnoses and all orders for this visit:    Pseudogout    CML (chronic myelocytic leukemia)    Chronic obstructive pulmonary disease, unspecified COPD type    Benign essential HTN    Gastritis, presence of bleeding unspecified, unspecified chronicity, unspecified gastritis type    Other orders  -     famotidine (PEPCID) 20 MG tablet; Take one tablet by mouth once a day

## 2020-09-21 ENCOUNTER — LAB VISIT (OUTPATIENT)
Dept: LAB | Facility: HOSPITAL | Age: 80
End: 2020-09-21
Attending: INTERNAL MEDICINE
Payer: MEDICARE

## 2020-09-21 ENCOUNTER — TELEPHONE (OUTPATIENT)
Dept: HEMATOLOGY/ONCOLOGY | Facility: CLINIC | Age: 80
End: 2020-09-21

## 2020-09-21 DIAGNOSIS — C91.10 CLL (CHRONIC LYMPHOCYTIC LEUKEMIA): ICD-10-CM

## 2020-09-21 DIAGNOSIS — I77.9 DISEASE OF ARTERY: ICD-10-CM

## 2020-09-21 DIAGNOSIS — Z79.899 HIGH RISK MEDICATION USE: ICD-10-CM

## 2020-09-21 DIAGNOSIS — R06.09 DOE (DYSPNEA ON EXERTION): ICD-10-CM

## 2020-09-21 DIAGNOSIS — I10 HTN (HYPERTENSION): ICD-10-CM

## 2020-09-21 LAB
ALBUMIN SERPL BCP-MCNC: 3.6 G/DL (ref 3.5–5.2)
ALP SERPL-CCNC: 47 U/L (ref 55–135)
ALT SERPL W/O P-5'-P-CCNC: 40 U/L (ref 10–44)
ANION GAP SERPL CALC-SCNC: 9 MMOL/L (ref 8–16)
AST SERPL-CCNC: 30 U/L (ref 10–40)
BASOPHILS # BLD AUTO: ABNORMAL K/UL (ref 0–0.2)
BASOPHILS NFR BLD: 0 % (ref 0–1.9)
BILIRUB SERPL-MCNC: 0.4 MG/DL (ref 0.1–1)
BUN SERPL-MCNC: 12 MG/DL (ref 8–23)
CALCIUM SERPL-MCNC: 9.2 MG/DL (ref 8.7–10.5)
CHLORIDE SERPL-SCNC: 102 MMOL/L (ref 95–110)
CO2 SERPL-SCNC: 25 MMOL/L (ref 23–29)
CREAT SERPL-MCNC: 1.1 MG/DL (ref 0.5–1.4)
DIFFERENTIAL METHOD: ABNORMAL
EOSINOPHIL # BLD AUTO: ABNORMAL K/UL (ref 0–0.5)
EOSINOPHIL NFR BLD: 0 % (ref 0–8)
ERYTHROCYTE [DISTWIDTH] IN BLOOD BY AUTOMATED COUNT: 12.1 % (ref 11.5–14.5)
EST. GFR  (AFRICAN AMERICAN): >60 ML/MIN/1.73 M^2
EST. GFR  (NON AFRICAN AMERICAN): >60 ML/MIN/1.73 M^2
GLUCOSE SERPL-MCNC: 107 MG/DL (ref 70–110)
HCT VFR BLD AUTO: 38.5 % (ref 40–54)
HGB BLD-MCNC: 12.6 G/DL (ref 14–18)
IMM GRANULOCYTES # BLD AUTO: ABNORMAL K/UL (ref 0–0.04)
IMM GRANULOCYTES NFR BLD AUTO: ABNORMAL % (ref 0–0.5)
LYMPHOCYTES # BLD AUTO: ABNORMAL K/UL (ref 1–4.8)
LYMPHOCYTES NFR BLD: 85 % (ref 18–48)
MCH RBC QN AUTO: 31.7 PG (ref 27–31)
MCHC RBC AUTO-ENTMCNC: 32.7 G/DL (ref 32–36)
MCV RBC AUTO: 97 FL (ref 82–98)
MONOCYTES # BLD AUTO: ABNORMAL K/UL (ref 0.3–1)
MONOCYTES NFR BLD: 6 % (ref 4–15)
NEUTROPHILS NFR BLD: 7 % (ref 38–73)
NRBC BLD-RTO: 0 /100 WBC
PLATELET # BLD AUTO: 218 K/UL (ref 150–350)
PMV BLD AUTO: 10.1 FL (ref 9.2–12.9)
POTASSIUM SERPL-SCNC: 4.1 MMOL/L (ref 3.5–5.1)
PROT SERPL-MCNC: 7 G/DL (ref 6–8.4)
RBC # BLD AUTO: 3.98 M/UL (ref 4.6–6.2)
SODIUM SERPL-SCNC: 136 MMOL/L (ref 136–145)
WBC # BLD AUTO: 68.21 K/UL (ref 3.9–12.7)
WBC OTHER NFR BLD MANUAL: 2 %

## 2020-09-21 PROCEDURE — 36415 COLL VENOUS BLD VENIPUNCTURE: CPT

## 2020-09-21 PROCEDURE — 85007 BL SMEAR W/DIFF WBC COUNT: CPT

## 2020-09-21 PROCEDURE — 85027 COMPLETE CBC AUTOMATED: CPT

## 2020-09-21 PROCEDURE — 80053 COMPREHEN METABOLIC PANEL: CPT

## 2020-09-23 ENCOUNTER — IMMUNIZATION (OUTPATIENT)
Dept: PHARMACY | Facility: CLINIC | Age: 80
End: 2020-09-23
Payer: MEDICARE

## 2020-09-30 ENCOUNTER — EXTERNAL CHRONIC CARE MANAGEMENT (OUTPATIENT)
Dept: PRIMARY CARE CLINIC | Facility: CLINIC | Age: 80
End: 2020-09-30
Payer: MEDICARE

## 2020-09-30 PROCEDURE — 99490 CHRNC CARE MGMT STAFF 1ST 20: CPT | Mod: S$GLB,,, | Performed by: FAMILY MEDICINE

## 2020-09-30 PROCEDURE — 99490 PR CHRONIC CARE MGMT, 1ST 20 MIN: ICD-10-PCS | Mod: S$GLB,,, | Performed by: FAMILY MEDICINE

## 2020-10-02 RX ORDER — BUDESONIDE AND FORMOTEROL FUMARATE DIHYDRATE 160; 4.5 UG/1; UG/1
AEROSOL RESPIRATORY (INHALATION)
Qty: 10.2 G | Refills: 12 | Status: SHIPPED | OUTPATIENT
Start: 2020-10-02 | End: 2021-03-10

## 2020-10-12 ENCOUNTER — TELEPHONE (OUTPATIENT)
Dept: HEMATOLOGY/ONCOLOGY | Facility: CLINIC | Age: 80
End: 2020-10-12

## 2020-10-12 NOTE — TELEPHONE ENCOUNTER
----- Message from Sara Urbina sent at 10/12/2020  8:11 AM CDT -----  Pt would like to be called back regarding rescheduling appt    Pt can be reached at [phone]

## 2020-10-19 RX ORDER — MONTELUKAST SODIUM 10 MG/1
TABLET ORAL
Qty: 30 TABLET | Refills: 11 | Status: SHIPPED | OUTPATIENT
Start: 2020-10-19 | End: 2021-03-10

## 2020-10-23 ENCOUNTER — LAB VISIT (OUTPATIENT)
Dept: LAB | Facility: HOSPITAL | Age: 80
End: 2020-10-23
Attending: INTERNAL MEDICINE
Payer: MEDICARE

## 2020-10-23 DIAGNOSIS — I77.9 DISEASE OF ARTERY: ICD-10-CM

## 2020-10-23 DIAGNOSIS — R06.09 DOE (DYSPNEA ON EXERTION): ICD-10-CM

## 2020-10-23 DIAGNOSIS — C91.10 CLL (CHRONIC LYMPHOCYTIC LEUKEMIA): ICD-10-CM

## 2020-10-23 DIAGNOSIS — Z79.899 HIGH RISK MEDICATION USE: ICD-10-CM

## 2020-10-23 DIAGNOSIS — I10 HTN (HYPERTENSION): ICD-10-CM

## 2020-10-23 LAB
ALBUMIN SERPL BCP-MCNC: 3.7 G/DL (ref 3.5–5.2)
ALP SERPL-CCNC: 45 U/L (ref 55–135)
ALT SERPL W/O P-5'-P-CCNC: 29 U/L (ref 10–44)
ANION GAP SERPL CALC-SCNC: 8 MMOL/L (ref 8–16)
AST SERPL-CCNC: 27 U/L (ref 10–40)
BASOPHILS # BLD AUTO: ABNORMAL K/UL (ref 0–0.2)
BASOPHILS NFR BLD: 0 % (ref 0–1.9)
BILIRUB SERPL-MCNC: 0.4 MG/DL (ref 0.1–1)
BUN SERPL-MCNC: 10 MG/DL (ref 8–23)
CALCIUM SERPL-MCNC: 9.2 MG/DL (ref 8.7–10.5)
CHLORIDE SERPL-SCNC: 104 MMOL/L (ref 95–110)
CO2 SERPL-SCNC: 26 MMOL/L (ref 23–29)
CREAT SERPL-MCNC: 1.1 MG/DL (ref 0.5–1.4)
DIFFERENTIAL METHOD: ABNORMAL
EOSINOPHIL # BLD AUTO: ABNORMAL K/UL (ref 0–0.5)
EOSINOPHIL NFR BLD: 1 % (ref 0–8)
ERYTHROCYTE [DISTWIDTH] IN BLOOD BY AUTOMATED COUNT: 12.4 % (ref 11.5–14.5)
EST. GFR  (AFRICAN AMERICAN): >60 ML/MIN/1.73 M^2
EST. GFR  (NON AFRICAN AMERICAN): >60 ML/MIN/1.73 M^2
GLUCOSE SERPL-MCNC: 106 MG/DL (ref 70–110)
HCT VFR BLD AUTO: 37.1 % (ref 40–54)
HGB BLD-MCNC: 12 G/DL (ref 14–18)
IMM GRANULOCYTES # BLD AUTO: ABNORMAL K/UL (ref 0–0.04)
IMM GRANULOCYTES NFR BLD AUTO: ABNORMAL % (ref 0–0.5)
LYMPHOCYTES # BLD AUTO: ABNORMAL K/UL (ref 1–4.8)
LYMPHOCYTES NFR BLD: 87 % (ref 18–48)
MCH RBC QN AUTO: 31.6 PG (ref 27–31)
MCHC RBC AUTO-ENTMCNC: 32.3 G/DL (ref 32–36)
MCV RBC AUTO: 98 FL (ref 82–98)
MONOCYTES # BLD AUTO: ABNORMAL K/UL (ref 0.3–1)
MONOCYTES NFR BLD: 6 % (ref 4–15)
NEUTROPHILS NFR BLD: 6 % (ref 38–73)
NRBC BLD-RTO: 0 /100 WBC
PLATELET # BLD AUTO: 210 K/UL (ref 150–350)
PLATELET BLD QL SMEAR: ABNORMAL
PMV BLD AUTO: 9.8 FL (ref 9.2–12.9)
POTASSIUM SERPL-SCNC: 4.3 MMOL/L (ref 3.5–5.1)
PROT SERPL-MCNC: 6.8 G/DL (ref 6–8.4)
RBC # BLD AUTO: 3.8 M/UL (ref 4.6–6.2)
SMUDGE CELLS BLD QL SMEAR: PRESENT
SODIUM SERPL-SCNC: 138 MMOL/L (ref 136–145)
WBC # BLD AUTO: 67.57 K/UL (ref 3.9–12.7)

## 2020-10-23 PROCEDURE — 85027 COMPLETE CBC AUTOMATED: CPT

## 2020-10-23 PROCEDURE — 80053 COMPREHEN METABOLIC PANEL: CPT

## 2020-10-23 PROCEDURE — 36415 COLL VENOUS BLD VENIPUNCTURE: CPT

## 2020-10-23 PROCEDURE — 85007 BL SMEAR W/DIFF WBC COUNT: CPT

## 2020-10-26 ENCOUNTER — TELEPHONE (OUTPATIENT)
Dept: HEMATOLOGY/ONCOLOGY | Facility: CLINIC | Age: 80
End: 2020-10-26

## 2020-10-26 NOTE — TELEPHONE ENCOUNTER
----- Message from Oumar Livingston sent at 10/26/2020  8:55 AM CDT -----  Contact: Patient  Patient Advice/Staff Message     Caller name/title: Pt     Provider: Mariano     Reason for call: Pt is unable to connect for VV, wants the doctor to call instead     Do you feel you need to be seen today:: Yes        Communication Preference: [phone]    Additional Information:

## 2020-10-31 ENCOUNTER — EXTERNAL CHRONIC CARE MANAGEMENT (OUTPATIENT)
Dept: PRIMARY CARE CLINIC | Facility: CLINIC | Age: 80
End: 2020-10-31
Payer: MEDICARE

## 2020-10-31 PROCEDURE — 99490 PR CHRONIC CARE MGMT, 1ST 20 MIN: ICD-10-PCS | Mod: S$GLB,,, | Performed by: FAMILY MEDICINE

## 2020-10-31 PROCEDURE — 99490 CHRNC CARE MGMT STAFF 1ST 20: CPT | Mod: S$GLB,,, | Performed by: FAMILY MEDICINE

## 2020-11-05 RX ORDER — ALBUTEROL SULFATE 0.83 MG/ML
SOLUTION RESPIRATORY (INHALATION)
Qty: 540 ML | Refills: 12 | OUTPATIENT
Start: 2020-11-05

## 2020-11-05 RX ORDER — IPRATROPIUM BROMIDE 0.5 MG/2.5ML
SOLUTION RESPIRATORY (INHALATION)
Qty: 450 ML | Refills: 12 | OUTPATIENT
Start: 2020-11-05

## 2020-11-09 DIAGNOSIS — E78.00 HYPERCHOLESTEREMIA: ICD-10-CM

## 2020-11-09 RX ORDER — LOSARTAN POTASSIUM 50 MG/1
TABLET ORAL
Qty: 30 TABLET | Refills: 11 | Status: SHIPPED | OUTPATIENT
Start: 2020-11-09 | End: 2021-08-23

## 2020-11-12 ENCOUNTER — TELEPHONE (OUTPATIENT)
Dept: HEMATOLOGY/ONCOLOGY | Facility: CLINIC | Age: 80
End: 2020-11-12

## 2020-11-12 NOTE — TELEPHONE ENCOUNTER
"----- Message from Sade Garcia sent at 11/12/2020  7:57 AM CST -----  Consult/Advisory:    Name Of Caller: Kevin   Contact Preference?:[phone]    Does patient feel the need to be seen today? No     What is the nature of the call?:  Pt states he has 3 unsuccessful virtual visit and would like to know if he needs to continue blood work for CLL.  Please contact to discuss     Additional Notes:  "Thank you for all that you do for our patients'"    "

## 2020-11-16 ENCOUNTER — LAB VISIT (OUTPATIENT)
Dept: LAB | Facility: HOSPITAL | Age: 80
End: 2020-11-16
Attending: INTERNAL MEDICINE
Payer: MEDICARE

## 2020-11-16 DIAGNOSIS — C91.10 CLL (CHRONIC LYMPHOCYTIC LEUKEMIA): ICD-10-CM

## 2020-11-16 DIAGNOSIS — Z79.899 HIGH RISK MEDICATION USE: ICD-10-CM

## 2020-11-16 DIAGNOSIS — I77.9 DISEASE OF ARTERY: ICD-10-CM

## 2020-11-16 DIAGNOSIS — R06.09 DOE (DYSPNEA ON EXERTION): ICD-10-CM

## 2020-11-16 DIAGNOSIS — I10 HTN (HYPERTENSION): ICD-10-CM

## 2020-11-16 LAB
ALBUMIN SERPL BCP-MCNC: 3.7 G/DL (ref 3.5–5.2)
ALP SERPL-CCNC: 48 U/L (ref 55–135)
ALT SERPL W/O P-5'-P-CCNC: 27 U/L (ref 10–44)
ANION GAP SERPL CALC-SCNC: 7 MMOL/L (ref 8–16)
AST SERPL-CCNC: 27 U/L (ref 10–40)
BASOPHILS # BLD AUTO: ABNORMAL K/UL (ref 0–0.2)
BASOPHILS NFR BLD: 0 % (ref 0–1.9)
BILIRUB SERPL-MCNC: 0.4 MG/DL (ref 0.1–1)
BUN SERPL-MCNC: 9 MG/DL (ref 8–23)
CALCIUM SERPL-MCNC: 9 MG/DL (ref 8.7–10.5)
CHLORIDE SERPL-SCNC: 102 MMOL/L (ref 95–110)
CO2 SERPL-SCNC: 27 MMOL/L (ref 23–29)
CREAT SERPL-MCNC: 1 MG/DL (ref 0.5–1.4)
DIFFERENTIAL METHOD: ABNORMAL
EOSINOPHIL # BLD AUTO: ABNORMAL K/UL (ref 0–0.5)
EOSINOPHIL NFR BLD: 2 % (ref 0–8)
ERYTHROCYTE [DISTWIDTH] IN BLOOD BY AUTOMATED COUNT: 12.6 % (ref 11.5–14.5)
EST. GFR  (AFRICAN AMERICAN): >60 ML/MIN/1.73 M^2
EST. GFR  (NON AFRICAN AMERICAN): >60 ML/MIN/1.73 M^2
GLUCOSE SERPL-MCNC: 98 MG/DL (ref 70–110)
HCT VFR BLD AUTO: 37.2 % (ref 40–54)
HGB BLD-MCNC: 12.1 G/DL (ref 14–18)
IMM GRANULOCYTES # BLD AUTO: ABNORMAL K/UL (ref 0–0.04)
IMM GRANULOCYTES NFR BLD AUTO: ABNORMAL % (ref 0–0.5)
LYMPHOCYTES # BLD AUTO: ABNORMAL K/UL (ref 1–4.8)
LYMPHOCYTES NFR BLD: 95 % (ref 18–48)
MCH RBC QN AUTO: 31.5 PG (ref 27–31)
MCHC RBC AUTO-ENTMCNC: 32.5 G/DL (ref 32–36)
MCV RBC AUTO: 97 FL (ref 82–98)
MONOCYTES # BLD AUTO: ABNORMAL K/UL (ref 0.3–1)
MONOCYTES NFR BLD: 0 % (ref 4–15)
NEUTROPHILS NFR BLD: 3 % (ref 38–73)
NRBC BLD-RTO: 0 /100 WBC
PLATELET # BLD AUTO: 219 K/UL (ref 150–350)
PLATELET BLD QL SMEAR: ABNORMAL
PMV BLD AUTO: 9.5 FL (ref 9.2–12.9)
POTASSIUM SERPL-SCNC: 4 MMOL/L (ref 3.5–5.1)
PROT SERPL-MCNC: 6.9 G/DL (ref 6–8.4)
RBC # BLD AUTO: 3.84 M/UL (ref 4.6–6.2)
SODIUM SERPL-SCNC: 136 MMOL/L (ref 136–145)
WBC # BLD AUTO: 81.3 K/UL (ref 3.9–12.7)

## 2020-11-16 PROCEDURE — 80053 COMPREHEN METABOLIC PANEL: CPT

## 2020-11-16 PROCEDURE — 85007 BL SMEAR W/DIFF WBC COUNT: CPT

## 2020-11-16 PROCEDURE — 85027 COMPLETE CBC AUTOMATED: CPT

## 2020-11-16 PROCEDURE — 36415 COLL VENOUS BLD VENIPUNCTURE: CPT

## 2020-11-19 ENCOUNTER — OFFICE VISIT (OUTPATIENT)
Dept: HEMATOLOGY/ONCOLOGY | Facility: CLINIC | Age: 80
End: 2020-11-19
Payer: MEDICARE

## 2020-11-19 VITALS
SYSTOLIC BLOOD PRESSURE: 121 MMHG | OXYGEN SATURATION: 100 % | TEMPERATURE: 98 F | RESPIRATION RATE: 18 BRPM | HEIGHT: 70 IN | HEART RATE: 71 BPM | WEIGHT: 169.75 LBS | DIASTOLIC BLOOD PRESSURE: 62 MMHG | BODY MASS INDEX: 24.3 KG/M2

## 2020-11-19 DIAGNOSIS — I10 ESSENTIAL HYPERTENSION: Chronic | ICD-10-CM

## 2020-11-19 DIAGNOSIS — I10 BENIGN ESSENTIAL HTN: ICD-10-CM

## 2020-11-19 DIAGNOSIS — C61 PROSTATE CANCER: Primary | ICD-10-CM

## 2020-11-19 DIAGNOSIS — C91.10 CLL (CHRONIC LYMPHOCYTIC LEUKEMIA): ICD-10-CM

## 2020-11-19 DIAGNOSIS — K21.9 GASTROESOPHAGEAL REFLUX DISEASE, UNSPECIFIED WHETHER ESOPHAGITIS PRESENT: Chronic | ICD-10-CM

## 2020-11-19 PROCEDURE — 1126F AMNT PAIN NOTED NONE PRSNT: CPT | Mod: S$GLB,,, | Performed by: INTERNAL MEDICINE

## 2020-11-19 PROCEDURE — 99215 OFFICE O/P EST HI 40 MIN: CPT | Mod: S$GLB,,, | Performed by: INTERNAL MEDICINE

## 2020-11-19 PROCEDURE — 1159F PR MEDICATION LIST DOCUMENTED IN MEDICAL RECORD: ICD-10-PCS | Mod: S$GLB,,, | Performed by: INTERNAL MEDICINE

## 2020-11-19 PROCEDURE — 1159F MED LIST DOCD IN RCRD: CPT | Mod: S$GLB,,, | Performed by: INTERNAL MEDICINE

## 2020-11-19 PROCEDURE — 3074F SYST BP LT 130 MM HG: CPT | Mod: CPTII,S$GLB,, | Performed by: INTERNAL MEDICINE

## 2020-11-19 PROCEDURE — 99999 PR PBB SHADOW E&M-EST. PATIENT-LVL IV: ICD-10-PCS | Mod: PBBFAC,,, | Performed by: INTERNAL MEDICINE

## 2020-11-19 PROCEDURE — 1126F PR PAIN SEVERITY QUANTIFIED, NO PAIN PRESENT: ICD-10-PCS | Mod: S$GLB,,, | Performed by: INTERNAL MEDICINE

## 2020-11-19 PROCEDURE — 3074F PR MOST RECENT SYSTOLIC BLOOD PRESSURE < 130 MM HG: ICD-10-PCS | Mod: CPTII,S$GLB,, | Performed by: INTERNAL MEDICINE

## 2020-11-19 PROCEDURE — 3078F PR MOST RECENT DIASTOLIC BLOOD PRESSURE < 80 MM HG: ICD-10-PCS | Mod: CPTII,S$GLB,, | Performed by: INTERNAL MEDICINE

## 2020-11-19 PROCEDURE — 3078F DIAST BP <80 MM HG: CPT | Mod: CPTII,S$GLB,, | Performed by: INTERNAL MEDICINE

## 2020-11-19 PROCEDURE — 99215 PR OFFICE/OUTPT VISIT, EST, LEVL V, 40-54 MIN: ICD-10-PCS | Mod: S$GLB,,, | Performed by: INTERNAL MEDICINE

## 2020-11-19 PROCEDURE — 99999 PR PBB SHADOW E&M-EST. PATIENT-LVL IV: CPT | Mod: PBBFAC,,, | Performed by: INTERNAL MEDICINE

## 2020-11-19 NOTE — PROGRESS NOTES
Hematology and Medical Oncology   Follow Up     11/19/2020    Primary Oncologic Diagnosis: CLL    History of Present Ilness:   Kevin Echeverria (Kevin) is a pleasant 80 y.o.male who presents to transition care to [facility] from the [facility]. The patient presents today with his wife and daughter. Most of his issues started in August when he began experiencing horrible pain in his hands that was worse with lack of motion/rest. The pain is excruciating and has sent him to the ED several times. The pain can happen in either upper extremity and feels like it travels at times. The discomfort abates as he moves his hands more throughout the day.     Incidentally through these ER visits it was noted that he has a persistently elevated but largely stable WBC.    --Pathologist review of the peripheral smear on 12/21/19 Leukocytosis with an absolute and relative lymphocytosis.     Lymphocytes are mature, and a subset displays a slightly atypical chromatin pattern.  Smudge cells are present.  Background granulocytes are morphologically normal.  The morphology suggests a B cell lymphoproliferative disorder such as CLL.  --Flow Cytometry on 1/3/2020: A B cell lymphoproliferative disorder is present.  Mantle cell lymphoma is favored although an atypical CLL cannot be completely ruled out; correlation   with morphology and with any available cytogenetic or molecular studies (t(11;14)) is required.     Interval History:  Mr. Echeverria is doing well. 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 Energy is good. Remains active with no complaints.    He is concerned about the uptick in his white count after several months of stability.     PAST MEDICAL HISTORY:   Past Medical History:   Diagnosis Date    Arthritis     Cancer     prostate    COPD (chronic obstructive pulmonary disease)     Hyperlipidemia     Hypertension     Leukemia        PAST SURGICAL HISTORY:   Past Surgical History:   Procedure  Laterality Date    CATARACT EXTRACTION W/  INTRAOCULAR LENS IMPLANT Left 05/05/2016    COLONOSCOPY W/ BIOPSIES AND POLYPECTOMY      PROSTATE SURGERY  05/1996    TONSILLECTOMY  1956       PAST SOCIAL HISTORY:   reports that he has never smoked. He quit smokeless tobacco use about 21 years ago. He reports current alcohol use. He reports that he does not use drugs.    FAMILY HISTORY:  Family History   Problem Relation Age of Onset    Diabetes Mother        CURRENT MEDICATIONS:   Current Outpatient Medications   Medication Sig    albuterol (PROVENTIL) 2.5 mg /3 mL (0.083 %) nebulizer solution USE 1 VIAL PER NEBULIZER TREATMENT EVERY 6 HOURS  AS NEEDED FOR WHEEZING    albuterol (PROVENTIL) 2.5 mg /3 mL (0.083 %) nebulizer solution Use content of one vial every 12 hours    amoxicillin (AMOXIL) 125 MG chewable tablet Take 125 mg by mouth 3 (three) times daily.    aspirin (ECOTRIN) 81 MG EC tablet Take 81 mg by mouth once daily.      budesonide-formoterol 160-4.5 mcg (SYMBICORT) 160-4.5 mcg/actuation HFAA Inhale 2 puffs into the lungs every 12 (twelve) hours. Controller    budesonide-formoterol 160-4.5 mcg (SYMBICORT) 160-4.5 mcg/actuation HFAA Inhale 2 puffs into the lungs every 12 hours.    colchicine (COLCRYS) 0.6 mg tablet Take 1 tablet (0.6 mg total) by mouth once daily.    famotidine (PEPCID) 20 MG tablet Take one tablet by mouth once a day    fish oil-omega-3 fatty acids 300-1,000 mg capsule Take 1 g by mouth once daily.      gabapentin (NEURONTIN) 300 MG capsule Take 1 capsule (300 mg total) by mouth once daily.    ipratropium (ATROVENT) 0.02 % nebulizer solution     ipratropium (ATROVENT) 0.02 % nebulizer solution use contents of one vial every 12 hours    losartan (COZAAR) 50 MG tablet TAKE ONE TABLET BY MOUTH EVERY DAY    meloxicam (MOBIC) 15 MG tablet Take 1 tablet in a 24 hour period only for pain    montelukast (SINGULAIR) 10 mg tablet TAKE ONE TABLET BY MOUTH EACH EVENING    multivitamin  with minerals tablet Take 1 tablet by mouth once daily. Equate Brand with Iron    nebulizer accessories Misc Use as directed    omeprazole (PRILOSEC) 20 MG capsule Take one capsule by mouth once daily in the morning    OPW TEST CLAIM - DO NOT FILL test    pravastatin (PRAVACHOL) 20 MG tablet Take 1 tablet (20 mg total) by mouth nightly.    verapamiL (VERELAN) 100 MG CPCT Take 1 capsule orally once a day.     No current facility-administered medications for this visit.      ALLERGIES:   Review of patient's allergies indicates:  No Known Allergies      Review of Systems:     Review of Systems   Constitutional: Negative for appetite change, chills, diaphoresis, fatigue, fever and unexpected weight change.   HENT:   Negative for hearing loss, mouth sores, nosebleeds, sore throat, trouble swallowing and voice change.    Eyes: Negative for eye problems and icterus.   Respiratory: Negative for chest tightness, cough, hemoptysis, shortness of breath and wheezing.    Cardiovascular: Negative for chest pain, leg swelling and palpitations.   Gastrointestinal: Negative for abdominal distention, abdominal pain, blood in stool, diarrhea, nausea and vomiting.   Endocrine: Negative for hot flashes.   Genitourinary: Negative for bladder incontinence, difficulty urinating, dysuria and hematuria.    Musculoskeletal: Positive for arthralgias and neck pain. Negative for back pain, flank pain, gait problem, myalgias and neck stiffness.   Skin: Negative for itching, rash and wound.   Neurological: Negative for dizziness, extremity weakness, gait problem, headaches, numbness, seizures and speech difficulty.   Hematological: Negative for adenopathy. Does not bruise/bleed easily.   Psychiatric/Behavioral: Negative for confusion, depression and sleep disturbance. The patient is not nervous/anxious.           Physical Exam:     Vitals:    11/19/20 1020   BP: 121/62   Pulse: 71   Resp: 18   Temp: 97.9 °F (36.6 °C)     Physical  Exam  Constitutional:       General: He is not in acute distress.     Appearance: He is well-developed. He is not diaphoretic.      Comments: Well dressed gentleman   HENT:      Head: Normocephalic and atraumatic.      Mouth/Throat:      Pharynx: No oropharyngeal exudate.   Eyes:      Conjunctiva/sclera: Conjunctivae normal.      Pupils: Pupils are equal, round, and reactive to light.   Neck:      Musculoskeletal: Normal range of motion and neck supple.      Thyroid: No thyromegaly.      Vascular: No JVD.      Trachea: No tracheal deviation.   Cardiovascular:      Rate and Rhythm: Normal rate and regular rhythm.      Heart sounds: Normal heart sounds. No murmur. No friction rub.   Pulmonary:      Effort: Pulmonary effort is normal. No respiratory distress.      Breath sounds: Normal breath sounds. No stridor. No wheezing or rales.   Chest:      Chest wall: No tenderness.   Abdominal:      General: Bowel sounds are normal. There is no distension.      Palpations: Abdomen is soft.      Tenderness: There is no abdominal tenderness. There is no guarding or rebound.   Musculoskeletal: Normal range of motion.         General: No tenderness or deformity.   Skin:     General: Skin is warm and dry.      Capillary Refill: Capillary refill takes less than 2 seconds.      Coloration: Skin is not pale.      Findings: No erythema or rash.   Neurological:      Mental Status: He is alert and oriented to person, place, and time.      Cranial Nerves: No cranial nerve deficit.      Sensory: No sensory deficit.      Motor: No abnormal muscle tone.      Coordination: Coordination normal.      Deep Tendon Reflexes: Reflexes normal.   Psychiatric:         Behavior: Behavior normal.         Thought Content: Thought content normal.         Judgment: Judgment normal.         ECOG Performance Status: (foot note - ECOG PS provided by Eastern Cooperative Oncology Group) 1 - Symptomatic but completely ambulatory    Karnofsky Performance Score:   90%- Able to Carry on Normal Activity: Minor Symptoms of Disease    Labs:   Lab Results   Component Value Date    WBC 81.30 (HH) 11/16/2020    HGB 12.1 (L) 11/16/2020    HCT 37.2 (L) 11/16/2020     11/16/2020    CHOL 157 01/31/2020    TRIG 57 01/31/2020    HDL 65 01/31/2020    ALT 27 11/16/2020    AST 27 11/16/2020     11/16/2020    K 4.0 11/16/2020     11/16/2020    CREATININE 1.0 11/16/2020    BUN 9 11/16/2020    CO2 27 11/16/2020    TSH 3.184 01/31/2020    PSA 0.07 09/27/2016    PSA 0.07 09/27/2016       Imaging: Outside imaging has been reviewed   Assessment and Plan:     Mr. Echeverria is a pleasant 80 year old male with presumed CLL.    CLL  --Diagnosis is still a work in progress  --Will obtain bone marrow biopsy report from Dr. Gallardo given the discrepancy in flow  --If this is indeed CLL the doubling time of the white count has not met criteria for treatment  --CLL fish indicates a 17p deletion in 40.5% of nuclei. In CLL, a 17p deletion is associated with an unfavorable prognosis   --Plan on monthly cbc's at [facility] with q3 month follow ups here    Bilateral Hand Myalgias  --Suspect possible cervical stenosis or other outflow issue  --Pt requests to see rheumatology here in the event it is joint related  --MRI cervical and thorasic spine ordered, if necessary we will consult vascular surgery    Prostate Cancer  --Treated with a prostatectomy  60 minutes were spent face to face with the patient and his  family to discuss the disease, natural history, treatment options and survival statistics. I have provided the patient with an opportunity to ask questions and have all questions answered to his satisfaction.       he will return to clinic in 3 months, but knows to call in the interim if symptoms change or should a problem arise.        Jaquelin Quintana MD  Hematology and Medical Oncology  Bone Marrow Transplant  [facility]

## 2020-11-30 ENCOUNTER — EXTERNAL CHRONIC CARE MANAGEMENT (OUTPATIENT)
Dept: PRIMARY CARE CLINIC | Facility: CLINIC | Age: 80
End: 2020-11-30
Payer: MEDICARE

## 2020-11-30 PROCEDURE — 99490 CHRNC CARE MGMT STAFF 1ST 20: CPT | Mod: S$GLB,,, | Performed by: FAMILY MEDICINE

## 2020-11-30 PROCEDURE — 99490 PR CHRONIC CARE MGMT, 1ST 20 MIN: ICD-10-PCS | Mod: S$GLB,,, | Performed by: FAMILY MEDICINE

## 2020-12-16 ENCOUNTER — LAB VISIT (OUTPATIENT)
Dept: LAB | Facility: HOSPITAL | Age: 80
End: 2020-12-16
Attending: INTERNAL MEDICINE
Payer: MEDICARE

## 2020-12-16 DIAGNOSIS — Z79.899 HIGH RISK MEDICATION USE: ICD-10-CM

## 2020-12-16 DIAGNOSIS — C91.10 CLL (CHRONIC LYMPHOCYTIC LEUKEMIA): ICD-10-CM

## 2020-12-16 DIAGNOSIS — I10 HTN (HYPERTENSION): ICD-10-CM

## 2020-12-16 DIAGNOSIS — I77.9 DISEASE OF ARTERY: ICD-10-CM

## 2020-12-16 DIAGNOSIS — R06.09 DOE (DYSPNEA ON EXERTION): ICD-10-CM

## 2020-12-16 LAB
ALBUMIN SERPL BCP-MCNC: 3.6 G/DL (ref 3.5–5.2)
ALP SERPL-CCNC: 49 U/L (ref 55–135)
ALT SERPL W/O P-5'-P-CCNC: 27 U/L (ref 10–44)
ANION GAP SERPL CALC-SCNC: 9 MMOL/L (ref 8–16)
AST SERPL-CCNC: 25 U/L (ref 10–40)
BASOPHILS # BLD AUTO: ABNORMAL K/UL (ref 0–0.2)
BASOPHILS NFR BLD: 0 % (ref 0–1.9)
BILIRUB SERPL-MCNC: 0.4 MG/DL (ref 0.1–1)
BUN SERPL-MCNC: 12 MG/DL (ref 8–23)
CALCIUM SERPL-MCNC: 8.7 MG/DL (ref 8.7–10.5)
CHLORIDE SERPL-SCNC: 101 MMOL/L (ref 95–110)
CO2 SERPL-SCNC: 25 MMOL/L (ref 23–29)
CREAT SERPL-MCNC: 1 MG/DL (ref 0.5–1.4)
DIFFERENTIAL METHOD: ABNORMAL
EOSINOPHIL # BLD AUTO: ABNORMAL K/UL (ref 0–0.5)
EOSINOPHIL NFR BLD: 3 % (ref 0–8)
ERYTHROCYTE [DISTWIDTH] IN BLOOD BY AUTOMATED COUNT: 12.7 % (ref 11.5–14.5)
EST. GFR  (AFRICAN AMERICAN): >60 ML/MIN/1.73 M^2
EST. GFR  (NON AFRICAN AMERICAN): >60 ML/MIN/1.73 M^2
GLUCOSE SERPL-MCNC: 89 MG/DL (ref 70–110)
HCT VFR BLD AUTO: 37.3 % (ref 40–54)
HGB BLD-MCNC: 12 G/DL (ref 14–18)
IMM GRANULOCYTES # BLD AUTO: ABNORMAL K/UL (ref 0–0.04)
IMM GRANULOCYTES NFR BLD AUTO: ABNORMAL % (ref 0–0.5)
LYMPHOCYTES # BLD AUTO: ABNORMAL K/UL (ref 1–4.8)
LYMPHOCYTES NFR BLD: 86 % (ref 18–48)
MCH RBC QN AUTO: 31.3 PG (ref 27–31)
MCHC RBC AUTO-ENTMCNC: 32.2 G/DL (ref 32–36)
MCV RBC AUTO: 97 FL (ref 82–98)
MONOCYTES # BLD AUTO: ABNORMAL K/UL (ref 0.3–1)
MONOCYTES NFR BLD: 4 % (ref 4–15)
NEUTROPHILS # BLD AUTO: ABNORMAL K/UL (ref 1.8–7.7)
NEUTROPHILS NFR BLD: 7 % (ref 38–73)
NRBC BLD-RTO: 0 /100 WBC
PLATELET # BLD AUTO: 225 K/UL (ref 150–350)
PLATELET BLD QL SMEAR: ABNORMAL
PMV BLD AUTO: 9.8 FL (ref 9.2–12.9)
POTASSIUM SERPL-SCNC: 4.2 MMOL/L (ref 3.5–5.1)
PROT SERPL-MCNC: 6.9 G/DL (ref 6–8.4)
RBC # BLD AUTO: 3.83 M/UL (ref 4.6–6.2)
SODIUM SERPL-SCNC: 135 MMOL/L (ref 136–145)
WBC # BLD AUTO: 85.13 K/UL (ref 3.9–12.7)

## 2020-12-16 PROCEDURE — 85060 BLOOD SMEAR INTERPRETATION: CPT | Mod: ,,, | Performed by: PATHOLOGY

## 2020-12-16 PROCEDURE — 80053 COMPREHEN METABOLIC PANEL: CPT

## 2020-12-16 PROCEDURE — 85060 PATHOLOGIST REVIEW: ICD-10-PCS | Mod: ,,, | Performed by: PATHOLOGY

## 2020-12-16 PROCEDURE — 85007 BL SMEAR W/DIFF WBC COUNT: CPT

## 2020-12-16 PROCEDURE — 85027 COMPLETE CBC AUTOMATED: CPT

## 2020-12-16 PROCEDURE — 36415 COLL VENOUS BLD VENIPUNCTURE: CPT

## 2020-12-17 LAB — PATH REV BLD -IMP: NORMAL

## 2020-12-31 ENCOUNTER — EXTERNAL CHRONIC CARE MANAGEMENT (OUTPATIENT)
Dept: PRIMARY CARE CLINIC | Facility: CLINIC | Age: 80
End: 2020-12-31
Payer: MEDICARE

## 2020-12-31 PROCEDURE — 99490 PR CHRONIC CARE MGMT, 1ST 20 MIN: ICD-10-PCS | Mod: S$GLB,,, | Performed by: FAMILY MEDICINE

## 2020-12-31 PROCEDURE — 99490 CHRNC CARE MGMT STAFF 1ST 20: CPT | Mod: S$GLB,,, | Performed by: FAMILY MEDICINE

## 2021-01-05 ENCOUNTER — IMMUNIZATION (OUTPATIENT)
Dept: PHARMACY | Facility: CLINIC | Age: 81
End: 2021-01-05
Payer: MEDICARE

## 2021-01-05 DIAGNOSIS — Z23 NEED FOR VACCINATION: ICD-10-CM

## 2021-01-06 ENCOUNTER — TELEPHONE (OUTPATIENT)
Dept: HEMATOLOGY/ONCOLOGY | Facility: CLINIC | Age: 81
End: 2021-01-06

## 2021-01-20 ENCOUNTER — LAB VISIT (OUTPATIENT)
Dept: LAB | Facility: HOSPITAL | Age: 81
End: 2021-01-20
Attending: INTERNAL MEDICINE
Payer: MEDICARE

## 2021-01-20 DIAGNOSIS — Z79.899 HIGH RISK MEDICATION USE: ICD-10-CM

## 2021-01-20 DIAGNOSIS — I77.9 DISEASE OF ARTERY: ICD-10-CM

## 2021-01-20 DIAGNOSIS — R06.09 DOE (DYSPNEA ON EXERTION): ICD-10-CM

## 2021-01-20 DIAGNOSIS — I10 HTN (HYPERTENSION): ICD-10-CM

## 2021-01-20 DIAGNOSIS — C91.10 CLL (CHRONIC LYMPHOCYTIC LEUKEMIA): ICD-10-CM

## 2021-01-20 LAB
ALBUMIN SERPL BCP-MCNC: 3.6 G/DL (ref 3.5–5.2)
ALP SERPL-CCNC: 50 U/L (ref 55–135)
ALT SERPL W/O P-5'-P-CCNC: 28 U/L (ref 10–44)
ANION GAP SERPL CALC-SCNC: 8 MMOL/L (ref 8–16)
AST SERPL-CCNC: 25 U/L (ref 10–40)
BASOPHILS # BLD AUTO: ABNORMAL K/UL (ref 0–0.2)
BASOPHILS NFR BLD: 0 % (ref 0–1.9)
BILIRUB SERPL-MCNC: 0.4 MG/DL (ref 0.1–1)
BUN SERPL-MCNC: 13 MG/DL (ref 8–23)
CALCIUM SERPL-MCNC: 8.8 MG/DL (ref 8.7–10.5)
CHLORIDE SERPL-SCNC: 104 MMOL/L (ref 95–110)
CO2 SERPL-SCNC: 25 MMOL/L (ref 23–29)
CREAT SERPL-MCNC: 1.1 MG/DL (ref 0.5–1.4)
DIFFERENTIAL METHOD: ABNORMAL
EOSINOPHIL # BLD AUTO: ABNORMAL K/UL (ref 0–0.5)
EOSINOPHIL NFR BLD: 2 % (ref 0–8)
ERYTHROCYTE [DISTWIDTH] IN BLOOD BY AUTOMATED COUNT: 12.4 % (ref 11.5–14.5)
EST. GFR  (AFRICAN AMERICAN): >60 ML/MIN/1.73 M^2
EST. GFR  (NON AFRICAN AMERICAN): >60 ML/MIN/1.73 M^2
GLUCOSE SERPL-MCNC: 95 MG/DL (ref 70–110)
HCT VFR BLD AUTO: 36.1 % (ref 40–54)
HGB BLD-MCNC: 11.8 G/DL (ref 14–18)
IMM GRANULOCYTES # BLD AUTO: ABNORMAL K/UL (ref 0–0.04)
IMM GRANULOCYTES NFR BLD AUTO: ABNORMAL % (ref 0–0.5)
LYMPHOCYTES # BLD AUTO: ABNORMAL K/UL (ref 1–4.8)
LYMPHOCYTES NFR BLD: 95 % (ref 18–48)
MCH RBC QN AUTO: 31.7 PG (ref 27–31)
MCHC RBC AUTO-ENTMCNC: 32.7 G/DL (ref 32–36)
MCV RBC AUTO: 97 FL (ref 82–98)
MONOCYTES # BLD AUTO: ABNORMAL K/UL (ref 0.3–1)
MONOCYTES NFR BLD: 1 % (ref 4–15)
NEUTROPHILS NFR BLD: 2 % (ref 38–73)
NRBC BLD-RTO: 0 /100 WBC
PLATELET # BLD AUTO: 217 K/UL (ref 150–350)
PLATELET BLD QL SMEAR: ABNORMAL
PMV BLD AUTO: 9.9 FL (ref 9.2–12.9)
POTASSIUM SERPL-SCNC: 4.1 MMOL/L (ref 3.5–5.1)
PROT SERPL-MCNC: 6.6 G/DL (ref 6–8.4)
RBC # BLD AUTO: 3.72 M/UL (ref 4.6–6.2)
SODIUM SERPL-SCNC: 137 MMOL/L (ref 136–145)
WBC # BLD AUTO: 94.32 K/UL (ref 3.9–12.7)

## 2021-01-20 PROCEDURE — 80053 COMPREHEN METABOLIC PANEL: CPT

## 2021-01-20 PROCEDURE — 36415 COLL VENOUS BLD VENIPUNCTURE: CPT

## 2021-01-20 PROCEDURE — 85007 BL SMEAR W/DIFF WBC COUNT: CPT

## 2021-01-20 PROCEDURE — 85027 COMPLETE CBC AUTOMATED: CPT

## 2021-01-31 ENCOUNTER — EXTERNAL CHRONIC CARE MANAGEMENT (OUTPATIENT)
Dept: PRIMARY CARE CLINIC | Facility: CLINIC | Age: 81
End: 2021-01-31
Payer: MEDICARE

## 2021-01-31 PROCEDURE — 99439 PR CHRONIC CARE MGMT, EA ADDTL 20 MIN: ICD-10-PCS | Mod: S$GLB,,, | Performed by: FAMILY MEDICINE

## 2021-01-31 PROCEDURE — 99490 CHRNC CARE MGMT STAFF 1ST 20: CPT | Mod: S$GLB,,, | Performed by: FAMILY MEDICINE

## 2021-01-31 PROCEDURE — 99490 PR CHRONIC CARE MGMT, 1ST 20 MIN: ICD-10-PCS | Mod: S$GLB,,, | Performed by: FAMILY MEDICINE

## 2021-01-31 PROCEDURE — 99439 CHRNC CARE MGMT STAF EA ADDL: CPT | Mod: S$GLB,,, | Performed by: FAMILY MEDICINE

## 2021-02-01 DIAGNOSIS — M11.849 CALCIUM PYROPHOSPHATE ARTHROPATHY OF HAND: ICD-10-CM

## 2021-02-01 RX ORDER — COLCHICINE 0.6 MG/1
0.6 CAPSULE ORAL DAILY
Qty: 30 CAPSULE | Refills: 2 | Status: SHIPPED | OUTPATIENT
Start: 2021-02-01 | End: 2021-02-26

## 2021-02-02 ENCOUNTER — IMMUNIZATION (OUTPATIENT)
Dept: PHARMACY | Facility: CLINIC | Age: 81
End: 2021-02-02
Payer: MEDICARE

## 2021-02-02 DIAGNOSIS — Z23 NEED FOR VACCINATION: Primary | ICD-10-CM

## 2021-02-17 ENCOUNTER — LAB VISIT (OUTPATIENT)
Dept: LAB | Facility: HOSPITAL | Age: 81
End: 2021-02-17
Attending: INTERNAL MEDICINE
Payer: MEDICARE

## 2021-02-17 DIAGNOSIS — C91.10 CLL (CHRONIC LYMPHOCYTIC LEUKEMIA): ICD-10-CM

## 2021-02-17 DIAGNOSIS — R06.09 DOE (DYSPNEA ON EXERTION): ICD-10-CM

## 2021-02-17 DIAGNOSIS — I10 HTN (HYPERTENSION): ICD-10-CM

## 2021-02-17 DIAGNOSIS — Z79.899 HIGH RISK MEDICATION USE: ICD-10-CM

## 2021-02-17 DIAGNOSIS — I77.9 DISEASE OF ARTERY: ICD-10-CM

## 2021-02-17 LAB
ALBUMIN SERPL BCP-MCNC: 3.9 G/DL (ref 3.5–5.2)
ALP SERPL-CCNC: 51 U/L (ref 55–135)
ALT SERPL W/O P-5'-P-CCNC: 30 U/L (ref 10–44)
ANION GAP SERPL CALC-SCNC: 8 MMOL/L (ref 8–16)
AST SERPL-CCNC: 27 U/L (ref 10–40)
BASOPHILS # BLD AUTO: ABNORMAL K/UL (ref 0–0.2)
BASOPHILS NFR BLD: 1 % (ref 0–1.9)
BILIRUB SERPL-MCNC: 0.4 MG/DL (ref 0.1–1)
BUN SERPL-MCNC: 12 MG/DL (ref 8–23)
CALCIUM SERPL-MCNC: 9.2 MG/DL (ref 8.7–10.5)
CHLORIDE SERPL-SCNC: 102 MMOL/L (ref 95–110)
CO2 SERPL-SCNC: 27 MMOL/L (ref 23–29)
CREAT SERPL-MCNC: 1 MG/DL (ref 0.5–1.4)
DIFFERENTIAL METHOD: ABNORMAL
EOSINOPHIL # BLD AUTO: ABNORMAL K/UL (ref 0–0.5)
EOSINOPHIL NFR BLD: 1 % (ref 0–8)
ERYTHROCYTE [DISTWIDTH] IN BLOOD BY AUTOMATED COUNT: 12.4 % (ref 11.5–14.5)
EST. GFR  (AFRICAN AMERICAN): >60 ML/MIN/1.73 M^2
EST. GFR  (NON AFRICAN AMERICAN): >60 ML/MIN/1.73 M^2
GLUCOSE SERPL-MCNC: 96 MG/DL (ref 70–110)
HCT VFR BLD AUTO: 38.1 % (ref 40–54)
HGB BLD-MCNC: 12.5 G/DL (ref 14–18)
HYPOCHROMIA BLD QL SMEAR: ABNORMAL
IMM GRANULOCYTES # BLD AUTO: ABNORMAL K/UL (ref 0–0.04)
IMM GRANULOCYTES NFR BLD AUTO: ABNORMAL % (ref 0–0.5)
LYMPHOCYTES # BLD AUTO: ABNORMAL K/UL (ref 1–4.8)
LYMPHOCYTES NFR BLD: 88 % (ref 18–48)
MCH RBC QN AUTO: 31.8 PG (ref 27–31)
MCHC RBC AUTO-ENTMCNC: 32.8 G/DL (ref 32–36)
MCV RBC AUTO: 97 FL (ref 82–98)
MONOCYTES # BLD AUTO: ABNORMAL K/UL (ref 0.3–1)
MONOCYTES NFR BLD: 0 % (ref 4–15)
NEUTROPHILS NFR BLD: 4 % (ref 38–73)
NRBC BLD-RTO: 0 /100 WBC
PLATELET # BLD AUTO: 228 K/UL (ref 150–350)
PLATELET BLD QL SMEAR: ABNORMAL
PMV BLD AUTO: 9.8 FL (ref 9.2–12.9)
POTASSIUM SERPL-SCNC: 4.4 MMOL/L (ref 3.5–5.1)
PROT SERPL-MCNC: 7.3 G/DL (ref 6–8.4)
RBC # BLD AUTO: 3.93 M/UL (ref 4.6–6.2)
SODIUM SERPL-SCNC: 137 MMOL/L (ref 136–145)
STOMATOCYTES BLD QL SMEAR: PRESENT
WBC # BLD AUTO: 101 K/UL (ref 3.9–12.7)
WBC OTHER NFR BLD MANUAL: 6 %

## 2021-02-17 PROCEDURE — 85007 BL SMEAR W/DIFF WBC COUNT: CPT

## 2021-02-17 PROCEDURE — 85027 COMPLETE CBC AUTOMATED: CPT

## 2021-02-17 PROCEDURE — 36415 COLL VENOUS BLD VENIPUNCTURE: CPT

## 2021-02-17 PROCEDURE — 80053 COMPREHEN METABOLIC PANEL: CPT

## 2021-02-17 PROCEDURE — 85060 PATHOLOGIST REVIEW: ICD-10-PCS | Mod: ,,, | Performed by: PATHOLOGY

## 2021-02-17 PROCEDURE — 85060 BLOOD SMEAR INTERPRETATION: CPT | Mod: ,,, | Performed by: PATHOLOGY

## 2021-02-18 LAB — PATH REV BLD -IMP: NORMAL

## 2021-02-23 ENCOUNTER — PATIENT MESSAGE (OUTPATIENT)
Dept: RHEUMATOLOGY | Facility: CLINIC | Age: 81
End: 2021-02-23

## 2021-02-26 ENCOUNTER — OFFICE VISIT (OUTPATIENT)
Dept: RHEUMATOLOGY | Facility: CLINIC | Age: 81
End: 2021-02-26
Payer: MEDICARE

## 2021-02-26 ENCOUNTER — OFFICE VISIT (OUTPATIENT)
Dept: HEMATOLOGY/ONCOLOGY | Facility: CLINIC | Age: 81
End: 2021-02-26
Payer: MEDICARE

## 2021-02-26 VITALS
SYSTOLIC BLOOD PRESSURE: 135 MMHG | DIASTOLIC BLOOD PRESSURE: 64 MMHG | DIASTOLIC BLOOD PRESSURE: 62 MMHG | RESPIRATION RATE: 99 BRPM | OXYGEN SATURATION: 98 % | SYSTOLIC BLOOD PRESSURE: 121 MMHG | HEIGHT: 70 IN | HEART RATE: 73 BPM | WEIGHT: 174.38 LBS | TEMPERATURE: 98 F | BODY MASS INDEX: 25.02 KG/M2 | HEART RATE: 80 BPM | BODY MASS INDEX: 24.47 KG/M2 | WEIGHT: 170.94 LBS

## 2021-02-26 DIAGNOSIS — C91.10 CLL (CHRONIC LYMPHOCYTIC LEUKEMIA): Primary | ICD-10-CM

## 2021-02-26 DIAGNOSIS — M79.641 RIGHT HAND PAIN: ICD-10-CM

## 2021-02-26 DIAGNOSIS — M11.849 CALCIUM PYROPHOSPHATE ARTHROPATHY OF HAND: ICD-10-CM

## 2021-02-26 DIAGNOSIS — M11.20 PSEUDOGOUT: ICD-10-CM

## 2021-02-26 PROCEDURE — 3078F PR MOST RECENT DIASTOLIC BLOOD PRESSURE < 80 MM HG: ICD-10-PCS | Mod: CPTII,S$GLB,, | Performed by: INTERNAL MEDICINE

## 2021-02-26 PROCEDURE — 1159F MED LIST DOCD IN RCRD: CPT | Mod: S$GLB,,, | Performed by: INTERNAL MEDICINE

## 2021-02-26 PROCEDURE — 99214 PR OFFICE/OUTPT VISIT, EST, LEVL IV, 30-39 MIN: ICD-10-PCS | Mod: S$GLB,,, | Performed by: INTERNAL MEDICINE

## 2021-02-26 PROCEDURE — 99215 OFFICE O/P EST HI 40 MIN: CPT | Mod: S$GLB,,, | Performed by: INTERNAL MEDICINE

## 2021-02-26 PROCEDURE — 3074F SYST BP LT 130 MM HG: CPT | Mod: CPTII,S$GLB,, | Performed by: INTERNAL MEDICINE

## 2021-02-26 PROCEDURE — 99215 PR OFFICE/OUTPT VISIT, EST, LEVL V, 40-54 MIN: ICD-10-PCS | Mod: S$GLB,,, | Performed by: INTERNAL MEDICINE

## 2021-02-26 PROCEDURE — 99214 OFFICE O/P EST MOD 30 MIN: CPT | Mod: S$GLB,,, | Performed by: INTERNAL MEDICINE

## 2021-02-26 PROCEDURE — 99999 PR PBB SHADOW E&M-EST. PATIENT-LVL III: ICD-10-PCS | Mod: PBBFAC,,, | Performed by: INTERNAL MEDICINE

## 2021-02-26 PROCEDURE — 99999 PR PBB SHADOW E&M-EST. PATIENT-LVL III: CPT | Mod: PBBFAC,,, | Performed by: INTERNAL MEDICINE

## 2021-02-26 PROCEDURE — 1159F PR MEDICATION LIST DOCUMENTED IN MEDICAL RECORD: ICD-10-PCS | Mod: S$GLB,,, | Performed by: INTERNAL MEDICINE

## 2021-02-26 PROCEDURE — 3288F PR FALLS RISK ASSESSMENT DOCUMENTED: ICD-10-PCS | Mod: CPTII,S$GLB,, | Performed by: INTERNAL MEDICINE

## 2021-02-26 PROCEDURE — 99999 PR PBB SHADOW E&M-EST. PATIENT-LVL II: CPT | Mod: PBBFAC,,, | Performed by: INTERNAL MEDICINE

## 2021-02-26 PROCEDURE — 3075F SYST BP GE 130 - 139MM HG: CPT | Mod: CPTII,S$GLB,, | Performed by: INTERNAL MEDICINE

## 2021-02-26 PROCEDURE — 1126F AMNT PAIN NOTED NONE PRSNT: CPT | Mod: S$GLB,,, | Performed by: INTERNAL MEDICINE

## 2021-02-26 PROCEDURE — 3074F PR MOST RECENT SYSTOLIC BLOOD PRESSURE < 130 MM HG: ICD-10-PCS | Mod: CPTII,S$GLB,, | Performed by: INTERNAL MEDICINE

## 2021-02-26 PROCEDURE — 99999 PR PBB SHADOW E&M-EST. PATIENT-LVL II: ICD-10-PCS | Mod: PBBFAC,,, | Performed by: INTERNAL MEDICINE

## 2021-02-26 PROCEDURE — 1126F PR PAIN SEVERITY QUANTIFIED, NO PAIN PRESENT: ICD-10-PCS | Mod: S$GLB,,, | Performed by: INTERNAL MEDICINE

## 2021-02-26 PROCEDURE — 3078F DIAST BP <80 MM HG: CPT | Mod: CPTII,S$GLB,, | Performed by: INTERNAL MEDICINE

## 2021-02-26 PROCEDURE — 1101F PT FALLS ASSESS-DOCD LE1/YR: CPT | Mod: CPTII,S$GLB,, | Performed by: INTERNAL MEDICINE

## 2021-02-26 PROCEDURE — 3288F FALL RISK ASSESSMENT DOCD: CPT | Mod: CPTII,S$GLB,, | Performed by: INTERNAL MEDICINE

## 2021-02-26 PROCEDURE — 3075F PR MOST RECENT SYSTOLIC BLOOD PRESS GE 130-139MM HG: ICD-10-PCS | Mod: CPTII,S$GLB,, | Performed by: INTERNAL MEDICINE

## 2021-02-26 PROCEDURE — 1101F PR PT FALLS ASSESS DOC 0-1 FALLS W/OUT INJ PAST YR: ICD-10-PCS | Mod: CPTII,S$GLB,, | Performed by: INTERNAL MEDICINE

## 2021-02-26 RX ORDER — GABAPENTIN 300 MG/1
300 CAPSULE ORAL DAILY
Qty: 90 CAPSULE | Refills: 2 | Status: SHIPPED | OUTPATIENT
Start: 2021-02-26 | End: 2021-08-25

## 2021-02-26 RX ORDER — COLCHICINE 0.6 MG/1
0.6 CAPSULE ORAL DAILY
Qty: 90 CAPSULE | Refills: 2 | Status: SHIPPED | OUTPATIENT
Start: 2021-02-26 | End: 2021-02-26

## 2021-02-26 RX ORDER — COLCHICINE 0.6 MG/1
0.6 CAPSULE ORAL DAILY
Qty: 90 CAPSULE | Refills: 2 | Status: SHIPPED | OUTPATIENT
Start: 2021-02-26 | End: 2021-03-05 | Stop reason: SDUPTHER

## 2021-02-26 ASSESSMENT — ROUTINE ASSESSMENT OF PATIENT INDEX DATA (RAPID3)
AM STIFFNESS SCORE: 0, NO
TOTAL RAPID3 SCORE: 0
MDHAQ FUNCTION SCORE: 0
PAIN SCORE: 0
PATIENT GLOBAL ASSESSMENT SCORE: 0
PSYCHOLOGICAL DISTRESS SCORE: 0
FATIGUE SCORE: 0

## 2021-02-28 ENCOUNTER — EXTERNAL CHRONIC CARE MANAGEMENT (OUTPATIENT)
Dept: PRIMARY CARE CLINIC | Facility: CLINIC | Age: 81
End: 2021-02-28
Payer: MEDICARE

## 2021-02-28 PROCEDURE — 99490 PR CHRONIC CARE MGMT, 1ST 20 MIN: ICD-10-PCS | Mod: S$GLB,,, | Performed by: FAMILY MEDICINE

## 2021-02-28 PROCEDURE — 99490 CHRNC CARE MGMT STAFF 1ST 20: CPT | Mod: S$GLB,,, | Performed by: FAMILY MEDICINE

## 2021-03-02 ENCOUNTER — SPECIALTY PHARMACY (OUTPATIENT)
Dept: PHARMACY | Facility: CLINIC | Age: 81
End: 2021-03-02

## 2021-03-02 DIAGNOSIS — C91.10 CLL (CHRONIC LYMPHOCYTIC LEUKEMIA): Primary | ICD-10-CM

## 2021-03-05 DIAGNOSIS — M11.849 CALCIUM PYROPHOSPHATE ARTHROPATHY OF HAND: ICD-10-CM

## 2021-03-05 RX ORDER — COLCHICINE 0.6 MG/1
0.6 CAPSULE ORAL DAILY
Qty: 90 CAPSULE | Refills: 2 | Status: SHIPPED | OUTPATIENT
Start: 2021-03-05 | End: 2021-08-25

## 2021-03-10 ENCOUNTER — SPECIALTY PHARMACY (OUTPATIENT)
Dept: PHARMACY | Facility: CLINIC | Age: 81
End: 2021-03-10

## 2021-03-10 DIAGNOSIS — C91.10 CLL (CHRONIC LYMPHOCYTIC LEUKEMIA): Primary | ICD-10-CM

## 2021-03-12 ENCOUNTER — TELEPHONE (OUTPATIENT)
Dept: HEMATOLOGY/ONCOLOGY | Facility: CLINIC | Age: 81
End: 2021-03-12

## 2021-03-16 ENCOUNTER — TELEPHONE (OUTPATIENT)
Dept: HEMATOLOGY/ONCOLOGY | Facility: CLINIC | Age: 81
End: 2021-03-16

## 2021-03-16 ENCOUNTER — LAB VISIT (OUTPATIENT)
Dept: LAB | Facility: HOSPITAL | Age: 81
End: 2021-03-16
Attending: FAMILY MEDICINE
Payer: MEDICARE

## 2021-03-16 DIAGNOSIS — C91.10 CLL (CHRONIC LYMPHOCYTIC LEUKEMIA): ICD-10-CM

## 2021-03-16 LAB
ALBUMIN SERPL BCP-MCNC: 3.5 G/DL (ref 3.5–5.2)
ALP SERPL-CCNC: 56 U/L (ref 55–135)
ALT SERPL W/O P-5'-P-CCNC: 17 U/L (ref 10–44)
ANION GAP SERPL CALC-SCNC: 7 MMOL/L (ref 8–16)
AST SERPL-CCNC: 17 U/L (ref 10–40)
BASOPHILS # BLD AUTO: ABNORMAL K/UL (ref 0–0.2)
BASOPHILS NFR BLD: 0 % (ref 0–1.9)
BILIRUB SERPL-MCNC: 0.5 MG/DL (ref 0.1–1)
BUN SERPL-MCNC: 10 MG/DL (ref 8–23)
CALCIUM SERPL-MCNC: 8.9 MG/DL (ref 8.7–10.5)
CHLORIDE SERPL-SCNC: 100 MMOL/L (ref 95–110)
CO2 SERPL-SCNC: 26 MMOL/L (ref 23–29)
CREAT SERPL-MCNC: 1.1 MG/DL (ref 0.5–1.4)
DIFFERENTIAL METHOD: ABNORMAL
EOSINOPHIL # BLD AUTO: ABNORMAL K/UL (ref 0–0.5)
EOSINOPHIL NFR BLD: 2 % (ref 0–8)
ERYTHROCYTE [DISTWIDTH] IN BLOOD BY AUTOMATED COUNT: 12.1 % (ref 11.5–14.5)
EST. GFR  (AFRICAN AMERICAN): >60 ML/MIN/1.73 M^2
EST. GFR  (NON AFRICAN AMERICAN): >60 ML/MIN/1.73 M^2
GLUCOSE SERPL-MCNC: 116 MG/DL (ref 70–110)
HCT VFR BLD AUTO: 35.5 % (ref 40–54)
HGB BLD-MCNC: 11.9 G/DL (ref 14–18)
IMM GRANULOCYTES # BLD AUTO: ABNORMAL K/UL (ref 0–0.04)
IMM GRANULOCYTES NFR BLD AUTO: ABNORMAL % (ref 0–0.5)
LDH SERPL L TO P-CCNC: 142 U/L (ref 110–260)
LYMPHOCYTES # BLD AUTO: ABNORMAL K/UL (ref 1–4.8)
LYMPHOCYTES NFR BLD: 81 % (ref 18–48)
MCH RBC QN AUTO: 32.5 PG (ref 27–31)
MCHC RBC AUTO-ENTMCNC: 33.5 G/DL (ref 32–36)
MCV RBC AUTO: 97 FL (ref 82–98)
MONOCYTES # BLD AUTO: ABNORMAL K/UL (ref 0.3–1)
MONOCYTES NFR BLD: 0 % (ref 4–15)
NEUTROPHILS NFR BLD: 7 % (ref 38–73)
NRBC BLD-RTO: 0 /100 WBC
PLATELET # BLD AUTO: 215 K/UL (ref 150–350)
PMV BLD AUTO: 10 FL (ref 9.2–12.9)
POTASSIUM SERPL-SCNC: 4.2 MMOL/L (ref 3.5–5.1)
PROT SERPL-MCNC: 7 G/DL (ref 6–8.4)
RBC # BLD AUTO: 3.66 M/UL (ref 4.6–6.2)
SODIUM SERPL-SCNC: 133 MMOL/L (ref 136–145)
URATE SERPL-MCNC: 4.8 MG/DL (ref 3.4–7)
WBC # BLD AUTO: 85.3 K/UL (ref 3.9–12.7)
WBC OTHER NFR BLD MANUAL: 10 %

## 2021-03-16 PROCEDURE — 85027 COMPLETE CBC AUTOMATED: CPT | Performed by: INTERNAL MEDICINE

## 2021-03-16 PROCEDURE — 36415 COLL VENOUS BLD VENIPUNCTURE: CPT | Performed by: INTERNAL MEDICINE

## 2021-03-16 PROCEDURE — 80053 COMPREHEN METABOLIC PANEL: CPT | Performed by: INTERNAL MEDICINE

## 2021-03-16 PROCEDURE — 85007 BL SMEAR W/DIFF WBC COUNT: CPT | Performed by: INTERNAL MEDICINE

## 2021-03-16 PROCEDURE — 83615 LACTATE (LD) (LDH) ENZYME: CPT | Performed by: INTERNAL MEDICINE

## 2021-03-16 PROCEDURE — 84550 ASSAY OF BLOOD/URIC ACID: CPT | Performed by: INTERNAL MEDICINE

## 2021-03-19 ENCOUNTER — SPECIALTY PHARMACY (OUTPATIENT)
Dept: PHARMACY | Facility: CLINIC | Age: 81
End: 2021-03-19

## 2021-03-19 DIAGNOSIS — C91.10 CLL (CHRONIC LYMPHOCYTIC LEUKEMIA): Primary | ICD-10-CM

## 2021-03-31 ENCOUNTER — EXTERNAL CHRONIC CARE MANAGEMENT (OUTPATIENT)
Dept: PRIMARY CARE CLINIC | Facility: CLINIC | Age: 81
End: 2021-03-31
Payer: MEDICARE

## 2021-03-31 PROCEDURE — 99490 PR CHRONIC CARE MGMT, 1ST 20 MIN: ICD-10-PCS | Mod: S$GLB,,, | Performed by: FAMILY MEDICINE

## 2021-03-31 PROCEDURE — 99490 CHRNC CARE MGMT STAFF 1ST 20: CPT | Mod: S$GLB,,, | Performed by: FAMILY MEDICINE

## 2021-04-05 ENCOUNTER — SPECIALTY PHARMACY (OUTPATIENT)
Dept: PHARMACY | Facility: CLINIC | Age: 81
End: 2021-04-05

## 2021-04-12 DIAGNOSIS — E78.5 HYPERLIPIDEMIA, UNSPECIFIED HYPERLIPIDEMIA TYPE: ICD-10-CM

## 2021-04-12 RX ORDER — PRAVASTATIN SODIUM 20 MG/1
20 TABLET ORAL NIGHTLY
Qty: 30 TABLET | Refills: 5 | OUTPATIENT
Start: 2021-04-12

## 2021-04-15 ENCOUNTER — LAB VISIT (OUTPATIENT)
Dept: LAB | Facility: HOSPITAL | Age: 81
End: 2021-04-15
Attending: INTERNAL MEDICINE
Payer: MEDICARE

## 2021-04-15 ENCOUNTER — TELEPHONE (OUTPATIENT)
Dept: HEMATOLOGY/ONCOLOGY | Facility: CLINIC | Age: 81
End: 2021-04-15

## 2021-04-15 DIAGNOSIS — C91.10 CLL (CHRONIC LYMPHOCYTIC LEUKEMIA): ICD-10-CM

## 2021-04-15 LAB
ALBUMIN SERPL BCP-MCNC: 3.5 G/DL (ref 3.5–5.2)
ALP SERPL-CCNC: 45 U/L (ref 55–135)
ALT SERPL W/O P-5'-P-CCNC: 21 U/L (ref 10–44)
ANION GAP SERPL CALC-SCNC: 7 MMOL/L (ref 8–16)
AST SERPL-CCNC: 23 U/L (ref 10–40)
BASOPHILS # BLD AUTO: ABNORMAL K/UL (ref 0–0.2)
BASOPHILS NFR BLD: 0 % (ref 0–1.9)
BILIRUB SERPL-MCNC: 0.5 MG/DL (ref 0.1–1)
BUN SERPL-MCNC: 10 MG/DL (ref 8–23)
CALCIUM SERPL-MCNC: 9 MG/DL (ref 8.7–10.5)
CHLORIDE SERPL-SCNC: 103 MMOL/L (ref 95–110)
CO2 SERPL-SCNC: 26 MMOL/L (ref 23–29)
CREAT SERPL-MCNC: 1.1 MG/DL (ref 0.5–1.4)
DIFFERENTIAL METHOD: ABNORMAL
EOSINOPHIL # BLD AUTO: ABNORMAL K/UL (ref 0–0.5)
EOSINOPHIL NFR BLD: 0 % (ref 0–8)
ERYTHROCYTE [DISTWIDTH] IN BLOOD BY AUTOMATED COUNT: 12.3 % (ref 11.5–14.5)
EST. GFR  (AFRICAN AMERICAN): >60 ML/MIN/1.73 M^2
EST. GFR  (NON AFRICAN AMERICAN): >60 ML/MIN/1.73 M^2
GLUCOSE SERPL-MCNC: 90 MG/DL (ref 70–110)
HCT VFR BLD AUTO: 36.4 % (ref 40–54)
HGB BLD-MCNC: 11.8 G/DL (ref 14–18)
IMM GRANULOCYTES # BLD AUTO: ABNORMAL K/UL (ref 0–0.04)
IMM GRANULOCYTES NFR BLD AUTO: ABNORMAL % (ref 0–0.5)
LDH SERPL L TO P-CCNC: 139 U/L (ref 110–260)
LYMPHOCYTES # BLD AUTO: ABNORMAL K/UL (ref 1–4.8)
LYMPHOCYTES NFR BLD: 98 % (ref 18–48)
MCH RBC QN AUTO: 32.2 PG (ref 27–31)
MCHC RBC AUTO-ENTMCNC: 32.4 G/DL (ref 32–36)
MCV RBC AUTO: 99 FL (ref 82–98)
MONOCYTES # BLD AUTO: ABNORMAL K/UL (ref 0.3–1)
MONOCYTES NFR BLD: 0 % (ref 4–15)
NEUTROPHILS NFR BLD: 2 % (ref 38–73)
NRBC BLD-RTO: 0 /100 WBC
PLATELET # BLD AUTO: 173 K/UL (ref 150–450)
PLATELET BLD QL SMEAR: ABNORMAL
PMV BLD AUTO: 11.3 FL (ref 9.2–12.9)
POTASSIUM SERPL-SCNC: 4.3 MMOL/L (ref 3.5–5.1)
PROT SERPL-MCNC: 6.5 G/DL (ref 6–8.4)
RBC # BLD AUTO: 3.67 M/UL (ref 4.6–6.2)
SODIUM SERPL-SCNC: 136 MMOL/L (ref 136–145)
URATE SERPL-MCNC: 5.1 MG/DL (ref 3.4–7)
WBC # BLD AUTO: 58.28 K/UL (ref 3.9–12.7)

## 2021-04-15 PROCEDURE — 85027 COMPLETE CBC AUTOMATED: CPT | Performed by: INTERNAL MEDICINE

## 2021-04-15 PROCEDURE — 85007 BL SMEAR W/DIFF WBC COUNT: CPT | Performed by: INTERNAL MEDICINE

## 2021-04-15 PROCEDURE — 84550 ASSAY OF BLOOD/URIC ACID: CPT | Performed by: INTERNAL MEDICINE

## 2021-04-15 PROCEDURE — 83615 LACTATE (LD) (LDH) ENZYME: CPT | Performed by: INTERNAL MEDICINE

## 2021-04-15 PROCEDURE — 80053 COMPREHEN METABOLIC PANEL: CPT | Performed by: INTERNAL MEDICINE

## 2021-04-15 PROCEDURE — 36415 COLL VENOUS BLD VENIPUNCTURE: CPT | Performed by: INTERNAL MEDICINE

## 2021-04-16 ENCOUNTER — OFFICE VISIT (OUTPATIENT)
Dept: HEMATOLOGY/ONCOLOGY | Facility: CLINIC | Age: 81
End: 2021-04-16
Payer: MEDICARE

## 2021-04-16 VITALS
SYSTOLIC BLOOD PRESSURE: 131 MMHG | HEART RATE: 73 BPM | OXYGEN SATURATION: 100 % | TEMPERATURE: 98 F | HEIGHT: 69 IN | DIASTOLIC BLOOD PRESSURE: 60 MMHG | BODY MASS INDEX: 25.11 KG/M2 | RESPIRATION RATE: 16 BRPM | WEIGHT: 169.56 LBS

## 2021-04-16 DIAGNOSIS — K57.32 DIVERTICULITIS OF COLON: ICD-10-CM

## 2021-04-16 DIAGNOSIS — M25.542 ARTHRALGIA OF BOTH HANDS: ICD-10-CM

## 2021-04-16 DIAGNOSIS — I10 ESSENTIAL HYPERTENSION: Chronic | ICD-10-CM

## 2021-04-16 DIAGNOSIS — M25.541 ARTHRALGIA OF BOTH HANDS: ICD-10-CM

## 2021-04-16 DIAGNOSIS — C61 PROSTATE CANCER: ICD-10-CM

## 2021-04-16 DIAGNOSIS — C91.10 CLL (CHRONIC LYMPHOCYTIC LEUKEMIA): Primary | ICD-10-CM

## 2021-04-16 PROCEDURE — 3288F PR FALLS RISK ASSESSMENT DOCUMENTED: ICD-10-PCS | Mod: CPTII,S$GLB,, | Performed by: INTERNAL MEDICINE

## 2021-04-16 PROCEDURE — 1101F PT FALLS ASSESS-DOCD LE1/YR: CPT | Mod: CPTII,S$GLB,, | Performed by: INTERNAL MEDICINE

## 2021-04-16 PROCEDURE — 99999 PR PBB SHADOW E&M-EST. PATIENT-LVL III: ICD-10-PCS | Mod: PBBFAC,,, | Performed by: INTERNAL MEDICINE

## 2021-04-16 PROCEDURE — 99215 PR OFFICE/OUTPT VISIT, EST, LEVL V, 40-54 MIN: ICD-10-PCS | Mod: S$GLB,,, | Performed by: INTERNAL MEDICINE

## 2021-04-16 PROCEDURE — 1159F PR MEDICATION LIST DOCUMENTED IN MEDICAL RECORD: ICD-10-PCS | Mod: S$GLB,,, | Performed by: INTERNAL MEDICINE

## 2021-04-16 PROCEDURE — 99999 PR PBB SHADOW E&M-EST. PATIENT-LVL III: CPT | Mod: PBBFAC,,, | Performed by: INTERNAL MEDICINE

## 2021-04-16 PROCEDURE — 99215 OFFICE O/P EST HI 40 MIN: CPT | Mod: S$GLB,,, | Performed by: INTERNAL MEDICINE

## 2021-04-16 PROCEDURE — 1126F PR PAIN SEVERITY QUANTIFIED, NO PAIN PRESENT: ICD-10-PCS | Mod: S$GLB,,, | Performed by: INTERNAL MEDICINE

## 2021-04-16 PROCEDURE — 1159F MED LIST DOCD IN RCRD: CPT | Mod: S$GLB,,, | Performed by: INTERNAL MEDICINE

## 2021-04-16 PROCEDURE — 3288F FALL RISK ASSESSMENT DOCD: CPT | Mod: CPTII,S$GLB,, | Performed by: INTERNAL MEDICINE

## 2021-04-16 PROCEDURE — 1101F PR PT FALLS ASSESS DOC 0-1 FALLS W/OUT INJ PAST YR: ICD-10-PCS | Mod: CPTII,S$GLB,, | Performed by: INTERNAL MEDICINE

## 2021-04-16 PROCEDURE — 1126F AMNT PAIN NOTED NONE PRSNT: CPT | Mod: S$GLB,,, | Performed by: INTERNAL MEDICINE

## 2021-04-29 ENCOUNTER — SPECIALTY PHARMACY (OUTPATIENT)
Dept: PHARMACY | Facility: CLINIC | Age: 81
End: 2021-04-29

## 2021-05-18 RX ORDER — MONTELUKAST SODIUM 10 MG/1
TABLET ORAL
Qty: 30 TABLET | Refills: 11 | OUTPATIENT
Start: 2021-05-18

## 2021-05-21 ENCOUNTER — TELEPHONE (OUTPATIENT)
Dept: HEMATOLOGY/ONCOLOGY | Facility: CLINIC | Age: 81
End: 2021-05-21

## 2021-05-21 ENCOUNTER — LAB VISIT (OUTPATIENT)
Dept: LAB | Facility: HOSPITAL | Age: 81
End: 2021-05-21
Attending: INTERNAL MEDICINE
Payer: MEDICARE

## 2021-05-21 DIAGNOSIS — C91.10 CLL (CHRONIC LYMPHOCYTIC LEUKEMIA): ICD-10-CM

## 2021-05-21 LAB
ALBUMIN SERPL BCP-MCNC: 3.4 G/DL (ref 3.5–5.2)
ALP SERPL-CCNC: 46 U/L (ref 55–135)
ALT SERPL W/O P-5'-P-CCNC: 23 U/L (ref 10–44)
ANION GAP SERPL CALC-SCNC: 4 MMOL/L (ref 8–16)
AST SERPL-CCNC: 24 U/L (ref 10–40)
BASOPHILS # BLD AUTO: ABNORMAL K/UL (ref 0–0.2)
BASOPHILS NFR BLD: 0 % (ref 0–1.9)
BILIRUB SERPL-MCNC: 0.4 MG/DL (ref 0.1–1)
BUN SERPL-MCNC: 10 MG/DL (ref 8–23)
CALCIUM SERPL-MCNC: 8.4 MG/DL (ref 8.7–10.5)
CHLORIDE SERPL-SCNC: 102 MMOL/L (ref 95–110)
CO2 SERPL-SCNC: 25 MMOL/L (ref 23–29)
CREAT SERPL-MCNC: 1 MG/DL (ref 0.5–1.4)
DIFFERENTIAL METHOD: ABNORMAL
EOSINOPHIL # BLD AUTO: ABNORMAL K/UL (ref 0–0.5)
EOSINOPHIL NFR BLD: 2 % (ref 0–8)
ERYTHROCYTE [DISTWIDTH] IN BLOOD BY AUTOMATED COUNT: 12.2 % (ref 11.5–14.5)
EST. GFR  (AFRICAN AMERICAN): >60 ML/MIN/1.73 M^2
EST. GFR  (NON AFRICAN AMERICAN): >60 ML/MIN/1.73 M^2
GLUCOSE SERPL-MCNC: 102 MG/DL (ref 70–110)
HCT VFR BLD AUTO: 34.3 % (ref 40–54)
HGB BLD-MCNC: 11.6 G/DL (ref 14–18)
IMM GRANULOCYTES # BLD AUTO: ABNORMAL K/UL (ref 0–0.04)
IMM GRANULOCYTES NFR BLD AUTO: ABNORMAL % (ref 0–0.5)
LDH SERPL L TO P-CCNC: 120 U/L (ref 110–260)
LYMPHOCYTES # BLD AUTO: ABNORMAL K/UL (ref 1–4.8)
LYMPHOCYTES NFR BLD: 88 % (ref 18–48)
MCH RBC QN AUTO: 33.3 PG (ref 27–31)
MCHC RBC AUTO-ENTMCNC: 33.8 G/DL (ref 32–36)
MCV RBC AUTO: 99 FL (ref 82–98)
MONOCYTES # BLD AUTO: ABNORMAL K/UL (ref 0.3–1)
MONOCYTES NFR BLD: 2 % (ref 4–15)
NEUTROPHILS NFR BLD: 8 % (ref 38–73)
NRBC BLD-RTO: 0 /100 WBC
PLATELET # BLD AUTO: 172 K/UL (ref 150–450)
PLATELET BLD QL SMEAR: ABNORMAL
PMV BLD AUTO: 11.5 FL (ref 9.2–12.9)
POTASSIUM SERPL-SCNC: 4.3 MMOL/L (ref 3.5–5.1)
PROT SERPL-MCNC: 6.3 G/DL (ref 6–8.4)
RBC # BLD AUTO: 3.48 M/UL (ref 4.6–6.2)
SMUDGE CELLS BLD QL SMEAR: PRESENT
SODIUM SERPL-SCNC: 131 MMOL/L (ref 136–145)
URATE SERPL-MCNC: 5.1 MG/DL (ref 3.4–7)
WBC # BLD AUTO: 50.28 K/UL (ref 3.9–12.7)

## 2021-05-21 PROCEDURE — 36415 COLL VENOUS BLD VENIPUNCTURE: CPT | Performed by: INTERNAL MEDICINE

## 2021-05-21 PROCEDURE — 85007 BL SMEAR W/DIFF WBC COUNT: CPT | Performed by: INTERNAL MEDICINE

## 2021-05-21 PROCEDURE — 84550 ASSAY OF BLOOD/URIC ACID: CPT | Performed by: INTERNAL MEDICINE

## 2021-05-21 PROCEDURE — 85027 COMPLETE CBC AUTOMATED: CPT | Performed by: INTERNAL MEDICINE

## 2021-05-21 PROCEDURE — 83615 LACTATE (LD) (LDH) ENZYME: CPT | Performed by: INTERNAL MEDICINE

## 2021-05-21 PROCEDURE — 80053 COMPREHEN METABOLIC PANEL: CPT | Performed by: INTERNAL MEDICINE

## 2021-06-01 ENCOUNTER — SPECIALTY PHARMACY (OUTPATIENT)
Dept: PHARMACY | Facility: CLINIC | Age: 81
End: 2021-06-01

## 2021-06-01 DIAGNOSIS — C91.10 CLL (CHRONIC LYMPHOCYTIC LEUKEMIA): Primary | ICD-10-CM

## 2021-06-21 ENCOUNTER — LAB VISIT (OUTPATIENT)
Dept: LAB | Facility: HOSPITAL | Age: 81
End: 2021-06-21
Attending: INTERNAL MEDICINE
Payer: MEDICARE

## 2021-06-21 DIAGNOSIS — C91.10 CLL (CHRONIC LYMPHOCYTIC LEUKEMIA): ICD-10-CM

## 2021-06-21 DIAGNOSIS — C91.10 CLL (CHRONIC LYMPHOCYTIC LEUKEMIA): Primary | ICD-10-CM

## 2021-06-21 LAB
ALBUMIN SERPL BCP-MCNC: 3.5 G/DL (ref 3.5–5.2)
ALP SERPL-CCNC: 39 U/L (ref 55–135)
ALT SERPL W/O P-5'-P-CCNC: 22 U/L (ref 10–44)
ANION GAP SERPL CALC-SCNC: 8 MMOL/L (ref 8–16)
AST SERPL-CCNC: 22 U/L (ref 10–40)
BASOPHILS # BLD AUTO: ABNORMAL K/UL (ref 0–0.2)
BASOPHILS NFR BLD: 0 % (ref 0–1.9)
BILIRUB SERPL-MCNC: 0.5 MG/DL (ref 0.1–1)
BUN SERPL-MCNC: 11 MG/DL (ref 8–23)
CALCIUM SERPL-MCNC: 8.9 MG/DL (ref 8.7–10.5)
CHLORIDE SERPL-SCNC: 102 MMOL/L (ref 95–110)
CO2 SERPL-SCNC: 25 MMOL/L (ref 23–29)
CREAT SERPL-MCNC: 1 MG/DL (ref 0.5–1.4)
DIFFERENTIAL METHOD: ABNORMAL
EOSINOPHIL # BLD AUTO: ABNORMAL K/UL (ref 0–0.5)
EOSINOPHIL NFR BLD: 1 % (ref 0–8)
ERYTHROCYTE [DISTWIDTH] IN BLOOD BY AUTOMATED COUNT: 12 % (ref 11.5–14.5)
EST. GFR  (AFRICAN AMERICAN): >60 ML/MIN/1.73 M^2
EST. GFR  (NON AFRICAN AMERICAN): >60 ML/MIN/1.73 M^2
GLUCOSE SERPL-MCNC: 70 MG/DL (ref 70–110)
HCT VFR BLD AUTO: 36.7 % (ref 40–54)
HGB BLD-MCNC: 12.1 G/DL (ref 14–18)
IMM GRANULOCYTES # BLD AUTO: ABNORMAL K/UL (ref 0–0.04)
IMM GRANULOCYTES NFR BLD AUTO: ABNORMAL % (ref 0–0.5)
LDH SERPL L TO P-CCNC: 115 U/L (ref 110–260)
LYMPHOCYTES # BLD AUTO: ABNORMAL K/UL (ref 1–4.8)
LYMPHOCYTES NFR BLD: 67 % (ref 18–48)
MCH RBC QN AUTO: 32.7 PG (ref 27–31)
MCHC RBC AUTO-ENTMCNC: 33 G/DL (ref 32–36)
MCV RBC AUTO: 99 FL (ref 82–98)
MONOCYTES # BLD AUTO: ABNORMAL K/UL (ref 0.3–1)
MONOCYTES NFR BLD: 2 % (ref 4–15)
NEUTROPHILS NFR BLD: 10 % (ref 38–73)
NRBC BLD-RTO: 0 /100 WBC
PLATELET # BLD AUTO: 179 K/UL (ref 150–450)
PLATELET BLD QL SMEAR: ABNORMAL
PMV BLD AUTO: 11.2 FL (ref 9.2–12.9)
POTASSIUM SERPL-SCNC: 4.5 MMOL/L (ref 3.5–5.1)
PROT SERPL-MCNC: 6.5 G/DL (ref 6–8.4)
RBC # BLD AUTO: 3.7 M/UL (ref 4.6–6.2)
SODIUM SERPL-SCNC: 135 MMOL/L (ref 136–145)
URATE SERPL-MCNC: 4.8 MG/DL (ref 3.4–7)
WBC # BLD AUTO: 45.89 K/UL (ref 3.9–12.7)
WBC OTHER NFR BLD MANUAL: 20 %

## 2021-06-21 PROCEDURE — 84550 ASSAY OF BLOOD/URIC ACID: CPT | Performed by: INTERNAL MEDICINE

## 2021-06-21 PROCEDURE — 85060 PATHOLOGIST REVIEW: ICD-10-PCS | Mod: ,,, | Performed by: PATHOLOGY

## 2021-06-21 PROCEDURE — 85007 BL SMEAR W/DIFF WBC COUNT: CPT | Performed by: INTERNAL MEDICINE

## 2021-06-21 PROCEDURE — 85060 BLOOD SMEAR INTERPRETATION: CPT | Mod: ,,, | Performed by: PATHOLOGY

## 2021-06-21 PROCEDURE — 85027 COMPLETE CBC AUTOMATED: CPT | Performed by: INTERNAL MEDICINE

## 2021-06-21 PROCEDURE — 80053 COMPREHEN METABOLIC PANEL: CPT | Performed by: INTERNAL MEDICINE

## 2021-06-21 PROCEDURE — 83615 LACTATE (LD) (LDH) ENZYME: CPT | Performed by: INTERNAL MEDICINE

## 2021-06-21 PROCEDURE — 36415 COLL VENOUS BLD VENIPUNCTURE: CPT | Performed by: INTERNAL MEDICINE

## 2021-06-22 LAB — PATH REV BLD -IMP: NORMAL

## 2021-06-23 ENCOUNTER — OFFICE VISIT (OUTPATIENT)
Dept: HEMATOLOGY/ONCOLOGY | Facility: CLINIC | Age: 81
End: 2021-06-23
Payer: MEDICARE

## 2021-06-23 VITALS
WEIGHT: 168.56 LBS | DIASTOLIC BLOOD PRESSURE: 63 MMHG | SYSTOLIC BLOOD PRESSURE: 141 MMHG | HEART RATE: 65 BPM | HEIGHT: 69 IN | OXYGEN SATURATION: 100 % | BODY MASS INDEX: 24.97 KG/M2 | TEMPERATURE: 98 F | RESPIRATION RATE: 16 BRPM

## 2021-06-23 DIAGNOSIS — J44.9 CHRONIC OBSTRUCTIVE PULMONARY DISEASE, UNSPECIFIED COPD TYPE: ICD-10-CM

## 2021-06-23 DIAGNOSIS — I10 BENIGN ESSENTIAL HTN: ICD-10-CM

## 2021-06-23 DIAGNOSIS — C61 PROSTATE CANCER: ICD-10-CM

## 2021-06-23 DIAGNOSIS — I10 ESSENTIAL HYPERTENSION: Chronic | ICD-10-CM

## 2021-06-23 DIAGNOSIS — C91.10 CLL (CHRONIC LYMPHOCYTIC LEUKEMIA): Primary | ICD-10-CM

## 2021-06-23 DIAGNOSIS — K57.32 DIVERTICULITIS OF COLON: ICD-10-CM

## 2021-06-23 PROCEDURE — 1159F MED LIST DOCD IN RCRD: CPT | Mod: S$GLB,,, | Performed by: INTERNAL MEDICINE

## 2021-06-23 PROCEDURE — 99999 PR PBB SHADOW E&M-EST. PATIENT-LVL IV: ICD-10-PCS | Mod: PBBFAC,,, | Performed by: INTERNAL MEDICINE

## 2021-06-23 PROCEDURE — 99215 PR OFFICE/OUTPT VISIT, EST, LEVL V, 40-54 MIN: ICD-10-PCS | Mod: S$GLB,,, | Performed by: INTERNAL MEDICINE

## 2021-06-23 PROCEDURE — 1126F AMNT PAIN NOTED NONE PRSNT: CPT | Mod: S$GLB,,, | Performed by: INTERNAL MEDICINE

## 2021-06-23 PROCEDURE — 1101F PT FALLS ASSESS-DOCD LE1/YR: CPT | Mod: CPTII,S$GLB,, | Performed by: INTERNAL MEDICINE

## 2021-06-23 PROCEDURE — 1159F PR MEDICATION LIST DOCUMENTED IN MEDICAL RECORD: ICD-10-PCS | Mod: S$GLB,,, | Performed by: INTERNAL MEDICINE

## 2021-06-23 PROCEDURE — 3288F FALL RISK ASSESSMENT DOCD: CPT | Mod: CPTII,S$GLB,, | Performed by: INTERNAL MEDICINE

## 2021-06-23 PROCEDURE — 1126F PR PAIN SEVERITY QUANTIFIED, NO PAIN PRESENT: ICD-10-PCS | Mod: S$GLB,,, | Performed by: INTERNAL MEDICINE

## 2021-06-23 PROCEDURE — 99215 OFFICE O/P EST HI 40 MIN: CPT | Mod: S$GLB,,, | Performed by: INTERNAL MEDICINE

## 2021-06-23 PROCEDURE — 1101F PR PT FALLS ASSESS DOC 0-1 FALLS W/OUT INJ PAST YR: ICD-10-PCS | Mod: CPTII,S$GLB,, | Performed by: INTERNAL MEDICINE

## 2021-06-23 PROCEDURE — 99999 PR PBB SHADOW E&M-EST. PATIENT-LVL IV: CPT | Mod: PBBFAC,,, | Performed by: INTERNAL MEDICINE

## 2021-06-23 PROCEDURE — 3288F PR FALLS RISK ASSESSMENT DOCUMENTED: ICD-10-PCS | Mod: CPTII,S$GLB,, | Performed by: INTERNAL MEDICINE

## 2021-06-24 ENCOUNTER — SPECIALTY PHARMACY (OUTPATIENT)
Dept: PHARMACY | Facility: CLINIC | Age: 81
End: 2021-06-24

## 2021-06-24 ENCOUNTER — PATIENT MESSAGE (OUTPATIENT)
Dept: PHARMACY | Facility: CLINIC | Age: 81
End: 2021-06-24

## 2021-07-02 DIAGNOSIS — C91.10 CLL (CHRONIC LYMPHOCYTIC LEUKEMIA): Primary | ICD-10-CM

## 2021-07-06 ENCOUNTER — TELEPHONE (OUTPATIENT)
Dept: HEMATOLOGY/ONCOLOGY | Facility: CLINIC | Age: 81
End: 2021-07-06

## 2021-07-19 ENCOUNTER — SPECIALTY PHARMACY (OUTPATIENT)
Dept: PHARMACY | Facility: CLINIC | Age: 81
End: 2021-07-19

## 2021-07-30 ENCOUNTER — LAB VISIT (OUTPATIENT)
Dept: LAB | Facility: HOSPITAL | Age: 81
End: 2021-07-30
Attending: INTERNAL MEDICINE
Payer: MEDICARE

## 2021-07-30 DIAGNOSIS — C91.10 CLL (CHRONIC LYMPHOCYTIC LEUKEMIA): ICD-10-CM

## 2021-07-30 LAB
ALBUMIN SERPL BCP-MCNC: 3.5 G/DL (ref 3.5–5.2)
ALP SERPL-CCNC: 37 U/L (ref 55–135)
ALT SERPL W/O P-5'-P-CCNC: 18 U/L (ref 10–44)
ANION GAP SERPL CALC-SCNC: 8 MMOL/L (ref 8–16)
AST SERPL-CCNC: 21 U/L (ref 10–40)
BASOPHILS # BLD AUTO: ABNORMAL K/UL (ref 0–0.2)
BASOPHILS NFR BLD: 0 % (ref 0–1.9)
BILIRUB SERPL-MCNC: 0.6 MG/DL (ref 0.1–1)
BUN SERPL-MCNC: 10 MG/DL (ref 8–23)
CALCIUM SERPL-MCNC: 9.1 MG/DL (ref 8.7–10.5)
CHLORIDE SERPL-SCNC: 101 MMOL/L (ref 95–110)
CO2 SERPL-SCNC: 25 MMOL/L (ref 23–29)
CREAT SERPL-MCNC: 1 MG/DL (ref 0.5–1.4)
DIFFERENTIAL METHOD: ABNORMAL
EOSINOPHIL # BLD AUTO: ABNORMAL K/UL (ref 0–0.5)
EOSINOPHIL NFR BLD: 1 % (ref 0–8)
ERYTHROCYTE [DISTWIDTH] IN BLOOD BY AUTOMATED COUNT: 12 % (ref 11.5–14.5)
EST. GFR  (AFRICAN AMERICAN): >60 ML/MIN/1.73 M^2
EST. GFR  (NON AFRICAN AMERICAN): >60 ML/MIN/1.73 M^2
GLUCOSE SERPL-MCNC: 106 MG/DL (ref 70–110)
HCT VFR BLD AUTO: 37.1 % (ref 40–54)
HGB BLD-MCNC: 12.2 G/DL (ref 14–18)
IMM GRANULOCYTES # BLD AUTO: ABNORMAL K/UL (ref 0–0.04)
IMM GRANULOCYTES NFR BLD AUTO: ABNORMAL % (ref 0–0.5)
LYMPHOCYTES # BLD AUTO: ABNORMAL K/UL (ref 1–4.8)
LYMPHOCYTES NFR BLD: 80 % (ref 18–48)
MCH RBC QN AUTO: 32.4 PG (ref 27–31)
MCHC RBC AUTO-ENTMCNC: 32.9 G/DL (ref 32–36)
MCV RBC AUTO: 99 FL (ref 82–98)
MONOCYTES # BLD AUTO: ABNORMAL K/UL (ref 0.3–1)
MONOCYTES NFR BLD: 4 % (ref 4–15)
NEUTROPHILS NFR BLD: 15 % (ref 38–73)
NRBC BLD-RTO: 0 /100 WBC
PLATELET # BLD AUTO: 172 K/UL (ref 150–450)
PMV BLD AUTO: 11.2 FL (ref 9.2–12.9)
POTASSIUM SERPL-SCNC: 4.1 MMOL/L (ref 3.5–5.1)
PROT SERPL-MCNC: 6.5 G/DL (ref 6–8.4)
RBC # BLD AUTO: 3.76 M/UL (ref 4.6–6.2)
SODIUM SERPL-SCNC: 134 MMOL/L (ref 136–145)
WBC # BLD AUTO: 40.44 K/UL (ref 3.9–12.7)

## 2021-07-30 PROCEDURE — 80053 COMPREHEN METABOLIC PANEL: CPT | Performed by: INTERNAL MEDICINE

## 2021-07-30 PROCEDURE — 36415 COLL VENOUS BLD VENIPUNCTURE: CPT | Performed by: INTERNAL MEDICINE

## 2021-07-30 PROCEDURE — 85027 COMPLETE CBC AUTOMATED: CPT | Performed by: INTERNAL MEDICINE

## 2021-07-30 PROCEDURE — 85007 BL SMEAR W/DIFF WBC COUNT: CPT | Performed by: INTERNAL MEDICINE

## 2021-08-16 ENCOUNTER — SPECIALTY PHARMACY (OUTPATIENT)
Dept: PHARMACY | Facility: CLINIC | Age: 81
End: 2021-08-16

## 2021-08-23 ENCOUNTER — SPECIALTY PHARMACY (OUTPATIENT)
Dept: PHARMACY | Facility: CLINIC | Age: 81
End: 2021-08-23

## 2021-08-23 DIAGNOSIS — C91.10 CLL (CHRONIC LYMPHOCYTIC LEUKEMIA): Primary | ICD-10-CM

## 2021-08-25 ENCOUNTER — OFFICE VISIT (OUTPATIENT)
Dept: RHEUMATOLOGY | Facility: CLINIC | Age: 81
End: 2021-08-25
Payer: MEDICARE

## 2021-08-25 VITALS
DIASTOLIC BLOOD PRESSURE: 80 MMHG | BODY MASS INDEX: 26.3 KG/M2 | WEIGHT: 173.5 LBS | HEIGHT: 68 IN | HEART RATE: 85 BPM | SYSTOLIC BLOOD PRESSURE: 139 MMHG

## 2021-08-25 DIAGNOSIS — M11.849 CALCIUM PYROPHOSPHATE ARTHROPATHY OF HAND: ICD-10-CM

## 2021-08-25 PROCEDURE — 99999 PR PBB SHADOW E&M-EST. PATIENT-LVL III: CPT | Mod: PBBFAC,,, | Performed by: INTERNAL MEDICINE

## 2021-08-25 PROCEDURE — 1159F MED LIST DOCD IN RCRD: CPT | Mod: CPTII,S$GLB,, | Performed by: INTERNAL MEDICINE

## 2021-08-25 PROCEDURE — 1159F PR MEDICATION LIST DOCUMENTED IN MEDICAL RECORD: ICD-10-PCS | Mod: CPTII,S$GLB,, | Performed by: INTERNAL MEDICINE

## 2021-08-25 PROCEDURE — 3079F PR MOST RECENT DIASTOLIC BLOOD PRESSURE 80-89 MM HG: ICD-10-PCS | Mod: CPTII,S$GLB,, | Performed by: INTERNAL MEDICINE

## 2021-08-25 PROCEDURE — 3079F DIAST BP 80-89 MM HG: CPT | Mod: CPTII,S$GLB,, | Performed by: INTERNAL MEDICINE

## 2021-08-25 PROCEDURE — 99214 PR OFFICE/OUTPT VISIT, EST, LEVL IV, 30-39 MIN: ICD-10-PCS | Mod: S$GLB,,, | Performed by: INTERNAL MEDICINE

## 2021-08-25 PROCEDURE — 1126F AMNT PAIN NOTED NONE PRSNT: CPT | Mod: CPTII,S$GLB,, | Performed by: INTERNAL MEDICINE

## 2021-08-25 PROCEDURE — 99214 OFFICE O/P EST MOD 30 MIN: CPT | Mod: S$GLB,,, | Performed by: INTERNAL MEDICINE

## 2021-08-25 PROCEDURE — 1126F PR PAIN SEVERITY QUANTIFIED, NO PAIN PRESENT: ICD-10-PCS | Mod: CPTII,S$GLB,, | Performed by: INTERNAL MEDICINE

## 2021-08-25 PROCEDURE — 99999 PR PBB SHADOW E&M-EST. PATIENT-LVL III: ICD-10-PCS | Mod: PBBFAC,,, | Performed by: INTERNAL MEDICINE

## 2021-08-25 PROCEDURE — 3075F PR MOST RECENT SYSTOLIC BLOOD PRESS GE 130-139MM HG: ICD-10-PCS | Mod: CPTII,S$GLB,, | Performed by: INTERNAL MEDICINE

## 2021-08-25 PROCEDURE — 3075F SYST BP GE 130 - 139MM HG: CPT | Mod: CPTII,S$GLB,, | Performed by: INTERNAL MEDICINE

## 2021-08-25 RX ORDER — COLCHICINE 0.6 MG/1
0.6 CAPSULE ORAL DAILY
Qty: 90 CAPSULE | Refills: 2 | Status: SHIPPED | OUTPATIENT
Start: 2021-08-25 | End: 2021-08-25

## 2021-08-25 RX ORDER — COLCHICINE 0.6 MG/1
0.6 TABLET ORAL DAILY
Qty: 90 TABLET | Refills: 4 | Status: SHIPPED | OUTPATIENT
Start: 2021-08-25 | End: 2022-03-07

## 2021-08-25 RX ORDER — COLCHICINE 0.6 MG/1
0.6 TABLET ORAL DAILY
Qty: 90 TABLET | Refills: 2 | Status: SHIPPED | OUTPATIENT
Start: 2021-08-25 | End: 2021-08-25

## 2021-08-25 RX ORDER — GABAPENTIN 300 MG/1
300 CAPSULE ORAL DAILY
Qty: 90 CAPSULE | Refills: 2 | Status: SHIPPED | OUTPATIENT
Start: 2021-08-25 | End: 2021-08-25

## 2021-08-25 RX ORDER — GABAPENTIN 300 MG/1
300 CAPSULE ORAL DAILY
Qty: 90 CAPSULE | Refills: 4 | Status: SHIPPED | OUTPATIENT
Start: 2021-08-25 | End: 2021-09-05 | Stop reason: SDUPTHER

## 2021-08-25 ASSESSMENT — ROUTINE ASSESSMENT OF PATIENT INDEX DATA (RAPID3)
AM STIFFNESS SCORE: 0, NO
TOTAL RAPID3 SCORE: 0.17
PSYCHOLOGICAL DISTRESS SCORE: 0
MDHAQ FUNCTION SCORE: 0
PATIENT GLOBAL ASSESSMENT SCORE: 0
FATIGUE SCORE: 0
PAIN SCORE: 0.5

## 2021-09-03 DIAGNOSIS — C91.10 CLL (CHRONIC LYMPHOCYTIC LEUKEMIA): Primary | ICD-10-CM

## 2021-09-05 DIAGNOSIS — M11.849 CALCIUM PYROPHOSPHATE ARTHROPATHY OF HAND: ICD-10-CM

## 2021-09-05 RX ORDER — BUDESONIDE, GLYCOPYRROLATE, AND FORMOTEROL FUMARATE 160; 9; 4.8 UG/1; UG/1; UG/1
AEROSOL, METERED RESPIRATORY (INHALATION)
Qty: 10.7 G | Refills: 12 | Status: SHIPPED | OUTPATIENT
Start: 2021-09-05 | End: 2021-11-16 | Stop reason: SDUPTHER

## 2021-09-05 RX ORDER — GABAPENTIN 300 MG/1
300 CAPSULE ORAL DAILY
Qty: 90 CAPSULE | Refills: 4 | Status: SHIPPED | OUTPATIENT
Start: 2021-09-05 | End: 2021-12-04

## 2021-09-05 RX ORDER — ALBUTEROL SULFATE 0.83 MG/ML
SOLUTION RESPIRATORY (INHALATION)
Qty: 540 ML | Refills: 12 | Status: SHIPPED | OUTPATIENT
Start: 2021-09-05 | End: 2023-09-12 | Stop reason: SDUPTHER

## 2021-09-05 RX ORDER — COLCHICINE 0.6 MG/1
0.6 TABLET ORAL DAILY
Qty: 90 TABLET | Refills: 4 | Status: SHIPPED | OUTPATIENT
Start: 2021-09-05 | End: 2021-12-04

## 2021-09-05 RX ORDER — OMEPRAZOLE 20 MG/1
CAPSULE, DELAYED RELEASE ORAL
Qty: 30 CAPSULE | Refills: 11 | Status: SHIPPED | OUTPATIENT
Start: 2021-09-05 | End: 2023-04-10 | Stop reason: SDUPTHER

## 2021-09-05 RX ORDER — VERAPAMIL HYDROCHLORIDE 100 MG/1
CAPSULE, EXTENDED RELEASE ORAL
Qty: 90 CAPSULE | Refills: 3 | Status: SHIPPED | OUTPATIENT
Start: 2021-09-05 | End: 2023-09-12

## 2021-09-05 RX ORDER — PRAVASTATIN SODIUM 20 MG/1
TABLET ORAL
Qty: 30 TABLET | Refills: 11 | Status: SHIPPED | OUTPATIENT
Start: 2021-09-05 | End: 2021-11-16 | Stop reason: SDUPTHER

## 2021-09-05 RX ORDER — LOSARTAN POTASSIUM 50 MG/1
TABLET ORAL
Qty: 90 TABLET | Refills: 3 | Status: SHIPPED | OUTPATIENT
Start: 2021-09-05 | End: 2022-03-07

## 2021-09-05 RX ORDER — IPRATROPIUM BROMIDE 0.5 MG/2.5ML
SOLUTION RESPIRATORY (INHALATION)
Qty: 450 ML | Refills: 12 | Status: SHIPPED | OUTPATIENT
Start: 2021-09-05 | End: 2023-09-12 | Stop reason: SDUPTHER

## 2021-09-16 DIAGNOSIS — E78.00 HYPERCHOLESTEREMIA: ICD-10-CM

## 2021-09-16 RX ORDER — LOSARTAN POTASSIUM 50 MG/1
TABLET ORAL
Qty: 30 TABLET | Refills: 11 | Status: SHIPPED | OUTPATIENT
Start: 2021-09-16 | End: 2022-03-07

## 2021-09-16 RX ORDER — OMEPRAZOLE 20 MG/1
CAPSULE, DELAYED RELEASE ORAL
Qty: 30 CAPSULE | Refills: 11 | Status: SHIPPED | OUTPATIENT
Start: 2021-09-16 | End: 2022-03-07

## 2021-09-18 ENCOUNTER — PATIENT MESSAGE (OUTPATIENT)
Dept: PHARMACY | Facility: CLINIC | Age: 81
End: 2021-09-18

## 2021-09-20 ENCOUNTER — SPECIALTY PHARMACY (OUTPATIENT)
Dept: PHARMACY | Facility: CLINIC | Age: 81
End: 2021-09-20

## 2021-10-04 ENCOUNTER — LAB VISIT (OUTPATIENT)
Dept: LAB | Facility: HOSPITAL | Age: 81
End: 2021-10-04
Attending: INTERNAL MEDICINE
Payer: MEDICARE

## 2021-10-04 DIAGNOSIS — C91.10 CLL (CHRONIC LYMPHOCYTIC LEUKEMIA): ICD-10-CM

## 2021-10-04 LAB
ALBUMIN SERPL BCP-MCNC: 3.5 G/DL (ref 3.5–5.2)
ALP SERPL-CCNC: 40 U/L (ref 55–135)
ALT SERPL W/O P-5'-P-CCNC: 23 U/L (ref 10–44)
ANION GAP SERPL CALC-SCNC: 6 MMOL/L (ref 8–16)
AST SERPL-CCNC: 22 U/L (ref 10–40)
BASOPHILS NFR BLD: 0 % (ref 0–1.9)
BILIRUB SERPL-MCNC: 0.5 MG/DL (ref 0.1–1)
BUN SERPL-MCNC: 11 MG/DL (ref 8–23)
CALCIUM SERPL-MCNC: 9 MG/DL (ref 8.7–10.5)
CHLORIDE SERPL-SCNC: 103 MMOL/L (ref 95–110)
CO2 SERPL-SCNC: 24 MMOL/L (ref 23–29)
CREAT SERPL-MCNC: 1.1 MG/DL (ref 0.5–1.4)
DIFFERENTIAL METHOD: ABNORMAL
EOSINOPHIL NFR BLD: 3 % (ref 0–8)
ERYTHROCYTE [DISTWIDTH] IN BLOOD BY AUTOMATED COUNT: 12.2 % (ref 11.5–14.5)
EST. GFR  (AFRICAN AMERICAN): >60 ML/MIN/1.73 M^2
EST. GFR  (NON AFRICAN AMERICAN): >60 ML/MIN/1.73 M^2
GLUCOSE SERPL-MCNC: 86 MG/DL (ref 70–110)
HCT VFR BLD AUTO: 37.1 % (ref 40–54)
HGB BLD-MCNC: 12.4 G/DL (ref 14–18)
IMM GRANULOCYTES # BLD AUTO: ABNORMAL K/UL (ref 0–0.04)
IMM GRANULOCYTES NFR BLD AUTO: ABNORMAL % (ref 0–0.5)
LYMPHOCYTES NFR BLD: 62 % (ref 18–48)
MCH RBC QN AUTO: 33.1 PG (ref 27–31)
MCHC RBC AUTO-ENTMCNC: 33.4 G/DL (ref 32–36)
MCV RBC AUTO: 99 FL (ref 82–98)
MONOCYTES NFR BLD: 1 % (ref 4–15)
NEUTROPHILS NFR BLD: 29 % (ref 38–73)
NEUTS BAND NFR BLD MANUAL: 2 %
NRBC BLD-RTO: 0 /100 WBC
PLATELET # BLD AUTO: 192 K/UL (ref 150–450)
PLATELET BLD QL SMEAR: ABNORMAL
PMV BLD AUTO: 10.9 FL (ref 9.2–12.9)
POTASSIUM SERPL-SCNC: 4.2 MMOL/L (ref 3.5–5.1)
PROT SERPL-MCNC: 6.6 G/DL (ref 6–8.4)
RBC # BLD AUTO: 3.75 M/UL (ref 4.6–6.2)
SODIUM SERPL-SCNC: 133 MMOL/L (ref 136–145)
WBC # BLD AUTO: 30.44 K/UL (ref 3.9–12.7)
WBC OTHER NFR BLD MANUAL: 3 %

## 2021-10-04 PROCEDURE — 85027 COMPLETE CBC AUTOMATED: CPT | Performed by: INTERNAL MEDICINE

## 2021-10-04 PROCEDURE — 80053 COMPREHEN METABOLIC PANEL: CPT | Performed by: INTERNAL MEDICINE

## 2021-10-04 PROCEDURE — 85007 BL SMEAR W/DIFF WBC COUNT: CPT | Performed by: INTERNAL MEDICINE

## 2021-10-04 PROCEDURE — 85060 PATHOLOGIST REVIEW: ICD-10-PCS | Mod: ,,, | Performed by: PATHOLOGY

## 2021-10-04 PROCEDURE — 85060 BLOOD SMEAR INTERPRETATION: CPT | Mod: ,,, | Performed by: PATHOLOGY

## 2021-10-04 PROCEDURE — 36415 COLL VENOUS BLD VENIPUNCTURE: CPT | Performed by: INTERNAL MEDICINE

## 2021-10-05 LAB — PATH REV BLD -IMP: NORMAL

## 2021-10-05 RX ORDER — MONTELUKAST SODIUM 10 MG/1
TABLET ORAL
Qty: 90 TABLET | Refills: 3 | Status: SHIPPED | OUTPATIENT
Start: 2021-10-05 | End: 2022-09-26 | Stop reason: SDUPTHER

## 2021-10-06 ENCOUNTER — OFFICE VISIT (OUTPATIENT)
Dept: HEMATOLOGY/ONCOLOGY | Facility: CLINIC | Age: 81
End: 2021-10-06
Payer: MEDICARE

## 2021-10-06 VITALS
HEART RATE: 69 BPM | RESPIRATION RATE: 12 BRPM | OXYGEN SATURATION: 100 % | BODY MASS INDEX: 25.64 KG/M2 | TEMPERATURE: 98 F | DIASTOLIC BLOOD PRESSURE: 67 MMHG | SYSTOLIC BLOOD PRESSURE: 144 MMHG | WEIGHT: 168.63 LBS

## 2021-10-06 DIAGNOSIS — C92.10 CML (CHRONIC MYELOCYTIC LEUKEMIA): ICD-10-CM

## 2021-10-06 DIAGNOSIS — C61 PROSTATE CANCER: Primary | ICD-10-CM

## 2021-10-06 PROCEDURE — 1126F PR PAIN SEVERITY QUANTIFIED, NO PAIN PRESENT: ICD-10-PCS | Mod: CPTII,S$GLB,, | Performed by: INTERNAL MEDICINE

## 2021-10-06 PROCEDURE — 1101F PT FALLS ASSESS-DOCD LE1/YR: CPT | Mod: CPTII,S$GLB,, | Performed by: INTERNAL MEDICINE

## 2021-10-06 PROCEDURE — 99999 PR PBB SHADOW E&M-EST. PATIENT-LVL III: ICD-10-PCS | Mod: PBBFAC,,, | Performed by: INTERNAL MEDICINE

## 2021-10-06 PROCEDURE — 1126F AMNT PAIN NOTED NONE PRSNT: CPT | Mod: CPTII,S$GLB,, | Performed by: INTERNAL MEDICINE

## 2021-10-06 PROCEDURE — 3078F DIAST BP <80 MM HG: CPT | Mod: CPTII,S$GLB,, | Performed by: INTERNAL MEDICINE

## 2021-10-06 PROCEDURE — 99215 PR OFFICE/OUTPT VISIT, EST, LEVL V, 40-54 MIN: ICD-10-PCS | Mod: S$GLB,,, | Performed by: INTERNAL MEDICINE

## 2021-10-06 PROCEDURE — 99215 OFFICE O/P EST HI 40 MIN: CPT | Mod: S$GLB,,, | Performed by: INTERNAL MEDICINE

## 2021-10-06 PROCEDURE — 99999 PR PBB SHADOW E&M-EST. PATIENT-LVL III: CPT | Mod: PBBFAC,,, | Performed by: INTERNAL MEDICINE

## 2021-10-06 PROCEDURE — 1101F PR PT FALLS ASSESS DOC 0-1 FALLS W/OUT INJ PAST YR: ICD-10-PCS | Mod: CPTII,S$GLB,, | Performed by: INTERNAL MEDICINE

## 2021-10-06 PROCEDURE — 3288F FALL RISK ASSESSMENT DOCD: CPT | Mod: CPTII,S$GLB,, | Performed by: INTERNAL MEDICINE

## 2021-10-06 PROCEDURE — 3077F PR MOST RECENT SYSTOLIC BLOOD PRESSURE >= 140 MM HG: ICD-10-PCS | Mod: CPTII,S$GLB,, | Performed by: INTERNAL MEDICINE

## 2021-10-06 PROCEDURE — 3288F PR FALLS RISK ASSESSMENT DOCUMENTED: ICD-10-PCS | Mod: CPTII,S$GLB,, | Performed by: INTERNAL MEDICINE

## 2021-10-06 PROCEDURE — 3078F PR MOST RECENT DIASTOLIC BLOOD PRESSURE < 80 MM HG: ICD-10-PCS | Mod: CPTII,S$GLB,, | Performed by: INTERNAL MEDICINE

## 2021-10-06 PROCEDURE — 3077F SYST BP >= 140 MM HG: CPT | Mod: CPTII,S$GLB,, | Performed by: INTERNAL MEDICINE

## 2021-10-11 ENCOUNTER — IMMUNIZATION (OUTPATIENT)
Dept: PHARMACY | Facility: CLINIC | Age: 81
End: 2021-10-11
Payer: MEDICARE

## 2021-10-13 ENCOUNTER — SPECIALTY PHARMACY (OUTPATIENT)
Dept: PHARMACY | Facility: CLINIC | Age: 81
End: 2021-10-13

## 2021-10-25 RX ORDER — IPRATROPIUM BROMIDE 0.5 MG/2.5ML
SOLUTION RESPIRATORY (INHALATION)
Qty: 450 ML | Refills: 12 | Status: CANCELLED | OUTPATIENT
Start: 2021-10-25

## 2021-11-09 ENCOUNTER — IMMUNIZATION (OUTPATIENT)
Dept: PHARMACY | Facility: CLINIC | Age: 81
End: 2021-11-09
Payer: MEDICARE

## 2021-11-09 DIAGNOSIS — Z23 NEED FOR VACCINATION: Primary | ICD-10-CM

## 2021-11-09 RX ORDER — VERAPAMIL HYDROCHLORIDE 100 MG/1
CAPSULE, EXTENDED RELEASE ORAL
Qty: 90 CAPSULE | Refills: 3 | Status: SHIPPED | OUTPATIENT
Start: 2021-11-09 | End: 2022-03-07

## 2021-11-10 RX ORDER — PRAVASTATIN SODIUM 20 MG/1
20 TABLET ORAL NIGHTLY
Qty: 30 TABLET | Refills: 11 | OUTPATIENT
Start: 2021-11-10

## 2021-11-12 ENCOUNTER — SPECIALTY PHARMACY (OUTPATIENT)
Dept: PHARMACY | Facility: CLINIC | Age: 81
End: 2021-11-12
Payer: MEDICARE

## 2021-11-16 ENCOUNTER — OFFICE VISIT (OUTPATIENT)
Dept: FAMILY MEDICINE | Facility: CLINIC | Age: 81
End: 2021-11-16
Payer: MEDICARE

## 2021-11-16 ENCOUNTER — CLINICAL SUPPORT (OUTPATIENT)
Dept: FAMILY MEDICINE | Facility: CLINIC | Age: 81
End: 2021-11-16
Payer: MEDICARE

## 2021-11-16 VITALS
HEIGHT: 68 IN | HEART RATE: 60 BPM | SYSTOLIC BLOOD PRESSURE: 138 MMHG | RESPIRATION RATE: 16 BRPM | WEIGHT: 168.31 LBS | BODY MASS INDEX: 25.51 KG/M2 | DIASTOLIC BLOOD PRESSURE: 78 MMHG

## 2021-11-16 DIAGNOSIS — C91.10 CLL (CHRONIC LYMPHOCYTIC LEUKEMIA): ICD-10-CM

## 2021-11-16 DIAGNOSIS — C61 PROSTATE CANCER: ICD-10-CM

## 2021-11-16 DIAGNOSIS — E78.5 HYPERLIPIDEMIA, UNSPECIFIED HYPERLIPIDEMIA TYPE: Primary | ICD-10-CM

## 2021-11-16 DIAGNOSIS — M19.90 OSTEOARTHRITIS, UNSPECIFIED OSTEOARTHRITIS TYPE, UNSPECIFIED SITE: ICD-10-CM

## 2021-11-16 DIAGNOSIS — Z12.11 SCREEN FOR COLON CANCER: ICD-10-CM

## 2021-11-16 LAB
ALBUMIN SERPL BCP-MCNC: 3.6 G/DL (ref 3.5–5.2)
ALP SERPL-CCNC: 40 U/L (ref 55–135)
ALT SERPL W/O P-5'-P-CCNC: 20 U/L (ref 10–44)
ANION GAP SERPL CALC-SCNC: 6 MMOL/L (ref 8–16)
AST SERPL-CCNC: 25 U/L (ref 10–40)
BASOPHILS # BLD AUTO: ABNORMAL K/UL (ref 0–0.2)
BASOPHILS NFR BLD: 0 % (ref 0–1.9)
BILIRUB SERPL-MCNC: 0.4 MG/DL (ref 0.1–1)
BUN SERPL-MCNC: 12 MG/DL (ref 8–23)
CALCIUM SERPL-MCNC: 9.5 MG/DL (ref 8.7–10.5)
CHLORIDE SERPL-SCNC: 102 MMOL/L (ref 95–110)
CO2 SERPL-SCNC: 28 MMOL/L (ref 23–29)
COMPLEXED PSA SERPL-MCNC: 0.05 NG/ML (ref 0–4)
CREAT SERPL-MCNC: 1.1 MG/DL (ref 0.5–1.4)
DIFFERENTIAL METHOD: ABNORMAL
EOSINOPHIL # BLD AUTO: ABNORMAL K/UL (ref 0–0.5)
EOSINOPHIL NFR BLD: 0 % (ref 0–8)
ERYTHROCYTE [DISTWIDTH] IN BLOOD BY AUTOMATED COUNT: 11.7 % (ref 11.5–14.5)
ERYTHROCYTE [SEDIMENTATION RATE] IN BLOOD BY WESTERGREN METHOD: 8 MM/HR (ref 0–10)
EST. GFR  (AFRICAN AMERICAN): >60 ML/MIN/1.73 M^2
EST. GFR  (NON AFRICAN AMERICAN): >60 ML/MIN/1.73 M^2
GLUCOSE SERPL-MCNC: 81 MG/DL (ref 70–110)
HCT VFR BLD AUTO: 36.8 % (ref 40–54)
HGB BLD-MCNC: 12.4 G/DL (ref 14–18)
IMM GRANULOCYTES # BLD AUTO: ABNORMAL K/UL (ref 0–0.04)
IMM GRANULOCYTES NFR BLD AUTO: ABNORMAL % (ref 0–0.5)
LYMPHOCYTES # BLD AUTO: ABNORMAL K/UL (ref 1–4.8)
LYMPHOCYTES NFR BLD: 72 % (ref 18–48)
MCH RBC QN AUTO: 32.9 PG (ref 27–31)
MCHC RBC AUTO-ENTMCNC: 33.7 G/DL (ref 32–36)
MCV RBC AUTO: 98 FL (ref 82–98)
MONOCYTES # BLD AUTO: ABNORMAL K/UL (ref 0.3–1)
MONOCYTES NFR BLD: 4 % (ref 4–15)
NEUTROPHILS NFR BLD: 24 % (ref 38–73)
NRBC BLD-RTO: 0 /100 WBC
PLATELET # BLD AUTO: 155 K/UL (ref 150–450)
PLATELET BLD QL SMEAR: ABNORMAL
PMV BLD AUTO: 11.8 FL (ref 9.2–12.9)
POTASSIUM SERPL-SCNC: 4.2 MMOL/L (ref 3.5–5.1)
PROT SERPL-MCNC: 6.9 G/DL (ref 6–8.4)
RBC # BLD AUTO: 3.77 M/UL (ref 4.6–6.2)
SODIUM SERPL-SCNC: 136 MMOL/L (ref 136–145)
WBC # BLD AUTO: 26.9 K/UL (ref 3.9–12.7)

## 2021-11-16 PROCEDURE — 85007 BL SMEAR W/DIFF WBC COUNT: CPT | Performed by: INTERNAL MEDICINE

## 2021-11-16 PROCEDURE — 99213 OFFICE O/P EST LOW 20 MIN: CPT | Mod: S$GLB,,, | Performed by: FAMILY MEDICINE

## 2021-11-16 PROCEDURE — 1159F PR MEDICATION LIST DOCUMENTED IN MEDICAL RECORD: ICD-10-PCS | Mod: CPTII,S$GLB,, | Performed by: FAMILY MEDICINE

## 2021-11-16 PROCEDURE — 3078F DIAST BP <80 MM HG: CPT | Mod: CPTII,S$GLB,, | Performed by: FAMILY MEDICINE

## 2021-11-16 PROCEDURE — 99213 PR OFFICE/OUTPT VISIT, EST, LEVL III, 20-29 MIN: ICD-10-PCS | Mod: S$GLB,,, | Performed by: FAMILY MEDICINE

## 2021-11-16 PROCEDURE — 3288F FALL RISK ASSESSMENT DOCD: CPT | Mod: CPTII,S$GLB,, | Performed by: FAMILY MEDICINE

## 2021-11-16 PROCEDURE — 85027 COMPLETE CBC AUTOMATED: CPT | Performed by: INTERNAL MEDICINE

## 2021-11-16 PROCEDURE — 99999 PR PBB SHADOW E&M-EST. PATIENT-LVL IV: CPT | Mod: PBBFAC,,, | Performed by: FAMILY MEDICINE

## 2021-11-16 PROCEDURE — 1126F AMNT PAIN NOTED NONE PRSNT: CPT | Mod: CPTII,S$GLB,, | Performed by: FAMILY MEDICINE

## 2021-11-16 PROCEDURE — 1159F MED LIST DOCD IN RCRD: CPT | Mod: CPTII,S$GLB,, | Performed by: FAMILY MEDICINE

## 2021-11-16 PROCEDURE — 3075F SYST BP GE 130 - 139MM HG: CPT | Mod: CPTII,S$GLB,, | Performed by: FAMILY MEDICINE

## 2021-11-16 PROCEDURE — 3075F PR MOST RECENT SYSTOLIC BLOOD PRESS GE 130-139MM HG: ICD-10-PCS | Mod: CPTII,S$GLB,, | Performed by: FAMILY MEDICINE

## 2021-11-16 PROCEDURE — 1101F PT FALLS ASSESS-DOCD LE1/YR: CPT | Mod: CPTII,S$GLB,, | Performed by: FAMILY MEDICINE

## 2021-11-16 PROCEDURE — 1126F PR PAIN SEVERITY QUANTIFIED, NO PAIN PRESENT: ICD-10-PCS | Mod: CPTII,S$GLB,, | Performed by: FAMILY MEDICINE

## 2021-11-16 PROCEDURE — 1101F PR PT FALLS ASSESS DOC 0-1 FALLS W/OUT INJ PAST YR: ICD-10-PCS | Mod: CPTII,S$GLB,, | Performed by: FAMILY MEDICINE

## 2021-11-16 PROCEDURE — 80053 COMPREHEN METABOLIC PANEL: CPT | Performed by: INTERNAL MEDICINE

## 2021-11-16 PROCEDURE — 84153 ASSAY OF PSA TOTAL: CPT | Performed by: FAMILY MEDICINE

## 2021-11-16 PROCEDURE — 3078F PR MOST RECENT DIASTOLIC BLOOD PRESSURE < 80 MM HG: ICD-10-PCS | Mod: CPTII,S$GLB,, | Performed by: FAMILY MEDICINE

## 2021-11-16 PROCEDURE — 85651 RBC SED RATE NONAUTOMATED: CPT | Performed by: FAMILY MEDICINE

## 2021-11-16 PROCEDURE — 36415 PR COLLECTION VENOUS BLOOD,VENIPUNCTURE: ICD-10-PCS | Mod: S$GLB,,, | Performed by: FAMILY MEDICINE

## 2021-11-16 PROCEDURE — 36415 COLL VENOUS BLD VENIPUNCTURE: CPT | Mod: S$GLB,,, | Performed by: FAMILY MEDICINE

## 2021-11-16 PROCEDURE — 99999 PR PBB SHADOW E&M-EST. PATIENT-LVL IV: ICD-10-PCS | Mod: PBBFAC,,, | Performed by: FAMILY MEDICINE

## 2021-11-16 PROCEDURE — 3288F PR FALLS RISK ASSESSMENT DOCUMENTED: ICD-10-PCS | Mod: CPTII,S$GLB,, | Performed by: FAMILY MEDICINE

## 2021-11-16 RX ORDER — PRAVASTATIN SODIUM 20 MG/1
TABLET ORAL
Qty: 30 TABLET | Refills: 5 | Status: SHIPPED | OUTPATIENT
Start: 2021-11-16 | End: 2022-02-23 | Stop reason: SDUPTHER

## 2021-11-16 RX ORDER — BUDESONIDE, GLYCOPYRROLATE, AND FORMOTEROL FUMARATE 160; 9; 4.8 UG/1; UG/1; UG/1
AEROSOL, METERED RESPIRATORY (INHALATION)
Qty: 10.7 G | Refills: 12 | Status: SHIPPED | OUTPATIENT
Start: 2021-11-16 | End: 2022-11-30 | Stop reason: SDUPTHER

## 2021-11-19 ENCOUNTER — PATIENT MESSAGE (OUTPATIENT)
Dept: FAMILY MEDICINE | Facility: CLINIC | Age: 81
End: 2021-11-19
Payer: MEDICARE

## 2021-11-23 RX ORDER — GABAPENTIN 300 MG/1
300 CAPSULE ORAL DAILY
Qty: 90 CAPSULE | Refills: 3 | Status: SHIPPED | OUTPATIENT
Start: 2021-11-23 | End: 2022-03-07

## 2021-11-30 LAB — NONINV COLON CA DNA+OCC BLD SCRN STL QL: NEGATIVE

## 2021-12-10 ENCOUNTER — SPECIALTY PHARMACY (OUTPATIENT)
Dept: PHARMACY | Facility: CLINIC | Age: 81
End: 2021-12-10
Payer: MEDICARE

## 2021-12-10 DIAGNOSIS — C91.10 CLL (CHRONIC LYMPHOCYTIC LEUKEMIA): Primary | ICD-10-CM

## 2021-12-16 ENCOUNTER — LAB VISIT (OUTPATIENT)
Dept: LAB | Facility: HOSPITAL | Age: 81
End: 2021-12-16
Attending: INTERNAL MEDICINE
Payer: MEDICARE

## 2021-12-16 DIAGNOSIS — C91.10 CLL (CHRONIC LYMPHOCYTIC LEUKEMIA): Primary | ICD-10-CM

## 2021-12-16 LAB
ALBUMIN SERPL BCP-MCNC: 3.5 G/DL (ref 3.5–5.2)
ALP SERPL-CCNC: 41 U/L (ref 55–135)
ALT SERPL W/O P-5'-P-CCNC: 23 U/L (ref 10–44)
ANION GAP SERPL CALC-SCNC: 7 MMOL/L (ref 8–16)
AST SERPL-CCNC: 24 U/L (ref 10–40)
BASOPHILS # BLD AUTO: 0.18 K/UL (ref 0–0.2)
BASOPHILS NFR BLD: 0.7 % (ref 0–1.9)
BILIRUB SERPL-MCNC: 0.4 MG/DL (ref 0.1–1)
BUN SERPL-MCNC: 9 MG/DL (ref 8–23)
CALCIUM SERPL-MCNC: 8.7 MG/DL (ref 8.7–10.5)
CHLORIDE SERPL-SCNC: 100 MMOL/L (ref 95–110)
CO2 SERPL-SCNC: 25 MMOL/L (ref 23–29)
CREAT SERPL-MCNC: 1 MG/DL (ref 0.5–1.4)
DIFFERENTIAL METHOD: ABNORMAL
EOSINOPHIL # BLD AUTO: 0.2 K/UL (ref 0–0.5)
EOSINOPHIL NFR BLD: 0.9 % (ref 0–8)
ERYTHROCYTE [DISTWIDTH] IN BLOOD BY AUTOMATED COUNT: 11.9 % (ref 11.5–14.5)
EST. GFR  (AFRICAN AMERICAN): >60 ML/MIN/1.73 M^2
EST. GFR  (NON AFRICAN AMERICAN): >60 ML/MIN/1.73 M^2
GLUCOSE SERPL-MCNC: 97 MG/DL (ref 70–110)
HCT VFR BLD AUTO: 37.3 % (ref 40–54)
HGB BLD-MCNC: 12.4 G/DL (ref 14–18)
IMM GRANULOCYTES # BLD AUTO: 0.06 K/UL (ref 0–0.04)
IMM GRANULOCYTES NFR BLD AUTO: 0.2 % (ref 0–0.5)
LYMPHOCYTES # BLD AUTO: 16.5 K/UL (ref 1–4.8)
LYMPHOCYTES NFR BLD: 67.7 % (ref 18–48)
MCH RBC QN AUTO: 32.4 PG (ref 27–31)
MCHC RBC AUTO-ENTMCNC: 33.2 G/DL (ref 32–36)
MCV RBC AUTO: 97 FL (ref 82–98)
MONOCYTES # BLD AUTO: 1.3 K/UL (ref 0.3–1)
MONOCYTES NFR BLD: 5.4 % (ref 4–15)
NEUTROPHILS # BLD AUTO: 6.1 K/UL (ref 1.8–7.7)
NEUTROPHILS NFR BLD: 25.1 % (ref 38–73)
NRBC BLD-RTO: 0 /100 WBC
PLATELET # BLD AUTO: 177 K/UL (ref 150–450)
PMV BLD AUTO: 11.3 FL (ref 9.2–12.9)
POTASSIUM SERPL-SCNC: 4.2 MMOL/L (ref 3.5–5.1)
PROT SERPL-MCNC: 6.5 G/DL (ref 6–8.4)
RBC # BLD AUTO: 3.83 M/UL (ref 4.6–6.2)
SODIUM SERPL-SCNC: 132 MMOL/L (ref 136–145)
WBC # BLD AUTO: 24.34 K/UL (ref 3.9–12.7)

## 2021-12-16 PROCEDURE — 85007 BL SMEAR W/DIFF WBC COUNT: CPT | Performed by: INTERNAL MEDICINE

## 2021-12-16 PROCEDURE — 80053 COMPREHEN METABOLIC PANEL: CPT | Performed by: INTERNAL MEDICINE

## 2021-12-16 PROCEDURE — 85060 PATHOLOGIST REVIEW: ICD-10-PCS | Mod: ,,, | Performed by: PATHOLOGY

## 2021-12-16 PROCEDURE — 36415 COLL VENOUS BLD VENIPUNCTURE: CPT | Performed by: INTERNAL MEDICINE

## 2021-12-16 PROCEDURE — 85060 BLOOD SMEAR INTERPRETATION: CPT | Mod: ,,, | Performed by: PATHOLOGY

## 2021-12-16 PROCEDURE — 85027 COMPLETE CBC AUTOMATED: CPT | Performed by: INTERNAL MEDICINE

## 2021-12-17 LAB — PATH REV BLD -IMP: NORMAL

## 2022-01-05 ENCOUNTER — SPECIALTY PHARMACY (OUTPATIENT)
Dept: PHARMACY | Facility: CLINIC | Age: 82
End: 2022-01-05
Payer: MEDICARE

## 2022-01-05 ENCOUNTER — OFFICE VISIT (OUTPATIENT)
Dept: HEMATOLOGY/ONCOLOGY | Facility: CLINIC | Age: 82
End: 2022-01-05
Payer: MEDICARE

## 2022-01-05 VITALS
OXYGEN SATURATION: 100 % | WEIGHT: 170.06 LBS | HEART RATE: 71 BPM | SYSTOLIC BLOOD PRESSURE: 115 MMHG | RESPIRATION RATE: 16 BRPM | TEMPERATURE: 98 F | DIASTOLIC BLOOD PRESSURE: 58 MMHG | BODY MASS INDEX: 25.77 KG/M2 | HEIGHT: 68 IN

## 2022-01-05 DIAGNOSIS — C91.10 CLL (CHRONIC LYMPHOCYTIC LEUKEMIA): Primary | ICD-10-CM

## 2022-01-05 DIAGNOSIS — K29.70 GASTRITIS, PRESENCE OF BLEEDING UNSPECIFIED, UNSPECIFIED CHRONICITY, UNSPECIFIED GASTRITIS TYPE: ICD-10-CM

## 2022-01-05 DIAGNOSIS — M19.90 ARTHRITIS: ICD-10-CM

## 2022-01-05 PROCEDURE — 99215 OFFICE O/P EST HI 40 MIN: CPT | Mod: S$GLB,,, | Performed by: INTERNAL MEDICINE

## 2022-01-05 PROCEDURE — 3074F SYST BP LT 130 MM HG: CPT | Mod: CPTII,S$GLB,, | Performed by: INTERNAL MEDICINE

## 2022-01-05 PROCEDURE — 3288F FALL RISK ASSESSMENT DOCD: CPT | Mod: CPTII,S$GLB,, | Performed by: INTERNAL MEDICINE

## 2022-01-05 PROCEDURE — 1126F PR PAIN SEVERITY QUANTIFIED, NO PAIN PRESENT: ICD-10-PCS | Mod: CPTII,S$GLB,, | Performed by: INTERNAL MEDICINE

## 2022-01-05 PROCEDURE — 99999 PR PBB SHADOW E&M-EST. PATIENT-LVL III: CPT | Mod: PBBFAC,,, | Performed by: INTERNAL MEDICINE

## 2022-01-05 PROCEDURE — 3078F PR MOST RECENT DIASTOLIC BLOOD PRESSURE < 80 MM HG: ICD-10-PCS | Mod: CPTII,S$GLB,, | Performed by: INTERNAL MEDICINE

## 2022-01-05 PROCEDURE — 3288F PR FALLS RISK ASSESSMENT DOCUMENTED: ICD-10-PCS | Mod: CPTII,S$GLB,, | Performed by: INTERNAL MEDICINE

## 2022-01-05 PROCEDURE — 1126F AMNT PAIN NOTED NONE PRSNT: CPT | Mod: CPTII,S$GLB,, | Performed by: INTERNAL MEDICINE

## 2022-01-05 PROCEDURE — 1101F PR PT FALLS ASSESS DOC 0-1 FALLS W/OUT INJ PAST YR: ICD-10-PCS | Mod: CPTII,S$GLB,, | Performed by: INTERNAL MEDICINE

## 2022-01-05 PROCEDURE — 3078F DIAST BP <80 MM HG: CPT | Mod: CPTII,S$GLB,, | Performed by: INTERNAL MEDICINE

## 2022-01-05 PROCEDURE — 99999 PR PBB SHADOW E&M-EST. PATIENT-LVL III: ICD-10-PCS | Mod: PBBFAC,,, | Performed by: INTERNAL MEDICINE

## 2022-01-05 PROCEDURE — 1101F PT FALLS ASSESS-DOCD LE1/YR: CPT | Mod: CPTII,S$GLB,, | Performed by: INTERNAL MEDICINE

## 2022-01-05 PROCEDURE — 99215 PR OFFICE/OUTPT VISIT, EST, LEVL V, 40-54 MIN: ICD-10-PCS | Mod: S$GLB,,, | Performed by: INTERNAL MEDICINE

## 2022-01-05 PROCEDURE — 3074F PR MOST RECENT SYSTOLIC BLOOD PRESSURE < 130 MM HG: ICD-10-PCS | Mod: CPTII,S$GLB,, | Performed by: INTERNAL MEDICINE

## 2022-01-05 NOTE — Clinical Note
1. Labs in 1 and 2 months at [facility]  2. See me several days after 2 month labs [he likes 8am appts]

## 2022-01-05 NOTE — TELEPHONE ENCOUNTER
Specialty Pharmacy - Refill Coordination    Specialty Medication Orders Linked to Encounter    Flowsheet Row Most Recent Value   Medication #1 ibrutinib (IMBRUVICA) 280 mg Tab (Order#[id number], Rx#[id number])          Refill Questions - Documented Responses    Flowsheet Row Most Recent Value   Patient Availability and HIPAA Verification    Does patient want to proceed with activity? Yes   HIPAA/medical authority confirmed? Yes   Relationship to patient of person spoken to? Self   Refill Screening Questions    Changes to allergies? No   Changes to medications? No   New conditions since last clinic visit? No   Unplanned office visit, urgent care, ED, or hospital admission in the last 4 weeks? No   How does patient/caregiver feel medication is working? Good   Financial problems or insurance changes? No   How many doses of your specialty medications were missed in the last 4 weeks? 0   Would patient like to speak to a pharmacist? No   When does the patient need to receive the medication? 01/12/22   Refill Delivery Questions    How will the patient receive the medication? Delivery D[facility]   When does the patient need to receive the medication? 01/12/22   Shipping Address Home   Address in [facility] confirmed and updated if neccessary? Yes   Expected Copay ($) 55   Is the patient able to afford the medication copay? Yes   Payment Method CC on file   Days supply of Refill 28   Supplies needed? No supplies needed   Refill activity completed? Yes   Refill activity plan Refill scheduled   Shipment/Pickup Date: 01/07/22          Current Outpatient Medications   Medication Sig    albuterol (PROVENTIL) 2.5 mg /3 mL (0.083 %) nebulizer solution Use content of one vial every 12 hours    albuterol (PROVENTIL) 2.5 mg /3 mL (0.083 %) nebulizer solution Take 1 vial by nebulization twice daily. (Patient not taking: No sig reported)    aspirin (ECOTRIN) 81 MG EC tablet Take 81 mg by mouth once daily.     budesonide-glycopyr-formoterol (BREZTRI AEROSPHERE) 160-9-4.8 mcg/actuation HFAA INSTILL 2 PUFFS BY MOUTH EVERY 12 HOURS    colchicine (COLCRYS) 0.6 mg tablet Take 1 tablet (0.6 mg total) by mouth once daily.    famotidine (PEPCID) 20 MG tablet Take 1 tablet orally 2 times a day as needed for heart burn    fish oil-omega-3 fatty acids 300-1,000 mg capsule Take 1 g by mouth once daily.    gabapentin (NEURONTIN) 300 MG capsule Take 1 capsule (300 mg total) by mouth once daily.    ibrutinib (IMBRUVICA) 280 mg Tab Take 1 tablet (280 mg) by mouth once daily.    ipratropium (ATROVENT) 0.02 % nebulizer solution use contents of one vial every 12 hours    ipratropium (ATROVENT) 0.02 % nebulizer solution Take 1 vial by nebulization twice daily. (Patient not taking: No sig reported)    ipratropium (ATROVENT) 0.02 % nebulizer solution Use contents of one vial every 12 hours    losartan (COZAAR) 50 MG tablet Take 1 tablet orally once a day.    losartan (COZAAR) 50 MG tablet TAKE ONE TABLET BY MOUTH EVERY DAY (Patient not taking: No sig reported)    montelukast (SINGULAIR) 10 mg tablet TAKE ONE TABLET BY MOUTH EACH EVENING    multivitamin with minerals tablet Take 1 tablet by mouth once daily. Equate Brand with Iron    nebulizer accessories Misc Use as directed    omeprazole (PRILOSEC) 20 MG capsule Take one capsule by mouth once daily in the morning    omeprazole (PRILOSEC) 20 MG capsule Take one capsule by mouth once daily in the morning (Patient not taking: No sig reported)    OPW TEST CLAIM - DO NOT FILL test    pravastatin (PRAVACHOL) 20 MG tablet Take 1 tablet orally once in the evening.    sars-cov-2, covid-19, (MODERNA COVID-19) 100 mcg/0.5 ml injection Inject 0.25 ml into the muscle. (Patient not taking: No sig reported)    verapamiL (VERELAN) 100 MG CPCT Take 1 capsule orally once a day.    verapamiL (VERELAN) 100 MG CPCT Take 1 capsule orally once a day. (Patient not taking: No sig reported)   Last  reviewed on 12/10/2021  3:26 PM by Katarzyna Garcia, PharmD    Review of patient's allergies indicates:  No Known Allergies Last reviewed on  1/5/2022 8:13 AM by Liz Dupree      Tasks added this encounter   2/2/2022 - Refill Call (Auto Added)   Tasks due within next 3 months   3/3/2022 - Clinical - Follow Up Assesement (90 day)     Keyanna Figueroa - Specialty Pharmacy  [facility] [address] Figueroa  [facility] [id number]  Phone: [phone]  Fax: [phone]

## 2022-01-10 NOTE — PROGRESS NOTES
Hematology and Medical Oncology   Follow Up     01/10/2022    Primary Oncologic Diagnosis: CLL    History of Present Ilness:   Kevin Echeverria (Kevin) is a pleasant 81 y.o.male who presents to transition care to [facility] from the [facility]. The patient presents today with his wife and daughter. Most of his issues started in August when he began experiencing horrible pain in his hands that was worse with lack of motion/rest. The pain is excruciating and has sent him to the ED several times. The pain can happen in either upper extremity and feels like it travels at times. The discomfort abates as he moves his hands more throughout the day.     Incidentally through these ER visits it was noted that he has a persistently elevated but largely stable WBC.    --Pathologist review of the peripheral smear on 12/21/19 Leukocytosis with an absolute and relative lymphocytosis.     Lymphocytes are mature, and a subset displays a slightly atypical chromatin pattern.  Smudge cells are present.  Background granulocytes are morphologically normal.  The morphology suggests a B cell lymphoproliferative disorder such as CLL.  --Flow Cytometry on 1/3/2020: A B cell lymphoproliferative disorder is present.  Mantle cell lymphoma is favored although an atypical CLL cannot be completely ruled out; correlation   with morphology and with any available cytogenetic or molecular studies (t(11;14)) is required.     Interval History:  Mr. Echeverria is doing well. 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 Energy is good. No issues with medication. He takes the pills with a large glass of water as instructed.     Overall doing very well.      PAST MEDICAL HISTORY:   Past Medical History:   Diagnosis Date    Arthritis     Cancer     prostate    COPD (chronic obstructive pulmonary disease)     Hyperlipidemia     Hypertension     Leukemia        PAST SURGICAL HISTORY:   Past Surgical History:   Procedure Laterality  Date    CATARACT EXTRACTION W/  INTRAOCULAR LENS IMPLANT Left 05/05/2016    COLONOSCOPY W/ BIOPSIES AND POLYPECTOMY      PROSTATE SURGERY  05/1996    TONSILLECTOMY  1956       PAST SOCIAL HISTORY:   reports that he has never smoked. He quit smokeless tobacco use about 22 years ago. He reports current alcohol use. He reports that he does not use drugs.    FAMILY HISTORY:  Family History   Problem Relation Age of Onset    Diabetes Mother        CURRENT MEDICATIONS:   Current Outpatient Medications   Medication Sig    albuterol (PROVENTIL) 2.5 mg /3 mL (0.083 %) nebulizer solution Use content of one vial every 12 hours    aspirin (ECOTRIN) 81 MG EC tablet Take 81 mg by mouth once daily.    budesonide-glycopyr-formoterol (BREZTRI AEROSPHERE) 160-9-4.8 mcg/actuation HFAA INSTILL 2 PUFFS BY MOUTH EVERY 12 HOURS    colchicine (COLCRYS) 0.6 mg tablet Take 1 tablet (0.6 mg total) by mouth once daily.    famotidine (PEPCID) 20 MG tablet Take 1 tablet orally 2 times a day as needed for heart burn    fish oil-omega-3 fatty acids 300-1,000 mg capsule Take 1 g by mouth once daily.    gabapentin (NEURONTIN) 300 MG capsule Take 1 capsule (300 mg total) by mouth once daily.    ibrutinib (IMBRUVICA) 280 mg Tab Take 1 tablet (280 mg) by mouth once daily.    ipratropium (ATROVENT) 0.02 % nebulizer solution use contents of one vial every 12 hours    ipratropium (ATROVENT) 0.02 % nebulizer solution Use contents of one vial every 12 hours    losartan (COZAAR) 50 MG tablet Take 1 tablet orally once a day.    montelukast (SINGULAIR) 10 mg tablet TAKE ONE TABLET BY MOUTH EACH EVENING    multivitamin with minerals tablet Take 1 tablet by mouth once daily. Equate Brand with Iron    nebulizer accessories Misc Use as directed    omeprazole (PRILOSEC) 20 MG capsule Take one capsule by mouth once daily in the morning    OPW TEST CLAIM - DO NOT FILL test    pravastatin (PRAVACHOL) 20 MG tablet Take 1 tablet orally once in the  evening.    verapamiL (VERELAN) 100 MG CPCT Take 1 capsule orally once a day.    albuterol (PROVENTIL) 2.5 mg /3 mL (0.083 %) nebulizer solution Take 1 vial by nebulization twice daily. (Patient not taking: No sig reported)    ipratropium (ATROVENT) 0.02 % nebulizer solution Take 1 vial by nebulization twice daily. (Patient not taking: No sig reported)    losartan (COZAAR) 50 MG tablet TAKE ONE TABLET BY MOUTH EVERY DAY (Patient not taking: No sig reported)    omeprazole (PRILOSEC) 20 MG capsule Take one capsule by mouth once daily in the morning (Patient not taking: No sig reported)    sars-cov-2, covid-19, (MODERNA COVID-19) 100 mcg/0.5 ml injection Inject 0.25 ml into the muscle. (Patient not taking: No sig reported)    verapamiL (VERELAN) 100 MG CPCT Take 1 capsule orally once a day. (Patient not taking: No sig reported)     No current facility-administered medications for this visit.     ALLERGIES:   Review of patient's allergies indicates:  No Known Allergies      Review of Systems:     Review of Systems   Constitutional: Negative for appetite change, chills, diaphoresis, fatigue, fever and unexpected weight change.   HENT:   Negative for hearing loss, mouth sores, nosebleeds, sore throat, trouble swallowing and voice change.    Eyes: Negative for eye problems and icterus.   Respiratory: Negative for chest tightness, cough, hemoptysis, shortness of breath and wheezing.    Cardiovascular: Negative for chest pain, leg swelling and palpitations.   Gastrointestinal: Negative for abdominal distention, abdominal pain, blood in stool, diarrhea, nausea and vomiting.   Endocrine: Negative for hot flashes.   Genitourinary: Negative for bladder incontinence, difficulty urinating, dysuria and hematuria.    Musculoskeletal: Positive for arthralgias and neck pain. Negative for back pain, flank pain, gait problem, myalgias and neck stiffness.   Skin: Negative for itching, rash and wound.   Neurological: Negative for  dizziness, extremity weakness, gait problem, headaches, numbness, seizures and speech difficulty.   Hematological: Negative for adenopathy. Does not bruise/bleed easily.   Psychiatric/Behavioral: Negative for confusion, depression and sleep disturbance. The patient is not nervous/anxious.         Physical Exam:     Vitals:    01/05/22 0811   BP: (!) 115/58   Pulse: 71   Resp: 16   Temp: 98 °F (36.7 °C)     Physical Exam  Constitutional:       General: He is not in acute distress.     Appearance: He is well-developed. He is not diaphoretic.      Comments: Well dressed gentleman   HENT:      Head: Normocephalic and atraumatic.      Mouth/Throat:      Pharynx: No oropharyngeal exudate.   Eyes:      Conjunctiva/sclera: Conjunctivae normal.      Pupils: Pupils are equal, round, and reactive to light.   Neck:      Thyroid: No thyromegaly.      Vascular: No JVD.      Trachea: No tracheal deviation.   Cardiovascular:      Rate and Rhythm: Normal rate and regular rhythm.      Heart sounds: Normal heart sounds. No murmur heard.  No friction rub.   Pulmonary:      Effort: Pulmonary effort is normal. No respiratory distress.      Breath sounds: Normal breath sounds. No stridor. No wheezing or rales.   Chest:      Chest wall: No tenderness.   Abdominal:      General: Bowel sounds are normal. There is no distension.      Palpations: Abdomen is soft.      Tenderness: There is no abdominal tenderness. There is no guarding or rebound.   Musculoskeletal:         General: No tenderness or deformity. Normal range of motion.      Cervical back: Normal range of motion and neck supple.   Skin:     General: Skin is warm and dry.      Capillary Refill: Capillary refill takes less than 2 seconds.      Coloration: Skin is not pale.      Findings: No erythema or rash.   Neurological:      Mental Status: He is alert and oriented to person, place, and time.      Cranial Nerves: No cranial nerve deficit.      Sensory: No sensory deficit.       Motor: No abnormal muscle tone.      Coordination: Coordination normal.      Deep Tendon Reflexes: Reflexes normal.   Psychiatric:         Behavior: Behavior normal.         Thought Content: Thought content normal.         Judgment: Judgment normal.         ECOG Performance Status: (foot note - ECOG PS provided by Eastern Cooperative Oncology Group) 1 - Symptomatic but completely ambulatory    Karnofsky Performance Score:  90%- Able to Carry on Normal Activity: Minor Symptoms of Disease    Labs:   Lab Results   Component Value Date    WBC 24.34 (H) 12/16/2021    HGB 12.4 (L) 12/16/2021    HCT 37.3 (L) 12/16/2021     12/16/2021    CHOL 157 01/31/2020    TRIG 57 01/31/2020    HDL 65 01/31/2020    ALT 23 12/16/2021    AST 24 12/16/2021     (L) 12/16/2021    K 4.2 12/16/2021     12/16/2021    CREATININE 1.0 12/16/2021    BUN 9 12/16/2021    CO2 25 12/16/2021    TSH 3.184 01/31/2020    PSA 0.05 11/16/2021       Imaging: Outside imaging has been reviewed   Assessment and Plan:     Mr. Echeverria is a pleasant 81 year old male with presumed CLL.    CLL  --Diagnosis is still a work in progress  --Will obtain bone marrow biopsy report from Dr. Gallardo given the discrepancy in flow  --If this is indeed CLL the doubling time of the white count has not met criteria for treatment  --CLL fish indicates a 17p deletion in 40.5% of nuclei. In CLL, a 17p deletion is associated with an unfavorable prognosis   --Plan on monthly cbc's at [place]   --Has taken ibrutinib for roughly 2 month  --White count has roughly fallen by 50%    Bilateral Hand Myalgias  --Suspect possible cervical stenosis or other outflow issue  --Pt requests to see rheumatology here in the event it is joint related  --MRI cervical and thorasic spine ordered, if necessary we will consult vascular surgery    Prostate Cancer  --Treated with a prostatectomy      30 minutes were spent face to face with the patient and his  family to discuss the disease,  natural history, treatment options and survival statistics. I have provided the patient with an opportunity to ask questions and have all questions answered to his satisfaction.       he will return to clinic in about 8 weeks, with labs at [place] in 1 month, but knows to call in the interim if symptoms change or should a problem arise.        Jaquelin Quintana MD  Hematology and Medical Oncology  Bone Marrow Transplant  [facility]

## 2022-01-12 DIAGNOSIS — C91.10 CLL (CHRONIC LYMPHOCYTIC LEUKEMIA): Primary | ICD-10-CM

## 2022-01-14 ENCOUNTER — SPECIALTY PHARMACY (OUTPATIENT)
Dept: PHARMACY | Facility: CLINIC | Age: 82
End: 2022-01-14
Payer: MEDICARE

## 2022-01-14 ENCOUNTER — TELEPHONE (OUTPATIENT)
Dept: HEMATOLOGY/ONCOLOGY | Facility: CLINIC | Age: 82
End: 2022-01-14
Payer: MEDICARE

## 2022-01-14 DIAGNOSIS — C91.10 CLL (CHRONIC LYMPHOCYTIC LEUKEMIA): ICD-10-CM

## 2022-01-14 NOTE — TELEPHONE ENCOUNTER
"----- Message from Azalea Jalloh sent at 1/14/2022  9:45 AM CST -----  Regarding: Consult/Advisory:  Name Of Caller: Kevin    Contact Preference?: [phone]    What is the nature of the call?: Stating that his insurance is no longer covering his medication and would like to know any alternative       Additional Notes:  "Thank you for all that you do for our patients'"     "

## 2022-01-14 NOTE — TELEPHONE ENCOUNTER
Received message from Stefanie Mcgarry RN with Dr. Quintana regarding patient's Imbruvica. Patient received a letter from his insurance saying it wouldn't be covered any longer. Will submit new PA to ensure coverage remains on file.

## 2022-01-14 NOTE — TELEPHONE ENCOUNTER
Spoke with patient and he was concerned that his imbruvica is no longer covered per a letter he got in the mail. Spoke with enrique schneider, marcello d and she noted that prior auth for the medication has been obtained through the end of the year. Updated patient that his medication has been authorized through the end of the year.

## 2022-01-14 NOTE — TELEPHONE ENCOUNTER
PA for Imbruvica 280mg approved by patient's insurance. Approval Case ID# [id number]. Qty approved #28/28 day supply. Dates of approval. 01/01/2022 through 12/31/2022.     Copay $55

## 2022-01-18 ENCOUNTER — LAB VISIT (OUTPATIENT)
Dept: FAMILY MEDICINE | Facility: CLINIC | Age: 82
End: 2022-01-18
Payer: MEDICARE

## 2022-01-18 DIAGNOSIS — Z11.52 ENCOUNTER FOR SCREENING FOR COVID-19: Primary | ICD-10-CM

## 2022-01-18 LAB
CTP QC/QA: YES
SARS-COV-2 AG RESP QL IA.RAPID: NEGATIVE

## 2022-01-18 PROCEDURE — 87811 SARS CORONAVIRUS 2 ANTIGEN POCT, MANUAL READ: ICD-10-PCS | Mod: S$GLB,,, | Performed by: INTERNAL MEDICINE

## 2022-01-18 PROCEDURE — 87811 SARS-COV-2 COVID19 W/OPTIC: CPT | Mod: S$GLB,,, | Performed by: INTERNAL MEDICINE

## 2022-01-18 NOTE — PROGRESS NOTES
Instructions for Patients with Confirmed or Suspected COVID-19    If you are awaiting your test result, you will either be called or it will be released to the patient portal.  If you have any questions about your test, please visit [email or web address] or call our COVID-19 information line at [phone].      Please isolate yourself at home.  You may leave home and/or return to work once the following conditions are met:    If you have symptoms and tested positive:   More than 5 days since symptoms first appeared AND   More than 24 hours fever free without medications AND       symptoms have improved   · For five days after ending isolation, masks are required.    If you had no symptoms but tested positive:   More than 5 days since the date of the first positive test. If you develop symptoms, then use the guidelines above  · For five days after ending isolation, masks are required.      Testing is not recommended if you are symptom free after completing isolation.

## 2022-01-24 ENCOUNTER — SPECIALTY PHARMACY (OUTPATIENT)
Dept: PHARMACY | Facility: CLINIC | Age: 82
End: 2022-01-24
Payer: MEDICARE

## 2022-01-24 DIAGNOSIS — C91.10 CLL (CHRONIC LYMPHOCYTIC LEUKEMIA): Primary | ICD-10-CM

## 2022-02-04 ENCOUNTER — SPECIALTY PHARMACY (OUTPATIENT)
Dept: PHARMACY | Facility: CLINIC | Age: 82
End: 2022-02-04
Payer: MEDICARE

## 2022-02-04 NOTE — TELEPHONE ENCOUNTER
Specialty Pharmacy - Refill Coordination    Specialty Medication Orders Linked to Encounter    Flowsheet Row Most Recent Value   Medication #1 ibrutinib (IMBRUVICA) 280 mg Tab (Order#[id number], Rx#[id number])          Refill Questions - Documented Responses    Flowsheet Row Most Recent Value   Patient Availability and HIPAA Verification    Does patient want to proceed with activity? Yes   HIPAA/medical authority confirmed? Yes   Relationship to patient of person spoken to? Self   Refill Screening Questions    Changes to allergies? No   Changes to medications? No   New conditions since last clinic visit? No   Unplanned office visit, urgent care, ED, or hospital admission in the last 4 weeks? No   How does patient/caregiver feel medication is working? Good   Financial problems or insurance changes? No   How many doses of your specialty medications were missed in the last 4 weeks? 0   Would patient like to speak to a pharmacist? No   When does the patient need to receive the medication? 02/08/22   Refill Delivery Questions    How will the patient receive the medication? Delivery Domenica   When does the patient need to receive the medication? 02/08/22   Shipping Address Home   Address in [facility] confirmed and updated if neccessary? Yes   Expected Copay ($) 55   Is the patient able to afford the medication copay? Yes   Payment Method CC on file   Days supply of Refill 28   Supplies needed? No supplies needed   Refill activity completed? Yes   Refill activity plan Refill scheduled   Shipment/Pickup Date: 02/08/22          Current Outpatient Medications   Medication Sig    albuterol (PROVENTIL) 2.5 mg /3 mL (0.083 %) nebulizer solution Use content of one vial every 12 hours    albuterol (PROVENTIL) 2.5 mg /3 mL (0.083 %) nebulizer solution Take 1 vial by nebulization twice daily. (Patient not taking: No sig reported)    aspirin (ECOTRIN) 81 MG EC tablet Take 81 mg by mouth once daily.     budesonide-glycopyr-formoterol (BREZTRI AEROSPHERE) 160-9-4.8 mcg/actuation HFAA INSTILL 2 PUFFS BY MOUTH EVERY 12 HOURS    colchicine (COLCRYS) 0.6 mg tablet Take 1 tablet (0.6 mg total) by mouth once daily.    famotidine (PEPCID) 20 MG tablet Take 1 tablet orally 2 times a day as needed for heart burn    fish oil-omega-3 fatty acids 300-1,000 mg capsule Take 1 g by mouth once daily.    gabapentin (NEURONTIN) 300 MG capsule Take 1 capsule (300 mg total) by mouth once daily.    ibrutinib (IMBRUVICA) 280 mg Tab Take 1 tablet (280 mg) by mouth once daily.    ipratropium (ATROVENT) 0.02 % nebulizer solution use contents of one vial every 12 hours    ipratropium (ATROVENT) 0.02 % nebulizer solution Take 1 vial by nebulization twice daily. (Patient not taking: No sig reported)    ipratropium (ATROVENT) 0.02 % nebulizer solution Use contents of one vial every 12 hours    losartan (COZAAR) 50 MG tablet Take 1 tablet orally once a day.    losartan (COZAAR) 50 MG tablet TAKE ONE TABLET BY MOUTH EVERY DAY (Patient not taking: No sig reported)    montelukast (SINGULAIR) 10 mg tablet TAKE ONE TABLET BY MOUTH EACH EVENING    multivitamin with minerals tablet Take 1 tablet by mouth once daily. Equate Brand with Iron    nebulizer accessories Misc Use as directed    omeprazole (PRILOSEC) 20 MG capsule Take one capsule by mouth once daily in the morning    omeprazole (PRILOSEC) 20 MG capsule Take one capsule by mouth once daily in the morning (Patient not taking: No sig reported)    OPW TEST CLAIM - DO NOT FILL test    pravastatin (PRAVACHOL) 20 MG tablet Take 1 tablet orally once in the evening.    sars-cov-2, covid-19, (MODERNA COVID-19) 100 mcg/0.5 ml injection Inject 0.25 ml into the muscle. (Patient not taking: No sig reported)    verapamiL (VERELAN) 100 MG CPCT Take 1 capsule orally once a day.    verapamiL (VERELAN) 100 MG CPCT Take 1 capsule orally once a day. (Patient not taking: No sig reported)   Last  reviewed on 12/10/2021  3:26 PM by Jessica DeeD    Review of patient's allergies indicates:  No Known Allergies Last reviewed on  1/5/2022 8:13 AM by Liz Dupree      Tasks added this encounter   3/1/2022 - Refill Call (Auto Added)   Tasks due within next 3 months   3/3/2022 - Clinical - Follow Up Assesement (90 day)     Saba Lares, PharmD  [facility]  [address] [place] [id number]  Phone: [phone]  Fax: [phone]

## 2022-02-09 ENCOUNTER — LAB VISIT (OUTPATIENT)
Dept: LAB | Facility: HOSPITAL | Age: 82
End: 2022-02-09
Attending: INTERNAL MEDICINE
Payer: MEDICARE

## 2022-02-09 DIAGNOSIS — C91.10 CLL (CHRONIC LYMPHOCYTIC LEUKEMIA): ICD-10-CM

## 2022-02-09 LAB
ALBUMIN SERPL BCP-MCNC: 3.4 G/DL (ref 3.5–5.2)
ALP SERPL-CCNC: 45 U/L (ref 55–135)
ALT SERPL W/O P-5'-P-CCNC: 20 U/L (ref 10–44)
ANION GAP SERPL CALC-SCNC: 7 MMOL/L (ref 8–16)
AST SERPL-CCNC: 21 U/L (ref 10–40)
BASOPHILS # BLD AUTO: ABNORMAL K/UL (ref 0–0.2)
BASOPHILS NFR BLD: 0 % (ref 0–1.9)
BILIRUB SERPL-MCNC: 0.5 MG/DL (ref 0.1–1)
BUN SERPL-MCNC: 11 MG/DL (ref 8–23)
CALCIUM SERPL-MCNC: 8.8 MG/DL (ref 8.7–10.5)
CHLORIDE SERPL-SCNC: 101 MMOL/L (ref 95–110)
CO2 SERPL-SCNC: 26 MMOL/L (ref 23–29)
CREAT SERPL-MCNC: 1 MG/DL (ref 0.5–1.4)
DIFFERENTIAL METHOD: ABNORMAL
EOSINOPHIL # BLD AUTO: ABNORMAL K/UL (ref 0–0.5)
EOSINOPHIL NFR BLD: 0 % (ref 0–8)
ERYTHROCYTE [DISTWIDTH] IN BLOOD BY AUTOMATED COUNT: 12.1 % (ref 11.5–14.5)
EST. GFR  (AFRICAN AMERICAN): >60 ML/MIN/1.73 M^2
EST. GFR  (NON AFRICAN AMERICAN): >60 ML/MIN/1.73 M^2
GLUCOSE SERPL-MCNC: 102 MG/DL (ref 70–110)
HCT VFR BLD AUTO: 36.9 % (ref 40–54)
HGB BLD-MCNC: 12.2 G/DL (ref 14–18)
IMM GRANULOCYTES # BLD AUTO: ABNORMAL K/UL (ref 0–0.04)
IMM GRANULOCYTES NFR BLD AUTO: ABNORMAL % (ref 0–0.5)
LYMPHOCYTES # BLD AUTO: ABNORMAL K/UL (ref 1–4.8)
LYMPHOCYTES NFR BLD: 51 % (ref 18–48)
MCH RBC QN AUTO: 32.5 PG (ref 27–31)
MCHC RBC AUTO-ENTMCNC: 33.1 G/DL (ref 32–36)
MCV RBC AUTO: 98 FL (ref 82–98)
MONOCYTES # BLD AUTO: ABNORMAL K/UL (ref 0.3–1)
MONOCYTES NFR BLD: 9 % (ref 4–15)
NEUTROPHILS NFR BLD: 37 % (ref 38–73)
NRBC BLD-RTO: 0 /100 WBC
PLATELET # BLD AUTO: 224 K/UL (ref 150–450)
PLATELET BLD QL SMEAR: ABNORMAL
PMV BLD AUTO: 11 FL (ref 9.2–12.9)
POTASSIUM SERPL-SCNC: 4.2 MMOL/L (ref 3.5–5.1)
PROT SERPL-MCNC: 6.7 G/DL (ref 6–8.4)
RBC # BLD AUTO: 3.75 M/UL (ref 4.6–6.2)
SODIUM SERPL-SCNC: 134 MMOL/L (ref 136–145)
WBC # BLD AUTO: 17.63 K/UL (ref 3.9–12.7)
WBC OTHER NFR BLD MANUAL: 3 %

## 2022-02-09 PROCEDURE — 85060 BLOOD SMEAR INTERPRETATION: CPT | Mod: ,,, | Performed by: PATHOLOGY

## 2022-02-09 PROCEDURE — 85027 COMPLETE CBC AUTOMATED: CPT | Performed by: INTERNAL MEDICINE

## 2022-02-09 PROCEDURE — 80053 COMPREHEN METABOLIC PANEL: CPT | Performed by: INTERNAL MEDICINE

## 2022-02-09 PROCEDURE — 36415 COLL VENOUS BLD VENIPUNCTURE: CPT | Performed by: INTERNAL MEDICINE

## 2022-02-09 PROCEDURE — 85007 BL SMEAR W/DIFF WBC COUNT: CPT | Performed by: INTERNAL MEDICINE

## 2022-02-09 PROCEDURE — 85060 PATHOLOGIST REVIEW: ICD-10-PCS | Mod: ,,, | Performed by: PATHOLOGY

## 2022-02-10 LAB — PATH REV BLD -IMP: NORMAL

## 2022-02-22 DIAGNOSIS — D84.9 IMMUNOSUPPRESSED STATUS: ICD-10-CM

## 2022-03-02 ENCOUNTER — SPECIALTY PHARMACY (OUTPATIENT)
Dept: PHARMACY | Facility: CLINIC | Age: 82
End: 2022-03-02
Payer: MEDICARE

## 2022-03-02 NOTE — TELEPHONE ENCOUNTER
Specialty Pharmacy - Refill Coordination    Specialty Medication Orders Linked to Encounter    Flowsheet Row Most Recent Value   Medication #1 ibrutinib (IMBRUVICA) 280 mg Tab (Order#[id number], Rx#[id number])          Refill Questions - Documented Responses    Flowsheet Row Most Recent Value   Patient Availability and HIPAA Verification    Does patient want to proceed with activity? Yes   HIPAA/medical authority confirmed? Yes   Relationship to patient of person spoken to? Spouse/Significant Other   Refill Screening Questions    Changes to allergies? No   Changes to medications? No   New conditions since last clinic visit? No   Unplanned office visit, urgent care, ED, or hospital admission in the last 4 weeks? No   How does patient/caregiver feel medication is working? Good   Financial problems or insurance changes? No   How many doses of your specialty medications were missed in the last 4 weeks? 0   Would patient like to speak to a pharmacist? No   When does the patient need to receive the medication? 03/06/22   Refill Delivery Questions    How will the patient receive the medication? Delivery Domenica   When does the patient need to receive the medication? 03/06/22   Shipping Address Home   Address in [facility] confirmed and updated if neccessary? Yes   Expected Copay ($) 55   Is the patient able to afford the medication copay? Yes   Payment Method CC on file   Days supply of Refill 28   Supplies needed? No supplies needed   Refill activity completed? Yes   Refill activity plan Refill scheduled   Shipment/Pickup Date: 03/04/22          Current Outpatient Medications   Medication Sig    albuterol (PROVENTIL) 2.5 mg /3 mL (0.083 %) nebulizer solution Use content of one vial every 12 hours    albuterol (PROVENTIL) 2.5 mg /3 mL (0.083 %) nebulizer solution Take 1 vial by nebulization twice daily. (Patient not taking: No sig reported)    aspirin (ECOTRIN) 81 MG EC tablet Take 81 mg by mouth once daily.     budesonide-glycopyr-formoterol (BREZTRI AEROSPHERE) 160-9-4.8 mcg/actuation HFAA INSTILL 2 PUFFS BY MOUTH EVERY 12 HOURS    colchicine (COLCRYS) 0.6 mg tablet Take 1 tablet (0.6 mg total) by mouth once daily.    famotidine (PEPCID) 20 MG tablet Take 1 tablet orally 2 times a day as needed for heart burn    famotidine (PEPCID) 20 MG tablet Take 1 tablet orally 2 times a day as needed for heart burn    fish oil-omega-3 fatty acids 300-1,000 mg capsule Take 1 g by mouth once daily.    gabapentin (NEURONTIN) 300 MG capsule Take 1 capsule (300 mg total) by mouth once daily.    ibrutinib (IMBRUVICA) 280 mg Tab Take 1 tablet (280 mg) by mouth once daily.    ipratropium (ATROVENT) 0.02 % nebulizer solution use contents of one vial every 12 hours    ipratropium (ATROVENT) 0.02 % nebulizer solution Take 1 vial by nebulization twice daily. (Patient not taking: No sig reported)    ipratropium (ATROVENT) 0.02 % nebulizer solution Use contents of one vial every 12 hours    losartan (COZAAR) 50 MG tablet Take 1 tablet orally once a day.    losartan (COZAAR) 50 MG tablet TAKE ONE TABLET BY MOUTH EVERY DAY (Patient not taking: No sig reported)    montelukast (SINGULAIR) 10 mg tablet TAKE ONE TABLET BY MOUTH EACH EVENING    multivitamin with minerals tablet Take 1 tablet by mouth once daily. Equate Brand with Iron    nebulizer accessories Misc Use as directed    omeprazole (PRILOSEC) 20 MG capsule Take one capsule by mouth once daily in the morning    omeprazole (PRILOSEC) 20 MG capsule Take one capsule by mouth once daily in the morning (Patient not taking: No sig reported)    OPW TEST CLAIM - DO NOT FILL test    pravastatin (PRAVACHOL) 20 MG tablet Take 1 tablet orally once in the evening.    sars-cov-2, covid-19, (MODERNA COVID-19) 100 mcg/0.5 ml injection Inject 0.25 ml into the muscle. (Patient not taking: No sig reported)    valsartan (DIOVAN) 160 MG tablet Take 1 tablet orally once a day. (replacing the  Losartan and Verapamil)    verapamiL (VERELAN) 100 MG CPCT Take 1 capsule orally once a day.    verapamiL (VERELAN) 100 MG CPCT Take 1 capsule orally once a day. (Patient not taking: No sig reported)   Last reviewed on 12/10/2021  3:26 PM by Katarzyna Garcia, PharmD    Review of patient's allergies indicates:  No Known Allergies Last reviewed on  1/5/2022 8:13 AM by iLz Dupree      Tasks added this encounter   3/27/2022 - Refill Call (Auto Added)   Tasks due within next 3 months   3/3/2022 - Clinical - Follow Up Assesement (90 day)     [facility]  [address] [facility] [address]  Phone: [phone]  Fax: [phone]

## 2022-03-07 ENCOUNTER — OFFICE VISIT (OUTPATIENT)
Dept: RHEUMATOLOGY | Facility: CLINIC | Age: 82
End: 2022-03-07
Payer: MEDICARE

## 2022-03-07 VITALS
HEART RATE: 81 BPM | BODY MASS INDEX: 25.96 KG/M2 | WEIGHT: 171.31 LBS | HEIGHT: 68 IN | SYSTOLIC BLOOD PRESSURE: 135 MMHG | DIASTOLIC BLOOD PRESSURE: 78 MMHG

## 2022-03-07 DIAGNOSIS — M11.849 CALCIUM PYROPHOSPHATE ARTHROPATHY OF HAND: ICD-10-CM

## 2022-03-07 PROCEDURE — 99214 PR OFFICE/OUTPT VISIT, EST, LEVL IV, 30-39 MIN: ICD-10-PCS | Mod: S$GLB,,, | Performed by: INTERNAL MEDICINE

## 2022-03-07 PROCEDURE — 3075F SYST BP GE 130 - 139MM HG: CPT | Mod: CPTII,S$GLB,, | Performed by: INTERNAL MEDICINE

## 2022-03-07 PROCEDURE — 1126F PR PAIN SEVERITY QUANTIFIED, NO PAIN PRESENT: ICD-10-PCS | Mod: CPTII,S$GLB,, | Performed by: INTERNAL MEDICINE

## 2022-03-07 PROCEDURE — 3078F DIAST BP <80 MM HG: CPT | Mod: CPTII,S$GLB,, | Performed by: INTERNAL MEDICINE

## 2022-03-07 PROCEDURE — 3075F PR MOST RECENT SYSTOLIC BLOOD PRESS GE 130-139MM HG: ICD-10-PCS | Mod: CPTII,S$GLB,, | Performed by: INTERNAL MEDICINE

## 2022-03-07 PROCEDURE — 1160F PR REVIEW ALL MEDS BY PRESCRIBER/CLIN PHARMACIST DOCUMENTED: ICD-10-PCS | Mod: CPTII,S$GLB,, | Performed by: INTERNAL MEDICINE

## 2022-03-07 PROCEDURE — 1126F AMNT PAIN NOTED NONE PRSNT: CPT | Mod: CPTII,S$GLB,, | Performed by: INTERNAL MEDICINE

## 2022-03-07 PROCEDURE — 1159F PR MEDICATION LIST DOCUMENTED IN MEDICAL RECORD: ICD-10-PCS | Mod: CPTII,S$GLB,, | Performed by: INTERNAL MEDICINE

## 2022-03-07 PROCEDURE — 1159F MED LIST DOCD IN RCRD: CPT | Mod: CPTII,S$GLB,, | Performed by: INTERNAL MEDICINE

## 2022-03-07 PROCEDURE — 3078F PR MOST RECENT DIASTOLIC BLOOD PRESSURE < 80 MM HG: ICD-10-PCS | Mod: CPTII,S$GLB,, | Performed by: INTERNAL MEDICINE

## 2022-03-07 PROCEDURE — 1160F RVW MEDS BY RX/DR IN RCRD: CPT | Mod: CPTII,S$GLB,, | Performed by: INTERNAL MEDICINE

## 2022-03-07 PROCEDURE — 99214 OFFICE O/P EST MOD 30 MIN: CPT | Mod: S$GLB,,, | Performed by: INTERNAL MEDICINE

## 2022-03-07 PROCEDURE — 99999 PR PBB SHADOW E&M-EST. PATIENT-LVL III: CPT | Mod: PBBFAC,,, | Performed by: INTERNAL MEDICINE

## 2022-03-07 PROCEDURE — 99999 PR PBB SHADOW E&M-EST. PATIENT-LVL III: ICD-10-PCS | Mod: PBBFAC,,, | Performed by: INTERNAL MEDICINE

## 2022-03-07 RX ORDER — GABAPENTIN 300 MG/1
300 CAPSULE ORAL DAILY
Qty: 90 CAPSULE | Refills: 4 | Status: SHIPPED | OUTPATIENT
Start: 2022-03-07 | End: 2022-08-08

## 2022-03-07 RX ORDER — DICLOFENAC SODIUM 10 MG/G
2 GEL TOPICAL 2 TIMES DAILY PRN
Qty: 100 G | Refills: 5 | Status: SHIPPED | OUTPATIENT
Start: 2022-03-07 | End: 2023-05-25 | Stop reason: SDUPTHER

## 2022-03-07 RX ORDER — FAMOTIDINE 20 MG/1
20 TABLET, FILM COATED ORAL DAILY
Qty: 30 TABLET | Refills: 0 | Status: SHIPPED | OUTPATIENT
Start: 2022-03-07 | End: 2022-07-20 | Stop reason: SDUPTHER

## 2022-03-07 RX ORDER — COLCHICINE 0.6 MG/1
0.6 TABLET ORAL DAILY
Qty: 90 TABLET | Refills: 4 | Status: SHIPPED | OUTPATIENT
Start: 2022-03-07 | End: 2022-03-12

## 2022-03-07 NOTE — PROGRESS NOTES
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      Notes:       History of presenting illness    82 year old male presented with severe pain in both hands in aug 2019  He went to rheumatology and hematology : leukemia diagnosed  Now came to cancer center : CLL diagnosed    Has been to ER with excruciating pain in the hands   Now sees   His leukemia is not very significant   No need for treatment     Episodes so far  :    Pain and swelling right hand   Pain and swelling left hand  Pain and swelling b/l hands    Triggers : none   Started late evening : came on very fast   Got relief from steroids   Each episode would last for hours     One episode : he had fever +  No rash    CRP 18.8  ESR 13 during the episode    ESR 52 during another episode  CRP 71.2    EVE neg  RF neg    Uric acids 3.8 to 4.2 during the episodes    At nights : numbness both hands  Tips of fingers are numb    EMS for 10 minutes   When not in a flare he is ok,he can use his hands and he only has some soreness   He works with his hands all day and he has no problems     MRI C/T spine : 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    Right hand xray  No fracture or dislocation.  Degenerative joint space narrowing at the 1st digit MCP and IP joints, with additional moderate degenerative area at the 2nd through 4th MCP and DIP joints.  No osseous lytic or blastic lesion.  Soft tissues are unremarkable.    Right wrist xray  There is no acute fracture or dislocation.  No significant soft tissue swelling.  The carpal bones are intact with mild degenerative osteoarthrosis.  The radiocarpal articulation is intact.  The ulnar styloid is intact.    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    He has great quality of life and doesn't think he has not had any night symptoms    Labs     CMP nml  CBC Leucocytosis 68.83 and he says hematology is following   H/h 13.1/39  plts nml    Past history  HTN,HLD,leukemia,COPD    Family history  Diabetes     Social history    Former tobacco smoker quit 1999    Review of Systems    No skin rashes,malar rash,photosensitivity   No telangiectasias   No calcinosis   No psoriasis   No patchy alopecia   No oral and nasal ulcers   No dry eyes and dry mouth   No pleurisy or any cardiopulmonary complaints except for COPD  No dysphagia,diplopia and dysphonia and muscle weakness   No n/v/d/c   No acid reflux+   No raynaud's+   No digital ulcers   No renal issues   No blood clots   No fever,chills,night sweats,weight loss and loss of appetite   No new onset headaches   No recurrent conjunctivitis or uveitis or scleritis or episcleritis   No chronic or bloody diarrhea with no u colitis or crohn's /inflammatory bowel disease   No penile and urethral  d/c/STDs/no ulcers         Physical Exam   Constitutional: He is oriented to person, place, and time. No distress.   HENT:   Head: Normocephalic.   Mouth/Throat: Oropharynx is clear and moist.   Eyes: Pupils are equal, round, and reactive to light. Conjunctivae are normal. Right eye exhibits no discharge. Left eye exhibits no discharge. No scleral icterus.   Neck: No thyromegaly present.   Cardiovascular: Normal rate, regular rhythm and normal heart sounds.   Pulmonary/Chest: Effort normal and breath sounds normal. No stridor.   Abdominal: Soft. Bowel sounds are normal.   Musculoskeletal:         General: Normal range of motion.      Cervical back: Normal range of motion.    Lymphadenopathy:     He has no cervical adenopathy.   Neurological: He is alert and oriented to person, place, and time.   Skin: Skin is warm. No rash noted. He is not diaphoretic.   Psychiatric: Affect and judgment normal.       Assessment     82 year old male with  HTN,HLD,leukemia,COPD  comes with 3 episodes of acute onset pain and swelling in the b/l hands since august 2019  He now has a diagnosis of CLL but doesn't need treatment    Episodes so far  :    Pain and swelling right hand   Pain and swelling left hand  Pain and swelling b/l hands    Triggers : none   Started late evening : came on very fast   Got relief from steroids   Each episode would last for hours   One episode : he had fever +  No rash    During each episode his ESR/CRP have gone high  EVE neg  RF neg  Uric acids 3.8 to 4.2 during the episodes    Last flare dec 2019  When not in flare has some hand and arm paresthesias and no pain    Today joint exam unremarkable     Xrays right hand and wrist : moderate deg changes from 1 to 4 MCPs/DIPs    No psoriasis or inf bowel disease    Suspect Calcium pyrophosphate deposition disease with acute pseudogout flares     We initiated colchicine 0.6 mg daily and he has tolerated it well with no side effects    Also on gabapentin 300 mg bed time     He has very minimal neuropathy symptoms today overall he has done well    Left CMC OA  Hands have OA- changes      His Labs were discussed     CMP nml  Cbc abnormalities might be due to his hematologic malignancy  CLL stable     Right shoulder advanced deg changes in GH joint     1. Calcium pyrophosphate arthropathy of hand          F/u problem     Plan    Right shoulder- he prefers home PT  Printed exercises    Continue colchicine 0.6 mg every day    Continue gabapentin 300 mg daily     Prn meloxicam only   Topical voltaren gel offered    Liver and kidney function ok    If future flares,call me for steroids     If the hand paresthesias worsen then he will  contact me and we will do EMG/NSE UE     covid vaccine UTD   Talked about importance of 4th dose     Kevin was seen today for disease management.    Diagnoses and all orders for this visit:    Calcium pyrophosphate arthropathy of hand  -     colchicine (COLCRYS) 0.6 mg tablet; Take 1 tablet (0.6 mg total) by mouth once daily.    Other orders  -     famotidine (PEPCID) 20 MG tablet; Take 1 tablet (20 mg total) by mouth once daily.  -     gabapentin (NEURONTIN) 300 MG capsule; Take 1 capsule (300 mg total) by mouth once daily.  -     diclofenac sodium (VOLTAREN) 1 % Gel; Apply 2 g topically 2 (two) times daily as needed (pain).          rtc in 12 months

## 2022-03-12 ENCOUNTER — PATIENT MESSAGE (OUTPATIENT)
Dept: RHEUMATOLOGY | Facility: CLINIC | Age: 82
End: 2022-03-12
Payer: MEDICARE

## 2022-03-12 DIAGNOSIS — M11.849 CALCIUM PYROPHOSPHATE ARTHROPATHY OF HAND: ICD-10-CM

## 2022-03-12 RX ORDER — COLCHICINE 0.6 MG/1
0.6 TABLET ORAL DAILY
Qty: 90 TABLET | Refills: 4 | Status: SHIPPED | OUTPATIENT
Start: 2022-03-12 | End: 2022-04-05

## 2022-03-14 ENCOUNTER — LAB VISIT (OUTPATIENT)
Dept: LAB | Facility: HOSPITAL | Age: 82
End: 2022-03-14
Attending: INTERNAL MEDICINE
Payer: MEDICARE

## 2022-03-14 DIAGNOSIS — C91.10 CLL (CHRONIC LYMPHOCYTIC LEUKEMIA): ICD-10-CM

## 2022-03-14 LAB
ALBUMIN SERPL BCP-MCNC: 3.5 G/DL (ref 3.5–5.2)
ALP SERPL-CCNC: 50 U/L (ref 55–135)
ALT SERPL W/O P-5'-P-CCNC: 17 U/L (ref 10–44)
ANION GAP SERPL CALC-SCNC: 5 MMOL/L (ref 8–16)
ANISOCYTOSIS BLD QL SMEAR: SLIGHT
AST SERPL-CCNC: 19 U/L (ref 10–40)
BASOPHILS # BLD AUTO: ABNORMAL K/UL (ref 0–0.2)
BASOPHILS NFR BLD: 0 % (ref 0–1.9)
BILIRUB SERPL-MCNC: 0.5 MG/DL (ref 0.1–1)
BUN SERPL-MCNC: 7 MG/DL (ref 8–23)
CALCIUM SERPL-MCNC: 9.2 MG/DL (ref 8.7–10.5)
CHLORIDE SERPL-SCNC: 102 MMOL/L (ref 95–110)
CO2 SERPL-SCNC: 26 MMOL/L (ref 23–29)
CREAT SERPL-MCNC: 1 MG/DL (ref 0.5–1.4)
DIFFERENTIAL METHOD: ABNORMAL
EOSINOPHIL # BLD AUTO: ABNORMAL K/UL (ref 0–0.5)
EOSINOPHIL NFR BLD: 5 % (ref 0–8)
ERYTHROCYTE [DISTWIDTH] IN BLOOD BY AUTOMATED COUNT: 11.9 % (ref 11.5–14.5)
EST. GFR  (AFRICAN AMERICAN): >60 ML/MIN/1.73 M^2
EST. GFR  (NON AFRICAN AMERICAN): >60 ML/MIN/1.73 M^2
GLUCOSE SERPL-MCNC: 96 MG/DL (ref 70–110)
HCT VFR BLD AUTO: 38.7 % (ref 40–54)
HGB BLD-MCNC: 12.7 G/DL (ref 14–18)
IMM GRANULOCYTES # BLD AUTO: ABNORMAL K/UL (ref 0–0.04)
IMM GRANULOCYTES NFR BLD AUTO: ABNORMAL % (ref 0–0.5)
LYMPHOCYTES # BLD AUTO: ABNORMAL K/UL (ref 1–4.8)
LYMPHOCYTES NFR BLD: 49 % (ref 18–48)
MCH RBC QN AUTO: 32.7 PG (ref 27–31)
MCHC RBC AUTO-ENTMCNC: 32.8 G/DL (ref 32–36)
MCV RBC AUTO: 100 FL (ref 82–98)
MONOCYTES # BLD AUTO: ABNORMAL K/UL (ref 0.3–1)
MONOCYTES NFR BLD: 4 % (ref 4–15)
NEUTROPHILS NFR BLD: 41 % (ref 38–73)
NEUTS BAND NFR BLD MANUAL: 1 %
NRBC BLD-RTO: 0 /100 WBC
PLATELET # BLD AUTO: 208 K/UL (ref 150–450)
PLATELET BLD QL SMEAR: ABNORMAL
PMV BLD AUTO: 11 FL (ref 9.2–12.9)
POIKILOCYTOSIS BLD QL SMEAR: SLIGHT
POTASSIUM SERPL-SCNC: 4.4 MMOL/L (ref 3.5–5.1)
PROT SERPL-MCNC: 6.8 G/DL (ref 6–8.4)
RBC # BLD AUTO: 3.88 M/UL (ref 4.6–6.2)
SODIUM SERPL-SCNC: 133 MMOL/L (ref 136–145)
WBC # BLD AUTO: 15.05 K/UL (ref 3.9–12.7)

## 2022-03-14 PROCEDURE — 85027 COMPLETE CBC AUTOMATED: CPT | Performed by: INTERNAL MEDICINE

## 2022-03-14 PROCEDURE — 36415 COLL VENOUS BLD VENIPUNCTURE: CPT | Performed by: INTERNAL MEDICINE

## 2022-03-14 PROCEDURE — 80053 COMPREHEN METABOLIC PANEL: CPT | Performed by: INTERNAL MEDICINE

## 2022-03-14 PROCEDURE — 85007 BL SMEAR W/DIFF WBC COUNT: CPT | Performed by: INTERNAL MEDICINE

## 2022-03-17 ENCOUNTER — OFFICE VISIT (OUTPATIENT)
Dept: HEMATOLOGY/ONCOLOGY | Facility: CLINIC | Age: 82
End: 2022-03-17
Payer: MEDICARE

## 2022-03-17 VITALS
HEART RATE: 71 BPM | SYSTOLIC BLOOD PRESSURE: 136 MMHG | WEIGHT: 156.63 LBS | DIASTOLIC BLOOD PRESSURE: 62 MMHG | TEMPERATURE: 98 F | HEIGHT: 68 IN | OXYGEN SATURATION: 100 % | RESPIRATION RATE: 16 BRPM | BODY MASS INDEX: 23.74 KG/M2

## 2022-03-17 DIAGNOSIS — M25.511 CHRONIC RIGHT SHOULDER PAIN: ICD-10-CM

## 2022-03-17 DIAGNOSIS — G89.29 CHRONIC RIGHT SHOULDER PAIN: ICD-10-CM

## 2022-03-17 DIAGNOSIS — C61 PROSTATE CANCER: ICD-10-CM

## 2022-03-17 DIAGNOSIS — C91.10 CLL (CHRONIC LYMPHOCYTIC LEUKEMIA): Primary | ICD-10-CM

## 2022-03-17 PROCEDURE — 1126F PR PAIN SEVERITY QUANTIFIED, NO PAIN PRESENT: ICD-10-PCS | Mod: CPTII,S$GLB,, | Performed by: INTERNAL MEDICINE

## 2022-03-17 PROCEDURE — 1159F MED LIST DOCD IN RCRD: CPT | Mod: CPTII,S$GLB,, | Performed by: INTERNAL MEDICINE

## 2022-03-17 PROCEDURE — 3078F DIAST BP <80 MM HG: CPT | Mod: CPTII,S$GLB,, | Performed by: INTERNAL MEDICINE

## 2022-03-17 PROCEDURE — 3288F FALL RISK ASSESSMENT DOCD: CPT | Mod: CPTII,S$GLB,, | Performed by: INTERNAL MEDICINE

## 2022-03-17 PROCEDURE — 3288F PR FALLS RISK ASSESSMENT DOCUMENTED: ICD-10-PCS | Mod: CPTII,S$GLB,, | Performed by: INTERNAL MEDICINE

## 2022-03-17 PROCEDURE — 1159F PR MEDICATION LIST DOCUMENTED IN MEDICAL RECORD: ICD-10-PCS | Mod: CPTII,S$GLB,, | Performed by: INTERNAL MEDICINE

## 2022-03-17 PROCEDURE — 3078F PR MOST RECENT DIASTOLIC BLOOD PRESSURE < 80 MM HG: ICD-10-PCS | Mod: CPTII,S$GLB,, | Performed by: INTERNAL MEDICINE

## 2022-03-17 PROCEDURE — 1126F AMNT PAIN NOTED NONE PRSNT: CPT | Mod: CPTII,S$GLB,, | Performed by: INTERNAL MEDICINE

## 2022-03-17 PROCEDURE — 99999 PR PBB SHADOW E&M-EST. PATIENT-LVL IV: ICD-10-PCS | Mod: PBBFAC,,, | Performed by: INTERNAL MEDICINE

## 2022-03-17 PROCEDURE — 3075F PR MOST RECENT SYSTOLIC BLOOD PRESS GE 130-139MM HG: ICD-10-PCS | Mod: CPTII,S$GLB,, | Performed by: INTERNAL MEDICINE

## 2022-03-17 PROCEDURE — 1101F PT FALLS ASSESS-DOCD LE1/YR: CPT | Mod: CPTII,S$GLB,, | Performed by: INTERNAL MEDICINE

## 2022-03-17 PROCEDURE — 99215 OFFICE O/P EST HI 40 MIN: CPT | Mod: S$GLB,,, | Performed by: INTERNAL MEDICINE

## 2022-03-17 PROCEDURE — 3075F SYST BP GE 130 - 139MM HG: CPT | Mod: CPTII,S$GLB,, | Performed by: INTERNAL MEDICINE

## 2022-03-17 PROCEDURE — 1101F PR PT FALLS ASSESS DOC 0-1 FALLS W/OUT INJ PAST YR: ICD-10-PCS | Mod: CPTII,S$GLB,, | Performed by: INTERNAL MEDICINE

## 2022-03-17 PROCEDURE — 99999 PR PBB SHADOW E&M-EST. PATIENT-LVL IV: CPT | Mod: PBBFAC,,, | Performed by: INTERNAL MEDICINE

## 2022-03-17 PROCEDURE — 99215 PR OFFICE/OUTPT VISIT, EST, LEVL V, 40-54 MIN: ICD-10-PCS | Mod: S$GLB,,, | Performed by: INTERNAL MEDICINE

## 2022-03-17 NOTE — PROGRESS NOTES
Hematology and Medical Oncology   Follow Up     03/17/2022    Primary Oncologic Diagnosis: CLL    History of Present Ilness:   Kevin Echeverria (Kevin) is a pleasant 82 y.o.male who presents to transition care to [facility] from the [facility]. The patient presents today with his wife and daughter. Most of his issues started in August when he began experiencing horrible pain in his hands that was worse with lack of motion/rest. The pain is excruciating and has sent him to the ED several times. The pain can happen in either upper extremity and feels like it travels at times. The discomfort abates as he moves his hands more throughout the day.     Incidentally through these ER visits it was noted that he has a persistently elevated but largely stable WBC.    --Pathologist review of the peripheral smear on 12/21/19 Leukocytosis with an absolute and relative lymphocytosis.     Lymphocytes are mature, and a subset displays a slightly atypical chromatin pattern.  Smudge cells are present.  Background granulocytes are morphologically normal.  The morphology suggests a B cell lymphoproliferative disorder such as CLL.  --Flow Cytometry on 1/3/2020: A B cell lymphoproliferative disorder is present.  Mantle cell lymphoma is favored although an atypical CLL cannot be completely ruled out; correlation   with morphology and with any available cytogenetic or molecular studies (t(11;14)) is required.     Interval History:  Mr. Echeverria is doing well. 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 Energy is good. No issues with medication. He takes the pills with a large glass of water as instructed.     Overall doing very well.  Happy to hear his white count is close to normal.    PAST MEDICAL HISTORY:   Past Medical History:   Diagnosis Date    Arthritis     Cancer     prostate    COPD (chronic obstructive pulmonary disease)     Hyperlipidemia     Hypertension     Leukemia        PAST SURGICAL  HISTORY:   Past Surgical History:   Procedure Laterality Date    CATARACT EXTRACTION W/  INTRAOCULAR LENS IMPLANT Left 05/05/2016    COLONOSCOPY W/ BIOPSIES AND POLYPECTOMY      PROSTATE SURGERY  05/1996    TONSILLECTOMY  1956       PAST SOCIAL HISTORY:   reports that he has never smoked. He quit smokeless tobacco use about 23 years ago. He reports current alcohol use. He reports that he does not use drugs.    FAMILY HISTORY:  Family History   Problem Relation Age of Onset    Diabetes Mother        CURRENT MEDICATIONS:   Current Outpatient Medications   Medication Sig    albuterol (PROVENTIL) 2.5 mg /3 mL (0.083 %) nebulizer solution Use content of one vial every 12 hours    aspirin (ECOTRIN) 81 MG EC tablet Take 81 mg by mouth once daily.    colchicine (COLCRYS) 0.6 mg tablet Take 1 tablet (0.6 mg total) by mouth once daily.    famotidine (PEPCID) 20 MG tablet Take 1 tablet (20 mg total) by mouth once daily.    fish oil-omega-3 fatty acids 300-1,000 mg capsule Take 1 g by mouth once daily.    gabapentin (NEURONTIN) 300 MG capsule Take 1 capsule (300 mg total) by mouth once daily.    ibrutinib (IMBRUVICA) 280 mg Tab Take 1 tablet (280 mg) by mouth once daily.    ipratropium (ATROVENT) 0.02 % nebulizer solution use contents of one vial every 12 hours    multivitamin with minerals tablet Take 1 tablet by mouth once daily. Equate Brand with Iron    omeprazole (PRILOSEC) 20 MG capsule Take one capsule by mouth once daily in the morning    pravastatin (PRAVACHOL) 20 MG tablet Take 1 tablet orally once in the evening.    verapamiL (VERELAN) 100 MG CPCT Take 1 capsule orally once a day.    budesonide-glycopyr-formoterol (BREZTRI AEROSPHERE) 160-9-4.8 mcg/actuation HFAA INSTILL 2 PUFFS BY MOUTH EVERY 12 HOURS    diclofenac sodium (VOLTAREN) 1 % Gel Apply 2 g topically 2 (two) times daily as needed (pain).    montelukast (SINGULAIR) 10 mg tablet TAKE ONE TABLET BY MOUTH EACH EVENING    nebulizer  accessories Misc Use as directed    valsartan (DIOVAN) 160 MG tablet Take 1 tablet orally once a day. (replacing the Losartan and Verapamil) (Patient not taking: Reported on 3/17/2022)     No current facility-administered medications for this visit.     ALLERGIES:   Review of patient's allergies indicates:  No Known Allergies      Review of Systems:     Review of Systems   Constitutional: Negative for appetite change, chills, diaphoresis, fatigue, fever and unexpected weight change.   HENT:   Negative for hearing loss, mouth sores, nosebleeds, sore throat, trouble swallowing and voice change.    Eyes: Negative for eye problems and icterus.   Respiratory: Negative for chest tightness, cough, hemoptysis, shortness of breath and wheezing.    Cardiovascular: Negative for chest pain, leg swelling and palpitations.   Gastrointestinal: Negative for abdominal distention, abdominal pain, blood in stool, diarrhea, nausea and vomiting.   Endocrine: Negative for hot flashes.   Genitourinary: Negative for bladder incontinence, difficulty urinating, dysuria and hematuria.    Musculoskeletal: Positive for arthralgias and neck pain. Negative for back pain, flank pain, gait problem, myalgias and neck stiffness.   Skin: Negative for itching, rash and wound.   Neurological: Negative for dizziness, extremity weakness, gait problem, headaches, numbness, seizures and speech difficulty.   Hematological: Negative for adenopathy. Does not bruise/bleed easily.   Psychiatric/Behavioral: Negative for confusion, depression and sleep disturbance. The patient is not nervous/anxious.         Physical Exam:     Vitals:    03/17/22 0746   BP: 136/62   Pulse: 71   Resp: 16   Temp: 97.9 °F (36.6 °C)     Physical Exam  Constitutional:       General: He is not in acute distress.     Appearance: He is well-developed. He is not diaphoretic.      Comments: Well dressed gentleman   HENT:      Head: Normocephalic and atraumatic.      Mouth/Throat:       Pharynx: No oropharyngeal exudate.   Eyes:      Conjunctiva/sclera: Conjunctivae normal.      Pupils: Pupils are equal, round, and reactive to light.   Neck:      Thyroid: No thyromegaly.      Vascular: No JVD.      Trachea: No tracheal deviation.   Cardiovascular:      Rate and Rhythm: Normal rate and regular rhythm.      Heart sounds: Normal heart sounds. No murmur heard.    No friction rub.   Pulmonary:      Effort: Pulmonary effort is normal. No respiratory distress.      Breath sounds: Normal breath sounds. No stridor. No wheezing or rales.   Chest:      Chest wall: No tenderness.   Abdominal:      General: Bowel sounds are normal. There is no distension.      Palpations: Abdomen is soft.      Tenderness: There is no abdominal tenderness. There is no guarding or rebound.   Musculoskeletal:         General: No tenderness or deformity. Normal range of motion.      Cervical back: Normal range of motion and neck supple.   Skin:     General: Skin is warm and dry.      Capillary Refill: Capillary refill takes less than 2 seconds.      Coloration: Skin is not pale.      Findings: No erythema or rash.   Neurological:      Mental Status: He is alert and oriented to person, place, and time.      Cranial Nerves: No cranial nerve deficit.      Sensory: No sensory deficit.      Motor: No abnormal muscle tone.      Coordination: Coordination normal.      Deep Tendon Reflexes: Reflexes normal.   Psychiatric:         Behavior: Behavior normal.         Thought Content: Thought content normal.         Judgment: Judgment normal.         ECOG Performance Status: (foot note - ECOG PS provided by Eastern Cooperative Oncology Group) 1 - Symptomatic but completely ambulatory    Karnofsky Performance Score:  90%- Able to Carry on Normal Activity: Minor Symptoms of Disease    Labs:   Lab Results   Component Value Date    WBC 15.05 (H) 03/14/2022    HGB 12.7 (L) 03/14/2022    HCT 38.7 (L) 03/14/2022     03/14/2022    CHOL 157  01/31/2020    TRIG 57 01/31/2020    HDL 65 01/31/2020    ALT 17 03/14/2022    AST 19 03/14/2022     (L) 03/14/2022    K 4.4 03/14/2022     03/14/2022    CREATININE 1.0 03/14/2022    BUN 7 (L) 03/14/2022    CO2 26 03/14/2022    TSH 3.184 01/31/2020    PSA 0.05 11/16/2021       Imaging: Outside imaging has been reviewed   Assessment and Plan:     Mr. Echeverria is a pleasant 82 year old male with presumed CLL.    CLL  --Diagnosis is still a work in progress  --Will obtain bone marrow biopsy report from Dr. Gallardo given the discrepancy in flow  --If this is indeed CLL the doubling time of the white count has not met criteria for treatment  --CLL fish indicates a 17p deletion in 40.5% of nuclei. In CLL, a 17p deletion is associated with an unfavorable prognosis   --Plan on monthly cbc's at [place]   --Has taken ibrutinib for roughly 2 month  --White count has roughly fallen by 50%    Bilateral Hand Myalgias  --Suspect possible cervical stenosis or other outflow issue  --Pt requests to see rheumatology here in the event it is joint related  --MRI cervical and thorasic spine ordered, if necessary we will consult vascular surgery    Prostate Cancer  --Treated with a prostatectomy      30 minutes were spent face to face with the patient and his  family to discuss the disease, natural history, treatment options and survival statistics. I have provided the patient with an opportunity to ask questions and have all questions answered to his satisfaction.       he will return to clinic in about 8 weeks, with labs at [place] 1 day prior, but knows to call in the interim if symptoms change or should a problem arise.        Jaquelin Quintana MD  Hematology and Medical Oncology  Bone Marrow Transplant  [facility]        BMT Chart Routing      Follow up with physician 2 months. Likes 8am appointments   Follow up with MARY JANE    Labs CBC and CMP   Lab interval:  Labs 1 week prior to clinic visit. Please draw labs at  [facility]   Imaging    Pharmacy appointment    Other referrals

## 2022-03-29 ENCOUNTER — SPECIALTY PHARMACY (OUTPATIENT)
Dept: PHARMACY | Facility: CLINIC | Age: 82
End: 2022-03-29
Payer: MEDICARE

## 2022-03-29 DIAGNOSIS — C91.10 CLL (CHRONIC LYMPHOCYTIC LEUKEMIA): Primary | ICD-10-CM

## 2022-03-29 RX ORDER — IPRATROPIUM BROMIDE 0.5 MG/2.5ML
SOLUTION RESPIRATORY (INHALATION)
Qty: 450 ML | Refills: 12 | OUTPATIENT
Start: 2022-03-29 | End: 2023-06-19 | Stop reason: SDUPTHER

## 2022-03-29 NOTE — TELEPHONE ENCOUNTER
Specialty Pharmacy - Refill Coordination    Specialty Medication Orders Linked to Encounter    Flowsheet Row Most Recent Value   Medication #1 ibrutinib (IMBRUVICA) 280 mg Tab (Order#[id number], Rx#[id number])          Refill Questions - Documented Responses    Flowsheet Row Most Recent Value   Patient Availability and HIPAA Verification    Does patient want to proceed with activity? Yes   HIPAA/medical authority confirmed? Yes   Relationship to patient of person spoken to? Self   Refill Screening Questions    Changes to allergies? No   Changes to medications? No   New conditions since last clinic visit? No   Unplanned office visit, urgent care, ED, or hospital admission in the last 4 weeks? No   How does patient/caregiver feel medication is working? Good   Financial problems or insurance changes? No   How many doses of your specialty medications were missed in the last 4 weeks? 0   Would patient like to speak to a pharmacist? Yes   When does the patient need to receive the medication? 04/02/22   Refill Delivery Questions    How will the patient receive the medication? Delivery Domenica   When does the patient need to receive the medication? 04/02/22   Shipping Address Home   Address in [facility] confirmed and updated if neccessary? Yes   Expected Copay ($) 70   Is the patient able to afford the medication copay? Yes   Payment Method zero copay   Days supply of Refill 28   Supplies needed? No supplies needed   Refill activity completed? Yes   Refill activity plan Refill scheduled   Shipment/Pickup Date: 03/31/22          Current Outpatient Medications   Medication Sig    albuterol (PROVENTIL) 2.5 mg /3 mL (0.083 %) nebulizer solution Use content of one vial every 12 hours    aspirin (ECOTRIN) 81 MG EC tablet Take 81 mg by mouth once daily.    budesonide-glycopyr-formoterol (BREZTRI AEROSPHERE) 160-9-4.8 mcg/actuation HFAA INSTILL 2 PUFFS BY MOUTH EVERY 12 HOURS    colchicine (COLCRYS) 0.6 mg tablet Take 1  tablet (0.6 mg total) by mouth once daily.    diclofenac sodium (VOLTAREN) 1 % Gel Apply 2 g topically 2 (two) times daily as needed (pain).    famotidine (PEPCID) 20 MG tablet Take 1 tablet (20 mg total) by mouth once daily.    fish oil-omega-3 fatty acids 300-1,000 mg capsule Take 1 g by mouth once daily.    gabapentin (NEURONTIN) 300 MG capsule Take 1 capsule (300 mg total) by mouth once daily.    ibrutinib (IMBRUVICA) 280 mg Tab Take 1 tablet (280 mg) by mouth once daily.    ipratropium (ATROVENT) 0.02 % nebulizer solution use contents of one vial every 12 hours    montelukast (SINGULAIR) 10 mg tablet TAKE ONE TABLET BY MOUTH EACH EVENING    multivitamin with minerals tablet Take 1 tablet by mouth once daily. Equate Brand with Iron    nebulizer accessories Misc Use as directed    omeprazole (PRILOSEC) 20 MG capsule Take one capsule by mouth once daily in the morning    pravastatin (PRAVACHOL) 20 MG tablet Take 1 tablet orally once in the evening.    valsartan (DIOVAN) 160 MG tablet Take 1 tablet orally once a day. (replacing the Losartan and Verapamil) (Patient not taking: Reported on 3/17/2022)    verapamiL (VERELAN) 100 MG CPCT Take 1 capsule orally once a day.   Last reviewed on 3/29/2022 12:29 PM by Katarzyna Garcia, Travis    Review of patient's allergies indicates:  No Known Allergies Last reviewed on  3/29/2022 12:29 PM by Katarzyna Garcia      Tasks added this encounter   No tasks added.   Tasks due within next 3 months   3/3/2022 - Clinical - Follow Up Assesement (90 day)  3/27/2022 - Refill Call (Auto Added)     Katarzyna Garcia, PharmD  [facility]  [address] [facility] [address]  Phone: [phone]  Fax: [phone]    Kevin Echeverria is a 82 y.o. 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  He has been on therapy with Imbruvica for 12 months.  I have reviewed his electronic medical  record and current medication list and determined that specialty medication adjustment Is not needed at this time.    Patient has not experienced adverse events.  He Is adherent reporting 0 missed doses since last review.  Adherence has been encouraged with the following mechanism(s): normal routine.  He is meeting goals of therapy and will continue treatment.    Follow Up Review 3/29/2022 3/2/2022 2/4/2022 1/5/2022   # of missed doses 0 0 0 0   New Medications? No No No No   New Conditions? No No No No   New Allergies? No No No No   Medication Effective? Good Good Good Good   Some recent data might be hidden       Therapy is appropriate to continue.    Therapy is effective: Yes  Recommendations: none at this time.  Review Method: Patient Contact     Labs reviewed from 3/14/22  WS: 15.05 (coming down)    Katarzyna Garcia, PharmD  [facility]  [address]  [facility] [address]  Phone: [phone]  Fax: [phone]

## 2022-04-05 ENCOUNTER — TELEPHONE (OUTPATIENT)
Dept: RHEUMATOLOGY | Facility: CLINIC | Age: 82
End: 2022-04-05
Payer: MEDICARE

## 2022-04-05 DIAGNOSIS — M11.849 CALCIUM PYROPHOSPHATE ARTHROPATHY OF HAND: ICD-10-CM

## 2022-04-05 RX ORDER — COLCHICINE 0.6 MG/1
0.6 CAPSULE ORAL DAILY
Qty: 90 CAPSULE | Refills: 4 | Status: SHIPPED | OUTPATIENT
Start: 2022-04-05 | End: 2022-08-08

## 2022-04-05 NOTE — TELEPHONE ENCOUNTER
4/5-11:38am Spoke w/ Rep at [facility], patient refill do not need a PA at this time.   4/5-patient paid a portion for he's refill for his Colchine. Patient's insurance paid a portion. Patient needs a PA for next month's refill.     Thank you,   Jerri     ----- Message from Burke Weaver sent at 4/4/2022  3:14 PM CDT -----  Patient called to speak w/ someone in regards to him receiving a notice from [facility] informing him that a PA is needed for a refill on Rx colchicine (COLCRYS) 0.6 mg tablet. Callback [phone]     [facility] [address] [place] [address]  Phone: [phone] Fax: [phone]

## 2022-04-11 NOTE — TELEPHONE ENCOUNTER
No new care gaps identified.  Powered by [employer]PATH [employer]. Reference number: [id number].   4/11/2022 4:50:07 PM CDT

## 2022-04-12 RX ORDER — OMEPRAZOLE 20 MG/1
CAPSULE, DELAYED RELEASE ORAL
Qty: 30 CAPSULE | Refills: 11 | Status: SHIPPED | OUTPATIENT
Start: 2022-04-12 | End: 2023-04-10 | Stop reason: SDUPTHER

## 2022-04-18 ENCOUNTER — PATIENT MESSAGE (OUTPATIENT)
Dept: ADMINISTRATIVE | Facility: OTHER | Age: 82
End: 2022-04-18
Payer: MEDICARE

## 2022-04-25 ENCOUNTER — SPECIALTY PHARMACY (OUTPATIENT)
Dept: PHARMACY | Facility: CLINIC | Age: 82
End: 2022-04-25
Payer: MEDICARE

## 2022-04-25 NOTE — TELEPHONE ENCOUNTER
Specialty Pharmacy - Refill Coordination    Specialty Medication Orders Linked to Encounter    Flowsheet Row Most Recent Value   Medication #1 ibrutinib (IMBRUVICA) 280 mg Tab (Order#[id number], Rx#[id number])          Refill Questions - Documented Responses    Flowsheet Row Most Recent Value   Patient Availability and HIPAA Verification    Does patient want to proceed with activity? Yes   HIPAA/medical authority confirmed? Yes   Relationship to patient of person spoken to? Self   Refill Screening Questions    Changes to allergies? No   Changes to medications? No   New conditions since last clinic visit? No   Unplanned office visit, urgent care, ED, or hospital admission in the last 4 weeks? No   How does patient/caregiver feel medication is working? Good   Financial problems or insurance changes? No   How many doses of your specialty medications were missed in the last 4 weeks? 0   Would patient like to speak to a pharmacist? No   When does the patient need to receive the medication? 05/02/22   Refill Delivery Questions    How will the patient receive the medication? Delivery Domenica   When does the patient need to receive the medication? 05/02/22   Shipping Address Home   Address in [facility] confirmed and updated if neccessary? Yes   Expected Copay ($) 70   Is the patient able to afford the medication copay? Yes   Payment Method CC on file   Days supply of Refill 28   Supplies needed? No supplies needed   Refill activity completed? Yes   Refill activity plan Refill scheduled   Shipment/Pickup Date: 04/28/22          Current Outpatient Medications   Medication Sig    albuterol (PROVENTIL) 2.5 mg /3 mL (0.083 %) nebulizer solution Use content of one vial every 12 hours    aspirin (ECOTRIN) 81 MG EC tablet Take 81 mg by mouth once daily.    budesonide-glycopyr-formoterol (BREZTRI AEROSPHERE) 160-9-4.8 mcg/actuation HFAA INSTILL 2 PUFFS BY MOUTH EVERY 12 HOURS    colchicine (COLCRYS) 0.6 mg tablet Take 1  tablet (0.6 mg total) by mouth once daily.    diclofenac sodium (VOLTAREN) 1 % Gel Apply 2 g topically 2 (two) times daily as needed (pain).    famotidine (PEPCID) 20 MG tablet Take 1 tablet (20 mg total) by mouth once daily.    fish oil-omega-3 fatty acids 300-1,000 mg capsule Take 1 g by mouth once daily.    gabapentin (NEURONTIN) 300 MG capsule Take 1 capsule (300 mg total) by mouth once daily.    ibrutinib (IMBRUVICA) 280 mg Tab Take 1 tablet (280 mg) by mouth once daily.    ipratropium (ATROVENT) 0.02 % nebulizer solution use contents of one vial every 12 hours    ipratropium (ATROVENT) 0.02 % nebulizer solution Use contents of one vial every 12 hours    montelukast (SINGULAIR) 10 mg tablet TAKE ONE TABLET BY MOUTH EACH EVENING    multivitamin with minerals tablet Take 1 tablet by mouth once daily. Equate Brand with Iron    nebulizer accessories Misc Use as directed    omeprazole (PRILOSEC) 20 MG capsule Take one capsule by mouth once daily in the morning    omeprazole (PRILOSEC) 20 MG capsule Take one capsule by mouth once daily in the morning    pravastatin (PRAVACHOL) 20 MG tablet Take 1 tablet orally once in the evening.    valsartan (DIOVAN) 160 MG tablet Take 1 tablet orally once a day. (replacing the Losartan and Verapamil) (Patient not taking: Reported on 3/17/2022)    verapamiL (VERELAN) 100 MG CPCT Take 1 capsule orally once a day.   Last reviewed on 3/29/2022 12:29 PM by Katarzyna Garcia, PharmD    Review of patient's allergies indicates:  No Known Allergies Last reviewed on  4/5/2022 11:52 AM by Jalen Joseph      Tasks added this encounter   5/23/2022 - Refill Call (Auto Added)   Tasks due within next 3 months   No tasks due.     Amaya Andrade [facility]  [address] [place] [id number]  Phone: [phone]  Fax: [phone]

## 2022-04-28 RX ORDER — ALBUTEROL SULFATE 0.83 MG/ML
SOLUTION RESPIRATORY (INHALATION)
Qty: 540 ML | Refills: 3 | Status: SHIPPED | OUTPATIENT
Start: 2022-04-28 | End: 2023-03-27 | Stop reason: SDUPTHER

## 2022-05-16 ENCOUNTER — LAB VISIT (OUTPATIENT)
Dept: LAB | Facility: HOSPITAL | Age: 82
End: 2022-05-16
Attending: INTERNAL MEDICINE
Payer: MEDICARE

## 2022-05-16 DIAGNOSIS — C91.10 CLL (CHRONIC LYMPHOCYTIC LEUKEMIA): ICD-10-CM

## 2022-05-16 LAB
ALBUMIN SERPL BCP-MCNC: 3.5 G/DL (ref 3.5–5.2)
ALP SERPL-CCNC: 41 U/L (ref 55–135)
ALT SERPL W/O P-5'-P-CCNC: 20 U/L (ref 10–44)
ANION GAP SERPL CALC-SCNC: 10 MMOL/L (ref 8–16)
AST SERPL-CCNC: 19 U/L (ref 10–40)
BASOPHILS # BLD AUTO: 0.12 K/UL (ref 0–0.2)
BASOPHILS NFR BLD: 0.9 % (ref 0–1.9)
BILIRUB SERPL-MCNC: 0.7 MG/DL (ref 0.1–1)
BUN SERPL-MCNC: 10 MG/DL (ref 8–23)
CALCIUM SERPL-MCNC: 9 MG/DL (ref 8.7–10.5)
CHLORIDE SERPL-SCNC: 101 MMOL/L (ref 95–110)
CO2 SERPL-SCNC: 25 MMOL/L (ref 23–29)
CREAT SERPL-MCNC: 1 MG/DL (ref 0.5–1.4)
DIFFERENTIAL METHOD: ABNORMAL
EOSINOPHIL # BLD AUTO: 0.2 K/UL (ref 0–0.5)
EOSINOPHIL NFR BLD: 1.3 % (ref 0–8)
ERYTHROCYTE [DISTWIDTH] IN BLOOD BY AUTOMATED COUNT: 11.9 % (ref 11.5–14.5)
EST. GFR  (AFRICAN AMERICAN): >60 ML/MIN/1.73 M^2
EST. GFR  (NON AFRICAN AMERICAN): >60 ML/MIN/1.73 M^2
GLUCOSE SERPL-MCNC: 102 MG/DL (ref 70–110)
HCT VFR BLD AUTO: 37.5 % (ref 40–54)
HGB BLD-MCNC: 12.5 G/DL (ref 14–18)
IMM GRANULOCYTES # BLD AUTO: 0.03 K/UL (ref 0–0.04)
IMM GRANULOCYTES NFR BLD AUTO: 0.2 % (ref 0–0.5)
LYMPHOCYTES # BLD AUTO: 7.1 K/UL (ref 1–4.8)
LYMPHOCYTES NFR BLD: 51.2 % (ref 18–48)
MCH RBC QN AUTO: 32 PG (ref 27–31)
MCHC RBC AUTO-ENTMCNC: 33.3 G/DL (ref 32–36)
MCV RBC AUTO: 96 FL (ref 82–98)
MONOCYTES # BLD AUTO: 1 K/UL (ref 0.3–1)
MONOCYTES NFR BLD: 7.3 % (ref 4–15)
NEUTROPHILS # BLD AUTO: 5.4 K/UL (ref 1.8–7.7)
NEUTROPHILS NFR BLD: 39.1 % (ref 38–73)
NRBC BLD-RTO: 0 /100 WBC
PLATELET # BLD AUTO: 181 K/UL (ref 150–450)
PLATELET BLD QL SMEAR: ABNORMAL
PMV BLD AUTO: 11.8 FL (ref 9.2–12.9)
POTASSIUM SERPL-SCNC: 4.7 MMOL/L (ref 3.5–5.1)
PROT SERPL-MCNC: 6.7 G/DL (ref 6–8.4)
RBC # BLD AUTO: 3.91 M/UL (ref 4.6–6.2)
SODIUM SERPL-SCNC: 136 MMOL/L (ref 136–145)
WBC # BLD AUTO: 13.79 K/UL (ref 3.9–12.7)

## 2022-05-16 PROCEDURE — 85025 COMPLETE CBC W/AUTO DIFF WBC: CPT | Performed by: INTERNAL MEDICINE

## 2022-05-16 PROCEDURE — 36415 COLL VENOUS BLD VENIPUNCTURE: CPT | Performed by: INTERNAL MEDICINE

## 2022-05-16 PROCEDURE — 80053 COMPREHEN METABOLIC PANEL: CPT | Performed by: INTERNAL MEDICINE

## 2022-05-23 ENCOUNTER — SPECIALTY PHARMACY (OUTPATIENT)
Dept: PHARMACY | Facility: CLINIC | Age: 82
End: 2022-05-23
Payer: MEDICARE

## 2022-05-23 NOTE — TELEPHONE ENCOUNTER
Specialty Pharmacy - Refill Coordination    Specialty Medication Orders Linked to Encounter    Flowsheet Row Most Recent Value   Medication #1 ibrutinib (IMBRUVICA) 280 mg Tab (Order#[id number], Rx#[id number])          Refill Questions - Documented Responses    Flowsheet Row Most Recent Value   Patient Availability and HIPAA Verification    Does patient want to proceed with activity? Yes   HIPAA/medical authority confirmed? Yes   Relationship to patient of person spoken to? Self   Refill Screening Questions    Changes to allergies? No   Changes to medications? No   New conditions since last clinic visit? No   Unplanned office visit, urgent care, ED, or hospital admission in the last 4 weeks? No   How does patient/caregiver feel medication is working? Good   Financial problems or insurance changes? No   How many doses of your specialty medications were missed in the last 4 weeks? 0   Would patient like to speak to a pharmacist? No   When does the patient need to receive the medication? 06/01/22   Refill Delivery Questions    How will the patient receive the medication? Delivery Domenica   When does the patient need to receive the medication? 06/01/22   Shipping Address Home   Address in [facility] confirmed and updated if neccessary? Yes   Expected Copay ($) 70   Is the patient able to afford the medication copay? Yes   Payment Method CC on file   Days supply of Refill 28   Supplies needed? No supplies needed   Refill activity completed? Yes   Refill activity plan Refill scheduled   Shipment/Pickup Date: 05/27/22          Current Outpatient Medications   Medication Sig    albuterol (PROVENTIL) 2.5 mg /3 mL (0.083 %) nebulizer solution Use content of one vial every 12 hours    albuterol (PROVENTIL) 2.5 mg /3 mL (0.083 %) nebulizer solution Take 1 vial by nebulization twice daily.    aspirin (ECOTRIN) 81 MG EC tablet Take 81 mg by mouth once daily.    budesonide-glycopyr-formoterol (BREZTRI AEROSPHERE) 160-9-4.8  mcg/actuation HFAA INSTILL 2 PUFFS BY MOUTH EVERY 12 HOURS    colchicine (MITIGARE) 0.6 mg Cap Take 1 capsule (0.6 mg total) by mouth once daily.    diclofenac sodium (VOLTAREN) 1 % Gel Apply 2 g topically 2 (two) times daily as needed (pain).    famotidine (PEPCID) 20 MG tablet Take 1 tablet (20 mg total) by mouth once daily.    fish oil-omega-3 fatty acids 300-1,000 mg capsule Take 1 g by mouth once daily.    gabapentin (NEURONTIN) 300 MG capsule Take 1 capsule (300 mg total) by mouth once daily.    ibrutinib (IMBRUVICA) 280 mg Tab Take 1 tablet (280 mg) by mouth once daily.    ipratropium (ATROVENT) 0.02 % nebulizer solution use contents of one vial every 12 hours    ipratropium (ATROVENT) 0.02 % nebulizer solution Use contents of one vial every 12 hours    montelukast (SINGULAIR) 10 mg tablet TAKE ONE TABLET BY MOUTH EACH EVENING    multivitamin with minerals tablet Take 1 tablet by mouth once daily. Equate Brand with Iron    nebulizer accessories Misc Use as directed    omeprazole (PRILOSEC) 20 MG capsule Take one capsule by mouth once daily in the morning    omeprazole (PRILOSEC) 20 MG capsule Take one capsule by mouth once daily in the morning    pravastatin (PRAVACHOL) 20 MG tablet Take 1 tablet orally once in the evening.    valsartan (DIOVAN) 160 MG tablet Take 1 tablet orally once a day. (replacing the Losartan and Verapamil) (Patient not taking: Reported on 3/17/2022)    verapamiL (VERELAN) 100 MG CPCT Take 1 capsule orally once a day.   Last reviewed on 3/29/2022 12:29 PM by Katarzyna Garcia, PharmD    Review of patient's allergies indicates:  No Known Allergies Last reviewed on  4/5/2022 11:52 AM by Jalen Joseph      Tasks added this encounter   6/22/2022 - Refill Call (Auto Added)   Tasks due within next 3 months   No tasks due.     Julisa Serrano, PharmD  [facility]  [address] [place] [id number]  Phone: [phone]  Fax:  [phone]

## 2022-05-30 ENCOUNTER — OFFICE VISIT (OUTPATIENT)
Dept: HEMATOLOGY/ONCOLOGY | Facility: CLINIC | Age: 82
End: 2022-05-30
Payer: MEDICARE

## 2022-05-30 VITALS
TEMPERATURE: 98 F | BODY MASS INDEX: 24.05 KG/M2 | HEIGHT: 70 IN | HEART RATE: 87 BPM | DIASTOLIC BLOOD PRESSURE: 62 MMHG | OXYGEN SATURATION: 99 % | SYSTOLIC BLOOD PRESSURE: 130 MMHG | WEIGHT: 168 LBS | RESPIRATION RATE: 18 BRPM

## 2022-05-30 DIAGNOSIS — C91.10 CLL (CHRONIC LYMPHOCYTIC LEUKEMIA): Primary | ICD-10-CM

## 2022-05-30 DIAGNOSIS — C61 PROSTATE CANCER: ICD-10-CM

## 2022-05-30 PROCEDURE — 3078F PR MOST RECENT DIASTOLIC BLOOD PRESSURE < 80 MM HG: ICD-10-PCS | Mod: CPTII,S$GLB,, | Performed by: INTERNAL MEDICINE

## 2022-05-30 PROCEDURE — 99999 PR PBB SHADOW E&M-EST. PATIENT-LVL IV: CPT | Mod: PBBFAC,,, | Performed by: INTERNAL MEDICINE

## 2022-05-30 PROCEDURE — 1159F PR MEDICATION LIST DOCUMENTED IN MEDICAL RECORD: ICD-10-PCS | Mod: CPTII,S$GLB,, | Performed by: INTERNAL MEDICINE

## 2022-05-30 PROCEDURE — 99215 PR OFFICE/OUTPT VISIT, EST, LEVL V, 40-54 MIN: ICD-10-PCS | Mod: S$GLB,,, | Performed by: INTERNAL MEDICINE

## 2022-05-30 PROCEDURE — 3078F DIAST BP <80 MM HG: CPT | Mod: CPTII,S$GLB,, | Performed by: INTERNAL MEDICINE

## 2022-05-30 PROCEDURE — 1101F PR PT FALLS ASSESS DOC 0-1 FALLS W/OUT INJ PAST YR: ICD-10-PCS | Mod: CPTII,S$GLB,, | Performed by: INTERNAL MEDICINE

## 2022-05-30 PROCEDURE — 1101F PT FALLS ASSESS-DOCD LE1/YR: CPT | Mod: CPTII,S$GLB,, | Performed by: INTERNAL MEDICINE

## 2022-05-30 PROCEDURE — 99999 PR PBB SHADOW E&M-EST. PATIENT-LVL IV: ICD-10-PCS | Mod: PBBFAC,,, | Performed by: INTERNAL MEDICINE

## 2022-05-30 PROCEDURE — 3288F FALL RISK ASSESSMENT DOCD: CPT | Mod: CPTII,S$GLB,, | Performed by: INTERNAL MEDICINE

## 2022-05-30 PROCEDURE — 99215 OFFICE O/P EST HI 40 MIN: CPT | Mod: S$GLB,,, | Performed by: INTERNAL MEDICINE

## 2022-05-30 PROCEDURE — 1126F AMNT PAIN NOTED NONE PRSNT: CPT | Mod: CPTII,S$GLB,, | Performed by: INTERNAL MEDICINE

## 2022-05-30 PROCEDURE — 1126F PR PAIN SEVERITY QUANTIFIED, NO PAIN PRESENT: ICD-10-PCS | Mod: CPTII,S$GLB,, | Performed by: INTERNAL MEDICINE

## 2022-05-30 PROCEDURE — 1159F MED LIST DOCD IN RCRD: CPT | Mod: CPTII,S$GLB,, | Performed by: INTERNAL MEDICINE

## 2022-05-30 PROCEDURE — 3075F SYST BP GE 130 - 139MM HG: CPT | Mod: CPTII,S$GLB,, | Performed by: INTERNAL MEDICINE

## 2022-05-30 PROCEDURE — 3075F PR MOST RECENT SYSTOLIC BLOOD PRESS GE 130-139MM HG: ICD-10-PCS | Mod: CPTII,S$GLB,, | Performed by: INTERNAL MEDICINE

## 2022-05-30 PROCEDURE — 3288F PR FALLS RISK ASSESSMENT DOCUMENTED: ICD-10-PCS | Mod: CPTII,S$GLB,, | Performed by: INTERNAL MEDICINE

## 2022-05-30 NOTE — PROGRESS NOTES
Hematology and Medical Oncology   Follow Up     05/30/2022    Primary Oncologic Diagnosis: CLL    History of Present Ilness:   Kevin Echeverria (Kevin) is a pleasant 82 y.o.male who presents to transition care to [facility] from the [facility]. The patient presents today with his wife and daughter. Most of his issues started in August when he began experiencing horrible pain in his hands that was worse with lack of motion/rest. The pain is excruciating and has sent him to the ED several times. The pain can happen in either upper extremity and feels like it travels at times. The discomfort abates as he moves his hands more throughout the day.     Incidentally through these ER visits it was noted that he has a persistently elevated but largely stable WBC.    --Pathologist review of the peripheral smear on 12/21/19 Leukocytosis with an absolute and relative lymphocytosis.     Lymphocytes are mature, and a subset displays a slightly atypical chromatin pattern.  Smudge cells are present.  Background granulocytes are morphologically normal.  The morphology suggests a B cell lymphoproliferative disorder such as CLL.  --Flow Cytometry on 1/3/2020: A B cell lymphoproliferative disorder is present.  Mantle cell lymphoma is favored although an atypical CLL cannot be completely ruled out; correlation   with morphology and with any available cytogenetic or molecular studies (t(11;14)) is required.     Interval History:  Mr. Echeverria is doing well. 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 Energy is good. No issues with medication. He takes the pills with a large glass of water as instructed.     Overall doing very well.  Happy to hear his white count is close to normal.    PAST MEDICAL HISTORY:   Past Medical History:   Diagnosis Date    Arthritis     Cancer     prostate    COPD (chronic obstructive pulmonary disease)     Hyperlipidemia     Hypertension     Leukemia        PAST SURGICAL  HISTORY:   Past Surgical History:   Procedure Laterality Date    CATARACT EXTRACTION W/  INTRAOCULAR LENS IMPLANT Left 05/05/2016    COLONOSCOPY W/ BIOPSIES AND POLYPECTOMY      PROSTATE SURGERY  05/1996    TONSILLECTOMY  1956       PAST SOCIAL HISTORY:   reports that he has never smoked. He quit smokeless tobacco use about 23 years ago. He reports current alcohol use. He reports that he does not use drugs.    FAMILY HISTORY:  Family History   Problem Relation Age of Onset    Diabetes Mother        CURRENT MEDICATIONS:   Current Outpatient Medications   Medication Sig    albuterol (PROVENTIL) 2.5 mg /3 mL (0.083 %) nebulizer solution Use content of one vial every 12 hours    albuterol (PROVENTIL) 2.5 mg /3 mL (0.083 %) nebulizer solution Take 1 vial by nebulization twice daily.    aspirin (ECOTRIN) 81 MG EC tablet Take 81 mg by mouth once daily.    budesonide-glycopyr-formoterol (BREZTRI AEROSPHERE) 160-9-4.8 mcg/actuation HFAA INSTILL 2 PUFFS BY MOUTH EVERY 12 HOURS    colchicine (MITIGARE) 0.6 mg Cap Take 1 capsule (0.6 mg total) by mouth once daily.    diclofenac sodium (VOLTAREN) 1 % Gel Apply 2 g topically 2 (two) times daily as needed (pain).    fish oil-omega-3 fatty acids 300-1,000 mg capsule Take 1 g by mouth once daily.    gabapentin (NEURONTIN) 300 MG capsule Take 1 capsule (300 mg total) by mouth once daily.    ibrutinib (IMBRUVICA) 280 mg Tab Take 1 tablet (280 mg) by mouth once daily.    ipratropium (ATROVENT) 0.02 % nebulizer solution use contents of one vial every 12 hours    ipratropium (ATROVENT) 0.02 % nebulizer solution Use contents of one vial every 12 hours    montelukast (SINGULAIR) 10 mg tablet TAKE ONE TABLET BY MOUTH EACH EVENING    multivitamin with minerals tablet Take 1 tablet by mouth once daily. Equate Brand with Iron    nebulizer accessories Misc Use as directed    omeprazole (PRILOSEC) 20 MG capsule Take one capsule by mouth once daily in the morning     omeprazole (PRILOSEC) 20 MG capsule Take one capsule by mouth once daily in the morning    pravastatin (PRAVACHOL) 20 MG tablet Take 1 tablet orally once in the evening.    valsartan (DIOVAN) 160 MG tablet Take 1 tablet orally once a day. (replacing the Losartan and Verapamil)    verapamiL (VERELAN) 100 MG CPCT Take 1 capsule orally once a day.    famotidine (PEPCID) 20 MG tablet Take 1 tablet (20 mg total) by mouth once daily.     No current facility-administered medications for this visit.     ALLERGIES:   Review of patient's allergies indicates:  No Known Allergies      Review of Systems:     Review of Systems   Constitutional: Negative for appetite change, chills, diaphoresis, fatigue, fever and unexpected weight change.   HENT:   Negative for hearing loss, mouth sores, nosebleeds, sore throat, trouble swallowing and voice change.    Eyes: Negative for eye problems and icterus.   Respiratory: Negative for chest tightness, cough, hemoptysis, shortness of breath and wheezing.    Cardiovascular: Negative for chest pain, leg swelling and palpitations.   Gastrointestinal: Negative for abdominal distention, abdominal pain, blood in stool, diarrhea, nausea and vomiting.   Endocrine: Negative for hot flashes.   Genitourinary: Negative for bladder incontinence, difficulty urinating, dysuria and hematuria.    Musculoskeletal: Positive for arthralgias and neck pain. Negative for back pain, flank pain, gait problem, myalgias and neck stiffness.   Skin: Negative for itching, rash and wound.   Neurological: Negative for dizziness, extremity weakness, gait problem, headaches, numbness, seizures and speech difficulty.   Hematological: Negative for adenopathy. Does not bruise/bleed easily.   Psychiatric/Behavioral: Negative for confusion, depression and sleep disturbance. The patient is not nervous/anxious.         Physical Exam:     Vitals:    05/30/22 0836   BP: 130/62   Pulse: 87   Resp: 18   Temp: 98.1 °F (36.7 °C)      Physical Exam  Constitutional:       General: He is not in acute distress.     Appearance: He is well-developed. He is not diaphoretic.      Comments: Well dressed gentleman   HENT:      Head: Normocephalic and atraumatic.      Mouth/Throat:      Pharynx: No oropharyngeal exudate.   Eyes:      Conjunctiva/sclera: Conjunctivae normal.      Pupils: Pupils are equal, round, and reactive to light.   Neck:      Thyroid: No thyromegaly.      Vascular: No JVD.      Trachea: No tracheal deviation.   Cardiovascular:      Rate and Rhythm: Normal rate and regular rhythm.      Heart sounds: Normal heart sounds. No murmur heard.    No friction rub.   Pulmonary:      Effort: Pulmonary effort is normal. No respiratory distress.      Breath sounds: Normal breath sounds. No stridor. No wheezing or rales.   Chest:      Chest wall: No tenderness.   Abdominal:      General: Bowel sounds are normal. There is no distension.      Palpations: Abdomen is soft.      Tenderness: There is no abdominal tenderness. There is no guarding or rebound.   Musculoskeletal:         General: No tenderness or deformity. Normal range of motion.      Cervical back: Normal range of motion and neck supple.   Skin:     General: Skin is warm and dry.      Capillary Refill: Capillary refill takes less than 2 seconds.      Coloration: Skin is not pale.      Findings: No erythema or rash.   Neurological:      Mental Status: He is alert and oriented to person, place, and time.      Cranial Nerves: No cranial nerve deficit.      Sensory: No sensory deficit.      Motor: No abnormal muscle tone.      Coordination: Coordination normal.      Deep Tendon Reflexes: Reflexes normal.   Psychiatric:         Behavior: Behavior normal.         Thought Content: Thought content normal.         Judgment: Judgment normal.         ECOG Performance Status: (foot note - ECOG PS provided by Eastern Cooperative Oncology Group) 1 - Symptomatic but completely ambulatory    Karnofsky  Performance Score:  90%- Able to Carry on Normal Activity: Minor Symptoms of Disease    Labs:   Lab Results   Component Value Date    WBC 13.79 (H) 05/16/2022    HGB 12.5 (L) 05/16/2022    HCT 37.5 (L) 05/16/2022     05/16/2022    CHOL 157 01/31/2020    TRIG 57 01/31/2020    HDL 65 01/31/2020    ALT 20 05/16/2022    AST 19 05/16/2022     05/16/2022    K 4.7 05/16/2022     05/16/2022    CREATININE 1.0 05/16/2022    BUN 10 05/16/2022    CO2 25 05/16/2022    TSH 3.184 01/31/2020    PSA 0.05 11/16/2021       Imaging: Outside imaging has been reviewed   Assessment and Plan:     Mr. Echeverria is a pleasant 82 year old male with presumed CLL.    CLL  --If this is indeed CLL the doubling time of the white count has not met criteria for treatment  --CLL fish indicates a 17p deletion in 40.5% of nuclei. In CLL, a 17p deletion is associated with an unfavorable prognosis   --Plan on monthly cbc's at [facility]   --Continue ibrutinib   --White count is nearly normal    Bilateral Hand Myalgias  --Suspect possible cervical stenosis or other outflow issue  --Pt requests to see rheumatology here in the event it is joint related  --MRI cervical and thorasic spine ordered, if necessary we will consult vascular surgery    Prostate Cancer  --Treated with a prostatectomy      30 minutes were spent face to face with the patient and his  family to discuss the disease, natural history, treatment options and survival statistics. I have provided the patient with an opportunity to ask questions and have all questions answered to his satisfaction.       he will return to clinic in about 8 weeks, with labs at [facility] 1 day prior, but knows to call in the interim if symptoms change or should a problem arise.        Jaquelin Quintana MD  Hematology and Medical Oncology  Bone Marrow Transplant  [facility]        BMT Chart Routing      Follow up with physician 2 months. Likes 8/8:30am appointments   Follow up with MARY JANE    Labs  CBC and CMP   Lab interval:  Labs 1 week prior to clinic visit. Please draw labs at [facility]   Imaging    Pharmacy appointment    Other referrals

## 2022-06-22 ENCOUNTER — PATIENT MESSAGE (OUTPATIENT)
Dept: PHARMACY | Facility: CLINIC | Age: 82
End: 2022-06-22
Payer: MEDICARE

## 2022-06-22 ENCOUNTER — SPECIALTY PHARMACY (OUTPATIENT)
Dept: PHARMACY | Facility: CLINIC | Age: 82
End: 2022-06-22
Payer: MEDICARE

## 2022-06-22 NOTE — TELEPHONE ENCOUNTER
Specialty Pharmacy - Refill Coordination    Specialty Medication Orders Linked to Encounter    Flowsheet Row Most Recent Value   Medication #1 ibrutinib (IMBRUVICA) 280 mg Tab (Order#[id number], Rx#[id number])          Refill Questions - Documented Responses    Flowsheet Row Most Recent Value   Patient Availability and HIPAA Verification    Does patient want to proceed with activity? Yes   HIPAA/medical authority confirmed? Yes   Relationship to patient of person spoken to? Self   Refill Screening Questions    Changes to allergies? No   Changes to medications? No   New conditions since last clinic visit? No   Unplanned office visit, urgent care, ED, or hospital admission in the last 4 weeks? No   How does patient/caregiver feel medication is working? Excellent   Financial problems or insurance changes? No   How many doses of your specialty medications were missed in the last 4 weeks? 0   Would patient like to speak to a pharmacist? No   When does the patient need to receive the medication? 06/29/22   Refill Delivery Questions    How will the patient receive the medication? Delivery Domenica   When does the patient need to receive the medication? 06/29/22   Shipping Address Home   Address in [facility] confirmed and updated if neccessary? Yes   Expected Copay ($) 70   Is the patient able to afford the medication copay? Yes   Payment Method CC on file   Days supply of Refill 28   Supplies needed? No supplies needed   Refill activity completed? Yes   Refill activity plan Refill scheduled   Shipment/Pickup Date: 06/24/22          Current Outpatient Medications   Medication Sig    albuterol (PROVENTIL) 2.5 mg /3 mL (0.083 %) nebulizer solution Use content of one vial every 12 hours    albuterol (PROVENTIL) 2.5 mg /3 mL (0.083 %) nebulizer solution Take 1 vial by nebulization twice daily.    aspirin (ECOTRIN) 81 MG EC tablet Take 81 mg by mouth once daily.    budesonide-glycopyr-formoterol (BREZTRI AEROSPHERE)  160-9-4.8 mcg/actuation HFAA INSTILL 2 PUFFS BY MOUTH EVERY 12 HOURS    colchicine (MITIGARE) 0.6 mg Cap Take 1 capsule (0.6 mg total) by mouth once daily.    diclofenac sodium (VOLTAREN) 1 % Gel Apply 2 g topically 2 (two) times daily as needed (pain).    famotidine (PEPCID) 20 MG tablet Take 1 tablet (20 mg total) by mouth once daily.    fish oil-omega-3 fatty acids 300-1,000 mg capsule Take 1 g by mouth once daily.    gabapentin (NEURONTIN) 300 MG capsule Take 1 capsule (300 mg total) by mouth once daily.    ibrutinib (IMBRUVICA) 280 mg Tab Take 1 tablet (280 mg) by mouth once daily.    ipratropium (ATROVENT) 0.02 % nebulizer solution use contents of one vial every 12 hours    ipratropium (ATROVENT) 0.02 % nebulizer solution Use contents of one vial every 12 hours    montelukast (SINGULAIR) 10 mg tablet TAKE ONE TABLET BY MOUTH EACH EVENING    multivitamin with minerals tablet Take 1 tablet by mouth once daily. Equate Brand with Iron    nebulizer accessories Misc Use as directed    omeprazole (PRILOSEC) 20 MG capsule Take one capsule by mouth once daily in the morning    omeprazole (PRILOSEC) 20 MG capsule Take one capsule by mouth once daily in the morning    pravastatin (PRAVACHOL) 20 MG tablet Take 1 tablet orally once in the evening.    valsartan (DIOVAN) 160 MG tablet Take 1 tablet orally once a day. (replacing the Losartan and Verapamil)    verapamiL (VERELAN) 100 MG CPCT Take 1 capsule orally once a day.   Last reviewed on 5/30/2022  8:38 AM by Helena Ahn MA    Review of patient's allergies indicates:  No Known Allergies Last reviewed on  5/30/2022 8:38 AM by Helena Ahn      Tasks added this encounter   7/20/2022 - Refill Call (Auto Added)   Tasks due within next 3 months   9/18/2022 - Clinical - Follow Up Assesement (180 day)     Bonita Lynn, PharmD  [facility]  [address] [place] [id number]  Phone: [phone]  Fax:  [phone]

## 2022-07-18 ENCOUNTER — PATIENT MESSAGE (OUTPATIENT)
Dept: RHEUMATOLOGY | Facility: CLINIC | Age: 82
End: 2022-07-18
Payer: MEDICARE

## 2022-07-19 DIAGNOSIS — M11.849 CALCIUM PYROPHOSPHATE ARTHROPATHY OF HAND: Primary | ICD-10-CM

## 2022-07-20 ENCOUNTER — SPECIALTY PHARMACY (OUTPATIENT)
Dept: PHARMACY | Facility: CLINIC | Age: 82
End: 2022-07-20
Payer: MEDICARE

## 2022-07-20 NOTE — TELEPHONE ENCOUNTER
Specialty Pharmacy - Refill Coordination    Specialty Medication Orders Linked to Encounter    Flowsheet Row Most Recent Value   Medication #1 ibrutinib (IMBRUVICA) 280 mg Tab (Order#[id number], Rx#[id number])          Refill Questions - Documented Responses    Flowsheet Row Most Recent Value   Patient Availability and HIPAA Verification    Does patient want to proceed with activity? Yes   HIPAA/medical authority confirmed? Yes   Relationship to patient of person spoken to? Self   Refill Screening Questions    Changes to allergies? No   Changes to medications? No   New conditions since last clinic visit? No   Unplanned office visit, urgent care, ED, or hospital admission in the last 4 weeks? No   How does patient/caregiver feel medication is working? Good   Financial problems or insurance changes? No   How many doses of your specialty medications were missed in the last 4 weeks? 0   Would patient like to speak to a pharmacist? No   When does the patient need to receive the medication? 07/23/22   Refill Delivery Questions    How will the patient receive the medication? Delivery Domenica   When does the patient need to receive the medication? 07/23/22   Shipping Address Home   Address in [facility] confirmed and updated if neccessary? Yes   Expected Copay ($) 70   Is the patient able to afford the medication copay? Yes   Payment Method CC on file   Days supply of Refill 28   Supplies needed? No supplies needed   Refill activity completed? Yes   Refill activity plan Refill scheduled   Shipment/Pickup Date: 07/21/22          Current Outpatient Medications   Medication Sig    albuterol (PROVENTIL) 2.5 mg /3 mL (0.083 %) nebulizer solution Use content of one vial every 12 hours    albuterol (PROVENTIL) 2.5 mg /3 mL (0.083 %) nebulizer solution Take 1 vial by nebulization twice daily.    aspirin (ECOTRIN) 81 MG EC tablet Take 81 mg by mouth once daily.    budesonide-glycopyr-formoterol (BREZTRI AEROSPHERE) 160-9-4.8  mcg/actuation HFAA INSTILL 2 PUFFS BY MOUTH EVERY 12 HOURS    colchicine (MITIGARE) 0.6 mg Cap Take 1 capsule (0.6 mg total) by mouth once daily.    diclofenac sodium (VOLTAREN) 1 % Gel Apply 2 g topically 2 (two) times daily as needed (pain).    famotidine (PEPCID) 20 MG tablet Take 1 tablet (20 mg total) by mouth once daily.    fish oil-omega-3 fatty acids 300-1,000 mg capsule Take 1 g by mouth once daily.    gabapentin (NEURONTIN) 300 MG capsule Take 1 capsule (300 mg total) by mouth once daily.    ibrutinib (IMBRUVICA) 280 mg Tab Take 1 tablet (280 mg) by mouth once daily.    ipratropium (ATROVENT) 0.02 % nebulizer solution use contents of one vial every 12 hours    ipratropium (ATROVENT) 0.02 % nebulizer solution Use contents of one vial every 12 hours    montelukast (SINGULAIR) 10 mg tablet TAKE ONE TABLET BY MOUTH EACH EVENING    multivitamin with minerals tablet Take 1 tablet by mouth once daily. Equate Brand with Iron    nebulizer accessories Misc Use as directed    omeprazole (PRILOSEC) 20 MG capsule Take one capsule by mouth once daily in the morning    omeprazole (PRILOSEC) 20 MG capsule Take one capsule by mouth once daily in the morning    pravastatin (PRAVACHOL) 20 MG tablet Take 1 tablet orally once in the evening.    valsartan (DIOVAN) 160 MG tablet Take 1 tablet orally once a day. (replacing the Losartan and Verapamil)    verapamiL (VERELAN) 100 MG CPCT Take 1 capsule orally once a day.   Last reviewed on 5/30/2022  8:38 AM by Helena Ahn MA    Review of patient's allergies indicates:  No Known Allergies Last reviewed on  5/30/2022 8:38 AM by Helena Ahn      Tasks added this encounter   8/13/2022 - Refill Call (Auto Added)   Tasks due within next 3 months   9/18/2022 - Clinical - Follow Up Assesement (180 day)     Gillian Andrade [facility]  [address] [place] [id number]  Phone: [phone]  Fax: [phone]

## 2022-07-22 RX ORDER — FAMOTIDINE 20 MG/1
20 TABLET, FILM COATED ORAL DAILY
Qty: 90 TABLET | Refills: 3 | Status: SHIPPED | OUTPATIENT
Start: 2022-07-22 | End: 2023-01-21

## 2022-08-01 ENCOUNTER — LAB VISIT (OUTPATIENT)
Dept: LAB | Facility: HOSPITAL | Age: 82
End: 2022-08-01
Attending: INTERNAL MEDICINE
Payer: MEDICARE

## 2022-08-01 DIAGNOSIS — C91.10 CLL (CHRONIC LYMPHOCYTIC LEUKEMIA): ICD-10-CM

## 2022-08-01 DIAGNOSIS — M11.849 CALCIUM PYROPHOSPHATE ARTHROPATHY OF HAND: ICD-10-CM

## 2022-08-01 LAB
ALBUMIN SERPL BCP-MCNC: 3.4 G/DL (ref 3.5–5.2)
ALP SERPL-CCNC: 40 U/L (ref 55–135)
ALT SERPL W/O P-5'-P-CCNC: 19 U/L (ref 10–44)
ANION GAP SERPL CALC-SCNC: 6 MMOL/L (ref 8–16)
AST SERPL-CCNC: 21 U/L (ref 10–40)
BASOPHILS # BLD AUTO: 0.12 K/UL (ref 0–0.2)
BASOPHILS NFR BLD: 1.1 % (ref 0–1.9)
BILIRUB SERPL-MCNC: 0.6 MG/DL (ref 0.1–1)
BUN SERPL-MCNC: 10 MG/DL (ref 8–23)
CALCIUM SERPL-MCNC: 9 MG/DL (ref 8.7–10.5)
CHLORIDE SERPL-SCNC: 104 MMOL/L (ref 95–110)
CO2 SERPL-SCNC: 26 MMOL/L (ref 23–29)
CREAT SERPL-MCNC: 1 MG/DL (ref 0.5–1.4)
DIFFERENTIAL METHOD: ABNORMAL
EOSINOPHIL # BLD AUTO: 0.5 K/UL (ref 0–0.5)
EOSINOPHIL NFR BLD: 4.5 % (ref 0–8)
ERYTHROCYTE [DISTWIDTH] IN BLOOD BY AUTOMATED COUNT: 11.9 % (ref 11.5–14.5)
EST. GFR  (NO RACE VARIABLE): >60 ML/MIN/1.73 M^2
GLUCOSE SERPL-MCNC: 105 MG/DL (ref 70–110)
HCT VFR BLD AUTO: 36.7 % (ref 40–54)
HGB BLD-MCNC: 12.5 G/DL (ref 14–18)
IMM GRANULOCYTES # BLD AUTO: 0.03 K/UL (ref 0–0.04)
IMM GRANULOCYTES NFR BLD AUTO: 0.3 % (ref 0–0.5)
LYMPHOCYTES # BLD AUTO: 5.6 K/UL (ref 1–4.8)
LYMPHOCYTES NFR BLD: 49.3 % (ref 18–48)
MCH RBC QN AUTO: 33 PG (ref 27–31)
MCHC RBC AUTO-ENTMCNC: 34.1 G/DL (ref 32–36)
MCV RBC AUTO: 97 FL (ref 82–98)
MONOCYTES # BLD AUTO: 0.9 K/UL (ref 0.3–1)
MONOCYTES NFR BLD: 7.8 % (ref 4–15)
NEUTROPHILS # BLD AUTO: 4.2 K/UL (ref 1.8–7.7)
NEUTROPHILS NFR BLD: 37 % (ref 38–73)
NRBC BLD-RTO: 0 /100 WBC
PLATELET # BLD AUTO: 166 K/UL (ref 150–450)
PMV BLD AUTO: 11.5 FL (ref 9.2–12.9)
POTASSIUM SERPL-SCNC: 4.1 MMOL/L (ref 3.5–5.1)
PROT SERPL-MCNC: 6.4 G/DL (ref 6–8.4)
RBC # BLD AUTO: 3.79 M/UL (ref 4.6–6.2)
SODIUM SERPL-SCNC: 136 MMOL/L (ref 136–145)
URATE SERPL-MCNC: 5.9 MG/DL (ref 3.4–7)
WBC # BLD AUTO: 11.33 K/UL (ref 3.9–12.7)

## 2022-08-01 PROCEDURE — 85025 COMPLETE CBC W/AUTO DIFF WBC: CPT | Performed by: INTERNAL MEDICINE

## 2022-08-01 PROCEDURE — 80053 COMPREHEN METABOLIC PANEL: CPT | Performed by: INTERNAL MEDICINE

## 2022-08-01 PROCEDURE — 36415 COLL VENOUS BLD VENIPUNCTURE: CPT | Performed by: INTERNAL MEDICINE

## 2022-08-01 PROCEDURE — 84550 ASSAY OF BLOOD/URIC ACID: CPT | Performed by: INTERNAL MEDICINE

## 2022-08-05 ENCOUNTER — PATIENT MESSAGE (OUTPATIENT)
Dept: HEMATOLOGY/ONCOLOGY | Facility: CLINIC | Age: 82
End: 2022-08-05
Payer: MEDICARE

## 2022-08-05 ENCOUNTER — TELEPHONE (OUTPATIENT)
Dept: HEMATOLOGY/ONCOLOGY | Facility: CLINIC | Age: 82
End: 2022-08-05
Payer: MEDICARE

## 2022-08-05 NOTE — TELEPHONE ENCOUNTER
Left message regarding appointment scheduled for 8/8/22 as well as the clinic callback number. Detailed message was sent through the patient portal.

## 2022-08-08 ENCOUNTER — OFFICE VISIT (OUTPATIENT)
Dept: HEMATOLOGY/ONCOLOGY | Facility: CLINIC | Age: 82
End: 2022-08-08
Payer: MEDICARE

## 2022-08-08 ENCOUNTER — OFFICE VISIT (OUTPATIENT)
Dept: RHEUMATOLOGY | Facility: CLINIC | Age: 82
End: 2022-08-08
Payer: MEDICARE

## 2022-08-08 VITALS
HEIGHT: 70 IN | HEART RATE: 90 BPM | SYSTOLIC BLOOD PRESSURE: 126 MMHG | TEMPERATURE: 98 F | DIASTOLIC BLOOD PRESSURE: 66 MMHG | WEIGHT: 165.13 LBS | BODY MASS INDEX: 23.64 KG/M2 | OXYGEN SATURATION: 100 % | RESPIRATION RATE: 16 BRPM

## 2022-08-08 VITALS
SYSTOLIC BLOOD PRESSURE: 135 MMHG | HEIGHT: 70 IN | BODY MASS INDEX: 23.89 KG/M2 | WEIGHT: 166.88 LBS | DIASTOLIC BLOOD PRESSURE: 83 MMHG | HEART RATE: 86 BPM

## 2022-08-08 DIAGNOSIS — M19.90 ARTHRITIS: ICD-10-CM

## 2022-08-08 DIAGNOSIS — M11.849 CALCIUM PYROPHOSPHATE ARTHROPATHY OF HAND: ICD-10-CM

## 2022-08-08 DIAGNOSIS — K21.9 GASTROESOPHAGEAL REFLUX DISEASE, UNSPECIFIED WHETHER ESOPHAGITIS PRESENT: Chronic | ICD-10-CM

## 2022-08-08 DIAGNOSIS — C91.10 CLL (CHRONIC LYMPHOCYTIC LEUKEMIA): Primary | ICD-10-CM

## 2022-08-08 PROCEDURE — 1126F AMNT PAIN NOTED NONE PRSNT: CPT | Mod: CPTII,S$GLB,, | Performed by: INTERNAL MEDICINE

## 2022-08-08 PROCEDURE — 1126F PR PAIN SEVERITY QUANTIFIED, NO PAIN PRESENT: ICD-10-PCS | Mod: CPTII,S$GLB,, | Performed by: INTERNAL MEDICINE

## 2022-08-08 PROCEDURE — 1160F PR REVIEW ALL MEDS BY PRESCRIBER/CLIN PHARMACIST DOCUMENTED: ICD-10-PCS | Mod: CPTII,S$GLB,, | Performed by: INTERNAL MEDICINE

## 2022-08-08 PROCEDURE — 3078F PR MOST RECENT DIASTOLIC BLOOD PRESSURE < 80 MM HG: ICD-10-PCS | Mod: CPTII,S$GLB,, | Performed by: INTERNAL MEDICINE

## 2022-08-08 PROCEDURE — 1160F RVW MEDS BY RX/DR IN RCRD: CPT | Mod: CPTII,S$GLB,, | Performed by: INTERNAL MEDICINE

## 2022-08-08 PROCEDURE — 3075F SYST BP GE 130 - 139MM HG: CPT | Mod: CPTII,S$GLB,, | Performed by: INTERNAL MEDICINE

## 2022-08-08 PROCEDURE — 3075F PR MOST RECENT SYSTOLIC BLOOD PRESS GE 130-139MM HG: ICD-10-PCS | Mod: CPTII,S$GLB,, | Performed by: INTERNAL MEDICINE

## 2022-08-08 PROCEDURE — 3078F DIAST BP <80 MM HG: CPT | Mod: CPTII,S$GLB,, | Performed by: INTERNAL MEDICINE

## 2022-08-08 PROCEDURE — 99215 OFFICE O/P EST HI 40 MIN: CPT | Mod: S$GLB,,, | Performed by: INTERNAL MEDICINE

## 2022-08-08 PROCEDURE — 3074F PR MOST RECENT SYSTOLIC BLOOD PRESSURE < 130 MM HG: ICD-10-PCS | Mod: CPTII,S$GLB,, | Performed by: INTERNAL MEDICINE

## 2022-08-08 PROCEDURE — 99214 OFFICE O/P EST MOD 30 MIN: CPT | Mod: S$GLB,,, | Performed by: INTERNAL MEDICINE

## 2022-08-08 PROCEDURE — 99214 PR OFFICE/OUTPT VISIT, EST, LEVL IV, 30-39 MIN: ICD-10-PCS | Mod: S$GLB,,, | Performed by: INTERNAL MEDICINE

## 2022-08-08 PROCEDURE — 99999 PR PBB SHADOW E&M-EST. PATIENT-LVL III: ICD-10-PCS | Mod: PBBFAC,,, | Performed by: INTERNAL MEDICINE

## 2022-08-08 PROCEDURE — 3288F FALL RISK ASSESSMENT DOCD: CPT | Mod: CPTII,S$GLB,, | Performed by: INTERNAL MEDICINE

## 2022-08-08 PROCEDURE — 99999 PR PBB SHADOW E&M-EST. PATIENT-LVL IV: CPT | Mod: PBBFAC,,, | Performed by: INTERNAL MEDICINE

## 2022-08-08 PROCEDURE — 3288F PR FALLS RISK ASSESSMENT DOCUMENTED: ICD-10-PCS | Mod: CPTII,S$GLB,, | Performed by: INTERNAL MEDICINE

## 2022-08-08 PROCEDURE — 3079F DIAST BP 80-89 MM HG: CPT | Mod: CPTII,S$GLB,, | Performed by: INTERNAL MEDICINE

## 2022-08-08 PROCEDURE — 1101F PT FALLS ASSESS-DOCD LE1/YR: CPT | Mod: CPTII,S$GLB,, | Performed by: INTERNAL MEDICINE

## 2022-08-08 PROCEDURE — 99999 PR PBB SHADOW E&M-EST. PATIENT-LVL III: CPT | Mod: PBBFAC,,, | Performed by: INTERNAL MEDICINE

## 2022-08-08 PROCEDURE — 3074F SYST BP LT 130 MM HG: CPT | Mod: CPTII,S$GLB,, | Performed by: INTERNAL MEDICINE

## 2022-08-08 PROCEDURE — 1101F PR PT FALLS ASSESS DOC 0-1 FALLS W/OUT INJ PAST YR: ICD-10-PCS | Mod: CPTII,S$GLB,, | Performed by: INTERNAL MEDICINE

## 2022-08-08 PROCEDURE — 99999 PR PBB SHADOW E&M-EST. PATIENT-LVL IV: ICD-10-PCS | Mod: PBBFAC,,, | Performed by: INTERNAL MEDICINE

## 2022-08-08 PROCEDURE — 1159F PR MEDICATION LIST DOCUMENTED IN MEDICAL RECORD: ICD-10-PCS | Mod: CPTII,S$GLB,, | Performed by: INTERNAL MEDICINE

## 2022-08-08 PROCEDURE — 3079F PR MOST RECENT DIASTOLIC BLOOD PRESSURE 80-89 MM HG: ICD-10-PCS | Mod: CPTII,S$GLB,, | Performed by: INTERNAL MEDICINE

## 2022-08-08 PROCEDURE — 1159F MED LIST DOCD IN RCRD: CPT | Mod: CPTII,S$GLB,, | Performed by: INTERNAL MEDICINE

## 2022-08-08 PROCEDURE — 99215 PR OFFICE/OUTPT VISIT, EST, LEVL V, 40-54 MIN: ICD-10-PCS | Mod: S$GLB,,, | Performed by: INTERNAL MEDICINE

## 2022-08-08 RX ORDER — GABAPENTIN 300 MG/1
300 CAPSULE ORAL DAILY
Qty: 90 CAPSULE | Refills: 4 | Status: SHIPPED | OUTPATIENT
Start: 2022-08-08 | End: 2023-08-28 | Stop reason: SDUPTHER

## 2022-08-08 RX ORDER — COLCHICINE 0.6 MG/1
0.6 CAPSULE ORAL DAILY
Qty: 90 CAPSULE | Refills: 4 | Status: SHIPPED | OUTPATIENT
Start: 2022-08-08 | End: 2023-09-26

## 2022-08-08 NOTE — PROGRESS NOTES
Hematology and Medical Oncology   Follow Up     08/08/2022    Primary Oncologic Diagnosis: CLL    History of Present Ilness:   Kevin Echeverria (Kevin) is a pleasant 82 y.o.male who presents to transition care to [facility] from the [facility]. The patient presents today with his wife and daughter. Most of his issues started in August when he began experiencing horrible pain in his hands that was worse with lack of motion/rest. The pain is excruciating and has sent him to the ED several times. The pain can happen in either upper extremity and feels like it travels at times. The discomfort abates as he moves his hands more throughout the day.     Incidentally through these ER visits it was noted that he has a persistently elevated but largely stable WBC.    --Pathologist review of the peripheral smear on 12/21/19 Leukocytosis with an absolute and relative lymphocytosis.     Lymphocytes are mature, and a subset displays a slightly atypical chromatin pattern.  Smudge cells are present.  Background granulocytes are morphologically normal.  The morphology suggests a B cell lymphoproliferative disorder such as CLL.  --Flow Cytometry on 1/3/2020: A B cell lymphoproliferative disorder is present.  Mantle cell lymphoma is favored although an atypical CLL cannot be completely ruled out; correlation   with morphology and with any available cytogenetic or molecular studies (t(11;14)) is required.     Interval History:  Mr. Echeverria is doing well. 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 Energy is good. No issues with medication. He takes the pills with a large glass of water as instructed.     Overall doing very well.  Happy to hear his white count is now normal.    Reports no new side effects or issues.    PAST MEDICAL HISTORY:   Past Medical History:   Diagnosis Date    Arthritis     Cancer     prostate    COPD (chronic obstructive pulmonary disease)     Hyperlipidemia     Hypertension      Leukemia        PAST SURGICAL HISTORY:   Past Surgical History:   Procedure Laterality Date    CATARACT EXTRACTION W/  INTRAOCULAR LENS IMPLANT Left 05/05/2016    COLONOSCOPY W/ BIOPSIES AND POLYPECTOMY      PROSTATE SURGERY  05/1996    TONSILLECTOMY  1956       PAST SOCIAL HISTORY:   reports that he has never smoked. He quit smokeless tobacco use about 23 years ago. He reports current alcohol use. He reports that he does not use drugs.    FAMILY HISTORY:  Family History   Problem Relation Age of Onset    Diabetes Mother        CURRENT MEDICATIONS:   Current Outpatient Medications   Medication Sig    albuterol (PROVENTIL) 2.5 mg /3 mL (0.083 %) nebulizer solution Use content of one vial every 12 hours    albuterol (PROVENTIL) 2.5 mg /3 mL (0.083 %) nebulizer solution Take 1 vial by nebulization twice daily.    aspirin (ECOTRIN) 81 MG EC tablet Take 81 mg by mouth once daily.    budesonide-glycopyr-formoterol (BREZTRI AEROSPHERE) 160-9-4.8 mcg/actuation HFAA INSTILL 2 PUFFS BY MOUTH EVERY 12 HOURS    colchicine (MITIGARE) 0.6 mg Cap Take 1 capsule (0.6 mg total) by mouth once daily.    diclofenac sodium (VOLTAREN) 1 % Gel Apply 2 g topically 2 (two) times daily as needed (pain).    famotidine (PEPCID) 20 MG tablet Take 1 tablet (20 mg total) by mouth once daily.    fish oil-omega-3 fatty acids 300-1,000 mg capsule Take 1 g by mouth once daily.    gabapentin (NEURONTIN) 300 MG capsule Take 1 capsule (300 mg total) by mouth once daily.    ibrutinib (IMBRUVICA) 280 mg Tab Take 1 tablet (280 mg) by mouth once daily.    ipratropium (ATROVENT) 0.02 % nebulizer solution use contents of one vial every 12 hours    ipratropium (ATROVENT) 0.02 % nebulizer solution Use contents of one vial every 12 hours    montelukast (SINGULAIR) 10 mg tablet TAKE ONE TABLET BY MOUTH EACH EVENING    multivitamin with minerals tablet Take 1 tablet by mouth once daily. Equate Brand with Iron    nebulizer accessories Misc Use  as directed    nebulizer and compressor Hyacinth USE WITH NEBULIZER SOLUTION AS DIRECTED    omeprazole (PRILOSEC) 20 MG capsule Take one capsule by mouth once daily in the morning    omeprazole (PRILOSEC) 20 MG capsule Take one capsule by mouth once daily in the morning    pravastatin (PRAVACHOL) 20 MG tablet Take 1 tablet orally once in the evening.    valsartan (DIOVAN) 160 MG tablet Take 1 tablet orally once a day. (replacing the Losartan and Verapamil)    verapamiL (VERELAN) 100 MG CPCT Take 1 capsule orally once a day. (Patient not taking: Reported on 8/8/2022)     No current facility-administered medications for this visit.     ALLERGIES:   Review of patient's allergies indicates:  No Known Allergies      Review of Systems:     Review of Systems   Constitutional: Negative for appetite change, chills, diaphoresis, fatigue, fever and unexpected weight change.   HENT:   Negative for hearing loss, mouth sores, nosebleeds, sore throat, trouble swallowing and voice change.    Eyes: Negative for eye problems and icterus.   Respiratory: Negative for chest tightness, cough, hemoptysis, shortness of breath and wheezing.    Cardiovascular: Negative for chest pain, leg swelling and palpitations.   Gastrointestinal: Negative for abdominal distention, abdominal pain, blood in stool, diarrhea, nausea and vomiting.   Endocrine: Negative for hot flashes.   Genitourinary: Negative for bladder incontinence, difficulty urinating, dysuria and hematuria.    Musculoskeletal: Positive for arthralgias and neck pain. Negative for back pain, flank pain, gait problem, myalgias and neck stiffness.   Skin: Negative for itching, rash and wound.   Neurological: Negative for dizziness, extremity weakness, gait problem, headaches, numbness, seizures and speech difficulty.   Hematological: Negative for adenopathy. Does not bruise/bleed easily.   Psychiatric/Behavioral: Negative for confusion, depression and sleep disturbance. The patient is  not nervous/anxious.         Physical Exam:     Vitals:    08/08/22 0824   BP: 126/66   Pulse: 90   Resp: 16   Temp: 97.7 °F (36.5 °C)     Physical Exam  Constitutional:       General: He is not in acute distress.     Appearance: He is well-developed. He is not diaphoretic.      Comments: Well dressed gentleman   HENT:      Head: Normocephalic and atraumatic.      Mouth/Throat:      Pharynx: No oropharyngeal exudate.   Eyes:      Conjunctiva/sclera: Conjunctivae normal.      Pupils: Pupils are equal, round, and reactive to light.   Neck:      Thyroid: No thyromegaly.      Vascular: No JVD.      Trachea: No tracheal deviation.   Cardiovascular:      Rate and Rhythm: Normal rate and regular rhythm.      Heart sounds: Normal heart sounds. No murmur heard.    No friction rub.   Pulmonary:      Effort: Pulmonary effort is normal. No respiratory distress.      Breath sounds: Normal breath sounds. No stridor. No wheezing or rales.   Chest:      Chest wall: No tenderness.   Abdominal:      General: Bowel sounds are normal. There is no distension.      Palpations: Abdomen is soft.      Tenderness: There is no abdominal tenderness. There is no guarding or rebound.   Musculoskeletal:         General: No tenderness or deformity. Normal range of motion.      Cervical back: Normal range of motion and neck supple.   Skin:     General: Skin is warm and dry.      Capillary Refill: Capillary refill takes less than 2 seconds.      Coloration: Skin is not pale.      Findings: No erythema or rash.   Neurological:      Mental Status: He is alert and oriented to person, place, and time.      Cranial Nerves: No cranial nerve deficit.      Sensory: No sensory deficit.      Motor: No abnormal muscle tone.      Coordination: Coordination normal.      Deep Tendon Reflexes: Reflexes normal.   Psychiatric:         Behavior: Behavior normal.         Thought Content: Thought content normal.         Judgment: Judgment normal.         ECOG  Performance Status: (foot note - ECOG PS provided by Eastern Cooperative Oncology Group) 1 - Symptomatic but completely ambulatory    Karnofsky Performance Score:  90%- Able to Carry on Normal Activity: Minor Symptoms of Disease    Labs:   Lab Results   Component Value Date    WBC 11.33 08/01/2022    HGB 12.5 (L) 08/01/2022    HCT 36.7 (L) 08/01/2022     08/01/2022    CHOL 157 01/31/2020    TRIG 57 01/31/2020    HDL 65 01/31/2020    ALT 19 08/01/2022    AST 21 08/01/2022     08/01/2022    K 4.1 08/01/2022     08/01/2022    CREATININE 1.0 08/01/2022    BUN 10 08/01/2022    CO2 26 08/01/2022    TSH 3.184 01/31/2020    PSA 0.05 11/16/2021       Imaging: Outside imaging has been reviewed   Assessment and Plan:     Mr. Echeverria is a pleasant 82 year old male with presumed CLL.    CLL  --If this is indeed CLL the doubling time of the white count has not met criteria for treatment  --CLL fish indicates a 17p deletion in 40.5% of nuclei. In CLL, a 17p deletion is associated with an unfavorable prognosis   --Plan on monthly cbc's at [facility]   --Continue ibrutinib   --White count is nearly normal    Bilateral Hand Myalgias  --Suspect possible cervical stenosis or other outflow issue  --Pt requests to see rheumatology here in the event it is joint related  --MRI cervical and thorasic spine ordered, if necessary we will consult vascular surgery    Prostate Cancer  --Treated with a prostatectomy      30 minutes were spent face to face with the patient and his  family to discuss the disease, natural history, treatment options and survival statistics. I have provided the patient with an opportunity to ask questions and have all questions answered to his satisfaction.       he will return to clinic in about 8 weeks, with labs at [facility] 1 day prior, but knows to call in the interim if symptoms change or should a problem arise.        Jaquelin Quintana MD  Hematology and Medical Oncology  [facility]  Transplant  [facility] [place]        BMT Chart Routing      Follow up with physician 2 months. Likes 8/8:30am appointments   Follow up with MARY JANE    Infusion scheduling note    Injection scheduling note    Labs CBC and CMP   Lab interval:  Labs 1 week prior to clinic visit. Please draw labs at [facility]   Imaging    Pharmacy appointment    Other referrals

## 2022-08-08 NOTE — PROGRESS NOTES
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      Notes:       History of presenting illness    82 year old male presented with severe pain in both hands in aug 2019  He went to rheumatology and hematology : leukemia diagnosed  Now came to cancer center : CLL diagnosed    Has been to ER with excruciating pain in the hands   Now sees   On ibrutinib    Episodes so far  :    Pain and swelling right hand   Pain and swelling left hand  Pain and swelling b/l hands    Triggers : none   Started late evening : came on very fast   Got relief from steroids   Each episode would last for hours     One episode : he had fever +  No rash    CRP 18.8  ESR 13 during the episode    ESR 52 during another episode  CRP 71.2    EVE neg  RF neg    Uric acids 3.8 to 4.2 during the episodes    At nights : numbness both hands  Tips of fingers are numb    EMS for 10 minutes   When not in a flare he is ok,he can use his hands and he only has some soreness   He works with his hands all day and he has no problems     MRI C/T spine : 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    Right hand xray  No fracture or dislocation.  Degenerative joint space narrowing at the 1st digit MCP and IP joints, with additional moderate degenerative area at the 2nd through 4th MCP and DIP joints.  No osseous lytic or blastic lesion.  Soft tissues are unremarkable.    Right wrist xray  There is no acute fracture or dislocation.  No significant soft tissue swelling.  The carpal bones are intact with mild degenerative osteoarthrosis.  The radiocarpal articulation is intact.  The ulnar styloid is intact.    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 make furniture    He has great quality of life and doesn't think he has not had any night symptoms    Labs     CMP nml  CBC Leucocytosis 68.83 and he says hematology is following   H/h 13.1/39  plts nml    Past history  HTN,HLD,leukemia,COPD    Family history  Diabetes     Social history    Former tobacco smoker quit 1999    Review of Systems    No skin rashes,malar rash,photosensitivity   No telangiectasias   No calcinosis   No psoriasis   No patchy alopecia   No oral and nasal ulcers   No dry eyes and dry mouth   No pleurisy or any cardiopulmonary complaints except for COPD  No dysphagia,diplopia and dysphonia and muscle weakness   No n/v/d/c   No acid reflux+   No raynaud's+   No digital ulcers   No renal issues   No blood clots   No fever,chills,night sweats,weight loss and loss of appetite   No new onset headaches   No recurrent conjunctivitis or uveitis or scleritis or episcleritis   No chronic or bloody diarrhea with no u colitis or crohn's /inflammatory bowel disease   No penile and urethral  d/c/STDs/no ulcers         Physical Exam   Constitutional: He is oriented to person, place, and time. No distress.   HENT:   Head: Normocephalic.   Mouth/Throat: Oropharynx is clear and moist.   Eyes: Pupils are equal, round, and reactive to light. Conjunctivae are normal. Right eye exhibits no discharge. Left eye exhibits no discharge. No scleral icterus.   Neck: No thyromegaly present.   Cardiovascular: Normal rate, regular rhythm and normal heart sounds.   Pulmonary/Chest: Effort normal and breath sounds normal. No stridor.   Abdominal: Soft. Bowel sounds are normal.   Musculoskeletal:         General: Normal range of motion.      Cervical back: Normal range of motion.   Lymphadenopathy:     He has no cervical  adenopathy.   Neurological: He is alert and oriented to person, place, and time.   Skin: Skin is warm. No rash noted. He is not diaphoretic.   Psychiatric: Affect and judgment normal.       Assessment     82 year old male with  HTN,HLD,leukemia,COPD  comes with 3 episodes of acute onset pain and swelling in the b/l hands since august 2019  He now has a diagnosis of CLL but doesn't need treatment    Episodes so far  :    Pain and swelling right hand   Pain and swelling left hand  Pain and swelling b/l hands    Triggers : none   Started late evening : came on very fast   Got relief from steroids   Each episode would last for hours   One episode : he had fever +  No rash    During each episode his ESR/CRP have gone high  EVE neg  RF neg  Uric acids 3.8 to 4.2 during the episodes    Last flare dec 2019  When not in flare has some hand and arm paresthesias and no pain    Today joint exam unremarkable     Xrays right hand and wrist : moderate deg changes from 1 to 4 MCPs/DIPs    No psoriasis or inf bowel disease    Suspect Calcium pyrophosphate deposition disease with acute pseudogout flares     We initiated colchicine 0.6 mg daily and he has tolerated it well with no side effects    Also on gabapentin 300 mg bed time     He has very minimal neuropathy symptoms today overall he has done well    Left CMC OA  Hands have OA- changes      His Labs were discussed     CMP nml  Cbc abnormalities might be due to his hematologic malignancy  CLL stable     On ibrutinib now    Right shoulder advanced deg changes in GH joint     1. Calcium pyrophosphate arthropathy of hand          F/u problem     Plan    Right shoulder- he prefers home PT    Continue colchicine 0.6 mg every day    Continue gabapentin 300 mg daily     Prn meloxicam only   Topical voltaren gel offered    Liver and kidney function ok    If future flares,call me for steroids     If the hand paresthesias worsen then he will contact me and we will do EMG/NSE UE     covid  vaccine UTD   Talked about importance of 4th dose     Kevin was seen today for disease management.    Diagnoses and all orders for this visit:    Calcium pyrophosphate arthropathy of hand  -     colchicine (MITIGARE) 0.6 mg Cap; Take 1 capsule (0.6 mg total) by mouth once daily.  -     CBC Auto Differential; Future  -     Comprehensive Metabolic Panel; Future    Other orders  -     gabapentin (NEURONTIN) 300 MG capsule; Take 1 capsule (300 mg total) by mouth once daily.          rtc in 12 months

## 2022-08-12 ENCOUNTER — SPECIALTY PHARMACY (OUTPATIENT)
Dept: PHARMACY | Facility: CLINIC | Age: 82
End: 2022-08-12
Payer: MEDICARE

## 2022-08-12 NOTE — TELEPHONE ENCOUNTER
Specialty Pharmacy - Refill Coordination    Specialty Medication Orders Linked to Encounter    Flowsheet Row Most Recent Value   Medication #1 ibrutinib (IMBRUVICA) 280 mg Tab (Order#[id number], Rx#[id number])          Refill Questions - Documented Responses    Flowsheet Row Most Recent Value   Patient Availability and HIPAA Verification    Does patient want to proceed with activity? Yes   HIPAA/medical authority confirmed? Yes   Relationship to patient of person spoken to? Self   Refill Screening Questions    Changes to allergies? No   Changes to medications? No   New conditions since last clinic visit? No   Unplanned office visit, urgent care, ED, or hospital admission in the last 4 weeks? No   How does patient/caregiver feel medication is working? Good   Financial problems or insurance changes? No   How many doses of your specialty medications were missed in the last 4 weeks? 0   Would patient like to speak to a pharmacist? No   When does the patient need to receive the medication? 08/22/22   Refill Delivery Questions    How will the patient receive the medication? Delivery Domenica   When does the patient need to receive the medication? 08/22/22   Shipping Address Home   Address in [facility] confirmed and updated if neccessary? Yes   Expected Copay ($) 70   Is the patient able to afford the medication copay? Yes   Payment Method CC on file   Days supply of Refill 28   Supplies needed? No supplies needed   Refill activity completed? Yes   Refill activity plan Refill scheduled   Shipment/Pickup Date: 08/17/22          Current Outpatient Medications   Medication Sig    albuterol (PROVENTIL) 2.5 mg /3 mL (0.083 %) nebulizer solution Use content of one vial every 12 hours    albuterol (PROVENTIL) 2.5 mg /3 mL (0.083 %) nebulizer solution Take 1 vial by nebulization twice daily.    aspirin (ECOTRIN) 81 MG EC tablet Take 81 mg by mouth once daily.    budesonide-glycopyr-formoterol (BREZTRI AEROSPHERE) 160-9-4.8  mcg/actuation HFAA INSTILL 2 PUFFS BY MOUTH EVERY 12 HOURS    colchicine (MITIGARE) 0.6 mg Cap Take 1 capsule (0.6 mg total) by mouth once daily.    diclofenac sodium (VOLTAREN) 1 % Gel Apply 2 g topically 2 (two) times daily as needed (pain).    famotidine (PEPCID) 20 MG tablet Take 1 tablet (20 mg total) by mouth once daily.    fish oil-omega-3 fatty acids 300-1,000 mg capsule Take 1 g by mouth once daily.    gabapentin (NEURONTIN) 300 MG capsule Take 1 capsule (300 mg total) by mouth once daily.    ibrutinib (IMBRUVICA) 280 mg Tab Take 1 tablet (280 mg) by mouth once daily.    ipratropium (ATROVENT) 0.02 % nebulizer solution use contents of one vial every 12 hours    ipratropium (ATROVENT) 0.02 % nebulizer solution Use contents of one vial every 12 hours    montelukast (SINGULAIR) 10 mg tablet TAKE ONE TABLET BY MOUTH EACH EVENING    multivitamin with minerals tablet Take 1 tablet by mouth once daily. Equate Brand with Iron    nebulizer accessories Misc Use as directed    nebulizer and compressor Hyacinth USE WITH NEBULIZER SOLUTION AS DIRECTED    omeprazole (PRILOSEC) 20 MG capsule Take one capsule by mouth once daily in the morning    omeprazole (PRILOSEC) 20 MG capsule Take one capsule by mouth once daily in the morning    pravastatin (PRAVACHOL) 20 MG tablet Take 1 tablet orally once in the evening.    valsartan (DIOVAN) 160 MG tablet Take 1 tablet orally once a day. (replacing the Losartan and Verapamil)    verapamiL (VERELAN) 100 MG CPCT Take 1 capsule orally once a day. (Patient not taking: No sig reported)   Last reviewed on 8/8/2022 11:11 AM by Jalen Joseph MD    Review of patient's allergies indicates:  No Known Allergies Last reviewed on  8/8/2022 10:47 AM by Svetlana Luther      Tasks added this encounter   9/12/2022 - Refill Call (Auto Added)   Tasks due within next 3 months   9/18/2022 - Clinical - Follow Up Assesement (180 day)     [facility] -  Specialty Pharmacy  [facility] [facility] [address]  Phone: [phone]  Fax: [phone]

## 2022-08-17 LAB
CHOLEST SERPL-MSCNC: 128 MG/DL (ref 0–200)
CHOLEST/HDLC SERPL: 2.2 {RATIO}
EGFR: 71.7
HDLC SERPL-MCNC: 58 MG/DL (ref 35–70)
LDL CHOLESTEROL DIRECT: 63 MG/DL
NON HDL CHOL. (LDL+VLDL): 70
TRIGL SERPL-MCNC: 63 MG/DL (ref 40–160)
VLDL CHOLESTEROL: 13 MG/DL

## 2022-09-13 ENCOUNTER — SPECIALTY PHARMACY (OUTPATIENT)
Dept: PHARMACY | Facility: CLINIC | Age: 82
End: 2022-09-13
Payer: MEDICARE

## 2022-09-13 NOTE — TELEPHONE ENCOUNTER
Specialty Pharmacy - Refill Coordination    Specialty Medication Orders Linked to Encounter      Flowsheet Row Most Recent Value   Medication #1 ibrutinib (IMBRUVICA) 280 mg Tab (Order#[id number], Rx#[id number])            Refill Questions - Documented Responses      Flowsheet Row Most Recent Value   Patient Availability and HIPAA Verification    Does patient want to proceed with activity? Yes   HIPAA/medical authority confirmed? Yes   Relationship to patient of person spoken to? Self   Refill Screening Questions    Changes to allergies? No   Changes to medications? No   New conditions since last clinic visit? No   Unplanned office visit, urgent care, ED, or hospital admission in the last 4 weeks? No   How does patient/caregiver feel medication is working? Good   Financial problems or insurance changes? No   How many doses of your specialty medications were missed in the last 4 weeks? 0   Would patient like to speak to a pharmacist? No   When does the patient need to receive the medication? 09/20/22   Refill Delivery Questions    How will the patient receive the medication? Delivery Domenica   When does the patient need to receive the medication? 09/20/22   Shipping Address Home   Address in [facility] confirmed and updated if neccessary? Yes   Expected Copay ($) 70   Is the patient able to afford the medication copay? Yes   Payment Method CC on file   Days supply of Refill 28   Supplies needed? No supplies needed   Refill activity completed? Yes   Refill activity plan Refill scheduled   Shipment/Pickup Date: 09/14/22            Current Outpatient Medications   Medication Sig    albuterol (PROVENTIL) 2.5 mg /3 mL (0.083 %) nebulizer solution Use content of one vial every 12 hours    albuterol (PROVENTIL) 2.5 mg /3 mL (0.083 %) nebulizer solution Take 1 vial by nebulization twice daily.    aspirin (ECOTRIN) 81 MG EC tablet Take 81 mg by mouth once daily.    budesonide-glycopyr-formoterol (BREZTRI AEROSPHERE)  160-9-4.8 mcg/actuation HFAA INSTILL 2 PUFFS BY MOUTH EVERY 12 HOURS    colchicine (MITIGARE) 0.6 mg Cap Take 1 capsule (0.6 mg total) by mouth once daily.    diclofenac sodium (VOLTAREN) 1 % Gel Apply 2 g topically 2 (two) times daily as needed (pain).    famotidine (PEPCID) 20 MG tablet Take 1 tablet (20 mg total) by mouth once daily.    famotidine (PEPCID) 20 MG tablet Take 1 tablet orally 2 times a day as needed for heart burn    fish oil-omega-3 fatty acids 300-1,000 mg capsule Take 1 g by mouth once daily.    gabapentin (NEURONTIN) 300 MG capsule Take 1 capsule (300 mg total) by mouth once daily.    ibrutinib (IMBRUVICA) 280 mg Tab Take 1 tablet (280 mg) by mouth once daily.    ipratropium (ATROVENT) 0.02 % nebulizer solution use contents of one vial every 12 hours    ipratropium (ATROVENT) 0.02 % nebulizer solution Use contents of one vial every 12 hours    metoprolol succinate (TOPROL-XL) 25 MG 24 hr tablet Take 1 tablet orally once a day.    montelukast (SINGULAIR) 10 mg tablet TAKE ONE TABLET BY MOUTH EACH EVENING    multivitamin with minerals tablet Take 1 tablet by mouth once daily. Equate Brand with Iron    nebulizer accessories Misc Use as directed    nebulizer and compressor Hyacinth USE WITH NEBULIZER SOLUTION AS DIRECTED    omeprazole (PRILOSEC) 20 MG capsule Take one capsule by mouth once daily in the morning    omeprazole (PRILOSEC) 20 MG capsule Take one capsule by mouth once daily in the morning    pravastatin (PRAVACHOL) 20 MG tablet Take 1 tablet orally once in the evening.    pravastatin (PRAVACHOL) 20 MG tablet Take 1 tablet orally once in the evening.    valsartan (DIOVAN) 160 MG tablet Take 1 tablet orally once a day. (replacing the Losartan and Verapamil)    valsartan (DIOVAN) 160 MG tablet Take 1 tablet orally once a day.    verapamiL (VERELAN) 100 MG CPCT Take 1 capsule orally once a day. (Patient not taking: No sig reported)   Last reviewed on 8/8/2022 11:11 AM by Jalen RUBIO  MD Jake    Review of patient's allergies indicates:  No Known Allergies Last reviewed on  8/8/2022 10:47 AM by Svetlana Luther      Tasks added this encounter   10/11/2022 - Refill Call (Auto Added)   Tasks due within next 3 months   9/18/2022 - Clinical - Follow Up Assesement (180 day)     [facility] - Specialty Pharmacy  [address] [place], [place] [address]  [facility] [address]  Phone: [phone]  Fax: [phone]

## 2022-09-21 ENCOUNTER — SPECIALTY PHARMACY (OUTPATIENT)
Dept: PHARMACY | Facility: CLINIC | Age: 82
End: 2022-09-21
Payer: MEDICARE

## 2022-09-21 NOTE — TELEPHONE ENCOUNTER
Kevin Echeverria is a 82 y.o. 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  He has been on therapy with Imbruvica for 18 months.  I have reviewed his electronic medical record and current medication list and determined that specialty medication adjustment Is not needed at this time.    Patient has not experienced adverse events.  He Is adherent reporting 0 missed doses since last review.  He is meeting goals of therapy and will continue treatment.    Follow Up Review 9/21/2022 9/13/2022 8/12/2022 7/20/2022   # of missed doses - 0 0 0   New Medications? - No No No   New Conditions? - No No No   New Allergies? - No No No   Medication Effective? - Good Good Good   Missed activities? No - - -   Urgent Care? None - - -   Some recent data might be hidden       Therapy is appropriate to continue.    Therapy is effective: Yes  Recommendations: none at this time.  Review Method: Chart Review    Caitlin Parker, PharmD  [facility]  [address]  [facility] [address]  Phone: [phone]  Fax: [phone]

## 2022-09-26 RX ORDER — MONTELUKAST SODIUM 10 MG/1
10 TABLET ORAL NIGHTLY
Qty: 90 TABLET | Refills: 0 | Status: SHIPPED | OUTPATIENT
Start: 2022-09-26 | End: 2022-12-27 | Stop reason: SDUPTHER

## 2022-09-26 NOTE — TELEPHONE ENCOUNTER
Care Due:                  Date            Visit Type   Department     Provider  --------------------------------------------------------------------------------                                [facility] FAMILY    Bridger Murcia  Last Visit: 11-      [facility]   MEDICINE       Nash  Next Visit: None Scheduled  None         None Found                                                            Last  Test          Frequency    Reason                     Performed    Due Date  --------------------------------------------------------------------------------    Office Visit  12 months..  famotidine, montelukast,   11- 11-                             omeprazole...............    [employer] Embedded Care Gaps. Reference number: [id number]. 9/26/2022   11:32:39 AM CDT

## 2022-09-26 NOTE — TELEPHONE ENCOUNTER
Refill Decision Note   Kevin Echeverria  is requesting a refill authorization.  Brief Assessment and Rationale for Refill:  Approve    -Medication-Related Problems Identified: Requires appointment  Medication Therapy Plan:  Due for annual w/ PCP    Medication Reconciliation Completed: No   Comments:     Provider Staff:     Action is required for this patient.   Please see care gap opportunities below in Care Due Message.     Thanks!  [facility]     Appointments      Date Provider   Last Visit   11/16/2021 Bridger Cao MD   Next Visit   Visit date not found Bridger Cao MD     Note composed:1:56 PM 09/26/2022           Note composed:1:56 PM 09/26/2022

## 2022-10-11 ENCOUNTER — SPECIALTY PHARMACY (OUTPATIENT)
Dept: PHARMACY | Facility: CLINIC | Age: 82
End: 2022-10-11
Payer: MEDICARE

## 2022-10-11 NOTE — TELEPHONE ENCOUNTER
Specialty Pharmacy - Refill Coordination    Specialty Medication Orders Linked to Encounter      Flowsheet Row Most Recent Value   Medication #1 ibrutinib (IMBRUVICA) 280 mg Tab (Order#[id number], Rx#[id number])            Refill Questions - Documented Responses      Flowsheet Row Most Recent Value   Patient Availability and HIPAA Verification    Does patient want to proceed with activity? Yes   HIPAA/medical authority confirmed? Yes   Relationship to patient of person spoken to? Self   Refill Screening Questions    Changes to allergies? No   Changes to medications? No   New conditions since last clinic visit? No   Unplanned office visit, urgent care, ED, or hospital admission in the last 4 weeks? No   How does patient/caregiver feel medication is working? Excellent   Financial problems or insurance changes? No   How many doses of your specialty medications were missed in the last 4 weeks? 0   Would patient like to speak to a pharmacist? No   When does the patient need to receive the medication? 10/20/22   Refill Delivery Questions    How will the patient receive the medication? MEDRx   When does the patient need to receive the medication? 10/20/22   Shipping Address Home   Address in [facility] confirmed and updated if neccessary? Yes   Expected Copay ($) 70   Is the patient able to afford the medication copay? Yes   Payment Method CC on file   Days supply of Refill 28   Supplies needed? No supplies needed   Refill activity completed? Yes   Refill activity plan Refill scheduled   Shipment/Pickup Date: 10/13/22            Current Outpatient Medications   Medication Sig    albuterol (PROVENTIL) 2.5 mg /3 mL (0.083 %) nebulizer solution Use content of one vial every 12 hours    albuterol (PROVENTIL) 2.5 mg /3 mL (0.083 %) nebulizer solution Take 1 vial by nebulization twice daily.    aspirin (ECOTRIN) 81 MG EC tablet Take 81 mg by mouth once daily.    budesonide-glycopyr-formoterol (BREZTRI AEROSPHERE)  160-9-4.8 mcg/actuation HFAA INSTILL 2 PUFFS BY MOUTH EVERY 12 HOURS    colchicine (MITIGARE) 0.6 mg Cap Take 1 capsule (0.6 mg total) by mouth once daily.    diclofenac sodium (VOLTAREN) 1 % Gel Apply 2 g topically 2 (two) times daily as needed (pain).    famotidine (PEPCID) 20 MG tablet Take 1 tablet (20 mg total) by mouth once daily.    famotidine (PEPCID) 20 MG tablet Take 1 tablet orally 2 times a day as needed for heart burn    fish oil-omega-3 fatty acids 300-1,000 mg capsule Take 1 g by mouth once daily.    gabapentin (NEURONTIN) 300 MG capsule Take 1 capsule (300 mg total) by mouth once daily.    ibrutinib (IMBRUVICA) 280 mg Tab Take 1 tablet (280 mg) by mouth once daily.    ipratropium (ATROVENT) 0.02 % nebulizer solution use contents of one vial every 12 hours    ipratropium (ATROVENT) 0.02 % nebulizer solution Use contents of one vial every 12 hours    metoprolol succinate (TOPROL-XL) 25 MG 24 hr tablet Take 1 tablet orally once a day.    montelukast (SINGULAIR) 10 mg tablet Take 1 tablet (10 mg total) by mouth every evening.    multivitamin with minerals tablet Take 1 tablet by mouth once daily. Equate Brand with Iron    nebulizer accessories Misc Use as directed    nebulizer and compressor Hyacinth USE WITH NEBULIZER SOLUTION AS DIRECTED    omeprazole (PRILOSEC) 20 MG capsule Take one capsule by mouth once daily in the morning    omeprazole (PRILOSEC) 20 MG capsule Take one capsule by mouth once daily in the morning    pravastatin (PRAVACHOL) 20 MG tablet Take 1 tablet orally once in the evening.    pravastatin (PRAVACHOL) 20 MG tablet Take 1 tablet orally once in the evening.    valsartan (DIOVAN) 160 MG tablet Take 1 tablet orally once a day. (replacing the Losartan and Verapamil)    valsartan (DIOVAN) 160 MG tablet Take 1 tablet orally once a day.    verapamiL (VERELAN) 100 MG CPCT Take 1 capsule orally once a day. (Patient not taking: No sig reported)   Last reviewed on 8/8/2022 11:11 AM by Jalen RUBIO  MD Jake    Review of patient's allergies indicates:  No Known Allergies Last reviewed on  8/8/2022 10:47 AM by Svetlana Luther      Tasks added this encounter   No tasks added.   Tasks due within next 3 months   10/11/2022 - Refill Call (Auto Added)     [facility] - Specialty Pharmacy  [address] [place], [place] [address]  [facility] [address]  Phone: [phone]  Fax: [phone]

## 2022-10-17 ENCOUNTER — LAB VISIT (OUTPATIENT)
Dept: LAB | Facility: HOSPITAL | Age: 82
End: 2022-10-17
Attending: INTERNAL MEDICINE
Payer: MEDICARE

## 2022-10-17 DIAGNOSIS — C91.10 CLL (CHRONIC LYMPHOCYTIC LEUKEMIA): ICD-10-CM

## 2022-10-17 LAB
ALBUMIN SERPL BCP-MCNC: 3.4 G/DL (ref 3.5–5.2)
ALP SERPL-CCNC: 38 U/L (ref 55–135)
ALT SERPL W/O P-5'-P-CCNC: 18 U/L (ref 10–44)
ANION GAP SERPL CALC-SCNC: 7 MMOL/L (ref 8–16)
AST SERPL-CCNC: 21 U/L (ref 10–40)
BASOPHILS # BLD AUTO: 0.12 K/UL (ref 0–0.2)
BASOPHILS NFR BLD: 1 % (ref 0–1.9)
BILIRUB SERPL-MCNC: 0.6 MG/DL (ref 0.1–1)
BUN SERPL-MCNC: 9 MG/DL (ref 8–23)
CALCIUM SERPL-MCNC: 8.9 MG/DL (ref 8.7–10.5)
CHLORIDE SERPL-SCNC: 103 MMOL/L (ref 95–110)
CO2 SERPL-SCNC: 24 MMOL/L (ref 23–29)
CREAT SERPL-MCNC: 1 MG/DL (ref 0.5–1.4)
DIFFERENTIAL METHOD: ABNORMAL
EOSINOPHIL # BLD AUTO: 0.5 K/UL (ref 0–0.5)
EOSINOPHIL NFR BLD: 3.8 % (ref 0–8)
ERYTHROCYTE [DISTWIDTH] IN BLOOD BY AUTOMATED COUNT: 11.7 % (ref 11.5–14.5)
EST. GFR  (NO RACE VARIABLE): >60 ML/MIN/1.73 M^2
GLUCOSE SERPL-MCNC: 97 MG/DL (ref 70–110)
HCT VFR BLD AUTO: 36.3 % (ref 40–54)
HGB BLD-MCNC: 12.4 G/DL (ref 14–18)
IMM GRANULOCYTES # BLD AUTO: 0.03 K/UL (ref 0–0.04)
IMM GRANULOCYTES NFR BLD AUTO: 0.2 % (ref 0–0.5)
LYMPHOCYTES # BLD AUTO: 5.9 K/UL (ref 1–4.8)
LYMPHOCYTES NFR BLD: 47.5 % (ref 18–48)
MCH RBC QN AUTO: 33 PG (ref 27–31)
MCHC RBC AUTO-ENTMCNC: 34.2 G/DL (ref 32–36)
MCV RBC AUTO: 97 FL (ref 82–98)
MONOCYTES # BLD AUTO: 1 K/UL (ref 0.3–1)
MONOCYTES NFR BLD: 8.3 % (ref 4–15)
NEUTROPHILS # BLD AUTO: 4.9 K/UL (ref 1.8–7.7)
NEUTROPHILS NFR BLD: 39.2 % (ref 38–73)
NRBC BLD-RTO: 0 /100 WBC
PLATELET # BLD AUTO: 171 K/UL (ref 150–450)
PMV BLD AUTO: 11.9 FL (ref 9.2–12.9)
POTASSIUM SERPL-SCNC: 3.9 MMOL/L (ref 3.5–5.1)
PROT SERPL-MCNC: 6.5 G/DL (ref 6–8.4)
RBC # BLD AUTO: 3.76 M/UL (ref 4.6–6.2)
SODIUM SERPL-SCNC: 134 MMOL/L (ref 136–145)
WBC # BLD AUTO: 12.46 K/UL (ref 3.9–12.7)

## 2022-10-17 PROCEDURE — 36415 COLL VENOUS BLD VENIPUNCTURE: CPT | Performed by: INTERNAL MEDICINE

## 2022-10-17 PROCEDURE — 80053 COMPREHEN METABOLIC PANEL: CPT | Performed by: INTERNAL MEDICINE

## 2022-10-17 PROCEDURE — 85025 COMPLETE CBC W/AUTO DIFF WBC: CPT | Performed by: INTERNAL MEDICINE

## 2022-10-24 ENCOUNTER — OFFICE VISIT (OUTPATIENT)
Dept: HEMATOLOGY/ONCOLOGY | Facility: CLINIC | Age: 82
End: 2022-10-24
Payer: MEDICARE

## 2022-10-24 VITALS
BODY MASS INDEX: 23.47 KG/M2 | HEIGHT: 70 IN | OXYGEN SATURATION: 99 % | RESPIRATION RATE: 16 BRPM | TEMPERATURE: 98 F | HEART RATE: 86 BPM | SYSTOLIC BLOOD PRESSURE: 125 MMHG | DIASTOLIC BLOOD PRESSURE: 58 MMHG | WEIGHT: 163.94 LBS

## 2022-10-24 DIAGNOSIS — C61 PROSTATE CANCER: ICD-10-CM

## 2022-10-24 DIAGNOSIS — I10 BENIGN ESSENTIAL HTN: Primary | ICD-10-CM

## 2022-10-24 DIAGNOSIS — C91.10 CLL (CHRONIC LYMPHOCYTIC LEUKEMIA): ICD-10-CM

## 2022-10-24 PROCEDURE — 3288F FALL RISK ASSESSMENT DOCD: CPT | Mod: CPTII,S$GLB,, | Performed by: INTERNAL MEDICINE

## 2022-10-24 PROCEDURE — 1126F PR PAIN SEVERITY QUANTIFIED, NO PAIN PRESENT: ICD-10-PCS | Mod: CPTII,S$GLB,, | Performed by: INTERNAL MEDICINE

## 2022-10-24 PROCEDURE — 1126F AMNT PAIN NOTED NONE PRSNT: CPT | Mod: CPTII,S$GLB,, | Performed by: INTERNAL MEDICINE

## 2022-10-24 PROCEDURE — 3074F SYST BP LT 130 MM HG: CPT | Mod: CPTII,S$GLB,, | Performed by: INTERNAL MEDICINE

## 2022-10-24 PROCEDURE — 3288F PR FALLS RISK ASSESSMENT DOCUMENTED: ICD-10-PCS | Mod: CPTII,S$GLB,, | Performed by: INTERNAL MEDICINE

## 2022-10-24 PROCEDURE — 99999 PR PBB SHADOW E&M-EST. PATIENT-LVL III: ICD-10-PCS | Mod: PBBFAC,,, | Performed by: INTERNAL MEDICINE

## 2022-10-24 PROCEDURE — 3074F PR MOST RECENT SYSTOLIC BLOOD PRESSURE < 130 MM HG: ICD-10-PCS | Mod: CPTII,S$GLB,, | Performed by: INTERNAL MEDICINE

## 2022-10-24 PROCEDURE — 99215 PR OFFICE/OUTPT VISIT, EST, LEVL V, 40-54 MIN: ICD-10-PCS | Mod: S$GLB,,, | Performed by: INTERNAL MEDICINE

## 2022-10-24 PROCEDURE — 3078F PR MOST RECENT DIASTOLIC BLOOD PRESSURE < 80 MM HG: ICD-10-PCS | Mod: CPTII,S$GLB,, | Performed by: INTERNAL MEDICINE

## 2022-10-24 PROCEDURE — 3078F DIAST BP <80 MM HG: CPT | Mod: CPTII,S$GLB,, | Performed by: INTERNAL MEDICINE

## 2022-10-24 PROCEDURE — 99215 OFFICE O/P EST HI 40 MIN: CPT | Mod: S$GLB,,, | Performed by: INTERNAL MEDICINE

## 2022-10-24 PROCEDURE — 1101F PT FALLS ASSESS-DOCD LE1/YR: CPT | Mod: CPTII,S$GLB,, | Performed by: INTERNAL MEDICINE

## 2022-10-24 PROCEDURE — 1101F PR PT FALLS ASSESS DOC 0-1 FALLS W/OUT INJ PAST YR: ICD-10-PCS | Mod: CPTII,S$GLB,, | Performed by: INTERNAL MEDICINE

## 2022-10-24 PROCEDURE — 99999 PR PBB SHADOW E&M-EST. PATIENT-LVL III: CPT | Mod: PBBFAC,,, | Performed by: INTERNAL MEDICINE

## 2022-10-24 NOTE — PROGRESS NOTES
Hematology and Medical Oncology   Follow Up     10/24/2022    Primary Oncologic Diagnosis: CLL    History of Present Ilness:   Kevin Echeverria (Kevin) is a pleasant 82 y.o.male who presents to transition care to [facility] from the [facility]. The patient presents today with his wife and daughter. Most of his issues started in August when he began experiencing horrible pain in his hands that was worse with lack of motion/rest. The pain is excruciating and has sent him to the ED several times. The pain can happen in either upper extremity and feels like it travels at times. The discomfort abates as he moves his hands more throughout the day.     Incidentally through these ER visits it was noted that he has a persistently elevated but largely stable WBC.    --Pathologist review of the peripheral smear on 12/21/19 Leukocytosis with an absolute and relative lymphocytosis.     Lymphocytes are mature, and a subset displays a slightly atypical chromatin pattern.  Smudge cells are present.  Background granulocytes are morphologically normal.  The morphology suggests a B cell lymphoproliferative disorder such as CLL.  --Flow Cytometry on 1/3/2020: A B cell lymphoproliferative disorder is present.  Mantle cell lymphoma is favored although an atypical CLL cannot be completely ruled out; correlation   with morphology and with any available cytogenetic or molecular studies (t(11;14)) is required.     Interval History:  Mr. Echeverria is doing well. 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 Energy is good. No issues with medication. He takes the pills with a large glass of water as instructed.     Overall doing very well.  Happy to hear his white count has remained in the normal range for the last several blood draws.    Reports no new side effects or issues.     PAST MEDICAL HISTORY:   Past Medical History:   Diagnosis Date    Arthritis     Cancer     prostate    COPD (chronic obstructive pulmonary  disease)     Hyperlipidemia     Hypertension     Leukemia        PAST SURGICAL HISTORY:   Past Surgical History:   Procedure Laterality Date    CATARACT EXTRACTION W/  INTRAOCULAR LENS IMPLANT Left 05/05/2016    COLONOSCOPY W/ BIOPSIES AND POLYPECTOMY      PROSTATE SURGERY  05/1996    TONSILLECTOMY  1956       PAST SOCIAL HISTORY:   reports that he has never smoked. He quit smokeless tobacco use about 23 years ago. He reports current alcohol use. He reports that he does not use drugs.    FAMILY HISTORY:  Family History   Problem Relation Age of Onset    Diabetes Mother        CURRENT MEDICATIONS:   Current Outpatient Medications   Medication Sig    albuterol (PROVENTIL) 2.5 mg /3 mL (0.083 %) nebulizer solution Use content of one vial every 12 hours    albuterol (PROVENTIL) 2.5 mg /3 mL (0.083 %) nebulizer solution Take 1 vial by nebulization twice daily.    aspirin (ECOTRIN) 81 MG EC tablet Take 81 mg by mouth once daily.    budesonide-glycopyr-formoterol (BREZTRI AEROSPHERE) 160-9-4.8 mcg/actuation HFAA INSTILL 2 PUFFS BY MOUTH EVERY 12 HOURS    colchicine (MITIGARE) 0.6 mg Cap Take 1 capsule (0.6 mg total) by mouth once daily.    diclofenac sodium (VOLTAREN) 1 % Gel Apply 2 g topically 2 (two) times daily as needed (pain).    famotidine (PEPCID) 20 MG tablet Take 1 tablet (20 mg total) by mouth once daily.    famotidine (PEPCID) 20 MG tablet Take 1 tablet orally 2 times a day as needed for heart burn    fish oil-omega-3 fatty acids 300-1,000 mg capsule Take 1 g by mouth once daily.    gabapentin (NEURONTIN) 300 MG capsule Take 1 capsule (300 mg total) by mouth once daily.    ibrutinib (IMBRUVICA) 280 mg Tab Take 1 tablet (280 mg) by mouth once daily.    ipratropium (ATROVENT) 0.02 % nebulizer solution use contents of one vial every 12 hours    ipratropium (ATROVENT) 0.02 % nebulizer solution Use contents of one vial every 12 hours    metoprolol succinate (TOPROL-XL) 25 MG 24 hr tablet Take 1 tablet orally once a  day.    montelukast (SINGULAIR) 10 mg tablet Take 1 tablet (10 mg total) by mouth every evening.    multivitamin with minerals tablet Take 1 tablet by mouth once daily. Equate Brand with Iron    nebulizer accessories Misc Use as directed    nebulizer and compressor Hyacinth USE WITH NEBULIZER SOLUTION AS DIRECTED    omeprazole (PRILOSEC) 20 MG capsule Take one capsule by mouth once daily in the morning    omeprazole (PRILOSEC) 20 MG capsule Take one capsule by mouth once daily in the morning    pravastatin (PRAVACHOL) 20 MG tablet Take 1 tablet orally once in the evening.    pravastatin (PRAVACHOL) 20 MG tablet Take 1 tablet orally once in the evening.    valsartan (DIOVAN) 160 MG tablet Take 1 tablet orally once a day.    valsartan (DIOVAN) 160 MG tablet Take 1 tablet orally once a day. (replacing the Losartan and Verapamil) (Patient not taking: Reported on 10/24/2022)    verapamiL (VERELAN) 100 MG CPCT Take 1 capsule orally once a day. (Patient not taking: No sig reported)     No current facility-administered medications for this visit.     ALLERGIES:   Review of patient's allergies indicates:  No Known Allergies      Review of Systems:     Review of Systems   Constitutional:  Negative for appetite change, chills, diaphoresis, fatigue, fever and unexpected weight change.   HENT:   Negative for hearing loss, mouth sores, nosebleeds, sore throat, trouble swallowing and voice change.    Eyes:  Negative for eye problems and icterus.   Respiratory:  Negative for chest tightness, cough, hemoptysis, shortness of breath and wheezing.    Cardiovascular:  Negative for chest pain, leg swelling and palpitations.   Gastrointestinal:  Negative for abdominal distention, abdominal pain, blood in stool, diarrhea, nausea and vomiting.   Endocrine: Negative for hot flashes.   Genitourinary:  Negative for bladder incontinence, difficulty urinating, dysuria and hematuria.    Musculoskeletal:  Positive for arthralgias and neck pain.  Negative for back pain, flank pain, gait problem, myalgias and neck stiffness.   Skin:  Negative for itching, rash and wound.   Neurological:  Negative for dizziness, extremity weakness, gait problem, headaches, numbness, seizures and speech difficulty.   Hematological:  Negative for adenopathy. Does not bruise/bleed easily.   Psychiatric/Behavioral:  Negative for confusion, depression and sleep disturbance. The patient is not nervous/anxious.       Physical Exam:     Vitals:    10/24/22 0826   BP: (!) 125/58   Pulse: 86   Resp: 16   Temp: 98.2 °F (36.8 °C)     Physical Exam  Constitutional:       General: He is not in acute distress.     Appearance: He is well-developed. He is not diaphoretic.      Comments: Well dressed gentleman   HENT:      Head: Normocephalic and atraumatic.      Mouth/Throat:      Pharynx: No oropharyngeal exudate.   Eyes:      Conjunctiva/sclera: Conjunctivae normal.      Pupils: Pupils are equal, round, and reactive to light.   Neck:      Thyroid: No thyromegaly.      Vascular: No JVD.      Trachea: No tracheal deviation.   Cardiovascular:      Rate and Rhythm: Normal rate and regular rhythm.      Heart sounds: Normal heart sounds. No murmur heard.    No friction rub.   Pulmonary:      Effort: Pulmonary effort is normal. No respiratory distress.      Breath sounds: Normal breath sounds. No stridor. No wheezing or rales.   Chest:      Chest wall: No tenderness.   Abdominal:      General: Bowel sounds are normal. There is no distension.      Palpations: Abdomen is soft.      Tenderness: There is no abdominal tenderness. There is no guarding or rebound.   Musculoskeletal:         General: No tenderness or deformity. Normal range of motion.      Cervical back: Normal range of motion and neck supple.   Skin:     General: Skin is warm and dry.      Capillary Refill: Capillary refill takes less than 2 seconds.      Coloration: Skin is not pale.      Findings: No erythema or rash.   Neurological:       Mental Status: He is alert and oriented to person, place, and time.      Cranial Nerves: No cranial nerve deficit.      Sensory: No sensory deficit.      Motor: No abnormal muscle tone.      Coordination: Coordination normal.      Deep Tendon Reflexes: Reflexes normal.   Psychiatric:         Behavior: Behavior normal.         Thought Content: Thought content normal.         Judgment: Judgment normal.       ECOG Performance Status: (foot note - ECOG PS provided by Eastern Cooperative Oncology Group) 1 - Symptomatic but completely ambulatory    Karnofsky Performance Score:  90%- Able to Carry on Normal Activity: Minor Symptoms of Disease    Labs:   Lab Results   Component Value Date    WBC 12.46 10/17/2022    HGB 12.4 (L) 10/17/2022    HCT 36.3 (L) 10/17/2022     10/17/2022    CHOL 157 01/31/2020    TRIG 57 01/31/2020    HDL 65 01/31/2020    ALT 18 10/17/2022    AST 21 10/17/2022     (L) 10/17/2022    K 3.9 10/17/2022     10/17/2022    CREATININE 1.0 10/17/2022    BUN 9 10/17/2022    CO2 24 10/17/2022    TSH 3.184 01/31/2020    PSA 0.05 11/16/2021       Imaging: Outside imaging has been reviewed   Assessment and Plan:     Mr. Echeverria is a pleasant 82 year old male with presumed CLL.    CLL  --If this is indeed CLL the doubling time of the white count has not met criteria for treatment  --CLL fish indicates a 17p deletion in 40.5% of nuclei. In CLL, a 17p deletion is associated with an unfavorable prognosis   --Plan on monthly cbc's at [place]   --Continue ibrutinib   --White count is now normal, will spread out blood draws to q3 months    Bilateral Hand Myalgias  --Suspect possible cervical stenosis or other outflow issue  --Pt requests to see rheumatology here in the event it is joint related  --MRI cervical and thorasic spine ordered, if necessary we will consult vascular surgery    Prostate Cancer  --Treated with a prostatectomy      30 minutes were spent face to face with the patient and  his  family to discuss the disease, natural history, treatment options and survival statistics. I have provided the patient with an opportunity to ask questions and have all questions answered to his satisfaction.       he will return to clinic in about 12 weeks, with labs at [place] 1 day prior, but knows to call in the interim if symptoms change or should a problem arise.        Jaquelin Quintana MD  Hematology and Medical Oncology  Bone Marrow Transplant  [facility]        BMT Chart Routing      Follow up with physician 3 months. 1. cbc,cmp at [facility] in 3 months 2.see me the next morning 8:30am slot   Follow up with MARY JANE    Provider visit type    Infusion scheduling note    Injection scheduling note    Labs CBC and CMP   Lab interval:     Imaging    Pharmacy appointment    Other referrals

## 2022-10-25 ENCOUNTER — OFFICE VISIT (OUTPATIENT)
Dept: FAMILY MEDICINE | Facility: CLINIC | Age: 82
End: 2022-10-25
Payer: MEDICARE

## 2022-10-25 ENCOUNTER — IMMUNIZATION (OUTPATIENT)
Dept: FAMILY MEDICINE | Facility: CLINIC | Age: 82
End: 2022-10-25
Payer: MEDICARE

## 2022-10-25 VITALS
HEART RATE: 89 BPM | SYSTOLIC BLOOD PRESSURE: 122 MMHG | BODY MASS INDEX: 23.14 KG/M2 | OXYGEN SATURATION: 98 % | WEIGHT: 161.63 LBS | DIASTOLIC BLOOD PRESSURE: 60 MMHG | HEIGHT: 70 IN | RESPIRATION RATE: 18 BRPM

## 2022-10-25 DIAGNOSIS — C91.10 CLL (CHRONIC LYMPHOCYTIC LEUKEMIA): Primary | ICD-10-CM

## 2022-10-25 DIAGNOSIS — C61 PROSTATE CANCER: ICD-10-CM

## 2022-10-25 DIAGNOSIS — J44.9 CHRONIC OBSTRUCTIVE PULMONARY DISEASE, UNSPECIFIED COPD TYPE: ICD-10-CM

## 2022-10-25 DIAGNOSIS — D84.821 DRUG-INDUCED IMMUNODEFICIENCY: ICD-10-CM

## 2022-10-25 DIAGNOSIS — C92.10 CML (CHRONIC MYELOCYTIC LEUKEMIA): ICD-10-CM

## 2022-10-25 DIAGNOSIS — Z23 NEED FOR PROPHYLACTIC VACCINATION AND INOCULATION AGAINST INFLUENZA: ICD-10-CM

## 2022-10-25 DIAGNOSIS — M11.20 PSEUDOGOUT: ICD-10-CM

## 2022-10-25 DIAGNOSIS — Z23 NEED FOR VACCINATION: Primary | ICD-10-CM

## 2022-10-25 DIAGNOSIS — Z79.899 DRUG-INDUCED IMMUNODEFICIENCY: ICD-10-CM

## 2022-10-25 PROCEDURE — G0008 ADMIN INFLUENZA VIRUS VAC: HCPCS | Mod: S$GLB,,, | Performed by: FAMILY MEDICINE

## 2022-10-25 PROCEDURE — 3074F PR MOST RECENT SYSTOLIC BLOOD PRESSURE < 130 MM HG: ICD-10-PCS | Mod: CPTII,S$GLB,, | Performed by: FAMILY MEDICINE

## 2022-10-25 PROCEDURE — 1159F PR MEDICATION LIST DOCUMENTED IN MEDICAL RECORD: ICD-10-PCS | Mod: CPTII,S$GLB,, | Performed by: FAMILY MEDICINE

## 2022-10-25 PROCEDURE — 3288F PR FALLS RISK ASSESSMENT DOCUMENTED: ICD-10-PCS | Mod: CPTII,S$GLB,, | Performed by: FAMILY MEDICINE

## 2022-10-25 PROCEDURE — 1126F PR PAIN SEVERITY QUANTIFIED, NO PAIN PRESENT: ICD-10-PCS | Mod: CPTII,S$GLB,, | Performed by: FAMILY MEDICINE

## 2022-10-25 PROCEDURE — G0008 FLU VACCINE - QUADRIVALENT - ADJUVANTED: ICD-10-PCS | Mod: S$GLB,,, | Performed by: FAMILY MEDICINE

## 2022-10-25 PROCEDURE — 99999 PR PBB SHADOW E&M-EST. PATIENT-LVL IV: ICD-10-PCS | Mod: PBBFAC,,, | Performed by: FAMILY MEDICINE

## 2022-10-25 PROCEDURE — 90694 FLU VACCINE - QUADRIVALENT - ADJUVANTED: ICD-10-PCS | Mod: S$GLB,,, | Performed by: FAMILY MEDICINE

## 2022-10-25 PROCEDURE — 3078F PR MOST RECENT DIASTOLIC BLOOD PRESSURE < 80 MM HG: ICD-10-PCS | Mod: CPTII,S$GLB,, | Performed by: FAMILY MEDICINE

## 2022-10-25 PROCEDURE — 99213 OFFICE O/P EST LOW 20 MIN: CPT | Mod: S$GLB,,, | Performed by: FAMILY MEDICINE

## 2022-10-25 PROCEDURE — 1101F PR PT FALLS ASSESS DOC 0-1 FALLS W/OUT INJ PAST YR: ICD-10-PCS | Mod: CPTII,S$GLB,, | Performed by: FAMILY MEDICINE

## 2022-10-25 PROCEDURE — 99213 PR OFFICE/OUTPT VISIT, EST, LEVL III, 20-29 MIN: ICD-10-PCS | Mod: S$GLB,,, | Performed by: FAMILY MEDICINE

## 2022-10-25 PROCEDURE — 1159F MED LIST DOCD IN RCRD: CPT | Mod: CPTII,S$GLB,, | Performed by: FAMILY MEDICINE

## 2022-10-25 PROCEDURE — 91312 COVID-19, MRNA, LNP-S, BIVALENT BOOSTER, PF, 30 MCG/0.3 ML DOSE: CPT | Mod: S$GLB,,, | Performed by: FAMILY MEDICINE

## 2022-10-25 PROCEDURE — 3078F DIAST BP <80 MM HG: CPT | Mod: CPTII,S$GLB,, | Performed by: FAMILY MEDICINE

## 2022-10-25 PROCEDURE — 3074F SYST BP LT 130 MM HG: CPT | Mod: CPTII,S$GLB,, | Performed by: FAMILY MEDICINE

## 2022-10-25 PROCEDURE — 0124A COVID-19, MRNA, LNP-S, BIVALENT BOOSTER, PF, 30 MCG/0.3 ML DOSE: CPT | Mod: PBBFAC | Performed by: FAMILY MEDICINE

## 2022-10-25 PROCEDURE — 1126F AMNT PAIN NOTED NONE PRSNT: CPT | Mod: CPTII,S$GLB,, | Performed by: FAMILY MEDICINE

## 2022-10-25 PROCEDURE — 99999 PR PBB SHADOW E&M-EST. PATIENT-LVL IV: CPT | Mod: PBBFAC,,, | Performed by: FAMILY MEDICINE

## 2022-10-25 PROCEDURE — 90694 VACC AIIV4 NO PRSRV 0.5ML IM: CPT | Mod: S$GLB,,, | Performed by: FAMILY MEDICINE

## 2022-10-25 PROCEDURE — 3288F FALL RISK ASSESSMENT DOCD: CPT | Mod: CPTII,S$GLB,, | Performed by: FAMILY MEDICINE

## 2022-10-25 PROCEDURE — 91312 COVID-19, MRNA, LNP-S, BIVALENT BOOSTER, PF, 30 MCG/0.3 ML DOSE: ICD-10-PCS | Mod: S$GLB,,, | Performed by: FAMILY MEDICINE

## 2022-10-25 PROCEDURE — 1101F PT FALLS ASSESS-DOCD LE1/YR: CPT | Mod: CPTII,S$GLB,, | Performed by: FAMILY MEDICINE

## 2022-10-25 NOTE — PROGRESS NOTES
Subjective:       Patient ID: Kevin Echeverria is a 82 y.o. male.    Chief Complaint: Annual Exam (Pt here for annual exam.)    Pt is a 82 y.o. male who presents for check up for Cll (chronic lymphocytic leukemia)  (primary encounter diagnosis)  Prostate cancer. Doing well on current meds. Denies any side effects. Prevention is up to date.     Review of Systems   Constitutional:  Negative for appetite change.   HENT:  Negative for congestion, ear pain, sneezing and sore throat.    Eyes:  Negative for redness and visual disturbance.   Respiratory:  Negative for cough, chest tightness and stridor.    Cardiovascular:  Negative for chest pain.   Gastrointestinal:  Negative for abdominal pain, blood in stool, diarrhea, nausea and vomiting.   Genitourinary:  Negative for difficulty urinating, dysuria and hematuria.   Musculoskeletal:  Negative for arthralgias, back pain, joint swelling, myalgias and neck pain.   Skin:  Negative for rash.   Neurological:  Negative for dizziness.   Psychiatric/Behavioral:  Negative for agitation. The patient is not nervous/anxious.      Objective:      Physical Exam  Constitutional:       Appearance: He is well-developed.   HENT:      Head: Normocephalic.   Eyes:      Pupils: Pupils are equal, round, and reactive to light.   Neck:      Thyroid: No thyromegaly.   Cardiovascular:      Rate and Rhythm: Normal rate and regular rhythm.      Heart sounds: No murmur heard.    No friction rub.   Pulmonary:      Effort: Pulmonary effort is normal. No respiratory distress.      Breath sounds: No wheezing.   Abdominal:      Tenderness: There is no abdominal tenderness. There is no guarding or rebound.   Musculoskeletal:         General: No tenderness. Normal range of motion.      Cervical back: Normal range of motion and neck supple.   Lymphadenopathy:      Cervical: No cervical adenopathy.   Skin:     General: Skin is warm and dry.   Neurological:      Mental Status: He is alert and oriented to  person, place, and time.      Cranial Nerves: No cranial nerve deficit.      Deep Tendon Reflexes: Reflexes are normal and symmetric.   Psychiatric:         Thought Content: Thought content normal.         Judgment: Judgment normal.       Assessment:       Encounter Diagnoses   Name Primary?    CLL (chronic lymphocytic leukemia) Yes    Prostate cancer          Plan:   1. CLL (chronic lymphocytic leukemia)    2. Prostate cancer

## 2022-11-10 ENCOUNTER — OFFICE VISIT (OUTPATIENT)
Dept: INTERNAL MEDICINE | Facility: CLINIC | Age: 82
End: 2022-11-10
Payer: MEDICARE

## 2022-11-10 ENCOUNTER — SPECIALTY PHARMACY (OUTPATIENT)
Dept: PHARMACY | Facility: CLINIC | Age: 82
End: 2022-11-10
Payer: MEDICARE

## 2022-11-10 VITALS
OXYGEN SATURATION: 99 % | RESPIRATION RATE: 18 BRPM | HEIGHT: 70 IN | HEART RATE: 62 BPM | DIASTOLIC BLOOD PRESSURE: 60 MMHG | SYSTOLIC BLOOD PRESSURE: 132 MMHG | WEIGHT: 163.5 LBS | BODY MASS INDEX: 23.41 KG/M2

## 2022-11-10 DIAGNOSIS — L50.9 WHEAL: ICD-10-CM

## 2022-11-10 DIAGNOSIS — I10 BENIGN ESSENTIAL HTN: Primary | ICD-10-CM

## 2022-11-10 PROCEDURE — 3288F FALL RISK ASSESSMENT DOCD: CPT | Mod: CPTII,S$GLB,, | Performed by: FAMILY MEDICINE

## 2022-11-10 PROCEDURE — 3075F PR MOST RECENT SYSTOLIC BLOOD PRESS GE 130-139MM HG: ICD-10-PCS | Mod: CPTII,S$GLB,, | Performed by: FAMILY MEDICINE

## 2022-11-10 PROCEDURE — 3075F SYST BP GE 130 - 139MM HG: CPT | Mod: CPTII,S$GLB,, | Performed by: FAMILY MEDICINE

## 2022-11-10 PROCEDURE — 3078F PR MOST RECENT DIASTOLIC BLOOD PRESSURE < 80 MM HG: ICD-10-PCS | Mod: CPTII,S$GLB,, | Performed by: FAMILY MEDICINE

## 2022-11-10 PROCEDURE — 1101F PR PT FALLS ASSESS DOC 0-1 FALLS W/OUT INJ PAST YR: ICD-10-PCS | Mod: CPTII,S$GLB,, | Performed by: FAMILY MEDICINE

## 2022-11-10 PROCEDURE — 3078F DIAST BP <80 MM HG: CPT | Mod: CPTII,S$GLB,, | Performed by: FAMILY MEDICINE

## 2022-11-10 PROCEDURE — 99999 PR PBB SHADOW E&M-EST. PATIENT-LVL IV: ICD-10-PCS | Mod: PBBFAC,,, | Performed by: FAMILY MEDICINE

## 2022-11-10 PROCEDURE — 1126F PR PAIN SEVERITY QUANTIFIED, NO PAIN PRESENT: ICD-10-PCS | Mod: CPTII,S$GLB,, | Performed by: FAMILY MEDICINE

## 2022-11-10 PROCEDURE — 99213 PR OFFICE/OUTPT VISIT, EST, LEVL III, 20-29 MIN: ICD-10-PCS | Mod: S$GLB,,, | Performed by: FAMILY MEDICINE

## 2022-11-10 PROCEDURE — 99999 PR PBB SHADOW E&M-EST. PATIENT-LVL IV: CPT | Mod: PBBFAC,,, | Performed by: FAMILY MEDICINE

## 2022-11-10 PROCEDURE — 1159F PR MEDICATION LIST DOCUMENTED IN MEDICAL RECORD: ICD-10-PCS | Mod: CPTII,S$GLB,, | Performed by: FAMILY MEDICINE

## 2022-11-10 PROCEDURE — 1159F MED LIST DOCD IN RCRD: CPT | Mod: CPTII,S$GLB,, | Performed by: FAMILY MEDICINE

## 2022-11-10 PROCEDURE — 3288F PR FALLS RISK ASSESSMENT DOCUMENTED: ICD-10-PCS | Mod: CPTII,S$GLB,, | Performed by: FAMILY MEDICINE

## 2022-11-10 PROCEDURE — 1126F AMNT PAIN NOTED NONE PRSNT: CPT | Mod: CPTII,S$GLB,, | Performed by: FAMILY MEDICINE

## 2022-11-10 PROCEDURE — 99213 OFFICE O/P EST LOW 20 MIN: CPT | Mod: S$GLB,,, | Performed by: FAMILY MEDICINE

## 2022-11-10 PROCEDURE — 1101F PT FALLS ASSESS-DOCD LE1/YR: CPT | Mod: CPTII,S$GLB,, | Performed by: FAMILY MEDICINE

## 2022-11-10 RX ORDER — HYDROXYZINE HYDROCHLORIDE 10 MG/1
30 TABLET, FILM COATED ORAL 2 TIMES DAILY
Qty: 40 TABLET | Refills: 2 | Status: SHIPPED | OUTPATIENT
Start: 2022-11-10

## 2022-11-10 NOTE — PROGRESS NOTES
Subjective:       Patient ID: Kevin Echeverria is a 82 y.o. male.    Chief Complaint: Rash (Pt here for rash that started 11/04/2022 on entire body including face.)    HPI  Review of Systems    Objective:      Physical Exam    Assessment:       Encounter Diagnosis   Name Primary?    Benign essential HTN Yes         Plan:   1. Benign essential HTN

## 2022-11-10 NOTE — TELEPHONE ENCOUNTER
Specialty Pharmacy - Refill Coordination    Specialty Medication Orders Linked to Encounter      Flowsheet Row Most Recent Value   Medication #1 ibrutinib (IMBRUVICA) 280 mg Tab (Order#[id number], Rx#[id number])            Refill Questions - Documented Responses      Flowsheet Row Most Recent Value   Patient Availability and HIPAA Verification    Does patient want to proceed with activity? Yes   HIPAA/medical authority confirmed? Yes   Relationship to patient of person spoken to? Self   Refill Screening Questions    Changes to allergies? No   Changes to medications? No   New conditions since last clinic visit? No   Unplanned office visit, urgent care, ED, or hospital admission in the last 4 weeks? No   How does patient/caregiver feel medication is working? Good   Financial problems or insurance changes? No   How many doses of your specialty medications were missed in the last 4 weeks? 0   Would patient like to speak to a pharmacist? No   When does the patient need to receive the medication? 11/18/22   Refill Delivery Questions    How will the patient receive the medication? MEDRx   When does the patient need to receive the medication? 11/18/22   Shipping Address Home   Address in [facility] confirmed and updated if neccessary? Yes   Expected Copay ($) 70   Is the patient able to afford the medication copay? Yes   Payment Method CC on file   Days supply of Refill 28   Supplies needed? No supplies needed   Refill activity completed? Yes   Refill activity plan Refill scheduled   Shipment/Pickup Date: 11/15/22            Current Outpatient Medications   Medication Sig    albuterol (PROVENTIL) 2.5 mg /3 mL (0.083 %) nebulizer solution Use content of one vial every 12 hours    albuterol (PROVENTIL) 2.5 mg /3 mL (0.083 %) nebulizer solution Take 1 vial by nebulization twice daily.    aspirin (ECOTRIN) 81 MG EC tablet Take 81 mg by mouth once daily.    budesonide-glycopyr-formoterol (BREZTRI AEROSPHERE) 160-9-4.8  mcg/actuation HFAA INSTILL 2 PUFFS BY MOUTH EVERY 12 HOURS    colchicine (MITIGARE) 0.6 mg Cap Take 1 capsule (0.6 mg total) by mouth once daily.    diclofenac sodium (VOLTAREN) 1 % Gel Apply 2 g topically 2 (two) times daily as needed (pain).    famotidine (PEPCID) 20 MG tablet Take 1 tablet (20 mg total) by mouth once daily.    famotidine (PEPCID) 20 MG tablet Take 1 tablet orally 2 times a day as needed for heart burn    fish oil-omega-3 fatty acids 300-1,000 mg capsule Take 1 g by mouth once daily.    gabapentin (NEURONTIN) 300 MG capsule Take 1 capsule (300 mg total) by mouth once daily.    hydrOXYzine HCL (ATARAX) 10 MG Tab Take 3 tablets (30 mg total) by mouth 2 (two) times daily.    ibrutinib (IMBRUVICA) 280 mg Tab Take 1 tablet (280 mg) by mouth once daily.    ipratropium (ATROVENT) 0.02 % nebulizer solution use contents of one vial every 12 hours    ipratropium (ATROVENT) 0.02 % nebulizer solution Use contents of one vial every 12 hours    metoprolol succinate (TOPROL-XL) 25 MG 24 hr tablet Take 1 tablet orally once a day.    montelukast (SINGULAIR) 10 mg tablet Take 1 tablet (10 mg total) by mouth every evening.    multivitamin with minerals tablet Take 1 tablet by mouth once daily. Equate Brand with Iron    nebulizer accessories Misc Use as directed    nebulizer and compressor Hyacinth USE WITH NEBULIZER SOLUTION AS DIRECTED    omeprazole (PRILOSEC) 20 MG capsule Take one capsule by mouth once daily in the morning    omeprazole (PRILOSEC) 20 MG capsule Take one capsule by mouth once daily in the morning    pravastatin (PRAVACHOL) 20 MG tablet Take 1 tablet orally once in the evening.    pravastatin (PRAVACHOL) 20 MG tablet Take 1 tablet orally once in the evening.    valsartan (DIOVAN) 160 MG tablet Take 1 tablet orally once a day. (replacing the Losartan and Verapamil)    valsartan (DIOVAN) 160 MG tablet Take 1 tablet orally once a day.    verapamiL (VERELAN) 100 MG CPCT Take 1 capsule orally once a day.    Last reviewed on 11/10/2022 10:24 AM by Awilda Jernigan MA    Review of patient's allergies indicates:  No Known Allergies Last reviewed on  11/10/2022 10:24 AM by Awilda Jernigan      Tasks added this encounter   12/9/2022 - Refill Call (Auto Added)   Tasks due within next 3 months   No tasks due.     [facility]  [address]  [facility] [address]  Phone: [phone]  Fax: [phone]      Specialty Pharmacy - Refill Coordination    Specialty Medication Orders Linked to Encounter      Flowsheet Row Most Recent Value   Medication #1 ibrutinib (IMBRUVICA) 280 mg Tab (Order#[id number], Rx#[id number])            Refill Questions - Documented Responses      Flowsheet Row Most Recent Value   Patient Availability and HIPAA Verification    Does patient want to proceed with activity? Yes   HIPAA/medical authority confirmed? Yes   Relationship to patient of person spoken to? Self   Refill Screening Questions    Changes to allergies? No   Changes to medications? No   New conditions since last clinic visit? No   Unplanned office visit, urgent care, ED, or hospital admission in the last 4 weeks? No   How does patient/caregiver feel medication is working? Good   Financial problems or insurance changes? No   How many doses of your specialty medications were missed in the last 4 weeks? 0   Would patient like to speak to a pharmacist? No   When does the patient need to receive the medication? 11/18/22   Refill Delivery Questions    How will the patient receive the medication? MEDRx   When does the patient need to receive the medication? 11/18/22   Shipping Address Home   Address in [facility] confirmed and updated if neccessary? Yes   Expected Copay ($) 70   Is the patient able to afford the medication copay? Yes   Payment Method CC on file   Days supply of Refill 28   Supplies needed? No supplies needed   Refill activity completed? Yes   Refill activity plan  Refill scheduled   Shipment/Pickup Date: 11/15/22            Current Outpatient Medications   Medication Sig    albuterol (PROVENTIL) 2.5 mg /3 mL (0.083 %) nebulizer solution Use content of one vial every 12 hours    albuterol (PROVENTIL) 2.5 mg /3 mL (0.083 %) nebulizer solution Take 1 vial by nebulization twice daily.    aspirin (ECOTRIN) 81 MG EC tablet Take 81 mg by mouth once daily.    budesonide-glycopyr-formoterol (BREZTRI AEROSPHERE) 160-9-4.8 mcg/actuation HFAA INSTILL 2 PUFFS BY MOUTH EVERY 12 HOURS    colchicine (MITIGARE) 0.6 mg Cap Take 1 capsule (0.6 mg total) by mouth once daily.    diclofenac sodium (VOLTAREN) 1 % Gel Apply 2 g topically 2 (two) times daily as needed (pain).    famotidine (PEPCID) 20 MG tablet Take 1 tablet (20 mg total) by mouth once daily.    famotidine (PEPCID) 20 MG tablet Take 1 tablet orally 2 times a day as needed for heart burn    fish oil-omega-3 fatty acids 300-1,000 mg capsule Take 1 g by mouth once daily.    gabapentin (NEURONTIN) 300 MG capsule Take 1 capsule (300 mg total) by mouth once daily.    hydrOXYzine HCL (ATARAX) 10 MG Tab Take 3 tablets (30 mg total) by mouth 2 (two) times daily.    ibrutinib (IMBRUVICA) 280 mg Tab Take 1 tablet (280 mg) by mouth once daily.    ipratropium (ATROVENT) 0.02 % nebulizer solution use contents of one vial every 12 hours    ipratropium (ATROVENT) 0.02 % nebulizer solution Use contents of one vial every 12 hours    metoprolol succinate (TOPROL-XL) 25 MG 24 hr tablet Take 1 tablet orally once a day.    montelukast (SINGULAIR) 10 mg tablet Take 1 tablet (10 mg total) by mouth every evening.    multivitamin with minerals tablet Take 1 tablet by mouth once daily. Equate Brand with Iron    nebulizer accessories Misc Use as directed    nebulizer and compressor Hyacinth USE WITH NEBULIZER SOLUTION AS DIRECTED    omeprazole (PRILOSEC) 20 MG capsule Take one capsule by mouth once daily in the morning    omeprazole (PRILOSEC) 20 MG capsule Take  one capsule by mouth once daily in the morning    pravastatin (PRAVACHOL) 20 MG tablet Take 1 tablet orally once in the evening.    pravastatin (PRAVACHOL) 20 MG tablet Take 1 tablet orally once in the evening.    valsartan (DIOVAN) 160 MG tablet Take 1 tablet orally once a day. (replacing the Losartan and Verapamil)    valsartan (DIOVAN) 160 MG tablet Take 1 tablet orally once a day.    verapamiL (VERELAN) 100 MG CPCT Take 1 capsule orally once a day.   Last reviewed on 11/10/2022 10:24 AM by Awilda Jernigan MA    Review of patient's allergies indicates:  No Known Allergies Last reviewed on  11/10/2022 10:24 AM by Awilda Jernigan      Tasks added this encounter   12/9/2022 - Refill Call (Auto Added)   Tasks due within next 3 months   No tasks due.     [facility]  [address] [place] [id number]  Phone: [phone]  Fax: [phone]

## 2022-11-10 NOTE — PROGRESS NOTES
Subjective:       Patient ID: Kevin Echeverria is a 82 y.o. male.    Chief Complaint: Rash (Pt here for rash that started 11/04/2022 on entire body including face.)    Pt is a 82 y.o. male who presents for check up for abdominal rash from for the last 6 days  (primary encounter diagnosis). Doing well on current meds. Denies any side effects. Prevention is up to date.     Review of Systems   Skin:  Positive for rash.        Rash on his abdomen on & of for the last few days only at night and then goes away; is painful & itching; wheals which come on in the evening and gone by the morning     Objective:      Physical Exam  Constitutional:       Appearance: He is well-developed.   HENT:      Head: Normocephalic.   Eyes:      Pupils: Pupils are equal, round, and reactive to light.   Neck:      Thyroid: No thyromegaly.   Cardiovascular:      Rate and Rhythm: Normal rate and regular rhythm.      Heart sounds: No murmur heard.    No friction rub.   Pulmonary:      Effort: Pulmonary effort is normal. No respiratory distress.      Breath sounds: No wheezing.   Abdominal:      Tenderness: There is no abdominal tenderness. There is no guarding or rebound.   Musculoskeletal:         General: No tenderness. Normal range of motion.      Cervical back: Normal range of motion and neck supple.   Lymphadenopathy:      Cervical: No cervical adenopathy.   Skin:     General: Skin is warm and dry.      Findings: Rash present.      Comments: Wheals on and off over the groin and abdomen   Neurological:      Mental Status: He is alert and oriented to person, place, and time.      Cranial Nerves: No cranial nerve deficit.      Deep Tendon Reflexes: Reflexes are normal and symmetric.   Psychiatric:         Thought Content: Thought content normal.         Judgment: Judgment normal.       Assessment:       Encounter Diagnoses   Name Primary?    Benign essential HTN Yes    Wheal          Plan:   1. Benign essential HTN    2. Wheal

## 2022-12-01 RX ORDER — BUDESONIDE, GLYCOPYRROLATE, AND FORMOTEROL FUMARATE 160; 9; 4.8 UG/1; UG/1; UG/1
AEROSOL, METERED RESPIRATORY (INHALATION)
Qty: 10.7 G | Refills: 12 | Status: SHIPPED | OUTPATIENT
Start: 2022-12-01 | End: 2023-10-30 | Stop reason: SDUPTHER

## 2022-12-09 ENCOUNTER — SPECIALTY PHARMACY (OUTPATIENT)
Dept: PHARMACY | Facility: CLINIC | Age: 82
End: 2022-12-09
Payer: MEDICARE

## 2022-12-09 NOTE — TELEPHONE ENCOUNTER
Specialty Pharmacy - Refill Coordination    Specialty Medication Orders Linked to Encounter      Flowsheet Row Most Recent Value   Medication #1 ibrutinib (IMBRUVICA) 280 mg Tab (Order#[id number], Rx#[id number])            Refill Questions - Documented Responses      Flowsheet Row Most Recent Value   Patient Availability and HIPAA Verification    Does patient want to proceed with activity? Yes   HIPAA/medical authority confirmed? Yes   Relationship to patient of person spoken to? Self   Refill Screening Questions    Changes to allergies? No   Changes to medications? No   New conditions since last clinic visit? No   Unplanned office visit, urgent care, ED, or hospital admission in the last 4 weeks? No   How does patient/caregiver feel medication is working? Good   Financial problems or insurance changes? No   How many doses of your specialty medications were missed in the last 4 weeks? 0   Would patient like to speak to a pharmacist? No   When does the patient need to receive the medication? 12/13/22   Refill Delivery Questions    How will the patient receive the medication? MEDRx   When does the patient need to receive the medication? 12/13/22   Shipping Address Home   Address in [facility] confirmed and updated if neccessary? Yes   Expected Copay ($) 70   Is the patient able to afford the medication copay? Yes   Payment Method CC on file   Days supply of Refill 28   Supplies needed? No supplies needed   Refill activity completed? Yes   Refill activity plan Refill scheduled   Shipment/Pickup Date: 12/09/22            Current Outpatient Medications   Medication Sig    albuterol (PROVENTIL) 2.5 mg /3 mL (0.083 %) nebulizer solution Use content of one vial every 12 hours    albuterol (PROVENTIL) 2.5 mg /3 mL (0.083 %) nebulizer solution Take 1 vial by nebulization twice daily.    aspirin (ECOTRIN) 81 MG EC tablet Take 81 mg by mouth once daily.    budesonide-glycopyr-formoterol (BREZTRI AEROSPHERE) 160-9-4.8  mcg/actuation HFAA INSTILL 2 PUFFS BY MOUTH EVERY 12 HOURS    colchicine (MITIGARE) 0.6 mg Cap Take 1 capsule (0.6 mg total) by mouth once daily.    diclofenac sodium (VOLTAREN) 1 % Gel Apply 2 g topically 2 (two) times daily as needed (pain).    famotidine (PEPCID) 20 MG tablet Take 1 tablet (20 mg total) by mouth once daily.    famotidine (PEPCID) 20 MG tablet Take 1 tablet orally 2 times a day as needed for heart burn    fish oil-omega-3 fatty acids 300-1,000 mg capsule Take 1 g by mouth once daily.    gabapentin (NEURONTIN) 300 MG capsule Take 1 capsule (300 mg total) by mouth once daily.    hydrOXYzine HCL (ATARAX) 10 MG Tab Take 3 tablets (30 mg total) by mouth 2 (two) times daily.    ibrutinib (IMBRUVICA) 280 mg Tab Take 1 tablet (280 mg) by mouth once daily.    ipratropium (ATROVENT) 0.02 % nebulizer solution use contents of one vial every 12 hours    ipratropium (ATROVENT) 0.02 % nebulizer solution Use contents of one vial every 12 hours    metoprolol succinate (TOPROL-XL) 25 MG 24 hr tablet Take 1 tablet orally once a day.    montelukast (SINGULAIR) 10 mg tablet Take 1 tablet (10 mg total) by mouth every evening.    multivitamin with minerals tablet Take 1 tablet by mouth once daily. Equate Brand with Iron    nebulizer accessories Misc Use as directed    nebulizer and compressor Hyacinth USE WITH NEBULIZER SOLUTION AS DIRECTED    omeprazole (PRILOSEC) 20 MG capsule Take one capsule by mouth once daily in the morning    omeprazole (PRILOSEC) 20 MG capsule Take one capsule by mouth once daily in the morning    pravastatin (PRAVACHOL) 20 MG tablet Take 1 tablet orally once in the evening.    pravastatin (PRAVACHOL) 20 MG tablet Take 1 tablet orally once in the evening.    valsartan (DIOVAN) 160 MG tablet Take 1 tablet orally once a day. (replacing the Losartan and Verapamil)    valsartan (DIOVAN) 160 MG tablet Take 1 tablet orally once a day.    verapamiL (VERELAN) 100 MG CPCT Take 1 capsule orally once a day.    Last reviewed on 11/10/2022 10:24 AM by Awilda Jernigan MA    Review of patient's allergies indicates:  No Known Allergies Last reviewed on  11/10/2022 10:24 AM by Awilda Jernigan      Tasks added this encounter   1/3/2023 - Refill Call (Auto Added)   Tasks due within next 3 months   No tasks due.     Izzy Isaacs, PharmD  [facility]  [address]  [facility] [address]  Phone: [phone]  Fax: [phone]

## 2022-12-27 RX ORDER — MONTELUKAST SODIUM 10 MG/1
10 TABLET ORAL NIGHTLY
Qty: 90 TABLET | Refills: 3 | Status: SHIPPED | OUTPATIENT
Start: 2022-12-27 | End: 2023-12-11 | Stop reason: SDUPTHER

## 2022-12-27 NOTE — TELEPHONE ENCOUNTER
No new care gaps identified.  [facility] Embedded Care Gaps. Reference number: [id number]. 12/27/2022   8:10:44 AM CST

## 2022-12-27 NOTE — TELEPHONE ENCOUNTER
Refill Decision Note   Kevin Echeverria  is requesting a refill authorization.  Brief Assessment and Rationale for Refill:  Approve     Medication Therapy Plan:       Medication Reconciliation Completed: No   Comments:     No Care Gaps recommended.     Note composed:1:33 PM 12/27/2022

## 2023-01-04 ENCOUNTER — SPECIALTY PHARMACY (OUTPATIENT)
Dept: PHARMACY | Facility: CLINIC | Age: 83
End: 2023-01-04
Payer: MEDICARE

## 2023-01-04 DIAGNOSIS — C91.10 CLL (CHRONIC LYMPHOCYTIC LEUKEMIA): ICD-10-CM

## 2023-01-04 RX ORDER — IBRUTINIB 280 MG/1
280 TABLET, FILM COATED ORAL DAILY
Qty: 28 TABLET | Refills: 11 | Status: CANCELLED | OUTPATIENT
Start: 2023-01-04

## 2023-01-04 NOTE — TELEPHONE ENCOUNTER
Outgoing call Imbruvica refill, informed pt contacting provider for refill approval and will follow up once provider respond

## 2023-01-06 DIAGNOSIS — C91.10 CLL (CHRONIC LYMPHOCYTIC LEUKEMIA): ICD-10-CM

## 2023-01-06 NOTE — TELEPHONE ENCOUNTER
Incoming call from patient for refill -- only has 5 days on hand. Will need next shipment for 1/12. Messaging provider for refills and patient is will call as well. Need to follow up on 1/9.

## 2023-01-09 NOTE — TELEPHONE ENCOUNTER
Specialty Pharmacy - Refill Coordination    Specialty Medication Orders Linked to Encounter      Flowsheet Row Most Recent Value   Medication #1 ibrutinib (IMBRUVICA) 280 mg Tab (Order#[id number], Rx#[id number])            Refill Questions - Documented Responses      Flowsheet Row Most Recent Value   Patient Availability and HIPAA Verification    Does patient want to proceed with activity? Yes   HIPAA/medical authority confirmed? Yes   Relationship to patient of person spoken to? Self   Refill Screening Questions    Changes to allergies? No   Changes to medications? No   New conditions since last clinic visit? No   Unplanned office visit, urgent care, ED, or hospital admission in the last 4 weeks? No   How does patient/caregiver feel medication is working? Good   Financial problems or insurance changes? No   How many doses of your specialty medications were missed in the last 4 weeks? 0   Would patient like to speak to a pharmacist? No   When does the patient need to receive the medication? 01/11/23   Refill Delivery Questions    How will the patient receive the medication? MEDRx   When does the patient need to receive the medication? 01/11/23   Shipping Address Home   Address in [facility] confirmed and updated if neccessary? Yes   Expected Copay ($) 55   Is the patient able to afford the medication copay? Yes   Payment Method CC on file   Days supply of Refill 28   Supplies needed? No supplies needed   Refill activity completed? Yes   Refill activity plan Refill scheduled   Shipment/Pickup Date: 01/09/23            Current Outpatient Medications   Medication Sig    albuterol (PROVENTIL) 2.5 mg /3 mL (0.083 %) nebulizer solution Use content of one vial every 12 hours    albuterol (PROVENTIL) 2.5 mg /3 mL (0.083 %) nebulizer solution Take 1 vial by nebulization twice daily.    aspirin (ECOTRIN) 81 MG EC tablet Take 81 mg by mouth once daily.    budesonide-glycopyr-formoterol (BREZTRI AEROSPHERE) 160-9-4.8  mcg/actuation HFAA INSTILL 2 PUFFS BY MOUTH EVERY 12 HOURS    colchicine (MITIGARE) 0.6 mg Cap Take 1 capsule (0.6 mg total) by mouth once daily.    diclofenac sodium (VOLTAREN) 1 % Gel Apply 2 g topically 2 (two) times daily as needed (pain).    famotidine (PEPCID) 20 MG tablet Take 1 tablet (20 mg total) by mouth once daily.    famotidine (PEPCID) 20 MG tablet Take 1 tablet orally 2 times a day as needed for heart burn    fish oil-omega-3 fatty acids 300-1,000 mg capsule Take 1 g by mouth once daily.    gabapentin (NEURONTIN) 300 MG capsule Take 1 capsule (300 mg total) by mouth once daily.    hydrOXYzine HCL (ATARAX) 10 MG Tab Take 3 tablets (30 mg total) by mouth 2 (two) times daily.    ibrutinib (IMBRUVICA) 280 mg Tab Take 1 tablet (280 mg) by mouth once daily.    ipratropium (ATROVENT) 0.02 % nebulizer solution use contents of one vial every 12 hours    ipratropium (ATROVENT) 0.02 % nebulizer solution Use contents of one vial every 12 hours    metoprolol succinate (TOPROL-XL) 25 MG 24 hr tablet Take 1 tablet orally once a day.    montelukast (SINGULAIR) 10 mg tablet Take 1 tablet (10 mg total) by mouth every evening.    multivitamin with minerals tablet Take 1 tablet by mouth once daily. Equate Brand with Iron    nebulizer accessories Misc Use as directed    nebulizer and compressor Hyacinth USE WITH NEBULIZER SOLUTION AS DIRECTED    omeprazole (PRILOSEC) 20 MG capsule Take one capsule by mouth once daily in the morning    omeprazole (PRILOSEC) 20 MG capsule Take one capsule by mouth once daily in the morning    pravastatin (PRAVACHOL) 20 MG tablet Take 1 tablet orally once in the evening.    pravastatin (PRAVACHOL) 20 MG tablet Take 1 tablet orally once in the evening.    valsartan (DIOVAN) 160 MG tablet Take 1 tablet orally once a day. (replacing the Losartan and Verapamil)    valsartan (DIOVAN) 160 MG tablet Take 1 tablet orally once a day.    verapamiL (VERELAN) 100 MG CPCT Take 1 capsule orally once a day.    Last reviewed on 11/10/2022 10:24 AM by Awilda Jernigan MA    Review of patient's allergies indicates:  No Known Allergies Last reviewed on  1/6/2023 4:42 PM by Stefanie Mcgarry      Tasks added this encounter   2/1/2023 - Refill Call (Auto Added)   Tasks due within next 3 months   3/13/2023 - Clinical - Follow Up Assesement (180 day)     [facility]  [facility] [address]  [facility] [address]  Phone: [phone]  Fax: [phone]

## 2023-01-18 DIAGNOSIS — C91.10 CLL (CHRONIC LYMPHOCYTIC LEUKEMIA): ICD-10-CM

## 2023-01-18 NOTE — TELEPHONE ENCOUNTER
Spoke with patient. Reviewed that we will complete PA for medication. He has 2 to 3 weeks of medication on hand.

## 2023-01-18 NOTE — TELEPHONE ENCOUNTER
----- Message from Evon Alonso sent at 1/18/2023 10:29 AM CST -----  Regarding: Consult/Advisory      Name Of Caller:  Kevin      Contact Preference?:    [phone]        What is the nature of the call?:  Patient says that he received a letter stating that they will not pay for the following medication:  ibrutinib (IMBRUVICA) 280 mg Tab    Was told that he needs an alternative or a prior auth.

## 2023-02-01 ENCOUNTER — PATIENT MESSAGE (OUTPATIENT)
Dept: PHARMACY | Facility: CLINIC | Age: 83
End: 2023-02-01
Payer: MEDICARE

## 2023-02-01 ENCOUNTER — SPECIALTY PHARMACY (OUTPATIENT)
Dept: PHARMACY | Facility: CLINIC | Age: 83
End: 2023-02-01
Payer: MEDICARE

## 2023-02-01 DIAGNOSIS — C91.10 CLL (CHRONIC LYMPHOCYTIC LEUKEMIA): ICD-10-CM

## 2023-02-01 RX ORDER — IBRUTINIB 280 MG/1
280 TABLET, FILM COATED ORAL DAILY
Qty: 28 TABLET | Refills: 11 | Status: CANCELLED | OUTPATIENT
Start: 2023-02-01

## 2023-02-01 NOTE — TELEPHONE ENCOUNTER
Incoming call from pt returning [facility] call. Pt reported 7 left on hand, requested refill to be sent to Dr. Jaquelin Quintana.  Rx request send to MD Quintana and Staff

## 2023-02-06 NOTE — TELEPHONE ENCOUNTER
Specialty Pharmacy - Refill Coordination    Specialty Medication Orders Linked to Encounter      Flowsheet Row Most Recent Value   Medication #1 ibrutinib (IMBRUVICA) 280 mg Tab (Order#[id number], Rx#)            Refill Questions - Documented Responses      Flowsheet Row Most Recent Value   Patient Availability and HIPAA Verification    Does patient want to proceed with activity? Yes   HIPAA/medical authority confirmed? Yes   Relationship to patient of person spoken to? Self   Refill Screening Questions    Changes to allergies? No   Changes to medications? No   New conditions since last clinic visit? No   Unplanned office visit, urgent care, ED, or hospital admission in the last 4 weeks? No   How does patient/caregiver feel medication is working? Very good   Financial problems or insurance changes? No   How many doses of your specialty medications were missed in the last 4 weeks? 0   Would patient like to speak to a pharmacist? No   When does the patient need to receive the medication? 02/08/23   Refill Delivery Questions    How will the patient receive the medication? MEDRx   When does the patient need to receive the medication? 02/08/23   Shipping Address Home   Address in [facility] confirmed and updated if neccessary? Yes   Expected Copay ($) 55   Is the patient able to afford the medication copay? Yes   Payment Method CC on file   Days supply of Refill 28   Supplies needed? No supplies needed   Refill activity completed? Yes   Refill activity plan Refill scheduled   Shipment/Pickup Date: 02/07/23            Current Outpatient Medications   Medication Sig    albuterol (PROVENTIL) 2.5 mg /3 mL (0.083 %) nebulizer solution Use content of one vial every 12 hours    albuterol (PROVENTIL) 2.5 mg /3 mL (0.083 %) nebulizer solution Take 1 vial by nebulization twice daily.    aspirin (ECOTRIN) 81 MG EC tablet Take 81 mg by mouth once daily.    budesonide-glycopyr-formoterol (BREZTRI AEROSPHERE) 160-9-4.8  mcg/actuation HFAA INSTILL 2 PUFFS BY MOUTH EVERY 12 HOURS    colchicine (MITIGARE) 0.6 mg Cap Take 1 capsule (0.6 mg total) by mouth once daily.    diclofenac sodium (VOLTAREN) 1 % Gel Apply 2 g topically 2 (two) times daily as needed (pain).    famotidine (PEPCID) 20 MG tablet Take 1 tablet orally 2 times a day as needed for heart burn    fish oil-omega-3 fatty acids 300-1,000 mg capsule Take 1 g by mouth once daily.    gabapentin (NEURONTIN) 300 MG capsule Take 1 capsule (300 mg total) by mouth once daily.    hydrOXYzine HCL (ATARAX) 10 MG Tab Take 3 tablets (30 mg total) by mouth 2 (two) times daily.    ibrutinib (IMBRUVICA) 280 mg Tab Take 1 tablet (280 mg) by mouth once daily.    ibrutinib (IMBRUVICA) 280 mg Tab Take 1 tablet (280 mg) by mouth once daily.    ipratropium (ATROVENT) 0.02 % nebulizer solution use contents of one vial every 12 hours    ipratropium (ATROVENT) 0.02 % nebulizer solution Use contents of one vial every 12 hours    metoprolol succinate (TOPROL-XL) 25 MG 24 hr tablet Take 1 tablet orally once a day.    montelukast (SINGULAIR) 10 mg tablet Take 1 tablet (10 mg total) by mouth every evening.    multivitamin with minerals tablet Take 1 tablet by mouth once daily. Equate Brand with Iron    nebulizer accessories Misc Use as directed    nebulizer and compressor Hyacinth USE WITH NEBULIZER SOLUTION AS DIRECTED    omeprazole (PRILOSEC) 20 MG capsule Take one capsule by mouth once daily in the morning    omeprazole (PRILOSEC) 20 MG capsule Take one capsule by mouth once daily in the morning    pravastatin (PRAVACHOL) 20 MG tablet Take 1 tablet orally once in the evening.    pravastatin (PRAVACHOL) 20 MG tablet Take 1 tablet orally once in the evening.    valsartan (DIOVAN) 160 MG tablet Take 1 tablet orally once a day. (replacing the Losartan and Verapamil)    valsartan (DIOVAN) 160 MG tablet Take 1 tablet orally once a day.    verapamiL (VERELAN) 100 MG CPCT Take 1 capsule orally once a day.   Last  reviewed on 11/10/2022 10:24 AM by Awilda Jernigan MA    Review of patient's allergies indicates:  No Known Allergies Last reviewed on  2/1/2023 3:46 PM by Stefanie Mcgarry      Tasks added this encounter   3/1/2023 - Refill Call (Auto Added)   Tasks due within next 3 months   3/13/2023 - Clinical - Follow Up Assesement (180 day)     Capo Becerra, PharmD  [facility]  [address]  [facility] [address]  Phone: [phone]  Fax: [phone]

## 2023-02-16 ENCOUNTER — TELEPHONE (OUTPATIENT)
Dept: HEMATOLOGY/ONCOLOGY | Facility: CLINIC | Age: 83
End: 2023-02-16
Payer: MEDICARE

## 2023-02-16 NOTE — TELEPHONE ENCOUNTER
----- Message from Rebeka Antunez sent at 2/16/2023  9:44 AM CST -----  Regarding: Consult/Advisory      Name Of Caller: Desire from [employer]      Contact Preference?: [phone]      Nature of Call? Is calling to see if the fax for the clarification form for Mireyaica was received? Fax was sent on 2/14/23

## 2023-02-20 ENCOUNTER — TELEPHONE (OUTPATIENT)
Dept: HEMATOLOGY/ONCOLOGY | Facility: CLINIC | Age: 83
End: 2023-02-20
Payer: MEDICARE

## 2023-02-20 DIAGNOSIS — C91.10 CLL (CHRONIC LYMPHOCYTIC LEUKEMIA): ICD-10-CM

## 2023-02-20 NOTE — TELEPHONE ENCOUNTER
----- Message from Azalea Jalloh sent at 2/20/2023  9:06 AM CST -----  Pharmacy calling for assistance        Reason for Call: following up on receipt of the prescription change form    Name of caller: Tyesha    Pharmacy name and phone number: [facility] [phone]    Ref [id number]

## 2023-02-20 NOTE — TELEPHONE ENCOUNTER
Spoke to michelle. Christopher will save money if he is prescribed 2 140mg capsule daily instead of 1 280mg capsule daily. Will speak to provider about change

## 2023-02-27 DIAGNOSIS — C91.10 CLL (CHRONIC LYMPHOCYTIC LEUKEMIA): Primary | ICD-10-CM

## 2023-02-27 NOTE — TELEPHONE ENCOUNTER
----- Message from Azalea Jalloh sent at 2/27/2023  3:40 PM CST -----  Consult/Advisory    Name Of Caller: Kevin      Contact Preference?:  [phone]      Nature of Call? Pt states he received a letter from insurance that he needs a prior authorization on Imbruvica or new prescription for another medication

## 2023-03-01 ENCOUNTER — SPECIALTY PHARMACY (OUTPATIENT)
Dept: PHARMACY | Facility: CLINIC | Age: 83
End: 2023-03-01
Payer: MEDICARE

## 2023-03-01 NOTE — TELEPHONE ENCOUNTER
Due to insurance no longer covering Imbruvica 280 mg tablets, order received for Imbruvica 140 mg capsules for pt to take 2 capsules daily.     Need to  patient on correct administration and new dosage form and determine if patient has any 140 mg tablets to properly dispose of

## 2023-03-01 NOTE — TELEPHONE ENCOUNTER
Specialty Pharmacy - Refill Coordination  Specialty Pharmacy - Clinical Intervention    Specialty Medication Orders Linked to Encounter      Flowsheet Row Most Recent Value   Medication #1 ibrutinib (IMBRUVICA) 140 mg Cap (Order#[id number], Rx#)            Refill Questions - Documented Responses      Flowsheet Row Most Recent Value   Patient Availability and HIPAA Verification    Does patient want to proceed with activity? Yes   HIPAA/medical authority confirmed? Yes   Relationship to patient of person spoken to? Self   Refill Screening Questions    Changes to allergies? No   Changes to medications? No   New conditions since last clinic visit? No   Unplanned office visit, urgent care, ED, or hospital admission in the last 4 weeks? No   How does patient/caregiver feel medication is working? Good   Financial problems or insurance changes? No   How many doses of your specialty medications were missed in the last 4 weeks? 0   Would patient like to speak to a pharmacist? No   When does the patient need to receive the medication? 03/02/23   Refill Delivery Questions    How will the patient receive the medication? MEDRx   When does the patient need to receive the medication? 03/02/23   Shipping Address Home   Address in [facility] confirmed and updated if neccessary? Yes   Expected Copay ($) 70   Is the patient able to afford the medication copay? Yes   Payment Method CC on file   Days supply of Refill 30   Supplies needed? No supplies needed   Refill activity completed? Yes   Refill activity plan Refill scheduled   Shipment/Pickup Date: 03/02/23            Current Outpatient Medications   Medication Sig    albuterol (PROVENTIL) 2.5 mg /3 mL (0.083 %) nebulizer solution Use content of one vial every 12 hours    albuterol (PROVENTIL) 2.5 mg /3 mL (0.083 %) nebulizer solution Take 1 vial by nebulization twice daily.    aspirin (ECOTRIN) 81 MG EC tablet Take 81 mg by mouth once daily.    budesonide-glycopyr-formoterol  (BREZTRI AEROSPHERE) 160-9-4.8 mcg/actuation HFAA INSTILL 2 PUFFS BY MOUTH EVERY 12 HOURS    colchicine (MITIGARE) 0.6 mg Cap Take 1 capsule (0.6 mg total) by mouth once daily.    diclofenac sodium (VOLTAREN) 1 % Gel Apply 2 g topically 2 (two) times daily as needed (pain).    famotidine (PEPCID) 20 MG tablet Take 1 tablet orally 2 times a day as needed for heart burn    famotidine (PEPCID) 20 MG tablet Take 1 tablet orally 2 times a day as needed for heart burn    fish oil-omega-3 fatty acids 300-1,000 mg capsule Take 1 g by mouth once daily.    gabapentin (NEURONTIN) 300 MG capsule Take 1 capsule (300 mg total) by mouth once daily.    hydrOXYzine HCL (ATARAX) 10 MG Tab Take 3 tablets (30 mg total) by mouth 2 (two) times daily.    ibrutinib (IMBRUVICA) 140 mg Cap Take 280 capsules by mouth once daily.    ibrutinib (IMBRUVICA) 280 mg Tab Take 1 tablet (280 mg) by mouth once daily.    ibrutinib (IMBRUVICA) 280 mg Tab Take 1 tablet (280 mg) by mouth once daily.    ipratropium (ATROVENT) 0.02 % nebulizer solution use contents of one vial every 12 hours    ipratropium (ATROVENT) 0.02 % nebulizer solution Use contents of one vial every 12 hours    metoprolol succinate (TOPROL-XL) 25 MG 24 hr tablet Take 1 tablet orally once a day.    metoprolol succinate (TOPROL-XL) 25 MG 24 hr tablet Take 1 tablet orally once a day.    montelukast (SINGULAIR) 10 mg tablet Take 1 tablet (10 mg total) by mouth every evening.    multivitamin with minerals tablet Take 1 tablet by mouth once daily. Equate Brand with Iron    nebulizer accessories Misc Use as directed    nebulizer and compressor Hyacinth USE WITH NEBULIZER SOLUTION AS DIRECTED    omeprazole (PRILOSEC) 20 MG capsule Take one capsule by mouth once daily in the morning    omeprazole (PRILOSEC) 20 MG capsule Take one capsule by mouth once daily in the morning    pravastatin (PRAVACHOL) 20 MG tablet Take 1 tablet orally once in the evening.    pravastatin (PRAVACHOL) 20 MG tablet Take  1 tablet orally once in the evening.    pravastatin (PRAVACHOL) 20 MG tablet Take 1 tablet orally once in the evening.    valsartan (DIOVAN) 160 MG tablet Take 1 tablet orally once a day. (replacing the Losartan and Verapamil)    valsartan (DIOVAN) 160 MG tablet Take 1 tablet orally once a day.    valsartan (DIOVAN) 160 MG tablet Take 1 tablet orally once a day.    verapamiL (VERELAN) 100 MG CPCT Take 1 capsule orally once a day.   Last reviewed on 11/10/2022 10:24 AM by Awilda Jernigan MA    Review of patient's allergies indicates:  No Known Allergies Last reviewed on  2/1/2023 3:46 PM by Stefanie Mcgarry    Interventions added this encounter   Open: [facility] Patient Intervention - Drug therapy effectiveness: ibrutinib (IMBRUVICA) 140 mg Cap     Tasks added this encounter   No tasks added.   Tasks due within next 3 months   3/13/2023 - Clinical - Follow Up Assesement (180 day)  3/1/2023 - Refill Call (Auto Added)     KARRI MINER, PharmD  [facility]  [address] [place] [address]  Phone: [phone]  Fax: [phone]

## 2023-03-01 NOTE — TELEPHONE ENCOUNTER
Patient counseled on prescription change. He is aware he will receive Imbruvica 140 mg and should take 2-140 mg capsules by mouth once daily.

## 2023-03-15 NOTE — TELEPHONE ENCOUNTER
----- Message from Madison Delaney sent at 2019  8:53 AM CST -----  Contact: self  Kevin Echeverria  MRN: [id number]  : 1940  PCP: Bridger Cao  Home Phone      [phone]  Work Phone      Not on file.  Mobile          [phone]      MESSAGE:   Pt requests a sooner appointment than the  can schedule.  Does patient feel like they need to be seen today:  No, tomorrow  What is the nature of the appointment:  Er f/u  What visit type:  est  Did you check other providers/department schedules for availability:   Only katerin cao  Comments:     Phone:  [phone]           Ozempic Approved    Filling Pharmacy: [facility]  Additional Information: Pharmacy contacted - received paid claim for $24.98 copay. Pharmacy will contact patient when medication is ready to be picked up.

## 2023-03-21 ENCOUNTER — SPECIALTY PHARMACY (OUTPATIENT)
Dept: PHARMACY | Facility: CLINIC | Age: 83
End: 2023-03-21
Payer: MEDICARE

## 2023-03-27 ENCOUNTER — SPECIALTY PHARMACY (OUTPATIENT)
Dept: PHARMACY | Facility: CLINIC | Age: 83
End: 2023-03-27
Payer: MEDICARE

## 2023-03-27 NOTE — TELEPHONE ENCOUNTER
Specialty Pharmacy - Refill Coordination    Specialty Medication Orders Linked to Encounter      Flowsheet Row Most Recent Value   Medication #1 ibrutinib (IMBRUVICA) 140 mg Cap (Order#[id number], Rx#[id number])            Refill Questions - Documented Responses      Flowsheet Row Most Recent Value   Patient Availability and HIPAA Verification    Does patient want to proceed with activity? Yes   HIPAA/medical authority confirmed? Yes   Relationship to patient of person spoken to? Self   Refill Screening Questions    Changes to allergies? No   Changes to medications? No   New conditions since last clinic visit? No   Unplanned office visit, urgent care, ED, or hospital admission in the last 4 weeks? No   How does patient/caregiver feel medication is working? Good   Financial problems or insurance changes? No   How many doses of your specialty medications were missed in the last 4 weeks? 0   Would patient like to speak to a pharmacist? No   When does the patient need to receive the medication? 03/31/23   Refill Delivery Questions    How will the patient receive the medication? MEDRx   When does the patient need to receive the medication? 03/31/23   Shipping Address Home   Address in [facility] confirmed and updated if neccessary? Yes   Expected Copay ($) 8.74   Is the patient able to afford the medication copay? Yes   Payment Method CC on file   Days supply of Refill 30   Supplies needed? No supplies needed   Refill activity completed? Yes   Refill activity plan Refill scheduled   Shipment/Pickup Date: 03/27/23            Current Outpatient Medications   Medication Sig    albuterol (PROVENTIL) 2.5 mg /3 mL (0.083 %) nebulizer solution Use content of one vial every 12 hours    albuterol (PROVENTIL) 2.5 mg /3 mL (0.083 %) nebulizer solution Take 1 vial by nebulization twice daily.    aspirin (ECOTRIN) 81 MG EC tablet Take 81 mg by mouth once daily.    budesonide-glycopyr-formoterol (BREZTRI AEROSPHERE) 160-9-4.8  mcg/actuation HFAA INSTILL 2 PUFFS BY MOUTH EVERY 12 HOURS    colchicine (MITIGARE) 0.6 mg Cap Take 1 capsule (0.6 mg total) by mouth once daily.    diclofenac sodium (VOLTAREN) 1 % Gel Apply 2 g topically 2 (two) times daily as needed (pain).    famotidine (PEPCID) 20 MG tablet Take 1 tablet orally 2 times a day as needed for heart burn    famotidine (PEPCID) 20 MG tablet Take 1 tablet orally 2 times a day as needed for heart burn    fish oil-omega-3 fatty acids 300-1,000 mg capsule Take 1 g by mouth once daily.    gabapentin (NEURONTIN) 300 MG capsule Take 1 capsule (300 mg total) by mouth once daily.    hydrOXYzine HCL (ATARAX) 10 MG Tab Take 3 tablets (30 mg total) by mouth 2 (two) times daily.    ibrutinib (IMBRUVICA) 140 mg Cap Take 280 mg (2 capsules) by mouth once daily.    ibrutinib (IMBRUVICA) 280 mg Tab Take 1 tablet (280 mg) by mouth once daily.    ibrutinib (IMBRUVICA) 280 mg Tab Take 1 tablet (280 mg) by mouth once daily.    ipratropium (ATROVENT) 0.02 % nebulizer solution use contents of one vial every 12 hours    ipratropium (ATROVENT) 0.02 % nebulizer solution Use contents of one vial every 12 hours    metoprolol succinate (TOPROL-XL) 25 MG 24 hr tablet Take 1 tablet orally once a day.    metoprolol succinate (TOPROL-XL) 25 MG 24 hr tablet Take 1 tablet orally once a day.    montelukast (SINGULAIR) 10 mg tablet Take 1 tablet (10 mg total) by mouth every evening.    multivitamin with minerals tablet Take 1 tablet by mouth once daily. Equate Brand with Iron    nebulizer accessories Misc Use as directed    nebulizer and compressor Hyacinth USE WITH NEBULIZER SOLUTION AS DIRECTED    omeprazole (PRILOSEC) 20 MG capsule Take one capsule by mouth once daily in the morning    omeprazole (PRILOSEC) 20 MG capsule Take one capsule by mouth once daily in the morning    pravastatin (PRAVACHOL) 20 MG tablet Take 1 tablet orally once in the evening.    pravastatin (PRAVACHOL) 20 MG tablet Take 1 tablet orally once in  the evening.    pravastatin (PRAVACHOL) 20 MG tablet Take 1 tablet orally once in the evening.    valsartan (DIOVAN) 160 MG tablet Take 1 tablet orally once a day. (replacing the Losartan and Verapamil)    valsartan (DIOVAN) 160 MG tablet Take 1 tablet orally once a day.    valsartan (DIOVAN) 160 MG tablet Take 1 tablet orally once a day.    verapamiL (VERELAN) 100 MG CPCT Take 1 capsule orally once a day.   Last reviewed on 11/10/2022 10:24 AM by Awilda Jernigan MA    Review of patient's allergies indicates:  No Known Allergies Last reviewed on  2/1/2023 3:46 PM by Stefanie Mcgarry      Tasks added this encounter   4/23/2023 - Refill Call (Auto Added)   Tasks due within next 3 months   No tasks due.     [facility] - [facility]  [address] [facility] [address]  Phone: [phone]  Fax: [phone]

## 2023-03-28 RX ORDER — ALBUTEROL SULFATE 0.83 MG/ML
SOLUTION RESPIRATORY (INHALATION)
Qty: 540 ML | Refills: 3 | Status: SHIPPED | OUTPATIENT
Start: 2023-03-28 | End: 2023-06-19 | Stop reason: SDUPTHER

## 2023-04-10 RX ORDER — OMEPRAZOLE 20 MG/1
20 CAPSULE, DELAYED RELEASE ORAL EVERY MORNING
Qty: 90 CAPSULE | Refills: 3 | Status: SHIPPED | OUTPATIENT
Start: 2023-04-10 | End: 2024-04-01 | Stop reason: SDUPTHER

## 2023-04-10 RX ORDER — OMEPRAZOLE 20 MG/1
20 CAPSULE, DELAYED RELEASE ORAL EVERY MORNING
Qty: 90 CAPSULE | Refills: 1 | Status: SHIPPED | OUTPATIENT
Start: 2023-04-10 | End: 2023-04-10 | Stop reason: SDUPTHER

## 2023-04-10 NOTE — TELEPHONE ENCOUNTER
No new care gaps identified.  [facility] Embedded Care Gaps. Reference number: [id number]. 4/10/2023   12:50:47 PM CDT

## 2023-04-10 NOTE — TELEPHONE ENCOUNTER
Refill Decision Note   Kevin Echeverria  is requesting a refill authorization.  Brief Assessment and Rationale for Refill:  Approve     Medication Therapy Plan:         Alert overridden per protocol: Yes   Comments:     Note composed:4:32 PM 04/10/2023             Appointments     Last Visit   11/10/2022 Bridger Cao MD   Next Visit   Visit date not found Bridger Cao MD

## 2023-04-18 ENCOUNTER — LAB VISIT (OUTPATIENT)
Dept: LAB | Facility: HOSPITAL | Age: 83
End: 2023-04-18
Attending: INTERNAL MEDICINE
Payer: MEDICARE

## 2023-04-18 DIAGNOSIS — C91.10 CLL (CHRONIC LYMPHOCYTIC LEUKEMIA): ICD-10-CM

## 2023-04-18 LAB
ALBUMIN SERPL BCP-MCNC: 3.6 G/DL (ref 3.5–5.2)
ALP SERPL-CCNC: 49 U/L (ref 55–135)
ALT SERPL W/O P-5'-P-CCNC: 21 U/L (ref 10–44)
ANION GAP SERPL CALC-SCNC: 9 MMOL/L (ref 8–16)
AST SERPL-CCNC: 25 U/L (ref 10–40)
BASOPHILS # BLD AUTO: ABNORMAL K/UL (ref 0–0.2)
BASOPHILS NFR BLD: 0 % (ref 0–1.9)
BILIRUB SERPL-MCNC: 0.4 MG/DL (ref 0.1–1)
BUN SERPL-MCNC: 9 MG/DL (ref 8–23)
CALCIUM SERPL-MCNC: 9.2 MG/DL (ref 8.7–10.5)
CHLORIDE SERPL-SCNC: 100 MMOL/L (ref 95–110)
CO2 SERPL-SCNC: 25 MMOL/L (ref 23–29)
CREAT SERPL-MCNC: 1.1 MG/DL (ref 0.5–1.4)
DIFFERENTIAL METHOD: ABNORMAL
EOSINOPHIL # BLD AUTO: ABNORMAL K/UL (ref 0–0.5)
EOSINOPHIL NFR BLD: 4 % (ref 0–8)
ERYTHROCYTE [DISTWIDTH] IN BLOOD BY AUTOMATED COUNT: 12 % (ref 11.5–14.5)
EST. GFR  (NO RACE VARIABLE): >60 ML/MIN/1.73 M^2
GLUCOSE SERPL-MCNC: 102 MG/DL (ref 70–110)
HCT VFR BLD AUTO: 39.9 % (ref 40–54)
HGB BLD-MCNC: 13.3 G/DL (ref 14–18)
IMM GRANULOCYTES # BLD AUTO: ABNORMAL K/UL (ref 0–0.04)
IMM GRANULOCYTES NFR BLD AUTO: ABNORMAL % (ref 0–0.5)
LYMPHOCYTES # BLD AUTO: ABNORMAL K/UL (ref 1–4.8)
LYMPHOCYTES NFR BLD: 56 % (ref 18–48)
MCH RBC QN AUTO: 32.5 PG (ref 27–31)
MCHC RBC AUTO-ENTMCNC: 33.3 G/DL (ref 32–36)
MCV RBC AUTO: 98 FL (ref 82–98)
MONOCYTES # BLD AUTO: ABNORMAL K/UL (ref 0.3–1)
MONOCYTES NFR BLD: 8 % (ref 4–15)
NEUTROPHILS NFR BLD: 32 % (ref 38–73)
NRBC BLD-RTO: 0 /100 WBC
PLATELET # BLD AUTO: 193 K/UL (ref 150–450)
PMV BLD AUTO: 11.1 FL (ref 9.2–12.9)
POTASSIUM SERPL-SCNC: 4.1 MMOL/L (ref 3.5–5.1)
PROT SERPL-MCNC: 7.3 G/DL (ref 6–8.4)
RBC # BLD AUTO: 4.09 M/UL (ref 4.6–6.2)
SODIUM SERPL-SCNC: 134 MMOL/L (ref 136–145)
WBC # BLD AUTO: 18.42 K/UL (ref 3.9–12.7)

## 2023-04-18 PROCEDURE — 85060 PATHOLOGIST REVIEW: ICD-10-PCS | Mod: ,,, | Performed by: PATHOLOGY

## 2023-04-18 PROCEDURE — 85007 BL SMEAR W/DIFF WBC COUNT: CPT | Performed by: INTERNAL MEDICINE

## 2023-04-18 PROCEDURE — 36415 COLL VENOUS BLD VENIPUNCTURE: CPT | Performed by: INTERNAL MEDICINE

## 2023-04-18 PROCEDURE — 80053 COMPREHEN METABOLIC PANEL: CPT | Performed by: INTERNAL MEDICINE

## 2023-04-18 PROCEDURE — 85060 BLOOD SMEAR INTERPRETATION: CPT | Mod: ,,, | Performed by: PATHOLOGY

## 2023-04-18 PROCEDURE — 85027 COMPLETE CBC AUTOMATED: CPT | Performed by: INTERNAL MEDICINE

## 2023-04-19 ENCOUNTER — OFFICE VISIT (OUTPATIENT)
Dept: HEMATOLOGY/ONCOLOGY | Facility: CLINIC | Age: 83
End: 2023-04-19
Payer: MEDICARE

## 2023-04-19 VITALS
HEART RATE: 67 BPM | OXYGEN SATURATION: 96 % | RESPIRATION RATE: 20 BRPM | DIASTOLIC BLOOD PRESSURE: 70 MMHG | SYSTOLIC BLOOD PRESSURE: 145 MMHG | WEIGHT: 169.44 LBS | TEMPERATURE: 98 F | HEIGHT: 70 IN | BODY MASS INDEX: 24.26 KG/M2

## 2023-04-19 DIAGNOSIS — C91.10 CLL (CHRONIC LYMPHOCYTIC LEUKEMIA): Primary | ICD-10-CM

## 2023-04-19 DIAGNOSIS — C92.10 CML (CHRONIC MYELOCYTIC LEUKEMIA): ICD-10-CM

## 2023-04-19 DIAGNOSIS — C61 PROSTATE CANCER: ICD-10-CM

## 2023-04-19 LAB — PATH REV BLD -IMP: NORMAL

## 2023-04-19 PROCEDURE — 99999 PR PBB SHADOW E&M-EST. PATIENT-LVL III: ICD-10-PCS | Mod: PBBFAC,,, | Performed by: INTERNAL MEDICINE

## 2023-04-19 PROCEDURE — 99215 OFFICE O/P EST HI 40 MIN: CPT | Mod: S$GLB,,, | Performed by: INTERNAL MEDICINE

## 2023-04-19 PROCEDURE — 99999 PR PBB SHADOW E&M-EST. PATIENT-LVL III: CPT | Mod: PBBFAC,,, | Performed by: INTERNAL MEDICINE

## 2023-04-19 PROCEDURE — 1101F PT FALLS ASSESS-DOCD LE1/YR: CPT | Mod: CPTII,S$GLB,, | Performed by: INTERNAL MEDICINE

## 2023-04-19 PROCEDURE — 99215 PR OFFICE/OUTPT VISIT, EST, LEVL V, 40-54 MIN: ICD-10-PCS | Mod: S$GLB,,, | Performed by: INTERNAL MEDICINE

## 2023-04-19 PROCEDURE — 3078F DIAST BP <80 MM HG: CPT | Mod: CPTII,S$GLB,, | Performed by: INTERNAL MEDICINE

## 2023-04-19 PROCEDURE — 3288F PR FALLS RISK ASSESSMENT DOCUMENTED: ICD-10-PCS | Mod: CPTII,S$GLB,, | Performed by: INTERNAL MEDICINE

## 2023-04-19 PROCEDURE — 3077F SYST BP >= 140 MM HG: CPT | Mod: CPTII,S$GLB,, | Performed by: INTERNAL MEDICINE

## 2023-04-19 PROCEDURE — 3288F FALL RISK ASSESSMENT DOCD: CPT | Mod: CPTII,S$GLB,, | Performed by: INTERNAL MEDICINE

## 2023-04-19 PROCEDURE — 1126F AMNT PAIN NOTED NONE PRSNT: CPT | Mod: CPTII,S$GLB,, | Performed by: INTERNAL MEDICINE

## 2023-04-19 PROCEDURE — 1159F MED LIST DOCD IN RCRD: CPT | Mod: CPTII,S$GLB,, | Performed by: INTERNAL MEDICINE

## 2023-04-19 PROCEDURE — 1126F PR PAIN SEVERITY QUANTIFIED, NO PAIN PRESENT: ICD-10-PCS | Mod: CPTII,S$GLB,, | Performed by: INTERNAL MEDICINE

## 2023-04-19 PROCEDURE — 1159F PR MEDICATION LIST DOCUMENTED IN MEDICAL RECORD: ICD-10-PCS | Mod: CPTII,S$GLB,, | Performed by: INTERNAL MEDICINE

## 2023-04-19 PROCEDURE — 1101F PR PT FALLS ASSESS DOC 0-1 FALLS W/OUT INJ PAST YR: ICD-10-PCS | Mod: CPTII,S$GLB,, | Performed by: INTERNAL MEDICINE

## 2023-04-19 PROCEDURE — 3078F PR MOST RECENT DIASTOLIC BLOOD PRESSURE < 80 MM HG: ICD-10-PCS | Mod: CPTII,S$GLB,, | Performed by: INTERNAL MEDICINE

## 2023-04-19 PROCEDURE — 3077F PR MOST RECENT SYSTOLIC BLOOD PRESSURE >= 140 MM HG: ICD-10-PCS | Mod: CPTII,S$GLB,, | Performed by: INTERNAL MEDICINE

## 2023-04-19 NOTE — PROGRESS NOTES
Hematology and Medical Oncology   Follow Up     04/19/2023    Primary Oncologic Diagnosis: CLL    History of Present Ilness:   Kevin Echeverria (Kevin) is a pleasant 83 y.o.male who presents to transition care to [facility] from the [facility]. The patient presents today with his wife and daughter. Most of his issues started in August when he began experiencing horrible pain in his hands that was worse with lack of motion/rest. The pain is excruciating and has sent him to the ED several times. The pain can happen in either upper extremity and feels like it travels at times. The discomfort abates as he moves his hands more throughout the day.     Incidentally through these ER visits it was noted that he has a persistently elevated but largely stable WBC.    --Pathologist review of the peripheral smear on 12/21/19 Leukocytosis with an absolute and relative lymphocytosis.     Lymphocytes are mature, and a subset displays a slightly atypical chromatin pattern.  Smudge cells are present.  Background granulocytes are morphologically normal.  The morphology suggests a B cell lymphoproliferative disorder such as CLL.  --Flow Cytometry on 1/3/2020: A B cell lymphoproliferative disorder is present.  Mantle cell lymphoma is favored although an atypical CLL cannot be completely ruled out; correlation   with morphology and with any available cytogenetic or molecular studies (t(11;14)) is required.     Interval History:  Mr. Echeverria is doing well. 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 Energy is good. No issues with medication. Recently his medication formulation changed from 1 pill to 2 capsules last month. No new side effects to report.     Overall doing very well. No recent illnesses.       PAST MEDICAL HISTORY:   Past Medical History:   Diagnosis Date    Arthritis     Cancer     prostate    COPD (chronic obstructive pulmonary disease)     Hyperlipidemia     Hypertension     Leukemia         PAST SURGICAL HISTORY:   Past Surgical History:   Procedure Laterality Date    CATARACT EXTRACTION W/  INTRAOCULAR LENS IMPLANT Left 05/05/2016    COLONOSCOPY W/ BIOPSIES AND POLYPECTOMY      PROSTATE SURGERY  05/1996    TONSILLECTOMY  1956       PAST SOCIAL HISTORY:   reports that he has never smoked. He quit smokeless tobacco use about 24 years ago. He reports current alcohol use. He reports that he does not use drugs.    FAMILY HISTORY:  Family History   Problem Relation Age of Onset    Diabetes Mother        CURRENT MEDICATIONS:   Current Outpatient Medications   Medication Sig    albuterol (PROVENTIL) 2.5 mg /3 mL (0.083 %) nebulizer solution Use content of one vial every 12 hours    albuterol (PROVENTIL) 2.5 mg /3 mL (0.083 %) nebulizer solution Take 1 vial by nebulization twice daily.    aspirin (ECOTRIN) 81 MG EC tablet Take 81 mg by mouth once daily.    budesonide-glycopyr-formoterol (BREZTRI AEROSPHERE) 160-9-4.8 mcg/actuation HFAA INSTILL 2 PUFFS BY MOUTH EVERY 12 HOURS    colchicine (MITIGARE) 0.6 mg Cap Take 1 capsule (0.6 mg total) by mouth once daily.    diclofenac sodium (VOLTAREN) 1 % Gel Apply 2 g topically 2 (two) times daily as needed (pain).    famotidine (PEPCID) 20 MG tablet Take 1 tablet orally 2 times a day as needed for heart burn    fish oil-omega-3 fatty acids 300-1,000 mg capsule Take 1 g by mouth once daily.    gabapentin (NEURONTIN) 300 MG capsule Take 1 capsule (300 mg total) by mouth once daily.    hydrOXYzine HCL (ATARAX) 10 MG Tab Take 3 tablets (30 mg total) by mouth 2 (two) times daily.    ibrutinib (IMBRUVICA) 140 mg Cap Take 280 mg (2 capsules) by mouth once daily.    ibrutinib (IMBRUVICA) 280 mg Tab Take 1 tablet (280 mg) by mouth once daily.    ibrutinib (IMBRUVICA) 280 mg Tab Take 1 tablet (280 mg) by mouth once daily.    ipratropium (ATROVENT) 0.02 % nebulizer solution use contents of one vial every 12 hours    ipratropium (ATROVENT) 0.02 % nebulizer solution Use  contents of one vial every 12 hours    metoprolol succinate (TOPROL-XL) 25 MG 24 hr tablet Take 1 tablet orally once a day.    metoprolol succinate (TOPROL-XL) 25 MG 24 hr tablet Take 1 tablet orally once a day.    montelukast (SINGULAIR) 10 mg tablet Take 1 tablet (10 mg total) by mouth every evening.    multivitamin with minerals tablet Take 1 tablet by mouth once daily. Equate Brand with Iron    nebulizer accessories Misc Use as directed    nebulizer and compressor Hyacinth USE WITH NEBULIZER SOLUTION AS DIRECTED    omeprazole (PRILOSEC) 20 MG capsule Take 1 capsule (20 mg total) by mouth every morning.    pravastatin (PRAVACHOL) 20 MG tablet Take 1 tablet orally once in the evening.    pravastatin (PRAVACHOL) 20 MG tablet Take 1 tablet orally once in the evening.    pravastatin (PRAVACHOL) 20 MG tablet Take 1 tablet orally once in the evening.    valsartan (DIOVAN) 160 MG tablet Take 1 tablet orally once a day. (replacing the Losartan and Verapamil)    valsartan (DIOVAN) 160 MG tablet Take 1 tablet orally once a day.    valsartan (DIOVAN) 160 MG tablet Take 1 tablet orally once a day.    verapamiL (VERELAN) 100 MG CPCT Take 1 capsule orally once a day.     No current facility-administered medications for this visit.     ALLERGIES:   Review of patient's allergies indicates:  No Known Allergies      Review of Systems:     Review of Systems   Constitutional:  Negative for appetite change, chills, diaphoresis, fatigue, fever and unexpected weight change.   HENT:   Negative for hearing loss, mouth sores, nosebleeds, sore throat, trouble swallowing and voice change.    Eyes:  Negative for eye problems and icterus.   Respiratory:  Negative for chest tightness, cough, hemoptysis, shortness of breath and wheezing.    Cardiovascular:  Negative for chest pain, leg swelling and palpitations.   Gastrointestinal:  Negative for abdominal distention, abdominal pain, blood in stool, diarrhea, nausea and vomiting.   Endocrine:  Negative for hot flashes.   Genitourinary:  Negative for bladder incontinence, difficulty urinating, dysuria and hematuria.    Musculoskeletal:  Positive for arthralgias and neck pain. Negative for back pain, flank pain, gait problem, myalgias and neck stiffness.   Skin:  Negative for itching, rash and wound.   Neurological:  Negative for dizziness, extremity weakness, gait problem, headaches, numbness, seizures and speech difficulty.   Hematological:  Negative for adenopathy. Does not bruise/bleed easily.   Psychiatric/Behavioral:  Negative for confusion, depression and sleep disturbance. The patient is not nervous/anxious.       Physical Exam:     Vitals:    04/19/23 0808   BP: (!) 145/70   Pulse: 67   Resp: 20   Temp: 97.6 °F (36.4 °C)       Physical Exam  Constitutional:       General: He is not in acute distress.     Appearance: He is well-developed. He is not diaphoretic.      Comments: Well dressed gentleman   HENT:      Head: Normocephalic and atraumatic.      Mouth/Throat:      Pharynx: No oropharyngeal exudate.   Eyes:      Conjunctiva/sclera: Conjunctivae normal.      Pupils: Pupils are equal, round, and reactive to light.   Neck:      Thyroid: No thyromegaly.      Vascular: No JVD.      Trachea: No tracheal deviation.   Cardiovascular:      Rate and Rhythm: Normal rate and regular rhythm.      Heart sounds: Normal heart sounds. No murmur heard.    No friction rub.   Pulmonary:      Effort: Pulmonary effort is normal. No respiratory distress.      Breath sounds: Normal breath sounds. No stridor. No wheezing or rales.   Chest:      Chest wall: No tenderness.   Abdominal:      General: Bowel sounds are normal. There is no distension.      Palpations: Abdomen is soft.      Tenderness: There is no abdominal tenderness. There is no guarding or rebound.   Musculoskeletal:         General: No tenderness or deformity. Normal range of motion.      Cervical back: Normal range of motion and neck supple.   Skin:      General: Skin is warm and dry.      Capillary Refill: Capillary refill takes less than 2 seconds.      Coloration: Skin is not pale.      Findings: No erythema or rash.   Neurological:      Mental Status: He is alert and oriented to person, place, and time.      Cranial Nerves: No cranial nerve deficit.      Sensory: No sensory deficit.      Motor: No abnormal muscle tone.      Coordination: Coordination normal.      Deep Tendon Reflexes: Reflexes normal.   Psychiatric:         Behavior: Behavior normal.         Thought Content: Thought content normal.         Judgment: Judgment normal.       ECOG Performance Status: (foot note - ECOG PS provided by Eastern Cooperative Oncology Group) 1 - Symptomatic but completely ambulatory    Karnofsky Performance Score:  90%- Able to Carry on Normal Activity: Minor Symptoms of Disease    Labs:   Lab Results   Component Value Date    WBC 18.42 (H) 04/18/2023    HGB 13.3 (L) 04/18/2023    HCT 39.9 (L) 04/18/2023     04/18/2023    CHOL 157 01/31/2020    TRIG 57 01/31/2020    HDL 65 01/31/2020    ALT 21 04/18/2023    AST 25 04/18/2023     (L) 04/18/2023    K 4.1 04/18/2023     04/18/2023    CREATININE 1.1 04/18/2023    BUN 9 04/18/2023    CO2 25 04/18/2023    TSH 3.184 01/31/2020    PSA 0.05 11/16/2021       Imaging: Outside imaging has been reviewed   Assessment and Plan:     Mr. Echeverria is a pleasant 83 year old male with presumed CLL.    CLL  --If this is indeed CLL the doubling time of the white count has not met criteria for treatment  --CLL fish indicates a 17p deletion in 40.5% of nuclei. In CLL, a 17p deletion is associated with an unfavorable prognosis   --Plan on monthly cbc's at [place]   --Continue ibrutinib [new formulation]  --will repeat labs in 1 month at [facility] to ensure white count again falls into an acceptable range  --White count is tomasz. I suspect this is transient and due to the change in ibrutinib formulation. If 1 month lab normalizes we  will again spread out blood draws to q3 months    Bilateral Hand Myalgias  --Suspect possible cervical stenosis or other outflow issue  --Pt requests to see rheumatology here in the event it is joint related  --MRI cervical and thorasic spine ordered, if necessary we will consult vascular surgery    Prostate Cancer  --Treated with a prostatectomy      30 minutes were spent face to face with the patient and his  family to discuss the disease, natural history, treatment options and survival statistics. I have provided the patient with an opportunity to ask questions and have all questions answered to his satisfaction.       he will return to clinic in about 12 weeks, with labs at [place] 1 day prior, but knows to call in the interim if symptoms change or should a problem arise.        Jaquelin Quintana MD  Hematology and Medical Oncology  Bone Marrow Transplant  [facility]        BMT Chart Routing      Follow up with physician Other. 1. labs in 1 month and 3 months at [place] 2.see me 1 day after 3 month labs [he likes 8 or 8:30 appts]   Follow up with MARY JANE    Provider visit type    Infusion scheduling note    Injection scheduling note    Labs    Imaging    Pharmacy appointment    Other referrals

## 2023-04-24 ENCOUNTER — SPECIALTY PHARMACY (OUTPATIENT)
Dept: PHARMACY | Facility: CLINIC | Age: 83
End: 2023-04-24
Payer: MEDICARE

## 2023-04-24 NOTE — TELEPHONE ENCOUNTER
Specialty Pharmacy - Refill Coordination    Specialty Medication Orders Linked to Encounter      Flowsheet Row Most Recent Value   Medication #1 ibrutinib (IMBRUVICA) 140 mg Cap (Order#[id number], Rx#[id number])            Refill Questions - Documented Responses      Flowsheet Row Most Recent Value   Refill Screening Questions    Changes to allergies? No   Changes to medications? No   New conditions since last clinic visit? No   Unplanned office visit, urgent care, ED, or hospital admission in the last 4 weeks? No   How does patient/caregiver feel medication is working? Good   Financial problems or insurance changes? No   How many doses of your specialty medications were missed in the last 4 weeks? 0   Would patient like to speak to a pharmacist? No   When does the patient need to receive the medication? 05/01/23   Refill Delivery Questions    How will the patient receive the medication? MEDRx   When does the patient need to receive the medication? 05/01/23   Shipping Address Home   Address in [facility] confirmed and updated if neccessary? Yes   Expected Copay ($) 70   Is the patient able to afford the medication copay? Yes   Payment Method CC on file   Days supply of Refill 30   Supplies needed? No supplies needed   Refill activity completed? Yes   Refill activity plan Refill scheduled   Shipment/Pickup Date: 04/25/23            Current Outpatient Medications   Medication Sig    albuterol (PROVENTIL) 2.5 mg /3 mL (0.083 %) nebulizer solution Use content of one vial every 12 hours    albuterol (PROVENTIL) 2.5 mg /3 mL (0.083 %) nebulizer solution Take 1 vial by nebulization twice daily.    aspirin (ECOTRIN) 81 MG EC tablet Take 81 mg by mouth once daily.    budesonide-glycopyr-formoterol (BREZTRI AEROSPHERE) 160-9-4.8 mcg/actuation HFAA INSTILL 2 PUFFS BY MOUTH EVERY 12 HOURS    colchicine (MITIGARE) 0.6 mg Cap Take 1 capsule (0.6 mg total) by mouth once daily.    diclofenac sodium (VOLTAREN) 1 % Gel Apply 2  g topically 2 (two) times daily as needed (pain).    famotidine (PEPCID) 20 MG tablet Take 1 tablet orally 2 times a day as needed for heart burn    fish oil-omega-3 fatty acids 300-1,000 mg capsule Take 1 g by mouth once daily.    gabapentin (NEURONTIN) 300 MG capsule Take 1 capsule (300 mg total) by mouth once daily.    hydrOXYzine HCL (ATARAX) 10 MG Tab Take 3 tablets (30 mg total) by mouth 2 (two) times daily.    ibrutinib (IMBRUVICA) 140 mg Cap Take 280 mg (2 capsules) by mouth once daily.    ibrutinib (IMBRUVICA) 280 mg Tab Take 1 tablet (280 mg) by mouth once daily.    ibrutinib (IMBRUVICA) 280 mg Tab Take 1 tablet (280 mg) by mouth once daily.    ipratropium (ATROVENT) 0.02 % nebulizer solution use contents of one vial every 12 hours    ipratropium (ATROVENT) 0.02 % nebulizer solution Use contents of one vial every 12 hours    metoprolol succinate (TOPROL-XL) 25 MG 24 hr tablet Take 1 tablet orally once a day.    metoprolol succinate (TOPROL-XL) 25 MG 24 hr tablet Take 1 tablet orally once a day.    montelukast (SINGULAIR) 10 mg tablet Take 1 tablet (10 mg total) by mouth every evening.    multivitamin with minerals tablet Take 1 tablet by mouth once daily. Equate Brand with Iron    nebulizer accessories Misc Use as directed    nebulizer and compressor Hyacinth USE WITH NEBULIZER SOLUTION AS DIRECTED    omeprazole (PRILOSEC) 20 MG capsule Take 1 capsule (20 mg total) by mouth every morning.    pravastatin (PRAVACHOL) 20 MG tablet Take 1 tablet orally once in the evening.    pravastatin (PRAVACHOL) 20 MG tablet Take 1 tablet orally once in the evening.    pravastatin (PRAVACHOL) 20 MG tablet Take 1 tablet orally once in the evening.    valsartan (DIOVAN) 160 MG tablet Take 1 tablet orally once a day. (replacing the Losartan and Verapamil)    valsartan (DIOVAN) 160 MG tablet Take 1 tablet orally once a day.    valsartan (DIOVAN) 160 MG tablet Take 1 tablet orally once a day.    verapamiL (VERELAN) 100 MG CPCT Take  1 capsule orally once a day.   Last reviewed on 4/19/2023  8:15 AM by Zeinab Acharya MA    Review of patient's allergies indicates:  No Known Allergies Last reviewed on  4/19/2023 8:08 AM by Zeinab Acharya      Tasks added this encounter   No tasks added.   Tasks due within next 3 months   4/23/2023 - Refill Coordination Outreach (1 time occurrence)     [facility] - Specialty Pharmacy  [address] [place], [place] [address]  [facility] [address]  Phone: [phone]  Fax: [phone]

## 2023-05-18 ENCOUNTER — SPECIALTY PHARMACY (OUTPATIENT)
Dept: PHARMACY | Facility: CLINIC | Age: 83
End: 2023-05-18
Payer: MEDICARE

## 2023-05-18 NOTE — TELEPHONE ENCOUNTER
Outgoing call regarding Imbruvica refill, pt states to follow up next week, due to large amount he has on hand, states he has not missed any doses. Informed pt will follow on 5/24 to check on hands and see if he's ready to schedule refill.

## 2023-05-19 ENCOUNTER — LAB VISIT (OUTPATIENT)
Dept: LAB | Facility: HOSPITAL | Age: 83
End: 2023-05-19
Attending: INTERNAL MEDICINE
Payer: MEDICARE

## 2023-05-19 DIAGNOSIS — C91.10 CLL (CHRONIC LYMPHOCYTIC LEUKEMIA): ICD-10-CM

## 2023-05-19 LAB
ALBUMIN SERPL BCP-MCNC: 3.5 G/DL (ref 3.5–5.2)
ALP SERPL-CCNC: 37 U/L (ref 55–135)
ALT SERPL W/O P-5'-P-CCNC: 18 U/L (ref 10–44)
ANION GAP SERPL CALC-SCNC: 7 MMOL/L (ref 8–16)
AST SERPL-CCNC: 20 U/L (ref 10–40)
BASOPHILS # BLD AUTO: ABNORMAL K/UL (ref 0–0.2)
BASOPHILS NFR BLD: 0 % (ref 0–1.9)
BILIRUB SERPL-MCNC: 0.5 MG/DL (ref 0.1–1)
BUN SERPL-MCNC: 11 MG/DL (ref 8–23)
CALCIUM SERPL-MCNC: 8.9 MG/DL (ref 8.7–10.5)
CHLORIDE SERPL-SCNC: 103 MMOL/L (ref 95–110)
CO2 SERPL-SCNC: 23 MMOL/L (ref 23–29)
CREAT SERPL-MCNC: 1.1 MG/DL (ref 0.5–1.4)
DIFFERENTIAL METHOD: ABNORMAL
EOSINOPHIL # BLD AUTO: ABNORMAL K/UL (ref 0–0.5)
EOSINOPHIL NFR BLD: 1 % (ref 0–8)
ERYTHROCYTE [DISTWIDTH] IN BLOOD BY AUTOMATED COUNT: 12 % (ref 11.5–14.5)
EST. GFR  (NO RACE VARIABLE): >60 ML/MIN/1.73 M^2
GLUCOSE SERPL-MCNC: 113 MG/DL (ref 70–110)
HCT VFR BLD AUTO: 36.5 % (ref 40–54)
HGB BLD-MCNC: 12.3 G/DL (ref 14–18)
IMM GRANULOCYTES # BLD AUTO: ABNORMAL K/UL (ref 0–0.04)
IMM GRANULOCYTES NFR BLD AUTO: ABNORMAL % (ref 0–0.5)
LYMPHOCYTES # BLD AUTO: ABNORMAL K/UL (ref 1–4.8)
LYMPHOCYTES NFR BLD: 73 % (ref 18–48)
MCH RBC QN AUTO: 32.7 PG (ref 27–31)
MCHC RBC AUTO-ENTMCNC: 33.7 G/DL (ref 32–36)
MCV RBC AUTO: 97 FL (ref 82–98)
MONOCYTES # BLD AUTO: ABNORMAL K/UL (ref 0.3–1)
MONOCYTES NFR BLD: 3 % (ref 4–15)
NEUTROPHILS NFR BLD: 23 % (ref 38–73)
NRBC BLD-RTO: 0 /100 WBC
PLATELET # BLD AUTO: 178 K/UL (ref 150–450)
PMV BLD AUTO: 10.8 FL (ref 9.2–12.9)
POTASSIUM SERPL-SCNC: 4.2 MMOL/L (ref 3.5–5.1)
PROT SERPL-MCNC: 6.6 G/DL (ref 6–8.4)
RBC # BLD AUTO: 3.76 M/UL (ref 4.6–6.2)
SODIUM SERPL-SCNC: 133 MMOL/L (ref 136–145)
WBC # BLD AUTO: 14.94 K/UL (ref 3.9–12.7)

## 2023-05-19 PROCEDURE — 85007 BL SMEAR W/DIFF WBC COUNT: CPT | Performed by: INTERNAL MEDICINE

## 2023-05-19 PROCEDURE — 36415 COLL VENOUS BLD VENIPUNCTURE: CPT | Performed by: INTERNAL MEDICINE

## 2023-05-19 PROCEDURE — 85027 COMPLETE CBC AUTOMATED: CPT | Performed by: INTERNAL MEDICINE

## 2023-05-19 PROCEDURE — 80053 COMPREHEN METABOLIC PANEL: CPT | Performed by: INTERNAL MEDICINE

## 2023-05-24 NOTE — TELEPHONE ENCOUNTER
Outgoing call regarding Imbruvica refill, pt states as of 5/18 he hand counted 19 days on hand. Informed pt refill too soon and will follow up on 5/29 to schedule refill and delivery.

## 2023-05-26 RX ORDER — DICLOFENAC SODIUM 10 MG/G
2 GEL TOPICAL 2 TIMES DAILY PRN
Qty: 100 G | Refills: 5 | Status: SHIPPED | OUTPATIENT
Start: 2023-05-26

## 2023-05-29 NOTE — TELEPHONE ENCOUNTER
Specialty Pharmacy - Refill Coordination    Specialty Medication Orders Linked to Encounter      Flowsheet Row Most Recent Value   Medication #1 ibrutinib (IMBRUVICA) 140 mg Cap (Order#[id number], Rx#[id number])            Refill Questions - Documented Responses      Flowsheet Row Most Recent Value   Patient Availability and HIPAA Verification    Does patient want to proceed with activity? Yes   HIPAA/medical authority confirmed? Yes   Relationship to patient of person spoken to? Self   Refill Screening Questions    Changes to allergies? No   Changes to medications? No   New conditions since last clinic visit? No   Unplanned office visit, urgent care, ED, or hospital admission in the last 4 weeks? No   How does patient/caregiver feel medication is working? Good   Financial problems or insurance changes? No   How many doses of your specialty medications were missed in the last 4 weeks? 0   Would patient like to speak to a pharmacist? No   When does the patient need to receive the medication? 06/06/23   Refill Delivery Questions    How will the patient receive the medication? MEDRx   When does the patient need to receive the medication? 06/06/23   Shipping Address Home   Address in [facility] confirmed and updated if neccessary? Yes   Expected Copay ($) 70   Is the patient able to afford the medication copay? Yes   Payment Method CC on file   Days supply of Refill 30   Supplies needed? No supplies needed   Refill activity completed? Yes   Refill activity plan Refill scheduled   Shipment/Pickup Date: 05/31/23            Current Outpatient Medications   Medication Sig    albuterol (PROVENTIL) 2.5 mg /3 mL (0.083 %) nebulizer solution Use content of one vial every 12 hours    albuterol (PROVENTIL) 2.5 mg /3 mL (0.083 %) nebulizer solution Take 1 vial by nebulization twice daily.    aspirin (ECOTRIN) 81 MG EC tablet Take 81 mg by mouth once daily.    budesonide-glycopyr-formoterol (BREZTRI AEROSPHERE) 160-9-4.8  mcg/actuation HFAA INSTILL 2 PUFFS BY MOUTH EVERY 12 HOURS    colchicine (MITIGARE) 0.6 mg Cap Take 1 capsule (0.6 mg total) by mouth once daily.    diclofenac sodium (VOLTAREN) 1 % Gel Apply 2 g topically 2 (two) times daily as needed (pain).    famotidine (PEPCID) 20 MG tablet Take 1 tablet orally 2 times a day as needed for heart burn    fish oil-omega-3 fatty acids 300-1,000 mg capsule Take 1 g by mouth once daily.    gabapentin (NEURONTIN) 300 MG capsule Take 1 capsule (300 mg total) by mouth once daily.    hydrOXYzine HCL (ATARAX) 10 MG Tab Take 3 tablets (30 mg total) by mouth 2 (two) times daily.    ibrutinib (IMBRUVICA) 140 mg Cap Take 280 mg (2 capsules) by mouth once daily.    ibrutinib (IMBRUVICA) 280 mg Tab Take 1 tablet (280 mg) by mouth once daily.    ipratropium (ATROVENT) 0.02 % nebulizer solution use contents of one vial every 12 hours    ipratropium (ATROVENT) 0.02 % nebulizer solution Use contents of one vial every 12 hours    metoprolol succinate (TOPROL-XL) 25 MG 24 hr tablet Take 1 tablet orally once a day.    metoprolol succinate (TOPROL-XL) 25 MG 24 hr tablet Take 1 tablet (25 mg total) by mouth once daily.    montelukast (SINGULAIR) 10 mg tablet Take 1 tablet (10 mg total) by mouth every evening.    multivitamin with minerals tablet Take 1 tablet by mouth once daily. Equate Brand with Iron    nebulizer accessories Misc Use as directed    nebulizer and compressor Hyacinth USE WITH NEBULIZER SOLUTION AS DIRECTED    omeprazole (PRILOSEC) 20 MG capsule Take 1 capsule (20 mg total) by mouth every morning.    pravastatin (PRAVACHOL) 20 MG tablet Take 1 tablet orally once in the evening.    pravastatin (PRAVACHOL) 20 MG tablet Take 1 tablet orally once in the evening.    pravastatin (PRAVACHOL) 20 MG tablet Take 1 tablet orally once in the evening.    valsartan (DIOVAN) 160 MG tablet Take 1 tablet orally once a day. (replacing the Losartan and Verapamil)    valsartan (DIOVAN) 160 MG tablet Take 1  tablet orally once a day.    valsartan (DIOVAN) 160 MG tablet Take 1 tablet orally once a day.    verapamiL (VERELAN) 100 MG CPCT Take 1 capsule orally once a day.   Last reviewed on 4/19/2023  8:15 AM by Zeinab Acharya MA    Review of patient's allergies indicates:  No Known Allergies Last reviewed on  4/19/2023 8:08 AM by Zeinab Acharya      Tasks added this encounter   No tasks added.   Tasks due within next 3 months   5/29/2023 - Refill Coordination Outreach (1 time occurrence)     KARRI MINER, PharmD  [facility]  [address] [address]  Phone: [phone]  Fax: [phone]

## 2023-06-15 ENCOUNTER — PATIENT MESSAGE (OUTPATIENT)
Dept: HEMATOLOGY/ONCOLOGY | Facility: CLINIC | Age: 83
End: 2023-06-15
Payer: MEDICARE

## 2023-06-19 RX ORDER — ALBUTEROL SULFATE 0.83 MG/ML
SOLUTION RESPIRATORY (INHALATION)
Qty: 540 ML | Refills: 11 | OUTPATIENT
Start: 2023-06-19 | End: 2023-10-30 | Stop reason: SDUPTHER

## 2023-06-19 RX ORDER — IPRATROPIUM BROMIDE 0.5 MG/2.5ML
SOLUTION RESPIRATORY (INHALATION)
Qty: 450 ML | Refills: 12 | OUTPATIENT
Start: 2023-06-19

## 2023-06-23 ENCOUNTER — SPECIALTY PHARMACY (OUTPATIENT)
Dept: PHARMACY | Facility: CLINIC | Age: 83
End: 2023-06-23
Payer: MEDICARE

## 2023-06-23 NOTE — TELEPHONE ENCOUNTER
Outgoing call regarding refill Imbruvica. Pt said he still has about 12 to 13 tablets on hand. Will follow up with pt for refill 6/29.

## 2023-06-29 NOTE — TELEPHONE ENCOUNTER
Specialty Pharmacy - Refill Coordination    Specialty Medication Orders Linked to Encounter      Flowsheet Row Most Recent Value   Medication #1 ibrutinib (IMBRUVICA) 140 mg Cap (Order#[id number], Rx#[id number])            Refill Questions - Documented Responses      Flowsheet Row Most Recent Value   Patient Availability and HIPAA Verification    Does patient want to proceed with activity? Yes   HIPAA/medical authority confirmed? Yes   Relationship to patient of person spoken to? Self   Refill Screening Questions    Changes to allergies? No   Changes to medications? No   New conditions since last clinic visit? No   Unplanned office visit, urgent care, ED, or hospital admission in the last 4 weeks? No   How does patient/caregiver feel medication is working? Good   Financial problems or insurance changes? Yes   How many doses of your specialty medications were missed in the last 4 weeks? 0   Would patient like to speak to a pharmacist? No   When does the patient need to receive the medication? 07/05/23   Refill Delivery Questions    How will the patient receive the medication? MEDRx   When does the patient need to receive the medication? 07/05/23   Shipping Address Home   Address in [facility] confirmed and updated if neccessary? Yes   Expected Copay ($) 70   Is the patient able to afford the medication copay? Yes   Payment Method CC on file   Days supply of Refill 30   Supplies needed? No supplies needed   Refill activity completed? Yes   Refill activity plan Refill scheduled   Shipment/Pickup Date: 06/29/23            Current Outpatient Medications   Medication Sig    albuterol (PROVENTIL) 2.5 mg /3 mL (0.083 %) nebulizer solution Use content of one vial every 12 hours    albuterol (PROVENTIL) 2.5 mg /3 mL (0.083 %) nebulizer solution Take 1 vial by nebulization twice daily.    aspirin (ECOTRIN) 81 MG EC tablet Take 81 mg by mouth once daily.    budesonide-glycopyr-formoterol (BREZTRI AEROSPHERE) 160-9-4.8  mcg/actuation HFAA INSTILL 2 PUFFS BY MOUTH EVERY 12 HOURS    colchicine (MITIGARE) 0.6 mg Cap Take 1 capsule (0.6 mg total) by mouth once daily.    diclofenac sodium (VOLTAREN) 1 % Gel Apply 2 g topically 2 (two) times daily as needed (pain).    famotidine (PEPCID) 20 MG tablet Take 1 tablet orally 2 times a day as needed for heart burn    fish oil-omega-3 fatty acids 300-1,000 mg capsule Take 1 g by mouth once daily.    gabapentin (NEURONTIN) 300 MG capsule Take 1 capsule (300 mg total) by mouth once daily.    hydrOXYzine HCL (ATARAX) 10 MG Tab Take 3 tablets (30 mg total) by mouth 2 (two) times daily.    ibrutinib (IMBRUVICA) 140 mg Cap Take 280 mg (2 capsules) by mouth once daily.    ibrutinib (IMBRUVICA) 280 mg Tab Take 1 tablet (280 mg) by mouth once daily.    ipratropium (ATROVENT) 0.02 % nebulizer solution use contents of one vial every 12 hours    ipratropium (ATROVENT) 0.02 % nebulizer solution Use contents of one vial every 12 hours    metoprolol succinate (TOPROL-XL) 25 MG 24 hr tablet Take 1 tablet orally once a day.    metoprolol succinate (TOPROL-XL) 25 MG 24 hr tablet Take 1 tablet (25 mg total) by mouth once daily.    montelukast (SINGULAIR) 10 mg tablet Take 1 tablet (10 mg total) by mouth every evening.    multivitamin with minerals tablet Take 1 tablet by mouth once daily. Equate Brand with Iron    nebulizer accessories Misc Use as directed    nebulizer and compressor Hyacinth USE WITH NEBULIZER SOLUTION AS DIRECTED    omeprazole (PRILOSEC) 20 MG capsule Take 1 capsule (20 mg total) by mouth every morning.    pravastatin (PRAVACHOL) 20 MG tablet Take 1 tablet orally once in the evening.    pravastatin (PRAVACHOL) 20 MG tablet Take 1 tablet orally once in the evening.    pravastatin (PRAVACHOL) 20 MG tablet Take 1 tablet orally once in the evening.    valsartan (DIOVAN) 160 MG tablet Take 1 tablet orally once a day. (replacing the Losartan and Verapamil)    valsartan (DIOVAN) 160 MG tablet Take 1  tablet orally once a day.    valsartan (DIOVAN) 160 MG tablet Take 1 tablet orally once a day.    verapamiL (VERELAN) 100 MG CPCT Take 1 capsule orally once a day.   Last reviewed on 4/19/2023  8:15 AM by Zeinab Acharya MA    Review of patient's allergies indicates:  No Known Allergies Last reviewed on  4/19/2023 8:08 AM by Zeinab Acharya      Tasks added this encounter   No tasks added.   Tasks due within next 3 months   9/10/2023 - Clinical Assessment (6 month recurrence)  6/29/2023 - Refill Coordination Outreach (1 time occurrence)     [facility]  [address] [place] [id number]  Phone: [phone]  Fax: [phone]

## 2023-07-24 ENCOUNTER — SPECIALTY PHARMACY (OUTPATIENT)
Dept: PHARMACY | Facility: CLINIC | Age: 83
End: 2023-07-24
Payer: MEDICARE

## 2023-07-24 NOTE — TELEPHONE ENCOUNTER
Specialty Pharmacy - Refill Coordination    Specialty Medication Orders Linked to Encounter      Flowsheet Row Most Recent Value   Medication #1 ibrutinib (IMBRUVICA) 140 mg Cap (Order#[id number], Rx#[id number])            Refill Questions - Documented Responses      Flowsheet Row Most Recent Value   Patient Availability and HIPAA Verification    Does patient want to proceed with activity? Yes   HIPAA/medical authority confirmed? Yes   Relationship to patient of person spoken to? Self   Refill Screening Questions    Changes to allergies? No   Changes to medications? No   New conditions since last clinic visit? No   Unplanned office visit, urgent care, ED, or hospital admission in the last 4 weeks? No   How does patient/caregiver feel medication is working? Good   Financial problems or insurance changes? Yes   How many doses of your specialty medications were missed in the last 4 weeks? 0   Would patient like to speak to a pharmacist? No   When does the patient need to receive the medication? 07/31/23   Refill Delivery Questions    How will the patient receive the medication? MEDRx   When does the patient need to receive the medication? 07/31/23   Shipping Address Home   Address in [facility] confirmed and updated if neccessary? Yes   Expected Copay ($) 70   Is the patient able to afford the medication copay? Yes   Payment Method CC on file   Days supply of Refill 30   Supplies needed? No supplies needed   Refill activity completed? Yes   Refill activity plan Refill scheduled   Shipment/Pickup Date: 07/25/23            Current Outpatient Medications   Medication Sig    albuterol (PROVENTIL) 2.5 mg /3 mL (0.083 %) nebulizer solution Use content of one vial every 12 hours    albuterol (PROVENTIL) 2.5 mg /3 mL (0.083 %) nebulizer solution Take 1 vial by nebulization twice daily.    aspirin (ECOTRIN) 81 MG EC tablet Take 81 mg by mouth once daily.    budesonide-glycopyr-formoterol (BREZTRI AEROSPHERE) 160-9-4.8  mcg/actuation HFAA INSTILL 2 PUFFS BY MOUTH EVERY 12 HOURS    colchicine (MITIGARE) 0.6 mg Cap Take 1 capsule (0.6 mg total) by mouth once daily.    diclofenac sodium (VOLTAREN) 1 % Gel Apply 2 g topically 2 (two) times daily as needed (pain).    famotidine (PEPCID) 20 MG tablet Take 1 tablet orally 2 times a day as needed for heart burn    fish oil-omega-3 fatty acids 300-1,000 mg capsule Take 1 g by mouth once daily.    gabapentin (NEURONTIN) 300 MG capsule Take 1 capsule (300 mg total) by mouth once daily.    hydrOXYzine HCL (ATARAX) 10 MG Tab Take 3 tablets (30 mg total) by mouth 2 (two) times daily.    ibrutinib (IMBRUVICA) 140 mg Cap Take 280 mg (2 capsules) by mouth once daily.    ibrutinib (IMBRUVICA) 280 mg Tab Take 1 tablet (280 mg) by mouth once daily.    ipratropium (ATROVENT) 0.02 % nebulizer solution use contents of one vial every 12 hours    ipratropium (ATROVENT) 0.02 % nebulizer solution Use contents of one vial every 12 hours    metoprolol succinate (TOPROL-XL) 25 MG 24 hr tablet Take 1 tablet orally once a day.    metoprolol succinate (TOPROL-XL) 25 MG 24 hr tablet Take 1 tablet (25 mg total) by mouth once daily.    montelukast (SINGULAIR) 10 mg tablet Take 1 tablet (10 mg total) by mouth every evening.    multivitamin with minerals tablet Take 1 tablet by mouth once daily. Equate Brand with Iron    nebulizer accessories Misc Use as directed    nebulizer and compressor Hyacinth USE WITH NEBULIZER SOLUTION AS DIRECTED    omeprazole (PRILOSEC) 20 MG capsule Take 1 capsule (20 mg total) by mouth every morning.    pravastatin (PRAVACHOL) 20 MG tablet Take 1 tablet orally once in the evening.    pravastatin (PRAVACHOL) 20 MG tablet Take 1 tablet orally once in the evening.    pravastatin (PRAVACHOL) 20 MG tablet Take 1 tablet orally once in the evening.    valsartan (DIOVAN) 160 MG tablet Take 1 tablet orally once a day. (replacing the Losartan and Verapamil)    valsartan (DIOVAN) 160 MG tablet Take 1  tablet orally once a day.    valsartan (DIOVAN) 160 MG tablet Take 1 tablet orally once a day.    verapamiL (VERELAN) 100 MG CPCT Take 1 capsule orally once a day.   Last reviewed on 4/19/2023  8:15 AM by Zeinab Acharya MA    Review of patient's allergies indicates:  No Known Allergies Last reviewed on  4/19/2023 8:08 AM by Zeinab Acharya      Tasks added this encounter   No tasks added.   Tasks due within next 3 months   9/10/2023 - Clinical Assessment (6 month recurrence)  7/22/2023 - Refill Coordination Outreach (1 time occurrence)     [facility]  [address] [facility] [address]  Phone: [phone]  Fax: [phone]

## 2023-07-25 ENCOUNTER — LAB VISIT (OUTPATIENT)
Dept: LAB | Facility: HOSPITAL | Age: 83
End: 2023-07-25
Attending: INTERNAL MEDICINE
Payer: MEDICARE

## 2023-07-25 DIAGNOSIS — C91.10 CLL (CHRONIC LYMPHOCYTIC LEUKEMIA): ICD-10-CM

## 2023-07-25 LAB
ALBUMIN SERPL BCP-MCNC: 3.4 G/DL (ref 3.5–5.2)
ALP SERPL-CCNC: 43 U/L (ref 55–135)
ALT SERPL W/O P-5'-P-CCNC: 15 U/L (ref 10–44)
ANION GAP SERPL CALC-SCNC: 6 MMOL/L (ref 8–16)
ANISOCYTOSIS BLD QL SMEAR: SLIGHT
AST SERPL-CCNC: 19 U/L (ref 10–40)
BASOPHILS # BLD AUTO: ABNORMAL K/UL (ref 0–0.2)
BASOPHILS NFR BLD: 0 % (ref 0–1.9)
BILIRUB SERPL-MCNC: 0.6 MG/DL (ref 0.1–1)
BUN SERPL-MCNC: 11 MG/DL (ref 8–23)
CALCIUM SERPL-MCNC: 8.9 MG/DL (ref 8.7–10.5)
CHLORIDE SERPL-SCNC: 101 MMOL/L (ref 95–110)
CO2 SERPL-SCNC: 26 MMOL/L (ref 23–29)
CREAT SERPL-MCNC: 1.1 MG/DL (ref 0.5–1.4)
DIFFERENTIAL METHOD: ABNORMAL
EOSINOPHIL # BLD AUTO: ABNORMAL K/UL (ref 0–0.5)
EOSINOPHIL NFR BLD: 0 % (ref 0–8)
ERYTHROCYTE [DISTWIDTH] IN BLOOD BY AUTOMATED COUNT: 12 % (ref 11.5–14.5)
EST. GFR  (NO RACE VARIABLE): >60 ML/MIN/1.73 M^2
GLUCOSE SERPL-MCNC: 83 MG/DL (ref 70–110)
HCT VFR BLD AUTO: 36.3 % (ref 40–54)
HGB BLD-MCNC: 12 G/DL (ref 14–18)
IMM GRANULOCYTES # BLD AUTO: ABNORMAL K/UL (ref 0–0.04)
IMM GRANULOCYTES NFR BLD AUTO: ABNORMAL % (ref 0–0.5)
LYMPHOCYTES # BLD AUTO: ABNORMAL K/UL (ref 1–4.8)
LYMPHOCYTES NFR BLD: 70 % (ref 18–48)
MCH RBC QN AUTO: 32.1 PG (ref 27–31)
MCHC RBC AUTO-ENTMCNC: 33.1 G/DL (ref 32–36)
MCV RBC AUTO: 97 FL (ref 82–98)
MONOCYTES # BLD AUTO: ABNORMAL K/UL (ref 0.3–1)
MONOCYTES NFR BLD: 4 % (ref 4–15)
NEUTROPHILS NFR BLD: 26 % (ref 38–73)
NRBC BLD-RTO: 0 /100 WBC
PLATELET # BLD AUTO: 176 K/UL (ref 150–450)
PLATELET BLD QL SMEAR: ABNORMAL
PMV BLD AUTO: 10.6 FL (ref 9.2–12.9)
POIKILOCYTOSIS BLD QL SMEAR: SLIGHT
POTASSIUM SERPL-SCNC: 4.2 MMOL/L (ref 3.5–5.1)
PROT SERPL-MCNC: 6.7 G/DL (ref 6–8.4)
RBC # BLD AUTO: 3.74 M/UL (ref 4.6–6.2)
SODIUM SERPL-SCNC: 133 MMOL/L (ref 136–145)
WBC # BLD AUTO: 20.89 K/UL (ref 3.9–12.7)

## 2023-07-25 PROCEDURE — 36415 COLL VENOUS BLD VENIPUNCTURE: CPT | Performed by: INTERNAL MEDICINE

## 2023-07-25 PROCEDURE — 85007 BL SMEAR W/DIFF WBC COUNT: CPT | Performed by: INTERNAL MEDICINE

## 2023-07-25 PROCEDURE — 85027 COMPLETE CBC AUTOMATED: CPT | Performed by: INTERNAL MEDICINE

## 2023-07-25 PROCEDURE — 80053 COMPREHEN METABOLIC PANEL: CPT | Performed by: INTERNAL MEDICINE

## 2023-07-26 ENCOUNTER — OFFICE VISIT (OUTPATIENT)
Dept: HEMATOLOGY/ONCOLOGY | Facility: CLINIC | Age: 83
End: 2023-07-26
Payer: MEDICARE

## 2023-07-26 VITALS
OXYGEN SATURATION: 99 % | TEMPERATURE: 98 F | HEIGHT: 70 IN | WEIGHT: 169.19 LBS | RESPIRATION RATE: 18 BRPM | HEART RATE: 64 BPM | SYSTOLIC BLOOD PRESSURE: 129 MMHG | BODY MASS INDEX: 24.22 KG/M2 | DIASTOLIC BLOOD PRESSURE: 62 MMHG

## 2023-07-26 DIAGNOSIS — K21.9 GASTROESOPHAGEAL REFLUX DISEASE, UNSPECIFIED WHETHER ESOPHAGITIS PRESENT: Chronic | ICD-10-CM

## 2023-07-26 DIAGNOSIS — C61 PROSTATE CANCER: ICD-10-CM

## 2023-07-26 DIAGNOSIS — C91.10 CLL (CHRONIC LYMPHOCYTIC LEUKEMIA): Primary | ICD-10-CM

## 2023-07-26 PROCEDURE — 1126F PR PAIN SEVERITY QUANTIFIED, NO PAIN PRESENT: ICD-10-PCS | Mod: CPTII,S$GLB,, | Performed by: INTERNAL MEDICINE

## 2023-07-26 PROCEDURE — 3074F SYST BP LT 130 MM HG: CPT | Mod: CPTII,S$GLB,, | Performed by: INTERNAL MEDICINE

## 2023-07-26 PROCEDURE — 99999 PR PBB SHADOW E&M-EST. PATIENT-LVL III: CPT | Mod: PBBFAC,,, | Performed by: INTERNAL MEDICINE

## 2023-07-26 PROCEDURE — 3288F FALL RISK ASSESSMENT DOCD: CPT | Mod: CPTII,S$GLB,, | Performed by: INTERNAL MEDICINE

## 2023-07-26 PROCEDURE — 3078F DIAST BP <80 MM HG: CPT | Mod: CPTII,S$GLB,, | Performed by: INTERNAL MEDICINE

## 2023-07-26 PROCEDURE — 1101F PR PT FALLS ASSESS DOC 0-1 FALLS W/OUT INJ PAST YR: ICD-10-PCS | Mod: CPTII,S$GLB,, | Performed by: INTERNAL MEDICINE

## 2023-07-26 PROCEDURE — 1101F PT FALLS ASSESS-DOCD LE1/YR: CPT | Mod: CPTII,S$GLB,, | Performed by: INTERNAL MEDICINE

## 2023-07-26 PROCEDURE — 99999 PR PBB SHADOW E&M-EST. PATIENT-LVL III: ICD-10-PCS | Mod: PBBFAC,,, | Performed by: INTERNAL MEDICINE

## 2023-07-26 PROCEDURE — 1159F PR MEDICATION LIST DOCUMENTED IN MEDICAL RECORD: ICD-10-PCS | Mod: CPTII,S$GLB,, | Performed by: INTERNAL MEDICINE

## 2023-07-26 PROCEDURE — 99215 PR OFFICE/OUTPT VISIT, EST, LEVL V, 40-54 MIN: ICD-10-PCS | Mod: S$GLB,,, | Performed by: INTERNAL MEDICINE

## 2023-07-26 PROCEDURE — 1159F MED LIST DOCD IN RCRD: CPT | Mod: CPTII,S$GLB,, | Performed by: INTERNAL MEDICINE

## 2023-07-26 PROCEDURE — 1126F AMNT PAIN NOTED NONE PRSNT: CPT | Mod: CPTII,S$GLB,, | Performed by: INTERNAL MEDICINE

## 2023-07-26 PROCEDURE — 3074F PR MOST RECENT SYSTOLIC BLOOD PRESSURE < 130 MM HG: ICD-10-PCS | Mod: CPTII,S$GLB,, | Performed by: INTERNAL MEDICINE

## 2023-07-26 PROCEDURE — 3288F PR FALLS RISK ASSESSMENT DOCUMENTED: ICD-10-PCS | Mod: CPTII,S$GLB,, | Performed by: INTERNAL MEDICINE

## 2023-07-26 PROCEDURE — 3078F PR MOST RECENT DIASTOLIC BLOOD PRESSURE < 80 MM HG: ICD-10-PCS | Mod: CPTII,S$GLB,, | Performed by: INTERNAL MEDICINE

## 2023-07-26 PROCEDURE — 99215 OFFICE O/P EST HI 40 MIN: CPT | Mod: S$GLB,,, | Performed by: INTERNAL MEDICINE

## 2023-07-26 NOTE — PROGRESS NOTES
Hematology and Medical Oncology   Follow Up     07/26/2023    Primary Oncologic Diagnosis: CLL    History of Present Ilness:   Kevin Echeverria Jr. (Kevin) is a pleasant 83 y.o.male who presents to transition care to [facility] from the [facility]. The patient presents today with his wife and daughter. Most of his issues started in August when he began experiencing horrible pain in his hands that was worse with lack of motion/rest. The pain is excruciating and has sent him to the ED several times. The pain can happen in either upper extremity and feels like it travels at times. The discomfort abates as he moves his hands more throughout the day.     Incidentally through these ER visits it was noted that he has a persistently elevated but largely stable WBC.    --Pathologist review of the peripheral smear on 12/21/19 Leukocytosis with an absolute and relative lymphocytosis.     Lymphocytes are mature, and a subset displays a slightly atypical chromatin pattern.  Smudge cells are present.  Background granulocytes are morphologically normal.  The morphology suggests a B cell lymphoproliferative disorder such as CLL.  --Flow Cytometry on 1/3/2020: A B cell lymphoproliferative disorder is present.  Mantle cell lymphoma is favored although an atypical CLL cannot be completely ruled out; correlation   with morphology and with any available cytogenetic or molecular studies (t(11;14)) is required.     Interval History:  Mr. Echeverria is doing fair. 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 Following the pill to capsule conversion his bowel movements are increasingly loose. Probiotics have helped. Able to control when he goes to the restroom, but this is a notable change from normal.     Overall doing very well. No recent illnesses.       PAST MEDICAL HISTORY:   Past Medical History:   Diagnosis Date    Arthritis     Cancer     prostate    COPD (chronic obstructive pulmonary disease)      Hyperlipidemia     Hypertension     Leukemia        PAST SURGICAL HISTORY:   Past Surgical History:   Procedure Laterality Date    CATARACT EXTRACTION W/  INTRAOCULAR LENS IMPLANT Left 05/05/2016    COLONOSCOPY W/ BIOPSIES AND POLYPECTOMY      PROSTATE SURGERY  05/1996    TONSILLECTOMY  1956       PAST SOCIAL HISTORY:   reports that he has never smoked. He quit smokeless tobacco use about 24 years ago. He reports current alcohol use. He reports that he does not use drugs.    FAMILY HISTORY:  Family History   Problem Relation Age of Onset    Diabetes Mother        CURRENT MEDICATIONS:   Current Outpatient Medications   Medication Sig    albuterol (PROVENTIL) 2.5 mg /3 mL (0.083 %) nebulizer solution Use content of one vial every 12 hours    albuterol (PROVENTIL) 2.5 mg /3 mL (0.083 %) nebulizer solution Take 1 vial by nebulization twice daily.    aspirin (ECOTRIN) 81 MG EC tablet Take 81 mg by mouth once daily.    budesonide-glycopyr-formoterol (BREZTRI AEROSPHERE) 160-9-4.8 mcg/actuation HFAA INSTILL 2 PUFFS BY MOUTH EVERY 12 HOURS    colchicine (MITIGARE) 0.6 mg Cap Take 1 capsule (0.6 mg total) by mouth once daily.    diclofenac sodium (VOLTAREN) 1 % Gel Apply 2 g topically 2 (two) times daily as needed (pain).    famotidine (PEPCID) 20 MG tablet Take 1 tablet orally 2 times a day as needed for heart burn    fish oil-omega-3 fatty acids 300-1,000 mg capsule Take 1 g by mouth once daily.    gabapentin (NEURONTIN) 300 MG capsule Take 1 capsule (300 mg total) by mouth once daily.    hydrOXYzine HCL (ATARAX) 10 MG Tab Take 3 tablets (30 mg total) by mouth 2 (two) times daily.    ibrutinib (IMBRUVICA) 140 mg Cap Take 280 mg (2 capsules) by mouth once daily.    ibrutinib (IMBRUVICA) 280 mg Tab Take 1 tablet (280 mg) by mouth once daily.    ipratropium (ATROVENT) 0.02 % nebulizer solution use contents of one vial every 12 hours    ipratropium (ATROVENT) 0.02 % nebulizer solution Use contents of one  vial every 12 hours    L.acid/B.animalis,bifidum/FOS (PROBIOTIC COMPLEX ORAL) Take by mouth.    metoprolol succinate (TOPROL-XL) 25 MG 24 hr tablet Take 1 tablet orally once a day.    metoprolol succinate (TOPROL-XL) 25 MG 24 hr tablet Take 1 tablet (25 mg total) by mouth once daily.    montelukast (SINGULAIR) 10 mg tablet Take 1 tablet (10 mg total) by mouth every evening.    multivitamin with minerals tablet Take 1 tablet by mouth once daily. Equate Brand with Iron    nebulizer accessories Misc Use as directed    nebulizer and compressor Hyacinth USE WITH NEBULIZER SOLUTION AS DIRECTED    omeprazole (PRILOSEC) 20 MG capsule Take 1 capsule (20 mg total) by mouth every morning.    pravastatin (PRAVACHOL) 20 MG tablet Take 1 tablet orally once in the evening.    pravastatin (PRAVACHOL) 20 MG tablet Take 1 tablet orally once in the evening.    pravastatin (PRAVACHOL) 20 MG tablet Take 1 tablet orally once in the evening.    valsartan (DIOVAN) 160 MG tablet Take 1 tablet orally once a day. (replacing the Losartan and Verapamil)    valsartan (DIOVAN) 160 MG tablet Take 1 tablet orally once a day.    valsartan (DIOVAN) 160 MG tablet Take 1 tablet orally once a day.    verapamiL (VERELAN) 100 MG CPCT Take 1 capsule orally once a day.     No current facility-administered medications for this visit.     ALLERGIES:   Review of patient's allergies indicates:  No Known Allergies      Review of Systems:     Review of Systems   Constitutional:  Negative for appetite change, chills, diaphoresis, fatigue, fever and unexpected weight change.   HENT:   Negative for hearing loss, mouth sores, nosebleeds, sore throat, trouble swallowing and voice change.    Eyes:  Negative for eye problems and icterus.   Respiratory:  Negative for chest tightness, cough, hemoptysis, shortness of breath and wheezing.    Cardiovascular:  Negative for chest pain, leg swelling and palpitations.   Gastrointestinal:  Negative for abdominal  distention, abdominal pain, blood in stool, diarrhea, nausea and vomiting.   Endocrine: Negative for hot flashes.   Genitourinary:  Negative for bladder incontinence, difficulty urinating, dysuria and hematuria.    Musculoskeletal:  Positive for arthralgias and neck pain. Negative for back pain, flank pain, gait problem, myalgias and neck stiffness.   Skin:  Negative for itching, rash and wound.   Neurological:  Negative for dizziness, extremity weakness, gait problem, headaches, numbness, seizures and speech difficulty.   Hematological:  Negative for adenopathy. Does not bruise/bleed easily.   Psychiatric/Behavioral:  Negative for confusion, depression and sleep disturbance. The patient is not nervous/anxious.       Physical Exam:     Vitals:    07/26/23 0817   BP: 129/62   Pulse: 64   Resp: 18   Temp: 97.5 °F (36.4 °C)       Physical Exam  Constitutional:       General: He is not in acute distress.     Appearance: He is well-developed. He is not diaphoretic.      Comments: Well dressed gentleman   HENT:      Head: Normocephalic and atraumatic.      Mouth/Throat:      Pharynx: No oropharyngeal exudate.   Eyes:      Conjunctiva/sclera: Conjunctivae normal.      Pupils: Pupils are equal, round, and reactive to light.   Neck:      Thyroid: No thyromegaly.      Vascular: No JVD.      Trachea: No tracheal deviation.   Cardiovascular:      Rate and Rhythm: Normal rate and regular rhythm.      Heart sounds: Normal heart sounds. No murmur heard.    No friction rub.   Pulmonary:      Effort: Pulmonary effort is normal. No respiratory distress.      Breath sounds: Normal breath sounds. No stridor. No wheezing or rales.   Chest:      Chest wall: No tenderness.   Abdominal:      General: Bowel sounds are normal. There is no distension.      Palpations: Abdomen is soft.      Tenderness: There is no abdominal tenderness. There is no guarding or rebound.   Musculoskeletal:         General: No tenderness or deformity. Normal range  of motion.      Cervical back: Normal range of motion and neck supple.   Skin:     General: Skin is warm and dry.      Capillary Refill: Capillary refill takes less than 2 seconds.      Coloration: Skin is not pale.      Findings: No erythema or rash.   Neurological:      Mental Status: He is alert and oriented to person, place, and time.      Cranial Nerves: No cranial nerve deficit.      Sensory: No sensory deficit.      Motor: No abnormal muscle tone.      Coordination: Coordination normal.      Deep Tendon Reflexes: Reflexes normal.   Psychiatric:         Behavior: Behavior normal.         Thought Content: Thought content normal.         Judgment: Judgment normal.       ECOG Performance Status: (foot note - ECOG PS provided by Eastern Cooperative Oncology Group) 1 - Symptomatic but completely ambulatory    Karnofsky Performance Score:  90%- Able to Carry on Normal Activity: Minor Symptoms of Disease    Labs:   Lab Results   Component Value Date    WBC 20.89 (H) 07/25/2023    HGB 12.0 (L) 07/25/2023    HCT 36.3 (L) 07/25/2023     07/25/2023    CHOL 157 01/31/2020    TRIG 57 01/31/2020    HDL 65 01/31/2020    ALT 15 07/25/2023    AST 19 07/25/2023     (L) 07/25/2023    K 4.2 07/25/2023     07/25/2023    CREATININE 1.1 07/25/2023    BUN 11 07/25/2023    CO2 26 07/25/2023    TSH 3.184 01/31/2020    PSA 0.05 11/16/2021       Imaging: Outside imaging has been reviewed   Assessment and Plan:     Mr. Echeverria is a pleasant 83 year old male with presumed CLL.    CLL  --If this is indeed CLL the doubling time of the white count has not met criteria for treatment  --CLL fish indicates a 17p deletion in 40.5% of nuclei. In CLL, a 17p deletion is associated with an unfavorable prognosis   --Plan on monthly cbc's at [facility]   --Continue ibrutinib [new formulation]  --will repeat labs in 1 month at [facility] to ensure white count again falls into an acceptable range  --Will repeat flow cytometry with next set  of labs    Bilateral Hand Myalgias  --Suspect possible cervical stenosis or other outflow issue  --Pt requests to see rheumatology here in the event it is joint related  --MRI cervical and thorasic spine ordered, if necessary we will consult vascular surgery    Prostate Cancer  --Treated with a prostatectomy      30 minutes were spent face to face with the patient and his  family to discuss the disease, natural history, treatment options and survival statistics. I have provided the patient with an opportunity to ask questions and have all questions answered to his satisfaction.       he will return to clinic in about 12 weeks, with labs at [place] 1 day prior, but knows to call in the interim if symptoms change or should a problem arise.        Jaquelin Quintana MD  Hematology and Medical Oncology  Bone Marrow Transplant  [facility]        BMT Chart Routing      Follow up with physician Other. 1. labs monthly x 3 at [facility], first lab appt needs to include a cbc,cmp, flow cytometery. All other lab appointments: cbc,cmp only 2.see me 1-2 days after 3 month lab [he likes 8:30am appt]   Follow up with MARY JANE    Provider visit type    Infusion scheduling note    Injection scheduling note    Labs    Imaging    Pharmacy appointment    Other referrals

## 2023-08-07 ENCOUNTER — LAB VISIT (OUTPATIENT)
Dept: LAB | Facility: HOSPITAL | Age: 83
End: 2023-08-07
Attending: INTERNAL MEDICINE
Payer: MEDICARE

## 2023-08-07 DIAGNOSIS — M11.849 CALCIUM PYROPHOSPHATE ARTHROPATHY OF HAND: ICD-10-CM

## 2023-08-07 LAB
ALBUMIN SERPL BCP-MCNC: 3.4 G/DL (ref 3.5–5.2)
ALP SERPL-CCNC: 46 U/L (ref 55–135)
ALT SERPL W/O P-5'-P-CCNC: 17 U/L (ref 10–44)
ANION GAP SERPL CALC-SCNC: 10 MMOL/L (ref 8–16)
ANISOCYTOSIS BLD QL SMEAR: SLIGHT
AST SERPL-CCNC: 21 U/L (ref 10–40)
BASOPHILS # BLD AUTO: ABNORMAL K/UL (ref 0–0.2)
BASOPHILS NFR BLD: 0 % (ref 0–1.9)
BILIRUB SERPL-MCNC: 0.5 MG/DL (ref 0.1–1)
BUN SERPL-MCNC: 10 MG/DL (ref 8–23)
CALCIUM SERPL-MCNC: 8.7 MG/DL (ref 8.7–10.5)
CHLORIDE SERPL-SCNC: 101 MMOL/L (ref 95–110)
CO2 SERPL-SCNC: 22 MMOL/L (ref 23–29)
CREAT SERPL-MCNC: 1 MG/DL (ref 0.5–1.4)
DIFFERENTIAL METHOD: ABNORMAL
EOSINOPHIL # BLD AUTO: ABNORMAL K/UL (ref 0–0.5)
EOSINOPHIL NFR BLD: 0 % (ref 0–8)
ERYTHROCYTE [DISTWIDTH] IN BLOOD BY AUTOMATED COUNT: 12 % (ref 11.5–14.5)
EST. GFR  (NO RACE VARIABLE): >60 ML/MIN/1.73 M^2
GIANT PLATELETS BLD QL SMEAR: PRESENT
GLUCOSE SERPL-MCNC: 79 MG/DL (ref 70–110)
HCT VFR BLD AUTO: 36.7 % (ref 40–54)
HGB BLD-MCNC: 12.3 G/DL (ref 14–18)
IMM GRANULOCYTES # BLD AUTO: ABNORMAL K/UL (ref 0–0.04)
IMM GRANULOCYTES NFR BLD AUTO: ABNORMAL % (ref 0–0.5)
LYMPHOCYTES # BLD AUTO: ABNORMAL K/UL (ref 1–4.8)
LYMPHOCYTES NFR BLD: 73 % (ref 18–48)
MCH RBC QN AUTO: 32.3 PG (ref 27–31)
MCHC RBC AUTO-ENTMCNC: 33.5 G/DL (ref 32–36)
MCV RBC AUTO: 96 FL (ref 82–98)
MONOCYTES # BLD AUTO: ABNORMAL K/UL (ref 0.3–1)
MONOCYTES NFR BLD: 6 % (ref 4–15)
NEUTROPHILS NFR BLD: 21 % (ref 38–73)
NRBC BLD-RTO: 0 /100 WBC
PLATELET # BLD AUTO: 149 K/UL (ref 150–450)
PLATELET BLD QL SMEAR: ABNORMAL
PMV BLD AUTO: 11.2 FL (ref 9.2–12.9)
POTASSIUM SERPL-SCNC: 4 MMOL/L (ref 3.5–5.1)
PROT SERPL-MCNC: 6.9 G/DL (ref 6–8.4)
RBC # BLD AUTO: 3.81 M/UL (ref 4.6–6.2)
SODIUM SERPL-SCNC: 133 MMOL/L (ref 136–145)
WBC # BLD AUTO: 24.31 K/UL (ref 3.9–12.7)

## 2023-08-07 PROCEDURE — 80053 COMPREHEN METABOLIC PANEL: CPT | Performed by: INTERNAL MEDICINE

## 2023-08-07 PROCEDURE — 36415 COLL VENOUS BLD VENIPUNCTURE: CPT | Performed by: INTERNAL MEDICINE

## 2023-08-07 PROCEDURE — 85027 COMPLETE CBC AUTOMATED: CPT | Performed by: INTERNAL MEDICINE

## 2023-08-07 PROCEDURE — 85007 BL SMEAR W/DIFF WBC COUNT: CPT | Performed by: INTERNAL MEDICINE

## 2023-08-17 ENCOUNTER — SPECIALTY PHARMACY (OUTPATIENT)
Dept: PHARMACY | Facility: CLINIC | Age: 83
End: 2023-08-17
Payer: MEDICARE

## 2023-08-17 RX ORDER — GABAPENTIN 300 MG/1
300 CAPSULE ORAL DAILY
Qty: 90 CAPSULE | Refills: 4 | OUTPATIENT
Start: 2023-08-17 | End: 2023-11-15

## 2023-08-17 NOTE — TELEPHONE ENCOUNTER
Outgoing call regarding Imbruvica refill, pt states he has about 10 days on hand and would like [facility] to follow up on 8/23.

## 2023-08-23 NOTE — TELEPHONE ENCOUNTER
Outgoing call to patient regarding Imbruvica refill. Patient states he has 12 days left on hand and has not missed any doses since 8/17 when he reported having 10 days left on hand. Informed patient refill too soon to set up and will follow up on 8/30.

## 2023-08-28 LAB
CHOLEST SERPL-MSCNC: 131 MG/DL (ref 0–200)
CHOLEST/HDLC SERPL: 2.3 {RATIO}
EGFR: 71.5
HDLC SERPL-MCNC: 56 MG/DL (ref 35–70)
LDL CHOLESTEROL DIRECT: 69 MG/DL
NON HDL CHOL. (LDL+VLDL): 75
TRIGL SERPL-MCNC: 56 MG/DL (ref 40–160)
VLDL CHOLESTEROL: 11 MG/DL

## 2023-08-28 RX ORDER — GABAPENTIN 300 MG/1
300 CAPSULE ORAL DAILY
Qty: 30 CAPSULE | Refills: 0 | Status: SHIPPED | OUTPATIENT
Start: 2023-08-28 | End: 2023-09-26

## 2023-08-30 NOTE — TELEPHONE ENCOUNTER
Specialty Pharmacy - Refill Coordination    Specialty Medication Orders Linked to Encounter      Flowsheet Row Most Recent Value   Medication #1 ibrutinib (IMBRUVICA) 140 mg Cap (Order#[id number], Rx#[id number])            Refill Questions - Documented Responses      Flowsheet Row Most Recent Value   Patient Availability and HIPAA Verification    Does patient want to proceed with activity? Yes   HIPAA/medical authority confirmed? Yes   Relationship to patient of person spoken to? Self   Refill Screening Questions    Changes to allergies? No   Changes to medications? No   New conditions since last clinic visit? No   Unplanned office visit, urgent care, ED, or hospital admission in the last 4 weeks? No   How does patient/caregiver feel medication is working? Good   Financial problems or insurance changes? No   How many doses of your specialty medications were missed in the last 4 weeks? 0   Would patient like to speak to a pharmacist? No   When does the patient need to receive the medication? 09/05/23   Refill Delivery Questions    How will the patient receive the medication? [facility]   When does the patient need to receive the medication? 09/05/23   Shipping Address Home   Address in [facility] confirmed and updated if neccessary? Yes   Expected Copay ($) 70   Is the patient able to afford the medication copay? Yes   Payment Method CC on file   Days supply of Refill 30   Supplies needed? No supplies needed   Refill activity completed? Yes   Refill activity plan Refill scheduled   Shipment/Pickup Date: 08/31/23            Current Outpatient Medications   Medication Sig    albuterol (PROVENTIL) 2.5 mg /3 mL (0.083 %) nebulizer solution Use content of one vial every 12 hours    albuterol (PROVENTIL) 2.5 mg /3 mL (0.083 %) nebulizer solution Take 1 vial by nebulization twice daily.    aspirin (ECOTRIN) 81 MG EC tablet Take 81 mg by mouth once daily.    budesonide-glycopyr-formoterol (BREZTRI AEROSPHERE) 160-9-4.8  mcg/actuation HFAA INSTILL 2 PUFFS BY MOUTH EVERY 12 HOURS    colchicine (MITIGARE) 0.6 mg Cap Take 1 capsule (0.6 mg total) by mouth once daily.    diclofenac sodium (VOLTAREN) 1 % Gel Apply 2 g topically 2 (two) times daily as needed (pain).    famotidine (PEPCID) 20 MG tablet Take 1 tablet orally 2 times a day as needed for heart burn    famotidine (PEPCID) 20 MG tablet Take 1 tablet orally 2 times a day as needed for heart burn    fish oil-omega-3 fatty acids 300-1,000 mg capsule Take 1 g by mouth once daily.    gabapentin (NEURONTIN) 300 MG capsule Take 1 capsule (300 mg total) by mouth once daily.    hydrOXYzine HCL (ATARAX) 10 MG Tab Take 3 tablets (30 mg total) by mouth 2 (two) times daily.    ibrutinib (IMBRUVICA) 140 mg Cap Take 280 mg (2 capsules) by mouth once daily.    ibrutinib (IMBRUVICA) 280 mg Tab Take 1 tablet (280 mg) by mouth once daily.    ipratropium (ATROVENT) 0.02 % nebulizer solution use contents of one vial every 12 hours    ipratropium (ATROVENT) 0.02 % nebulizer solution Use contents of one vial every 12 hours    L.acid/B.animalis,bifidum/FOS (PROBIOTIC COMPLEX ORAL) Take by mouth.    metoprolol succinate (TOPROL-XL) 25 MG 24 hr tablet Take 1 tablet orally once a day.    metoprolol succinate (TOPROL-XL) 25 MG 24 hr tablet Take 1 tablet (25 mg total) by mouth once daily.    metoprolol succinate (TOPROL-XL) 25 MG 24 hr tablet Take 1 tablet orally once a day.    montelukast (SINGULAIR) 10 mg tablet Take 1 tablet (10 mg total) by mouth every evening.    multivitamin with minerals tablet Take 1 tablet by mouth once daily. Equate Brand with Iron    nebulizer accessories Misc Use as directed    nebulizer and compressor Hyacinth USE WITH NEBULIZER SOLUTION AS DIRECTED    omeprazole (PRILOSEC) 20 MG capsule Take 1 capsule (20 mg total) by mouth every morning.    pravastatin (PRAVACHOL) 20 MG tablet Take 1 tablet orally once in the evening.    pravastatin (PRAVACHOL) 20 MG tablet Take 1 tablet orally  once in the evening.    pravastatin (PRAVACHOL) 20 MG tablet Take 1 tablet orally once in the evening.    pravastatin (PRAVACHOL) 20 MG tablet Take 1 tablet orally once in the evening.    valsartan (DIOVAN) 160 MG tablet Take 1 tablet orally once a day. (replacing the Losartan and Verapamil)    valsartan (DIOVAN) 160 MG tablet Take 1 tablet orally once a day.    valsartan (DIOVAN) 160 MG tablet Take 1 tablet orally once a day.    valsartan (DIOVAN) 160 MG tablet Take 1 tablet orally once a day.    verapamiL (VERELAN) 100 MG CPCT Take 1 capsule orally once a day.   Last reviewed on 7/26/2023  8:18 AM by Marjorie Zaldivar MA    Review of patient's allergies indicates:  No Known Allergies Last reviewed on  7/26/2023 8:18 AM by Marjorie Zaldivar      Tasks added this encounter   No tasks added.   Tasks due within next 3 months   9/10/2023 - Clinical Assessment (6 month recurrence)  8/30/2023 - Refill Coordination Outreach (1 time occurrence)     KARRI MINER, PharmD  [facility]  [address] [place] [id number]  Phone: [phone]  Fax: [phone]

## 2023-09-11 ENCOUNTER — TELEPHONE (OUTPATIENT)
Dept: HEMATOLOGY/ONCOLOGY | Facility: CLINIC | Age: 83
End: 2023-09-11
Payer: MEDICARE

## 2023-09-11 NOTE — TELEPHONE ENCOUNTER
----- Message from Kassie Resendez PharmD sent at 9/11/2023  3:22 PM CDT -----  Regarding: Imbruvica  Good afternoon,     Just so that you are aware, I have submitted a formulary exception request to [facility] per patient's request for previously prescribed formulation, Imbruvica 280 mg tablets. I will keep you posted on the determination.     Thank You,     Jessica CurryD

## 2023-09-12 DIAGNOSIS — C91.10 CLL (CHRONIC LYMPHOCYTIC LEUKEMIA): Primary | ICD-10-CM

## 2023-09-12 NOTE — TELEPHONE ENCOUNTER
----- Message from Kassie Resendez PharmD sent at 9/12/2023  9:47 AM CDT -----  Regarding: FW: Imbruvica  Good morning,     The formulary exception has been approved therefore [employer] will now cover Imbruvica 280 mg tablets. I noticed Mr. Echeverria was on Imbruvica 280 mg due to an interaction between Imbruvica and Verapamil. Patient informed me he is no longer on verapamil, therefore it may be appropriate to consider increasing his dose to standard Imbruvica dose, 420 mg daily. Can you review this and if you find it appropriate, send a new script to [facility]?     Thank You,     Kassie Resendez PharmD     ----- Message -----  From: Kassie Resendez PharmD  Sent: 9/11/2023   3:26 PM CDT  To: [facility] Staff  Subject: Imbruvica                                        Good afternoon,     Just so that you are aware, I have submitted a formulary exception request to [employer] per patient's request for previously prescribed formulation, Imbruvica 280 mg tablets. I will keep you posted on the determination.     Thank You,     Kassie Resendez PharmD

## 2023-09-15 ENCOUNTER — PATIENT MESSAGE (OUTPATIENT)
Dept: RHEUMATOLOGY | Facility: CLINIC | Age: 83
End: 2023-09-15
Payer: MEDICARE

## 2023-09-15 ENCOUNTER — TELEPHONE (OUTPATIENT)
Dept: FAMILY MEDICINE | Facility: CLINIC | Age: 83
End: 2023-09-15
Payer: MEDICARE

## 2023-09-15 NOTE — TELEPHONE ENCOUNTER
Pt on both omeprazole and famotidine for acid reflex. Pt inquiring about if he should be on both medications. States he has been on both medications for some time and is concerned with duration of therapy.    Please advise.

## 2023-09-15 NOTE — TELEPHONE ENCOUNTER
----- Message from Kassie Resendez PharmD sent at 9/11/2023  2:41 PM CDT -----  Regarding: Acid reflux medication  Good afternoon,     I noticed Mr. Echeverria is currently prescribed both omeprazole and famotidine for acid reflex. Mr. Echeverria has been both medications for some time and is concern with the duration of therapy. The package insert for both medication generally recommend these medications do not be taken for longer than 2-8 weeks depending of resolution of symptoms. More specifically, if symptoms improved famotidine may be taken as needed or intermittently.     Can you review this and reach out to the patient to discuss options for acid reflux management?     Thank You,     Kassie Resendez, JessicaD

## 2023-09-18 ENCOUNTER — PATIENT OUTREACH (OUTPATIENT)
Dept: ADMINISTRATIVE | Facility: HOSPITAL | Age: 83
End: 2023-09-18
Payer: MEDICARE

## 2023-09-18 NOTE — PROGRESS NOTES
Received external Lipid Panel collected/completed on 8/17/2022, updated to .  Dr. Luis M Garcia added to patient care team.

## 2023-09-26 ENCOUNTER — OFFICE VISIT (OUTPATIENT)
Dept: RHEUMATOLOGY | Facility: CLINIC | Age: 83
End: 2023-09-26
Payer: MEDICARE

## 2023-09-26 VITALS
SYSTOLIC BLOOD PRESSURE: 126 MMHG | BODY MASS INDEX: 24.3 KG/M2 | HEART RATE: 56 BPM | DIASTOLIC BLOOD PRESSURE: 70 MMHG | WEIGHT: 169.75 LBS | HEIGHT: 70 IN

## 2023-09-26 DIAGNOSIS — M11.849 CALCIUM PYROPHOSPHATE ARTHROPATHY OF HAND: Primary | ICD-10-CM

## 2023-09-26 DIAGNOSIS — Z79.899 DRUG-INDUCED IMMUNODEFICIENCY: ICD-10-CM

## 2023-09-26 DIAGNOSIS — D84.821 DRUG-INDUCED IMMUNODEFICIENCY: ICD-10-CM

## 2023-09-26 PROCEDURE — 99999 PR PBB SHADOW E&M-EST. PATIENT-LVL III: ICD-10-PCS | Mod: PBBFAC,,, | Performed by: INTERNAL MEDICINE

## 2023-09-26 PROCEDURE — 1159F PR MEDICATION LIST DOCUMENTED IN MEDICAL RECORD: ICD-10-PCS | Mod: CPTII,S$GLB,, | Performed by: INTERNAL MEDICINE

## 2023-09-26 PROCEDURE — 3074F SYST BP LT 130 MM HG: CPT | Mod: CPTII,S$GLB,, | Performed by: INTERNAL MEDICINE

## 2023-09-26 PROCEDURE — 3078F DIAST BP <80 MM HG: CPT | Mod: CPTII,S$GLB,, | Performed by: INTERNAL MEDICINE

## 2023-09-26 PROCEDURE — 99214 PR OFFICE/OUTPT VISIT, EST, LEVL IV, 30-39 MIN: ICD-10-PCS | Mod: S$GLB,,, | Performed by: INTERNAL MEDICINE

## 2023-09-26 PROCEDURE — 99214 OFFICE O/P EST MOD 30 MIN: CPT | Mod: S$GLB,,, | Performed by: INTERNAL MEDICINE

## 2023-09-26 PROCEDURE — 1159F MED LIST DOCD IN RCRD: CPT | Mod: CPTII,S$GLB,, | Performed by: INTERNAL MEDICINE

## 2023-09-26 PROCEDURE — 3074F PR MOST RECENT SYSTOLIC BLOOD PRESSURE < 130 MM HG: ICD-10-PCS | Mod: CPTII,S$GLB,, | Performed by: INTERNAL MEDICINE

## 2023-09-26 PROCEDURE — 3078F PR MOST RECENT DIASTOLIC BLOOD PRESSURE < 80 MM HG: ICD-10-PCS | Mod: CPTII,S$GLB,, | Performed by: INTERNAL MEDICINE

## 2023-09-26 PROCEDURE — 99999 PR PBB SHADOW E&M-EST. PATIENT-LVL III: CPT | Mod: PBBFAC,,, | Performed by: INTERNAL MEDICINE

## 2023-09-26 RX ORDER — COLCHICINE 0.6 MG/1
0.6 CAPSULE ORAL DAILY
Qty: 90 CAPSULE | Refills: 4 | Status: SHIPPED | OUTPATIENT
Start: 2023-09-26 | End: 2024-12-19

## 2023-09-26 RX ORDER — GABAPENTIN 300 MG/1
300 CAPSULE ORAL DAILY
Qty: 90 CAPSULE | Refills: 4 | Status: SHIPPED | OUTPATIENT
Start: 2023-09-26 | End: 2024-12-19

## 2023-09-26 NOTE — PROGRESS NOTES
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    Notes:     History of presenting illness    83 year old male presented with severe pain in both hands in aug 2019  He went to rheumatology and hematology : leukemia diagnosed  Now came to cancer center : CLL diagnosed    Has been to ER with excruciating pain in the hands   Now sees   On ibrutinib    Episodes so far  :    Pain and swelling right hand   Pain and swelling left hand  Pain and swelling b/l hands    Triggers : none   Started late evening : came on very fast   Got relief from steroids   Each episode would last for hours     One episode : he had fever +  No rash    CRP 18.8  ESR 13 during the episode    ESR 52 during another episode  CRP 71.2    EVE neg  RF neg    Uric acids 3.8 to 4.2 during the episodes    At nights : numbness both hands  Tips of fingers are numb    EMS for 10 minutes   When not in a flare he is ok,he can use his hands and he only has some soreness   He works with his hands all day and he has no problems     MRI C/T spine : 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    Right hand xray  No fracture or dislocation.  Degenerative joint space narrowing at the 1st digit MCP and IP joints, with additional moderate degenerative area at the 2nd through 4th MCP and DIP joints.  No osseous lytic or blastic lesion.  Soft tissues are unremarkable.    Right wrist xray  There is no acute fracture or dislocation.  No significant soft tissue swelling.  The carpal bones are intact with mild degenerative osteoarthrosis.  The radiocarpal articulation is intact.  The ulnar styloid is intact.    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 Doing well on medication. No longer using meloxicam. Needs refills on colchicine and gabapentin.     Hands are doing well. No flare up or symptoms.     Paraesthesias to hand have improved.     Right shoulder with decreased ROM but denies pain.     Component Ref Range & Units 1 mo ago  (8/7/23) 2 mo ago  (7/25/23) 4 mo ago  (5/19/23) 5 mo ago  (4/18/23) 11 mo ago  (10/17/22) 1 yr ago  (8/1/22) 1 yr ago  (5/16/22)   WBC 3.90 - 12.70 K/uL 24.31 High   20.89 High   14.94 High   18.42 High   12.46  11.33  13.79 High     RBC 4.60 - 6.20 M/uL 3.81 Low   3.74 Low   3.76 Low   4.09 Low   3.76 Low   3.79 Low   3.91 Low     Hemoglobin 14.0 - 18.0 g/dL 12.3 Low   12.0 Low   12.3 Low   13.3 Low   12.4 Low   12.5 Low   12.5 Low     Hematocrit 40.0 - 54.0 % 36.7 Low   36.3 Low   36.5 Low   39.9 Low   36.3 Low   36.7 Low   37.5 Low    CMP wnl       Past history  HTN,HLD,leukemia,COPD    Family history  Diabetes     Social history    Former tobacco smoker quit 1999    Review of Systems    No skin rashes,malar rash,photosensitivity   No telangiectasias   No calcinosis   No psoriasis   No patchy alopecia   No oral and nasal ulcers   No dry eyes and dry mouth   No pleurisy or any cardiopulmonary complaints except for COPD  No dysphagia,diplopia and dysphonia and muscle weakness   No n/v/d/c   No acid reflux+   No raynaud's+   No digital ulcers   No renal issues   No blood clots   No  fever,chills,night sweats,weight loss and loss of appetite   No new onset headaches   No recurrent conjunctivitis or uveitis or scleritis or episcleritis   No chronic or bloody diarrhea with no u colitis or crohn's /inflammatory bowel disease   No penile and urethral  d/c/STDs/no ulcers     Physical Exam   Constitutional: He is oriented to person, place, and time. No distress.   HENT:   Head: Normocephalic.   Mouth/Throat: Oropharynx is clear and moist.   Eyes: Pupils are equal, round, and reactive to light. Conjunctivae are normal. Right eye exhibits no discharge. Left eye exhibits no discharge. No scleral icterus.   Neck: No thyromegaly present.   Cardiovascular: Normal rate, regular rhythm and normal heart sounds.   Pulmonary/Chest: Effort normal and breath sounds normal. No stridor.   Abdominal: Soft. Bowel sounds are normal.   Musculoskeletal:         General: Normal range of motion.      Cervical back: Normal range of motion.   Lymphadenopathy:     He has no cervical adenopathy.   Neurological: He is alert and oriented to person, place, and time.   Skin: Skin is warm. No rash noted. He is not diaphoretic.   Psychiatric: Affect and judgment normal.     Assessment     83 year old male with  HTN,HLD,leukemia,COPD  comes with 3 episodes of acute onset pain and swelling in the b/l hands since august 2019  He now has a diagnosis of CLL but doesn't need treatment    Episodes so far  :    Pain and swelling right hand   Pain and swelling left hand  Pain and swelling b/l hands    Triggers : none   Started late evening : came on very fast   Got relief from steroids   Each episode would last for hours   One episode : he had fever +  No rash    During each episode his ESR/CRP have gone high  EVE neg  RF neg  Uric acids 3.8 to 4.2 during the episodes    Last flare Dec 2019  When not in flare has some hand and arm paresthesias and no pain    Today joint exam unremarkable     Xrays right hand and wrist : moderate deg changes  from 1 to 4 MCPs/DIPs    No psoriasis or inf bowel disease    Suspect Calcium pyrophosphate deposition disease with acute pseudogout flares     We initiated colchicine 0.6 mg daily and he has tolerated it well with no side effects    Also on gabapentin 300 mg bed time     He has very minimal neuropathy symptoms today overall he has done well    Left CMC OA  Hands have OA- changes    His Labs were discussed     CMP nml  Cbc abnormalities might be due to his hematologic malignancy  CLL stable     On ibrutinib now    Right shoulder advanced deg changes in GH joint     1. Calcium pyrophosphate arthropathy of hand    2. Drug-induced immunodeficiency        F/u problem     Plan    Right shoulder- he prefers home PT    Continue colchicine 0.6 mg every day  Continue gabapentin 300 mg daily     Prn meloxicam only   Topical voltaren gel offered    Liver and kidney function ok    If future flares, call me for steroids     If the hand paresthesias worsen then he will contact me and we will do EMG/NSE UE     Rtc 12 months    Kevin was seen today for disease management.    Diagnoses and all orders for this visit:    Calcium pyrophosphate arthropathy of hand  -     colchicine, gout, (MITIGARE) 0.6 mg Cap; Take 1 capsule (0.6 mg total) by mouth once daily.  -     gabapentin (NEURONTIN) 300 MG capsule; Take 1 capsule (300 mg total) by mouth once daily.    Drug-induced immunodeficiency          rtc in 12 months   no

## 2023-10-30 ENCOUNTER — OFFICE VISIT (OUTPATIENT)
Dept: FAMILY MEDICINE | Facility: CLINIC | Age: 83
End: 2023-10-30
Payer: MEDICARE

## 2023-10-30 VITALS
BODY MASS INDEX: 23.99 KG/M2 | DIASTOLIC BLOOD PRESSURE: 70 MMHG | HEIGHT: 70 IN | RESPIRATION RATE: 16 BRPM | SYSTOLIC BLOOD PRESSURE: 136 MMHG | WEIGHT: 167.56 LBS | HEART RATE: 64 BPM

## 2023-10-30 DIAGNOSIS — M11.849 CALCIUM PYROPHOSPHATE ARTHROPATHY OF HAND: ICD-10-CM

## 2023-10-30 DIAGNOSIS — J44.9 CHRONIC OBSTRUCTIVE PULMONARY DISEASE, UNSPECIFIED COPD TYPE: ICD-10-CM

## 2023-10-30 DIAGNOSIS — C91.10 CLL (CHRONIC LYMPHOCYTIC LEUKEMIA): Primary | ICD-10-CM

## 2023-10-30 PROCEDURE — 99213 OFFICE O/P EST LOW 20 MIN: CPT | Mod: 25,S$GLB,, | Performed by: FAMILY MEDICINE

## 2023-10-30 PROCEDURE — 90694 VACC AIIV4 NO PRSRV 0.5ML IM: CPT | Mod: S$GLB,,, | Performed by: FAMILY MEDICINE

## 2023-10-30 PROCEDURE — 1101F PT FALLS ASSESS-DOCD LE1/YR: CPT | Mod: CPTII,S$GLB,, | Performed by: FAMILY MEDICINE

## 2023-10-30 PROCEDURE — G0008 ADMIN INFLUENZA VIRUS VAC: HCPCS | Mod: S$GLB,,, | Performed by: FAMILY MEDICINE

## 2023-10-30 PROCEDURE — 1159F PR MEDICATION LIST DOCUMENTED IN MEDICAL RECORD: ICD-10-PCS | Mod: CPTII,S$GLB,, | Performed by: FAMILY MEDICINE

## 2023-10-30 PROCEDURE — 3075F PR MOST RECENT SYSTOLIC BLOOD PRESS GE 130-139MM HG: ICD-10-PCS | Mod: CPTII,S$GLB,, | Performed by: FAMILY MEDICINE

## 2023-10-30 PROCEDURE — G0008 FLU VACCINE - QUADRIVALENT - ADJUVANTED: ICD-10-PCS | Mod: S$GLB,,, | Performed by: FAMILY MEDICINE

## 2023-10-30 PROCEDURE — 1160F RVW MEDS BY RX/DR IN RCRD: CPT | Mod: CPTII,S$GLB,, | Performed by: FAMILY MEDICINE

## 2023-10-30 PROCEDURE — 99999 PR PBB SHADOW E&M-EST. PATIENT-LVL III: ICD-10-PCS | Mod: PBBFAC,,, | Performed by: FAMILY MEDICINE

## 2023-10-30 PROCEDURE — 1126F AMNT PAIN NOTED NONE PRSNT: CPT | Mod: CPTII,S$GLB,, | Performed by: FAMILY MEDICINE

## 2023-10-30 PROCEDURE — 3075F SYST BP GE 130 - 139MM HG: CPT | Mod: CPTII,S$GLB,, | Performed by: FAMILY MEDICINE

## 2023-10-30 PROCEDURE — 3288F PR FALLS RISK ASSESSMENT DOCUMENTED: ICD-10-PCS | Mod: CPTII,S$GLB,, | Performed by: FAMILY MEDICINE

## 2023-10-30 PROCEDURE — 1159F MED LIST DOCD IN RCRD: CPT | Mod: CPTII,S$GLB,, | Performed by: FAMILY MEDICINE

## 2023-10-30 PROCEDURE — 1101F PR PT FALLS ASSESS DOC 0-1 FALLS W/OUT INJ PAST YR: ICD-10-PCS | Mod: CPTII,S$GLB,, | Performed by: FAMILY MEDICINE

## 2023-10-30 PROCEDURE — 3078F PR MOST RECENT DIASTOLIC BLOOD PRESSURE < 80 MM HG: ICD-10-PCS | Mod: CPTII,S$GLB,, | Performed by: FAMILY MEDICINE

## 2023-10-30 PROCEDURE — 1126F PR PAIN SEVERITY QUANTIFIED, NO PAIN PRESENT: ICD-10-PCS | Mod: CPTII,S$GLB,, | Performed by: FAMILY MEDICINE

## 2023-10-30 PROCEDURE — 99999 PR PBB SHADOW E&M-EST. PATIENT-LVL III: CPT | Mod: PBBFAC,,, | Performed by: FAMILY MEDICINE

## 2023-10-30 PROCEDURE — 3078F DIAST BP <80 MM HG: CPT | Mod: CPTII,S$GLB,, | Performed by: FAMILY MEDICINE

## 2023-10-30 PROCEDURE — 90694 FLU VACCINE - QUADRIVALENT - ADJUVANTED: ICD-10-PCS | Mod: S$GLB,,, | Performed by: FAMILY MEDICINE

## 2023-10-30 PROCEDURE — 3288F FALL RISK ASSESSMENT DOCD: CPT | Mod: CPTII,S$GLB,, | Performed by: FAMILY MEDICINE

## 2023-10-30 PROCEDURE — 99213 PR OFFICE/OUTPT VISIT, EST, LEVL III, 20-29 MIN: ICD-10-PCS | Mod: 25,S$GLB,, | Performed by: FAMILY MEDICINE

## 2023-10-30 PROCEDURE — 1160F PR REVIEW ALL MEDS BY PRESCRIBER/CLIN PHARMACIST DOCUMENTED: ICD-10-PCS | Mod: CPTII,S$GLB,, | Performed by: FAMILY MEDICINE

## 2023-10-30 RX ORDER — BUDESONIDE, GLYCOPYRROLATE, AND FORMOTEROL FUMARATE 160; 9; 4.8 UG/1; UG/1; UG/1
AEROSOL, METERED RESPIRATORY (INHALATION)
Qty: 10.7 G | Refills: 12 | Status: SHIPPED | OUTPATIENT
Start: 2023-10-30

## 2023-10-30 RX ORDER — ALBUTEROL SULFATE 0.83 MG/ML
SOLUTION RESPIRATORY (INHALATION)
Qty: 540 ML | Refills: 11 | Status: SHIPPED | OUTPATIENT
Start: 2023-10-30

## 2023-10-30 NOTE — PROGRESS NOTES
Subjective:       Patient ID: Kevin Echeverria Jr. is a 83 y.o. male.    Chief Complaint: Annual Exam (Pt states no complaints )    Pt is a 83 y.o. male who presents for check up for Cll (chronic lymphocytic leukemia)  (primary encounter diagnosis). & wellness exam.      Review of Systems   Constitutional:  Negative for appetite change.   HENT:  Negative for congestion, ear pain, sneezing and sore throat.    Eyes:  Negative for redness and visual disturbance.   Respiratory:  Negative for cough, chest tightness and stridor.    Cardiovascular:  Negative for chest pain.   Gastrointestinal:  Negative for abdominal pain, blood in stool, diarrhea, nausea and vomiting.   Genitourinary:  Negative for difficulty urinating, dysuria and hematuria.        Nocturia x 1-2   Musculoskeletal:  Negative for arthralgias, back pain, joint swelling, myalgias and neck pain.   Skin:  Negative for rash.   Neurological:  Negative for dizziness.        Sleeping 7 hrs   Psychiatric/Behavioral:  Negative for agitation. The patient is not nervous/anxious.        Objective:      Physical Exam  Constitutional:       Appearance: He is well-developed.   HENT:      Head: Normocephalic.   Eyes:      Pupils: Pupils are equal, round, and reactive to light.   Neck:      Thyroid: No thyromegaly.   Cardiovascular:      Rate and Rhythm: Normal rate and regular rhythm.      Heart sounds: No murmur heard.     No friction rub.   Pulmonary:      Effort: Pulmonary effort is normal. No respiratory distress.      Breath sounds: No wheezing.   Abdominal:      Tenderness: There is no abdominal tenderness. There is no guarding or rebound.   Musculoskeletal:         General: No tenderness. Normal range of motion.      Cervical back: Normal range of motion and neck supple.   Lymphadenopathy:      Cervical: No cervical adenopathy.   Skin:     General: Skin is warm and dry.   Neurological:      Mental Status: He is alert and oriented to person, place, and time.       Cranial Nerves: No cranial nerve deficit.      Deep Tendon Reflexes: Reflexes are normal and symmetric.   Psychiatric:         Thought Content: Thought content normal.         Judgment: Judgment normal.         Assessment:       Encounter Diagnoses   Name Primary?    CLL (chronic lymphocytic leukemia) Yes    Chronic obstructive pulmonary disease, unspecified COPD type          Plan:   1. CLL (chronic lymphocytic leukemia)  Overview:  Followed by Dr Quintana and on daily Imbruvica      2. Chronic obstructive pulmonary disease, unspecified COPD type

## 2023-11-13 ENCOUNTER — PATIENT OUTREACH (OUTPATIENT)
Dept: ADMINISTRATIVE | Facility: HOSPITAL | Age: 83
End: 2023-11-13
Payer: MEDICARE

## 2023-12-11 NOTE — TELEPHONE ENCOUNTER
No care due was identified.  [facility] Embedded Care Due Messages. Reference number: [id number].   12/11/2023 4:22:52 PM CST

## 2023-12-12 RX ORDER — MONTELUKAST SODIUM 10 MG/1
10 TABLET ORAL NIGHTLY
Qty: 90 TABLET | Refills: 3 | Status: SHIPPED | OUTPATIENT
Start: 2023-12-12

## 2023-12-12 NOTE — TELEPHONE ENCOUNTER
Refill Decision Note   Kevin Echeverria  is requesting a refill authorization.  Brief Assessment and Rationale for Refill:  Approve     Medication Therapy Plan:       Medication Reconciliation Completed: No   Comments:     No Care Gaps recommended.     Note composed:11:14 AM 12/12/2023

## 2024-01-21 ENCOUNTER — PATIENT MESSAGE (OUTPATIENT)
Dept: ADMINISTRATIVE | Facility: OTHER | Age: 84
End: 2024-01-21
Payer: MEDICARE

## 2024-01-22 NOTE — PROGRESS NOTES
C3 nurse attempted to contact patient. No answer. The following message was left for the patient to return the call:  Good evening  I am a nurse calling on behalf of [facility] from the Care Coordination [facility].  This is a Transitional Care Call for Kevin. When you have a moment please contact us at [phone] or [phone] Monday through Friday, between the hours of 8 am to 4 pm. We look forward to speaking with you. On behalf of [facility] have a nice day.    The patient has a scheduled HOS appointment with Bridger Cao MD on 08/30/19 @ 1030 am. Message sent to Physician staff.         Spine Center Referral Navigation Encounter Note    Referred by: Alessandro Rodríguez    HPI: reports multiple pain problems. Autoimmune disorders, severe headaches. Five years ago started having pain in her right leg after moving. Did injections and PT after this for piriformis and SI joint. Gabapentin caused falls. Had an ablation a couple months ago.     Patient wants to be at [facility]. Has been seeing several different providers and reports difficulty getting all the way to [place].    Subjective Symptoms: Severe pain in low back, right hip/buttock and pelvic floor and thigh with \"shivers\" down leg. Spacticity in right leg. Severe pain sitting.   Previous neck pain, weakness and tremors in left hand. Pain in left elbow. Some shaking of jaw.     Physical Therapy: Yes    Medications for Spine Symptoms: Meloxicam, hydrocodone previously    Imaging: Cervical xray, outside MRI     Previous Spine Injections: Several    Previous Spine Surgery: None    Previously Seen Spine Care Providers: Orthopedic and pain management at [facility]     Information above is from chart review and patient reported.     Referred to: NICOLE

## 2024-04-01 NOTE — TELEPHONE ENCOUNTER
No care due was identified.  [facility] Embedded Care Due Messages. Reference number: [id number].   4/01/2024 1:26:54 PM CDT

## 2024-04-02 RX ORDER — OMEPRAZOLE 20 MG/1
20 CAPSULE, DELAYED RELEASE ORAL EVERY MORNING
Qty: 90 CAPSULE | Refills: 1 | Status: SHIPPED | OUTPATIENT
Start: 2024-04-02

## 2024-04-02 NOTE — TELEPHONE ENCOUNTER
Refill Decision Note   Kevin Echeverria  is requesting a refill authorization.  Brief Assessment and Rationale for Refill:  Approve     Medication Therapy Plan:         Alert overridden per protocol: Yes   Comments:     Note composed:5:37 PM 04/02/2024

## 2024-05-29 ENCOUNTER — TELEPHONE (OUTPATIENT)
Dept: HEMATOLOGY/ONCOLOGY | Facility: CLINIC | Age: 84
End: 2024-05-29
Payer: MEDICARE

## 2024-05-29 NOTE — TELEPHONE ENCOUNTER
----- Message from Maira Olivo sent at 5/29/2024  1:00 PM CDT -----  Regarding: Scheduling Request  Contact: Kevin Echeverria Jr  Scheduling Request           Appt Type:  Labs and Ep     Date/Time Preference: any     Treating Provider:Mariano     Caller Name:Kevin Echeverria Jr.      Contact Preference:[phone]     Comments/notes:Patient is calling to schedule labs and f/u

## 2024-05-31 ENCOUNTER — TELEPHONE (OUTPATIENT)
Dept: HEMATOLOGY/ONCOLOGY | Facility: CLINIC | Age: 84
End: 2024-05-31
Payer: MEDICARE

## 2024-05-31 ENCOUNTER — LAB VISIT (OUTPATIENT)
Dept: LAB | Facility: HOSPITAL | Age: 84
End: 2024-05-31
Attending: INTERNAL MEDICINE
Payer: MEDICARE

## 2024-05-31 DIAGNOSIS — C91.10 CLL (CHRONIC LYMPHOCYTIC LEUKEMIA): ICD-10-CM

## 2024-05-31 DIAGNOSIS — C92.10 CML (CHRONIC MYELOCYTIC LEUKEMIA): Primary | ICD-10-CM

## 2024-05-31 LAB
ALBUMIN SERPL BCP-MCNC: 3.3 G/DL (ref 3.5–5.2)
ALP SERPL-CCNC: 51 U/L (ref 55–135)
ALT SERPL W/O P-5'-P-CCNC: 16 U/L (ref 10–44)
ANION GAP SERPL CALC-SCNC: 5 MMOL/L (ref 8–16)
ANISOCYTOSIS BLD QL SMEAR: SLIGHT
AST SERPL-CCNC: 22 U/L (ref 10–40)
BASOPHILS # BLD AUTO: ABNORMAL K/UL (ref 0–0.2)
BASOPHILS NFR BLD: 0 % (ref 0–1.9)
BILIRUB SERPL-MCNC: 0.6 MG/DL (ref 0.1–1)
BUN SERPL-MCNC: 11 MG/DL (ref 8–23)
CALCIUM SERPL-MCNC: 9 MG/DL (ref 8.7–10.5)
CHLORIDE SERPL-SCNC: 99 MMOL/L (ref 95–110)
CO2 SERPL-SCNC: 25 MMOL/L (ref 23–29)
CREAT SERPL-MCNC: 1.1 MG/DL (ref 0.5–1.4)
DIFFERENTIAL METHOD BLD: ABNORMAL
EOSINOPHIL # BLD AUTO: ABNORMAL K/UL (ref 0–0.5)
EOSINOPHIL NFR BLD: 0 % (ref 0–8)
ERYTHROCYTE [DISTWIDTH] IN BLOOD BY AUTOMATED COUNT: 12.2 % (ref 11.5–14.5)
EST. GFR  (NO RACE VARIABLE): >60 ML/MIN/1.73 M^2
GIANT PLATELETS BLD QL SMEAR: PRESENT
GLUCOSE SERPL-MCNC: 85 MG/DL (ref 70–110)
HCT VFR BLD AUTO: 36.4 % (ref 40–54)
HGB BLD-MCNC: 12.2 G/DL (ref 14–18)
IMM GRANULOCYTES # BLD AUTO: ABNORMAL K/UL (ref 0–0.04)
IMM GRANULOCYTES NFR BLD AUTO: ABNORMAL % (ref 0–0.5)
LYMPHOCYTES # BLD AUTO: ABNORMAL K/UL (ref 1–4.8)
LYMPHOCYTES NFR BLD: 41 % (ref 18–48)
MCH RBC QN AUTO: 32.1 PG (ref 27–31)
MCHC RBC AUTO-ENTMCNC: 33.5 G/DL (ref 32–36)
MCV RBC AUTO: 96 FL (ref 82–98)
MONOCYTES # BLD AUTO: ABNORMAL K/UL (ref 0.3–1)
MONOCYTES NFR BLD: 0 % (ref 4–15)
NEUTROPHILS NFR BLD: 9 % (ref 38–73)
NRBC BLD-RTO: 0 /100 WBC
PLATELET # BLD AUTO: 168 K/UL (ref 150–450)
PLATELET BLD QL SMEAR: ABNORMAL
PMV BLD AUTO: 10.9 FL (ref 9.2–12.9)
POIKILOCYTOSIS BLD QL SMEAR: SLIGHT
POTASSIUM SERPL-SCNC: 4.5 MMOL/L (ref 3.5–5.1)
PROT SERPL-MCNC: 6.7 G/DL (ref 6–8.4)
RBC # BLD AUTO: 3.8 M/UL (ref 4.6–6.2)
SMUDGE CELLS BLD QL SMEAR: PRESENT
SODIUM SERPL-SCNC: 129 MMOL/L (ref 136–145)
WBC # BLD AUTO: 71.39 K/UL (ref 3.9–12.7)
WBC OTHER NFR BLD MANUAL: 50 %

## 2024-05-31 PROCEDURE — 80053 COMPREHEN METABOLIC PANEL: CPT | Performed by: INTERNAL MEDICINE

## 2024-05-31 PROCEDURE — 36415 COLL VENOUS BLD VENIPUNCTURE: CPT | Performed by: INTERNAL MEDICINE

## 2024-05-31 PROCEDURE — 85007 BL SMEAR W/DIFF WBC COUNT: CPT | Performed by: INTERNAL MEDICINE

## 2024-05-31 PROCEDURE — 85027 COMPLETE CBC AUTOMATED: CPT | Performed by: INTERNAL MEDICINE

## 2024-05-31 PROCEDURE — 85060 BLOOD SMEAR INTERPRETATION: CPT | Mod: ,,, | Performed by: PATHOLOGY

## 2024-05-31 RX ORDER — ALLOPURINOL 300 MG/1
300 TABLET ORAL DAILY
Qty: 30 TABLET | Refills: 3 | Status: SHIPPED | OUTPATIENT
Start: 2024-05-31

## 2024-05-31 RX ORDER — HYDROXYUREA 500 MG/1
500 CAPSULE ORAL DAILY
Qty: 60 CAPSULE | Refills: 3 | Status: SHIPPED | OUTPATIENT
Start: 2024-05-31 | End: 2025-05-31

## 2024-05-31 NOTE — TELEPHONE ENCOUNTER
----- Message from Jaquelin Quintana MD sent at 5/31/2024 12:24 PM CDT -----  I need to get this pt on for a marrow. When is our next available? He's coming to [facility] on 6/3 to see me   Ketoconazole Pregnancy And Lactation Text: This medication is Pregnancy Category C and it isn't know if it is safe during pregnancy. It is also excreted in breast milk and breast feeding isn't recommended.

## 2024-05-31 NOTE — PROGRESS NOTES
I spoke to Mr. Echeverria and his wife about the elevated white count and concern for conversion from a chronic to an acute leukemia. We will start allopurinol and hydrea today and continue daily until further notice.    Plan for repeat labs Monday with his clinic visit. I explained the purpose of the bone marrow biopsy next week. All questions were answered to their satisfaction.

## 2024-05-31 NOTE — PROGRESS NOTES
I need to get this pt on for a marrow. When is our next available? He's coming to [facility] on 6/3 to see me

## 2024-06-03 ENCOUNTER — TELEPHONE (OUTPATIENT)
Dept: HEMATOLOGY/ONCOLOGY | Facility: CLINIC | Age: 84
End: 2024-06-03

## 2024-06-03 ENCOUNTER — LAB VISIT (OUTPATIENT)
Dept: LAB | Facility: HOSPITAL | Age: 84
End: 2024-06-03
Payer: MEDICARE

## 2024-06-03 ENCOUNTER — OFFICE VISIT (OUTPATIENT)
Dept: HEMATOLOGY/ONCOLOGY | Facility: CLINIC | Age: 84
End: 2024-06-03
Payer: MEDICARE

## 2024-06-03 VITALS
DIASTOLIC BLOOD PRESSURE: 65 MMHG | OXYGEN SATURATION: 99 % | HEART RATE: 60 BPM | BODY MASS INDEX: 23.51 KG/M2 | HEIGHT: 70 IN | SYSTOLIC BLOOD PRESSURE: 152 MMHG | TEMPERATURE: 98 F | WEIGHT: 164.25 LBS | RESPIRATION RATE: 18 BRPM

## 2024-06-03 DIAGNOSIS — D84.821 DRUG-INDUCED IMMUNODEFICIENCY: ICD-10-CM

## 2024-06-03 DIAGNOSIS — C91.10 CLL (CHRONIC LYMPHOCYTIC LEUKEMIA): ICD-10-CM

## 2024-06-03 DIAGNOSIS — C61 PROSTATE CANCER: ICD-10-CM

## 2024-06-03 DIAGNOSIS — C91.10 CLL (CHRONIC LYMPHOCYTIC LEUKEMIA): Primary | ICD-10-CM

## 2024-06-03 DIAGNOSIS — Z79.899 DRUG-INDUCED IMMUNODEFICIENCY: ICD-10-CM

## 2024-06-03 LAB
ALBUMIN SERPL BCP-MCNC: 3.4 G/DL (ref 3.5–5.2)
ALP SERPL-CCNC: 52 U/L (ref 55–135)
ALT SERPL W/O P-5'-P-CCNC: 15 U/L (ref 10–44)
ANION GAP SERPL CALC-SCNC: 4 MMOL/L (ref 8–16)
AST SERPL-CCNC: 21 U/L (ref 10–40)
BASOPHILS NFR BLD: 0 % (ref 0–1.9)
BILIRUB SERPL-MCNC: 0.5 MG/DL (ref 0.1–1)
BUN SERPL-MCNC: 12 MG/DL (ref 8–23)
CALCIUM SERPL-MCNC: 8.9 MG/DL (ref 8.7–10.5)
CHLORIDE SERPL-SCNC: 100 MMOL/L (ref 95–110)
CO2 SERPL-SCNC: 25 MMOL/L (ref 23–29)
CREAT SERPL-MCNC: 1 MG/DL (ref 0.5–1.4)
DIFFERENTIAL METHOD BLD: ABNORMAL
EOSINOPHIL NFR BLD: 0 % (ref 0–8)
ERYTHROCYTE [DISTWIDTH] IN BLOOD BY AUTOMATED COUNT: 12.2 % (ref 11.5–14.5)
EST. GFR  (NO RACE VARIABLE): >60 ML/MIN/1.73 M^2
GLUCOSE SERPL-MCNC: 105 MG/DL (ref 70–110)
HCT VFR BLD AUTO: 36.8 % (ref 40–54)
HGB BLD-MCNC: 12.2 G/DL (ref 14–18)
IMM GRANULOCYTES # BLD AUTO: ABNORMAL K/UL (ref 0–0.04)
IMM GRANULOCYTES NFR BLD AUTO: ABNORMAL % (ref 0–0.5)
INR PPP: 1.1 (ref 0.8–1.2)
LDH SERPL L TO P-CCNC: 145 U/L (ref 110–260)
LYMPHOCYTES NFR BLD: 95 % (ref 18–48)
MCH RBC QN AUTO: 32.6 PG (ref 27–31)
MCHC RBC AUTO-ENTMCNC: 33.2 G/DL (ref 32–36)
MCV RBC AUTO: 98 FL (ref 82–98)
MONOCYTES NFR BLD: 0 % (ref 4–15)
NEUTROPHILS NFR BLD: 5 % (ref 38–73)
NRBC BLD-RTO: 0 /100 WBC
PATH REV BLD -IMP: NORMAL
PHOSPHATE SERPL-MCNC: 3.3 MG/DL (ref 2.7–4.5)
PLATELET # BLD AUTO: 191 K/UL (ref 150–450)
PLATELET BLD QL SMEAR: ABNORMAL
PMV BLD AUTO: 11 FL (ref 9.2–12.9)
POTASSIUM SERPL-SCNC: 4.6 MMOL/L (ref 3.5–5.1)
PROT SERPL-MCNC: 6.7 G/DL (ref 6–8.4)
PROTHROMBIN TIME: 11.5 SEC (ref 9–12.5)
RBC # BLD AUTO: 3.74 M/UL (ref 4.6–6.2)
SODIUM SERPL-SCNC: 129 MMOL/L (ref 136–145)
URATE SERPL-MCNC: 2.8 MG/DL (ref 3.4–7)
WBC # BLD AUTO: 78.58 K/UL (ref 3.9–12.7)

## 2024-06-03 PROCEDURE — 80053 COMPREHEN METABOLIC PANEL: CPT | Performed by: INTERNAL MEDICINE

## 2024-06-03 PROCEDURE — 36415 COLL VENOUS BLD VENIPUNCTURE: CPT | Performed by: INTERNAL MEDICINE

## 2024-06-03 PROCEDURE — 1101F PT FALLS ASSESS-DOCD LE1/YR: CPT | Mod: CPTII,S$GLB,, | Performed by: INTERNAL MEDICINE

## 2024-06-03 PROCEDURE — 84550 ASSAY OF BLOOD/URIC ACID: CPT | Performed by: INTERNAL MEDICINE

## 2024-06-03 PROCEDURE — 3077F SYST BP >= 140 MM HG: CPT | Mod: CPTII,S$GLB,, | Performed by: INTERNAL MEDICINE

## 2024-06-03 PROCEDURE — 99999 PR PBB SHADOW E&M-EST. PATIENT-LVL IV: CPT | Mod: PBBFAC,,, | Performed by: INTERNAL MEDICINE

## 2024-06-03 PROCEDURE — 3288F FALL RISK ASSESSMENT DOCD: CPT | Mod: CPTII,S$GLB,, | Performed by: INTERNAL MEDICINE

## 2024-06-03 PROCEDURE — 85007 BL SMEAR W/DIFF WBC COUNT: CPT | Performed by: INTERNAL MEDICINE

## 2024-06-03 PROCEDURE — 1126F AMNT PAIN NOTED NONE PRSNT: CPT | Mod: CPTII,S$GLB,, | Performed by: INTERNAL MEDICINE

## 2024-06-03 PROCEDURE — 84100 ASSAY OF PHOSPHORUS: CPT | Performed by: INTERNAL MEDICINE

## 2024-06-03 PROCEDURE — G2211 COMPLEX E/M VISIT ADD ON: HCPCS | Mod: S$GLB,,, | Performed by: INTERNAL MEDICINE

## 2024-06-03 PROCEDURE — 85610 PROTHROMBIN TIME: CPT | Mod: GA | Performed by: INTERNAL MEDICINE

## 2024-06-03 PROCEDURE — 99215 OFFICE O/P EST HI 40 MIN: CPT | Mod: S$GLB,,, | Performed by: INTERNAL MEDICINE

## 2024-06-03 PROCEDURE — 83615 LACTATE (LD) (LDH) ENZYME: CPT | Performed by: INTERNAL MEDICINE

## 2024-06-03 PROCEDURE — 85027 COMPLETE CBC AUTOMATED: CPT | Performed by: INTERNAL MEDICINE

## 2024-06-03 PROCEDURE — 3078F DIAST BP <80 MM HG: CPT | Mod: CPTII,S$GLB,, | Performed by: INTERNAL MEDICINE

## 2024-06-03 PROCEDURE — 1159F MED LIST DOCD IN RCRD: CPT | Mod: CPTII,S$GLB,, | Performed by: INTERNAL MEDICINE

## 2024-06-03 RX ORDER — LORAZEPAM 1 MG/1
1 TABLET ORAL 2 TIMES DAILY
Qty: 60 TABLET | Refills: 0 | Status: SHIPPED | OUTPATIENT
Start: 2024-06-03 | End: 2025-06-03

## 2024-06-03 RX ORDER — OXYCODONE HYDROCHLORIDE 5 MG/1
5 TABLET ORAL EVERY 4 HOURS PRN
Qty: 10 TABLET | Refills: 0 | Status: SHIPPED | OUTPATIENT
Start: 2024-06-03

## 2024-06-03 NOTE — PROGRESS NOTES
Hematology and Medical Oncology   Follow Up     06/03/2024    Primary Oncologic Diagnosis: CLL    History of Present Ilness:   Kevin Echeverria Jr. (Kevin) is a pleasant 84 y.o.male who presents to transition care to [facility] from the [facility]. The patient presents today with his wife and daughter. Most of his issues started in August when he began experiencing horrible pain in his hands that was worse with lack of motion/rest. The pain is excruciating and has sent him to the ED several times. The pain can happen in either upper extremity and feels like it travels at times. The discomfort abates as he moves his hands more throughout the day.     Incidentally through these ER visits it was noted that he has a persistently elevated but largely stable WBC.    --Pathologist review of the peripheral smear on 12/21/19 Leukocytosis with an absolute and relative lymphocytosis.     Lymphocytes are mature, and a subset displays a slightly atypical chromatin pattern.  Smudge cells are present.  Background granulocytes are morphologically normal.  The morphology suggests a B cell lymphoproliferative disorder such as CLL.  --Flow Cytometry on 1/3/2020: A B cell lymphoproliferative disorder is present.  Mantle cell lymphoma is favored although an atypical CLL cannot be completely ruled out; correlation   with morphology and with any available cytogenetic or molecular studies (t(11;14)) is required.     Interval History:  Mr. Echeverria is here today with his wife for lab check and treatment planning. He continues to feel the same. Able to do all desired activities. Energy and appetite are good. He is having liquid stools x 2 months. Placed on probiotics. Will have the urge to use the restroom in the middle of the night.     Concerned about the more than tripling of the white count in nine months.     PAST MEDICAL HISTORY:   Past Medical History:   Diagnosis Date    Arthritis     Cancer     prostate    COPD (chronic obstructive  pulmonary disease)     Hyperlipidemia     Hypertension     Leukemia        PAST SURGICAL HISTORY:   Past Surgical History:   Procedure Laterality Date    CATARACT EXTRACTION W/  INTRAOCULAR LENS IMPLANT Left 05/05/2016    COLONOSCOPY W/ BIOPSIES AND POLYPECTOMY      PROSTATE SURGERY  05/1996    TONSILLECTOMY  1956       PAST SOCIAL HISTORY:   reports that he has never smoked. He quit smokeless tobacco use about 25 years ago. He reports current alcohol use. He reports that he does not use drugs.    FAMILY HISTORY:  Family History   Problem Relation Name Age of Onset    Diabetes Mother         CURRENT MEDICATIONS:   Current Outpatient Medications   Medication Sig    albuterol (PROVENTIL) 2.5 mg /3 mL (0.083 %) nebulizer solution Take 1 vial by nebulization twice daily.    allopurinoL (ZYLOPRIM) 300 MG tablet Take 1 tablet (300 mg total) by mouth once daily.    aspirin (ECOTRIN) 81 MG EC tablet Take 81 mg by mouth once daily.    budesonide-glycopyr-formoterol (BREZTRI AEROSPHERE) 160-9-4.8 mcg/actuation HFAA INSTILL 2 PUFFS BY MOUTH EVERY 12 HOURS    colchicine (MITIGARE) 0.6 mg Cap Take 1 capsule (0.6 mg total) by mouth once daily.    diclofenac sodium (VOLTAREN) 1 % Gel Apply 2 g topically 2 (two) times daily as needed (pain).    famotidine (PEPCID) 20 MG tablet Take 1 tablet orally 2 times a day as needed for heart burn    fish oil-omega-3 fatty acids 300-1,000 mg capsule Take 1 g by mouth once daily.    gabapentin (NEURONTIN) 300 MG capsule Take 1 capsule (300 mg total) by mouth once daily.    hydroxyurea (HYDREA) 500 mg Cap Take 1 capsule (500 mg total) by mouth once daily.    ibrutinib (IMBRUVICA) 420 mg Tab Take 1 tablet by mouth once daily.    ipratropium (ATROVENT) 0.02 % nebulizer solution Use contents of one vial every 12 hours    L.acid/B.animalis,bifidum/FOS (PROBIOTIC COMPLEX ORAL) Take by mouth.    metoprolol succinate (TOPROL-XL) 25 MG 24 hr tablet Take 1 tablet orally once a day.    metoprolol  succinate (TOPROL-XL) 25 MG 24 hr tablet Take 1 tablet orally once a day.    montelukast (SINGULAIR) 10 mg tablet Take 1 tablet (10 mg total) by mouth every evening.    multivitamin with minerals tablet Take 1 tablet by mouth once daily. Equate Brand with Iron    nebulizer accessories Misc Use as directed    nebulizer and compressor Hyacinth USE WITH NEBULIZER SOLUTION AS DIRECTED    omeprazole (PRILOSEC) 20 MG capsule Take 1 capsule (20 mg total) by mouth every morning.    pravastatin (PRAVACHOL) 20 MG tablet Take 1 tablet orally once in the evening.    valsartan (DIOVAN) 160 MG tablet Take 1 tablet orally once a day.    hydrOXYzine HCL (ATARAX) 10 MG Tab Take 3 tablets (30 mg total) by mouth 2 (two) times daily. (Patient not taking: Reported on 6/3/2024)    ibrutinib (IMBRUVICA) 280 mg Tab Take 1 tablet (280 mg) by mouth once daily. (Patient not taking: Reported on 6/3/2024.)     No current facility-administered medications for this visit.     ALLERGIES:   Review of patient's allergies indicates:  No Known Allergies      Review of Systems:     Review of Systems   Constitutional:  Negative for appetite change, chills, diaphoresis, fatigue, fever and unexpected weight change.   HENT:   Negative for hearing loss, mouth sores, nosebleeds, sore throat, trouble swallowing and voice change.    Eyes:  Negative for eye problems and icterus.   Respiratory:  Negative for chest tightness, cough, hemoptysis, shortness of breath and wheezing.    Cardiovascular:  Negative for chest pain, leg swelling and palpitations.   Gastrointestinal:  Negative for abdominal distention, abdominal pain, blood in stool, diarrhea, nausea and vomiting.   Endocrine: Negative for hot flashes.   Genitourinary:  Negative for bladder incontinence, difficulty urinating, dysuria and hematuria.    Musculoskeletal:  Positive for arthralgias and neck pain. Negative for back pain, flank pain, gait problem, myalgias and neck stiffness.   Skin:  Negative for  itching, rash and wound.   Neurological:  Negative for dizziness, extremity weakness, gait problem, headaches, numbness, seizures and speech difficulty.   Hematological:  Negative for adenopathy. Does not bruise/bleed easily.   Psychiatric/Behavioral:  Negative for confusion, depression and sleep disturbance. The patient is not nervous/anxious.         Physical Exam:     Vitals:    06/03/24 0804   BP: (!) 152/65   Pulse: 60   Resp: 18   Temp: 97.6 °F (36.4 °C)       Physical Exam  Constitutional:       General: He is not in acute distress.     Appearance: He is well-developed. He is not diaphoretic.      Comments: Well dressed gentleman   HENT:      Head: Normocephalic and atraumatic.      Mouth/Throat:      Pharynx: No oropharyngeal exudate.   Eyes:      Conjunctiva/sclera: Conjunctivae normal.      Pupils: Pupils are equal, round, and reactive to light.   Neck:      Thyroid: No thyromegaly.      Vascular: No JVD.      Trachea: No tracheal deviation.   Cardiovascular:      Rate and Rhythm: Normal rate and regular rhythm.      Heart sounds: Normal heart sounds. No murmur heard.     No friction rub.   Pulmonary:      Effort: Pulmonary effort is normal. No respiratory distress.      Breath sounds: Normal breath sounds. No stridor. No wheezing or rales.   Chest:      Chest wall: No tenderness.   Abdominal:      General: Bowel sounds are normal. There is no distension.      Palpations: Abdomen is soft.      Tenderness: There is no abdominal tenderness. There is no guarding or rebound.   Musculoskeletal:         General: No tenderness or deformity. Normal range of motion.      Cervical back: Normal range of motion and neck supple.   Skin:     General: Skin is warm and dry.      Capillary Refill: Capillary refill takes less than 2 seconds.      Coloration: Skin is not pale.      Findings: No erythema or rash.   Neurological:      Mental Status: He is alert and oriented to person, place, and time.      Cranial Nerves: No  cranial nerve deficit.      Sensory: No sensory deficit.      Motor: No abnormal muscle tone.      Coordination: Coordination normal.      Deep Tendon Reflexes: Reflexes normal.   Psychiatric:         Behavior: Behavior normal.         Thought Content: Thought content normal.         Judgment: Judgment normal.         ECOG Performance Status: (foot note - ECOG PS provided by Eastern Cooperative Oncology Group) 1 - Symptomatic but completely ambulatory    Karnofsky Performance Score:  90%- Able to Carry on Normal Activity: Minor Symptoms of Disease    Labs:   Lab Results   Component Value Date    WBC 78.58 (HH) 06/03/2024    HGB 12.2 (L) 06/03/2024    HCT 36.8 (L) 06/03/2024     06/03/2024    CHOL 131 08/28/2023    TRIG 56 08/28/2023    HDL 56 08/28/2023    LDLDIRECT 69 08/28/2023    ALT 15 06/03/2024    AST 21 06/03/2024     (L) 06/03/2024    K 4.6 06/03/2024     06/03/2024    CREATININE 1.0 06/03/2024    BUN 12 06/03/2024    CO2 25 06/03/2024    TSH 3.184 01/31/2020    PSA 0.05 11/16/2021    INR 1.1 06/03/2024     Uric acid: 2.8  LDH: 145      Imaging: Outside imaging has been reviewed   Assessment and Plan:     Mr. 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    CLL  --If this is indeed CLL the doubling time of the white count has not met criteria for treatment  --CLL fish indicates a 17p deletion in 40.5% of nuclei. In CLL, a 17p deletion is associated with an unfavorable prognosis   --Last labs were 9 months ago   --Continues to take ibrutinib as scheduled  --Will continue to check cbc's at [facility]     Increasing White Count  --Seen on labs Friday  --Spoke to patient after labs. Placed on hydrea and allopurinol for the weekend with repeat labs today  --Repeat labs are stable  --Plan for bone marrow biopsy tomorrow  --I will reach out to him directly with marrow results      Prostate Cancer  --Treated with a prostatectomy      40 minutes in total were spent on this encounter of  which 30 minutes were spent face to face with the patient and his  family to discuss the disease, natural history, treatment options and survival statistics. I have provided the patient with an opportunity to ask questions and have all questions answered to his satisfaction.       he will return to clinic based on bone marrow biopsy, but knows to call in the interim if symptoms change or should a problem arise.    Visit today included increased complexity associated with the care of the episodic problem CLL addressed and managing the longitudinal care of the patient due to the serious and/or complex managed problem(s) CLL.      Jaquelin Quintana MD  Hematology and Medical Oncology  Bone Marrow Transplant  [facility]        BMT Chart Routing      Follow up with physician 1 month. 1.labs at [facility] in 1 month: cbc,cmp, 2.see me 8am 1-2 days after labs   Follow up with MARY JANE    Provider visit type    Infusion scheduling note    Injection scheduling note    Labs    Imaging    Pharmacy appointment    Other referrals

## 2024-06-04 ENCOUNTER — PROCEDURE VISIT (OUTPATIENT)
Dept: HEMATOLOGY/ONCOLOGY | Facility: CLINIC | Age: 84
End: 2024-06-04
Payer: MEDICARE

## 2024-06-04 VITALS
OXYGEN SATURATION: 98 % | HEART RATE: 60 BPM | BODY MASS INDEX: 23.57 KG/M2 | TEMPERATURE: 98 F | SYSTOLIC BLOOD PRESSURE: 155 MMHG | DIASTOLIC BLOOD PRESSURE: 70 MMHG | HEIGHT: 70 IN | RESPIRATION RATE: 18 BRPM

## 2024-06-04 DIAGNOSIS — C91.10 CLL (CHRONIC LYMPHOCYTIC LEUKEMIA): Primary | ICD-10-CM

## 2024-06-04 DIAGNOSIS — D46.4 REFRACTORY ANEMIA, UNSPECIFIED: ICD-10-CM

## 2024-06-04 PROCEDURE — 88305 TISSUE EXAM BY PATHOLOGIST: CPT | Performed by: PATHOLOGY

## 2024-06-04 PROCEDURE — 88313 SPECIAL STAINS GROUP 2: CPT | Mod: 59 | Performed by: PATHOLOGY

## 2024-06-04 PROCEDURE — 88184 FLOWCYTOMETRY/ TC 1 MARKER: CPT | Performed by: PATHOLOGY

## 2024-06-04 PROCEDURE — 88185 FLOWCYTOMETRY/TC ADD-ON: CPT | Performed by: PATHOLOGY

## 2024-06-04 PROCEDURE — 88342 IMHCHEM/IMCYTCHM 1ST ANTB: CPT | Performed by: PATHOLOGY

## 2024-06-04 PROCEDURE — 88237 TISSUE CULTURE BONE MARROW: CPT | Performed by: NURSE PRACTITIONER

## 2024-06-04 PROCEDURE — 88311 DECALCIFY TISSUE: CPT | Performed by: PATHOLOGY

## 2024-06-04 PROCEDURE — 88189 FLOWCYTOMETRY/READ 16 & >: CPT | Mod: ,,, | Performed by: PATHOLOGY

## 2024-06-04 PROCEDURE — 88299 UNLISTED CYTOGENETIC STUDY: CPT | Performed by: NURSE PRACTITIONER

## 2024-06-04 PROCEDURE — 88275 CYTOGENETICS 100-300: CPT | Mod: 59

## 2024-06-04 PROCEDURE — 88271 CYTOGENETICS DNA PROBE: CPT | Mod: 59 | Performed by: NURSE PRACTITIONER

## 2024-06-04 PROCEDURE — 88341 IMHCHEM/IMCYTCHM EA ADD ANTB: CPT | Mod: 59 | Performed by: PATHOLOGY

## 2024-06-04 PROCEDURE — 38222 DX BONE MARROW BX & ASPIR: CPT | Mod: LT,S$GLB,, | Performed by: NURSE PRACTITIONER

## 2024-06-04 PROCEDURE — 88264 CHROMOSOME ANALYSIS 20-25: CPT | Performed by: NURSE PRACTITIONER

## 2024-06-04 RX ORDER — LIDOCAINE HYDROCHLORIDE 20 MG/ML
8 INJECTION, SOLUTION INFILTRATION; PERINEURAL
Status: COMPLETED | OUTPATIENT
Start: 2024-06-04 | End: 2024-06-04

## 2024-06-04 RX ADMIN — LIDOCAINE HYDROCHLORIDE 8 ML: 20 INJECTION, SOLUTION INFILTRATION; PERINEURAL at 03:06

## 2024-06-04 NOTE — PROCEDURES
Bone marrow    Date/Time: 6/4/2024 3:20 PM    Performed by: Diandra Gramajo NP  Authorized by: Diandra Gramajo NP    Consent Done?: Yes (Written)   Immediately prior to procedure a time out was called to verify the correct patient, procedure, equipment, support staff and site/side marked as required. .      Position: prone  Aspiration?: Yes   Biopsy?: Yes        PROCEDURE NOTE:  Date of Procedure: 06/04/2024  Bone Marrow Biopsy and Aspiration  Indication: CLL  Consent: Informed consent was obtained from patient.  Timeout: Done and documented. Allergies reviewed.  Position: prone  Site: Left posterior illiac crest.  Prep: Betadine.  Needle used: 11 gauge Jamshidi needle.  Anesthetic: 2% lidocaine 8 cc.  Biopsy: The biopsy needle was introduced into the marrow cavity and an aspirate was obtained without complications and sent for flow cytometry, CLL, DNA/RNA hold, and cytogenetics. Core biopsy obtained without difficulty and sent for routine histologic examination.  Complications: None.  Disposition: The patient was placed supine for 15min following procedure. MA to assess bandaid for bleeding prior to discharge home. Patient aware to keep bandaid dry and intact for 24hrs and to call clinic for any bleeding, fevers, pain, or signs of infection.  Blood loss: Minimal.     Diandra Gramajo NP  Hematology/Oncology/BMT

## 2024-06-06 LAB
DNA/RNA EXTRACT AND HOLD RESULT: NORMAL
DNA/RNA EXTRACTION: NORMAL
EXHR SPECIMEN TYPE: NORMAL

## 2024-06-07 LAB — MAYO MISCELLANEOUS RESULT (REF): NORMAL

## 2024-06-11 LAB — MAYO MISCELLANEOUS RESULT (REF): NORMAL

## 2024-06-13 ENCOUNTER — PATIENT MESSAGE (OUTPATIENT)
Dept: HEMATOLOGY/ONCOLOGY | Facility: CLINIC | Age: 84
End: 2024-06-13
Payer: MEDICARE

## 2024-06-14 LAB
BODY SITE - BONE MARROW: NORMAL
CLINICAL DIAGNOSIS - BONE MARROW: NORMAL
FLOW CYTOMETRY ANTIBODIES ANALYZED - BONE MARROW: NORMAL
FLOW CYTOMETRY COMMENT - BONE MARROW: NORMAL
FLOW CYTOMETRY INTERPRETATION - BONE MARROW: NORMAL

## 2024-06-16 NOTE — PLAN OF CARE
08/26/19 1359   Discharge Assessment   Assessment Type Discharge Planning Reassessment     Mr. Echeverria is here for cellulitis. He has no post acute care needs at this time. CM contact information and discharge brochure given. Brochure checklist reviewed with patient and all questions answered. Discharge information sheet for cellulitis given including signs and symptoms indicating return to ED or call to PCP. Compliance and understanding voiced.     Private Auto Walk in

## 2024-06-17 LAB
CHROM BANDING METHOD: NORMAL
CHROMOSOME ANALYSIS BM ADDITIONAL INFORMATION: NORMAL
CHROMOSOME ANALYSIS BM RELEASED BY: NORMAL
CHROMOSOME ANALYSIS BM RESULT SUMMARY: NORMAL
CLINICAL CYTOGENETICIST REVIEW: NORMAL
COMMENT: NORMAL
FINAL PATHOLOGIC DIAGNOSIS: NORMAL
GROSS: NORMAL
KARYOTYP MAR: NORMAL
Lab: NORMAL
MICROSCOPIC EXAM: NORMAL
REASON FOR REFERRAL (NARRATIVE): NORMAL
REF LAB TEST METHOD: NORMAL
SPECIMEN SOURCE: NORMAL
SPECIMEN: NORMAL
SUPPLEMENTAL DIAGNOSIS: NORMAL

## 2024-07-05 ENCOUNTER — LAB VISIT (OUTPATIENT)
Dept: LAB | Facility: HOSPITAL | Age: 84
End: 2024-07-05
Attending: INTERNAL MEDICINE
Payer: MEDICARE

## 2024-07-05 ENCOUNTER — TELEPHONE (OUTPATIENT)
Dept: HEMATOLOGY/ONCOLOGY | Facility: CLINIC | Age: 84
End: 2024-07-05
Payer: MEDICARE

## 2024-07-05 DIAGNOSIS — C91.10 CLL (CHRONIC LYMPHOCYTIC LEUKEMIA): ICD-10-CM

## 2024-07-05 LAB
ALBUMIN SERPL BCP-MCNC: 3.3 G/DL (ref 3.5–5.2)
ALP SERPL-CCNC: 91 U/L (ref 55–135)
ALT SERPL W/O P-5'-P-CCNC: 19 U/L (ref 10–44)
ANION GAP SERPL CALC-SCNC: 6 MMOL/L (ref 8–16)
ANISOCYTOSIS BLD QL SMEAR: ABNORMAL
AST SERPL-CCNC: 27 U/L (ref 10–40)
BASO STIPL BLD QL SMEAR: ABNORMAL
BASOPHILS # BLD AUTO: ABNORMAL K/UL (ref 0–0.2)
BASOPHILS NFR BLD: 0 % (ref 0–1.9)
BILIRUB SERPL-MCNC: 0.5 MG/DL (ref 0.1–1)
BLASTS NFR BLD MANUAL: 0.5 %
BUN SERPL-MCNC: 13 MG/DL (ref 8–23)
CALCIUM SERPL-MCNC: 9 MG/DL (ref 8.7–10.5)
CHLORIDE SERPL-SCNC: 100 MMOL/L (ref 95–110)
CO2 SERPL-SCNC: 24 MMOL/L (ref 23–29)
CREAT SERPL-MCNC: 1.1 MG/DL (ref 0.5–1.4)
DIFFERENTIAL METHOD BLD: ABNORMAL
EOSINOPHIL # BLD AUTO: ABNORMAL K/UL (ref 0–0.5)
EOSINOPHIL NFR BLD: 2 % (ref 0–8)
ERYTHROCYTE [DISTWIDTH] IN BLOOD BY AUTOMATED COUNT: 15.5 % (ref 11.5–14.5)
EST. GFR  (NO RACE VARIABLE): >60 ML/MIN/1.73 M^2
GLUCOSE SERPL-MCNC: 100 MG/DL (ref 70–110)
HCT VFR BLD AUTO: 34.4 % (ref 40–54)
HGB BLD-MCNC: 11.5 G/DL (ref 14–18)
HYPOCHROMIA BLD QL SMEAR: ABNORMAL
IMM GRANULOCYTES # BLD AUTO: ABNORMAL K/UL (ref 0–0.04)
IMM GRANULOCYTES NFR BLD AUTO: ABNORMAL % (ref 0–0.5)
LYMPHOCYTES # BLD AUTO: ABNORMAL K/UL (ref 1–4.8)
LYMPHOCYTES NFR BLD: 53.5 % (ref 18–48)
MCH RBC QN AUTO: 33.2 PG (ref 27–31)
MCHC RBC AUTO-ENTMCNC: 33.4 G/DL (ref 32–36)
MCV RBC AUTO: 99 FL (ref 82–98)
MONOCYTES # BLD AUTO: ABNORMAL K/UL (ref 0.3–1)
MONOCYTES NFR BLD: 2.5 % (ref 4–15)
NEUTROPHILS NFR BLD: 4.5 % (ref 38–73)
NRBC BLD-RTO: 0 /100 WBC
PLATELET # BLD AUTO: 178 K/UL (ref 150–450)
PLATELET BLD QL SMEAR: ABNORMAL
PMV BLD AUTO: 9.3 FL (ref 9.2–12.9)
POIKILOCYTOSIS BLD QL SMEAR: SLIGHT
POTASSIUM SERPL-SCNC: 4.7 MMOL/L (ref 3.5–5.1)
PROT SERPL-MCNC: 7.1 G/DL (ref 6–8.4)
RBC # BLD AUTO: 3.46 M/UL (ref 4.6–6.2)
SODIUM SERPL-SCNC: 130 MMOL/L (ref 136–145)
WBC # BLD AUTO: 94.23 K/UL (ref 3.9–12.7)
WBC OTHER NFR BLD MANUAL: 37 %

## 2024-07-05 PROCEDURE — 80053 COMPREHEN METABOLIC PANEL: CPT | Performed by: INTERNAL MEDICINE

## 2024-07-05 PROCEDURE — 85027 COMPLETE CBC AUTOMATED: CPT | Performed by: INTERNAL MEDICINE

## 2024-07-05 PROCEDURE — 36415 COLL VENOUS BLD VENIPUNCTURE: CPT | Performed by: INTERNAL MEDICINE

## 2024-07-05 PROCEDURE — 85007 BL SMEAR W/DIFF WBC COUNT: CPT | Performed by: INTERNAL MEDICINE

## 2024-07-08 ENCOUNTER — OFFICE VISIT (OUTPATIENT)
Dept: HEMATOLOGY/ONCOLOGY | Facility: CLINIC | Age: 84
End: 2024-07-08
Payer: MEDICARE

## 2024-07-08 VITALS
BODY MASS INDEX: 23.54 KG/M2 | SYSTOLIC BLOOD PRESSURE: 154 MMHG | WEIGHT: 164.44 LBS | HEIGHT: 70 IN | OXYGEN SATURATION: 99 % | DIASTOLIC BLOOD PRESSURE: 70 MMHG | HEART RATE: 68 BPM | TEMPERATURE: 98 F

## 2024-07-08 DIAGNOSIS — D84.821 DRUG-INDUCED IMMUNODEFICIENCY: ICD-10-CM

## 2024-07-08 DIAGNOSIS — Z79.899 DRUG-INDUCED IMMUNODEFICIENCY: ICD-10-CM

## 2024-07-08 DIAGNOSIS — C83.09 SMALL B-CELL LYMPHOMA OF EXTRANODAL SITE EXCLUDING SPLEEN AND OTHER SOLID ORGANS: Primary | ICD-10-CM

## 2024-07-08 PROCEDURE — 3288F FALL RISK ASSESSMENT DOCD: CPT | Mod: CPTII,S$GLB,, | Performed by: INTERNAL MEDICINE

## 2024-07-08 PROCEDURE — 3077F SYST BP >= 140 MM HG: CPT | Mod: CPTII,S$GLB,, | Performed by: INTERNAL MEDICINE

## 2024-07-08 PROCEDURE — 1101F PT FALLS ASSESS-DOCD LE1/YR: CPT | Mod: CPTII,S$GLB,, | Performed by: INTERNAL MEDICINE

## 2024-07-08 PROCEDURE — 3078F DIAST BP <80 MM HG: CPT | Mod: CPTII,S$GLB,, | Performed by: INTERNAL MEDICINE

## 2024-07-08 PROCEDURE — 1126F AMNT PAIN NOTED NONE PRSNT: CPT | Mod: CPTII,S$GLB,, | Performed by: INTERNAL MEDICINE

## 2024-07-08 PROCEDURE — 99215 OFFICE O/P EST HI 40 MIN: CPT | Mod: S$GLB,,, | Performed by: INTERNAL MEDICINE

## 2024-07-08 PROCEDURE — G2211 COMPLEX E/M VISIT ADD ON: HCPCS | Mod: S$GLB,,, | Performed by: INTERNAL MEDICINE

## 2024-07-08 PROCEDURE — 99999 PR PBB SHADOW E&M-EST. PATIENT-LVL III: CPT | Mod: PBBFAC,,, | Performed by: INTERNAL MEDICINE

## 2024-07-08 PROCEDURE — 1159F MED LIST DOCD IN RCRD: CPT | Mod: CPTII,S$GLB,, | Performed by: INTERNAL MEDICINE

## 2024-07-08 RX ORDER — HYDROXYUREA 500 MG/1
1000 CAPSULE ORAL DAILY
Qty: 60 CAPSULE | Refills: 3 | Status: SHIPPED | OUTPATIENT
Start: 2024-07-08 | End: 2025-07-08

## 2024-07-08 NOTE — PROGRESS NOTES
Hematology and Medical Oncology   Follow Up     07/08/2024    Primary Oncologic Diagnosis: B cell lymphoma    History of Present Ilness:   Kevin Echeverria Jr. (Kevin) is a pleasant 84 y.o.male who presents to transition care to [facility] from the [facility]. The patient presents today with his wife and daughter. Most of his issues started in August when he began experiencing horrible pain in his hands that was worse with lack of motion/rest. The pain is excruciating and has sent him to the ED several times. The pain can happen in either upper extremity and feels like it travels at times. The discomfort abates as he moves his hands more throughout the day.     Incidentally through these ER visits it was noted that he has a persistently elevated but largely stable WBC.    --Pathologist review of the peripheral smear on 12/21/19 Leukocytosis with an absolute and relative lymphocytosis.     Lymphocytes are mature, and a subset displays a slightly atypical chromatin pattern.  Smudge cells are present.  Background granulocytes are morphologically normal.  The morphology suggests a B cell lymphoproliferative disorder such as CLL.  --Flow Cytometry on 1/3/2020: A B cell lymphoproliferative disorder is present.  Mantle cell lymphoma is favored although an atypical CLL cannot be completely ruled out; correlation   with morphology and with any available cytogenetic or molecular studies (t(11;14)) is required.     --Bone marrow biopsy on 6/4/24: B-cell lymphoma with biallelic TP53 inactivation.  Differential considerations include splenic marginal zone lymphoma, splenic diffuse red pulp small B-cell lymphoma, splenic B-cell  leukemia/lymphoma with prominent nucleoli (ICC classification, 2022), and less likely atypical chronic lymphocytic leukemia/small lymphocytic lymphoma.       Interval History:  Mr. Echeverria is here today to discuss his bone marrow biopsy and next treatment steps. Has been off of ibrutinib.      Understands the diagnosis was complicated to make and case sent to [facility] for a second opinion.    Willing to proceed with evaluation and treatment. Denies B symptoms.    PAST MEDICAL HISTORY:   Past Medical History:   Diagnosis Date    Arthritis     Cancer     prostate    COPD (chronic obstructive pulmonary disease)     Hyperlipidemia     Hypertension     Leukemia        PAST SURGICAL HISTORY:   Past Surgical History:   Procedure Laterality Date    CATARACT EXTRACTION W/  INTRAOCULAR LENS IMPLANT Left 05/05/2016    COLONOSCOPY W/ BIOPSIES AND POLYPECTOMY      PROSTATE SURGERY  05/1996    TONSILLECTOMY  1956       PAST SOCIAL HISTORY:   reports that he has never smoked. He quit smokeless tobacco use about 25 years ago. He reports current alcohol use. He reports that he does not use drugs.    FAMILY HISTORY:  Family History   Problem Relation Name Age of Onset    Diabetes Mother         CURRENT MEDICATIONS:   Current Outpatient Medications   Medication Sig    albuterol (PROVENTIL) 2.5 mg /3 mL (0.083 %) nebulizer solution Take 1 vial by nebulization twice daily.    albuterol (PROVENTIL) 2.5 mg /3 mL (0.083 %) nebulizer solution Use one 3mL vial in nebulizer once every 8 hours as needed    allopurinoL (ZYLOPRIM) 300 MG tablet Take 1 tablet (300 mg total) by mouth once daily.    aspirin (ECOTRIN) 81 MG EC tablet Take 81 mg by mouth once daily.    budesonide-glycopyr-formoterol (BREZTRI AEROSPHERE) 160-9-4.8 mcg/actuation HFAA INSTILL 2 PUFFS BY MOUTH EVERY 12 HOURS    colchicine (MITIGARE) 0.6 mg Cap Take 1 capsule (0.6 mg total) by mouth once daily.    diclofenac sodium (VOLTAREN) 1 % Gel Apply 2 g topically 2 (two) times daily as needed (pain).    famotidine (PEPCID) 20 MG tablet Take 1 tablet orally 2 times a day as needed for heart burn    fish oil-omega-3 fatty acids 300-1,000 mg capsule Take 1 g by mouth once daily.    gabapentin (NEURONTIN) 300 MG capsule Take 1 capsule (300 mg total) by mouth once  daily.    hydroxyurea (HYDREA) 500 mg Cap Take 1 capsule (500 mg total) by mouth once daily.    ibrutinib (IMBRUVICA) 420 mg Tab Take 1 tablet by mouth once daily.    ipratropium (ATROVENT) 0.02 % nebulizer solution Use contents of one vial every 12 hours    ipratropium (ATROVENT) 0.02 % nebulizer solution Use one vial by nebulization every 8 hours as needed    L.acid/B.animalis,bifidum/FOS (PROBIOTIC COMPLEX ORAL) Take by mouth.    LORazepam (ATIVAN) 1 MG tablet Take 1 tablet (1 mg total) by mouth 2 (two) times daily.    metoprolol succinate (TOPROL-XL) 25 MG 24 hr tablet Take 1 tablet orally once a day.    metoprolol succinate (TOPROL-XL) 25 MG 24 hr tablet Take 1 tablet orally once a day.    montelukast (SINGULAIR) 10 mg tablet Take 1 tablet (10 mg total) by mouth every evening.    multivitamin with minerals tablet Take 1 tablet by mouth once daily. Equate Brand with Iron    nebulizer accessories Misc Use as directed    nebulizer and compressor Hyacinth USE WITH NEBULIZER SOLUTION AS DIRECTED    nebulizer and compressor Hyacinth Use with nebulizer treatments    omeprazole (PRILOSEC) 20 MG capsule Take 1 capsule (20 mg total) by mouth every morning.    oxyCODONE (ROXICODONE) 5 MG immediate release tablet Take 1 tablet (5 mg total) by mouth every 4 (four) hours as needed for Pain.    pravastatin (PRAVACHOL) 20 MG tablet Take 1 tablet orally once in the evening.    valsartan (DIOVAN) 160 MG tablet Take 1 tablet orally once a day.    hydrOXYzine HCL (ATARAX) 10 MG Tab Take 3 tablets (30 mg total) by mouth 2 (two) times daily. (Patient not taking: Reported on 6/3/2024)    ibrutinib (IMBRUVICA) 280 mg Tab Take 1 tablet (280 mg) by mouth once daily. (Patient not taking: Reported on 6/3/2024.)     No current facility-administered medications for this visit.     ALLERGIES:   Review of patient's allergies indicates:  No Known Allergies      Review of Systems:     Review of Systems   Constitutional:  Negative for appetite change,  chills, diaphoresis, fatigue, fever and unexpected weight change.   HENT:   Negative for hearing loss, mouth sores, nosebleeds, sore throat, trouble swallowing and voice change.    Eyes:  Negative for eye problems and icterus.   Respiratory:  Negative for chest tightness, cough, hemoptysis, shortness of breath and wheezing.    Cardiovascular:  Negative for chest pain, leg swelling and palpitations.   Gastrointestinal:  Negative for abdominal distention, abdominal pain, blood in stool, diarrhea, nausea and vomiting.   Endocrine: Negative for hot flashes.   Genitourinary:  Negative for bladder incontinence, difficulty urinating, dysuria and hematuria.    Musculoskeletal:  Positive for arthralgias and neck pain. Negative for back pain, flank pain, gait problem, myalgias and neck stiffness.   Skin:  Negative for itching, rash and wound.   Neurological:  Negative for dizziness, extremity weakness, gait problem, headaches, numbness, seizures and speech difficulty.   Hematological:  Negative for adenopathy. Does not bruise/bleed easily.   Psychiatric/Behavioral:  Negative for confusion, depression and sleep disturbance. The patient is not nervous/anxious.         Physical Exam:     Vitals:    07/08/24 0841   BP: (!) 154/70   Pulse: 68   Temp: 97.5 °F (36.4 °C)       Physical Exam  Constitutional:       General: He is not in acute distress.     Appearance: He is well-developed. He is not diaphoretic.      Comments: Well dressed gentleman   HENT:      Head: Normocephalic and atraumatic.      Mouth/Throat:      Pharynx: No oropharyngeal exudate.   Eyes:      Conjunctiva/sclera: Conjunctivae normal.      Pupils: Pupils are equal, round, and reactive to light.   Neck:      Thyroid: No thyromegaly.      Vascular: No JVD.      Trachea: No tracheal deviation.   Cardiovascular:      Rate and Rhythm: Normal rate and regular rhythm.      Heart sounds: Normal heart sounds. No murmur heard.     No friction rub.   Pulmonary:       Effort: Pulmonary effort is normal. No respiratory distress.      Breath sounds: Normal breath sounds. No stridor. No wheezing or rales.   Chest:      Chest wall: No tenderness.   Abdominal:      General: Bowel sounds are normal. There is no distension.      Palpations: Abdomen is soft.      Tenderness: There is no abdominal tenderness. There is no guarding or rebound.   Musculoskeletal:         General: No tenderness or deformity. Normal range of motion.      Cervical back: Normal range of motion and neck supple.   Skin:     General: Skin is warm and dry.      Capillary Refill: Capillary refill takes less than 2 seconds.      Coloration: Skin is not pale.      Findings: No erythema or rash.   Neurological:      Mental Status: He is alert and oriented to person, place, and time.      Cranial Nerves: No cranial nerve deficit.      Sensory: No sensory deficit.      Motor: No abnormal muscle tone.      Coordination: Coordination normal.      Deep Tendon Reflexes: Reflexes normal.   Psychiatric:         Behavior: Behavior normal.         Thought Content: Thought content normal.         Judgment: Judgment normal.         ECOG Performance Status: (foot note - ECOG PS provided by Eastern Cooperative Oncology Group) 1 - Symptomatic but completely ambulatory    Karnofsky Performance Score:  90%- Able to Carry on Normal Activity: Minor Symptoms of Disease    Labs:   Lab Results   Component Value Date    WBC 94.23 (HH) 07/05/2024    HGB 11.5 (L) 07/05/2024    HCT 34.4 (L) 07/05/2024     07/05/2024    CHOL 131 08/28/2023    TRIG 56 08/28/2023    HDL 56 08/28/2023    LDLDIRECT 69 08/28/2023    ALT 19 07/05/2024    AST 27 07/05/2024     (L) 07/05/2024    K 4.7 07/05/2024     07/05/2024    CREATININE 1.1 07/05/2024    BUN 13 07/05/2024    CO2 24 07/05/2024    TSH 3.184 01/31/2020    PSA 0.05 11/16/2021    INR 1.1 06/03/2024     Uric acid: 2.8  LDH: 145      Imaging: Outside imaging has been reviewed   Assessment  and Plan:     Mr. 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     B-cell lymphoma with biallelic TP53 inactivation   --Seen on bone marrow biopsy  --Will proceed with PET, echo and treatment planning  --Insurance auth and schedule Bendamustine-Rituxan at [facility]    Increasing White Count  --Will increase hydrea dosing to 1 gram daily  --Continue daily allopurinol        Prostate Cancer  --Treated with a prostatectomy      40 minutes in total were spent on this encounter of which 30 minutes were spent face to face with the patient and his  family to discuss the disease, natural history, treatment options and survival statistics. I have provided the patient with an opportunity to ask questions and have all questions answered to his satisfaction.       he will return to clinic after PET ct, but knows to call in the interim if symptoms change or should a problem arise.    Visit today included increased complexity associated with the care of the episodic problem B cell lymphoma addressed and managing the longitudinal care of the patient due to the serious and/or complex managed problem(s) B cell lymphoma.      Jaquelin Quintana MD  Hematology and Medical Oncology  Bone Marrow Transplant  [facility]        BMT Chart Routing  Urgent    Follow up with physician . 1. insurance auth and schedule BR chemo in [facility] 2. PET here at [facility] 3. see me several days after imaging for review and consent signing + labs: cbc,cmp,lhd, uric acid, hep b 4.echo   Follow up with MARY JANE    Provider visit type    Infusion scheduling note    Injection scheduling note    Labs    Imaging    Pharmacy appointment    Other referrals

## 2024-07-22 ENCOUNTER — HOSPITAL ENCOUNTER (OUTPATIENT)
Dept: RADIOLOGY | Facility: HOSPITAL | Age: 84
Discharge: HOME | End: 2024-07-22
Attending: INTERNAL MEDICINE
Payer: MEDICARE

## 2024-07-22 ENCOUNTER — OFFICE VISIT (OUTPATIENT)
Dept: HEMATOLOGY/ONCOLOGY | Facility: CLINIC | Age: 84
End: 2024-07-22
Payer: MEDICARE

## 2024-07-22 ENCOUNTER — HOSPITAL ENCOUNTER (OUTPATIENT)
Dept: CARDIOLOGY | Facility: HOSPITAL | Age: 84
Discharge: HOME | End: 2024-07-22
Attending: INTERNAL MEDICINE
Payer: MEDICARE

## 2024-07-22 VITALS
HEIGHT: 70 IN | DIASTOLIC BLOOD PRESSURE: 70 MMHG | WEIGHT: 163.13 LBS | HEART RATE: 68 BPM | BODY MASS INDEX: 23.35 KG/M2 | SYSTOLIC BLOOD PRESSURE: 154 MMHG

## 2024-07-22 VITALS
SYSTOLIC BLOOD PRESSURE: 164 MMHG | RESPIRATION RATE: 18 BRPM | OXYGEN SATURATION: 98 % | TEMPERATURE: 98 F | HEIGHT: 70 IN | BODY MASS INDEX: 23.15 KG/M2 | HEART RATE: 71 BPM | WEIGHT: 161.69 LBS | DIASTOLIC BLOOD PRESSURE: 76 MMHG

## 2024-07-22 DIAGNOSIS — C83.09 SMALL B-CELL LYMPHOMA OF EXTRANODAL SITE EXCLUDING SPLEEN AND OTHER SOLID ORGANS: ICD-10-CM

## 2024-07-22 DIAGNOSIS — C61 PROSTATE CANCER: ICD-10-CM

## 2024-07-22 DIAGNOSIS — C83.09 SMALL B-CELL LYMPHOMA OF EXTRANODAL SITE EXCLUDING SPLEEN AND OTHER SOLID ORGANS: Primary | ICD-10-CM

## 2024-07-22 DIAGNOSIS — D84.821 DRUG-INDUCED IMMUNODEFICIENCY: ICD-10-CM

## 2024-07-22 DIAGNOSIS — Z79.899 DRUG-INDUCED IMMUNODEFICIENCY: ICD-10-CM

## 2024-07-22 LAB
ASCENDING AORTA: 3.23 CM
AV INDEX (PROSTH): 0.56
AV MEAN GRADIENT: 13 MMHG
AV PEAK GRADIENT: 20 MMHG
AV VALVE AREA BY VELOCITY RATIO: 2.36 CM²
AV VALVE AREA: 2.68 CM²
AV VELOCITY RATIO: 0.49
BSA FOR ECHO PROCEDURE: 1.91 M2
CV ECHO LV RWT: 0.35 CM
DOP CALC AO PEAK VEL: 2.25 M/S
DOP CALC AO VTI: 53.92 CM
DOP CALC LVOT AREA: 4.8 CM2
DOP CALC LVOT DIAMETER: 2.47 CM
DOP CALC LVOT PEAK VEL: 1.11 M/S
DOP CALC LVOT STROKE VOLUME: 144.35 CM3
DOP CALC MV VTI: 35.23 CM
DOP CALCLVOT PEAK VEL VTI: 30.14 CM
E WAVE DECELERATION TIME: 301.95 MSEC
E/A RATIO: 0.83
E/E' RATIO: 13.2 M/S
ECHO LV POSTERIOR WALL: 0.9 CM (ref 0.6–1.1)
EJECTION FRACTION: 60 %
FRACTIONAL SHORTENING: 47 % (ref 28–44)
HR MV ECHO: 69 BPM
INTERVENTRICULAR SEPTUM: 0.75 CM (ref 0.6–1.1)
IVRT: 97.05 MSEC
LA MAJOR: 4.62 CM
LA MINOR: 5.26 CM
LA WIDTH: 4.52 CM
LEFT ATRIUM SIZE: 3.82 CM
LEFT ATRIUM VOLUME INDEX MOD: 38.1 ML/M2
LEFT ATRIUM VOLUME INDEX: 37.8 ML/M2
LEFT ATRIUM VOLUME MOD: 72.68 CM3
LEFT ATRIUM VOLUME: 72.2 CM3
LEFT INTERNAL DIMENSION IN SYSTOLE: 2.74 CM (ref 2.1–4)
LEFT VENTRICLE DIASTOLIC VOLUME INDEX: 66.06 ML/M2
LEFT VENTRICLE DIASTOLIC VOLUME: 126.17 ML
LEFT VENTRICLE MASS INDEX: 78 G/M2
LEFT VENTRICLE SYSTOLIC VOLUME INDEX: 14.7 ML/M2
LEFT VENTRICLE SYSTOLIC VOLUME: 28.06 ML
LEFT VENTRICULAR INTERNAL DIMENSION IN DIASTOLE: 5.14 CM (ref 3.5–6)
LEFT VENTRICULAR MASS: 148.07 G
LV LATERAL E/E' RATIO: 11 M/S
LV SEPTAL E/E' RATIO: 16.5 M/S
MV A" WAVE DURATION": 17.98 MSEC
MV MEAN GRADIENT: 3 MMHG
MV PEAK A VEL: 1.2 M/S
MV PEAK E VEL: 0.99 M/S
MV PEAK GRADIENT: 7 MMHG
MV STENOSIS PRESSURE HALF TIME: 87.57 MS
MV VALVE AREA BY CONTINUITY EQUATION: 4.1 CM2
MV VALVE AREA P 1/2 METHOD: 2.51 CM2
PISA TR MAX VEL: 2.58 M/S
POCT GLUCOSE: 96 MG/DL (ref 70–110)
PULM VEIN S/D RATIO: 1.23
PV PEAK D VEL: 0.47 M/S
PV PEAK S VEL: 0.58 M/S
RA MAJOR: 4.62 CM
RA PRESSURE ESTIMATED: 3 MMHG
RA WIDTH: 3.47 CM
RIGHT VENTRICLE DIASTOLIC BASEL DIMENSION: 3.7 CM
RV TB RVSP: 6 MMHG
SINUS: 3.43 CM
STJ: 2.78 CM
TDI LATERAL: 0.09 M/S
TDI SEPTAL: 0.06 M/S
TDI: 0.08 M/S
TR MAX PG: 27 MMHG
TV REST PULMONARY ARTERY PRESSURE: 30 MMHG
Z-SCORE OF LEFT VENTRICULAR DIMENSION IN END DIASTOLE: -0.45
Z-SCORE OF LEFT VENTRICULAR DIMENSION IN END SYSTOLE: -1.49

## 2024-07-22 PROCEDURE — 1159F MED LIST DOCD IN RCRD: CPT | Mod: CPTII,S$GLB,, | Performed by: INTERNAL MEDICINE

## 2024-07-22 PROCEDURE — A9552 F18 FDG: HCPCS | Performed by: INTERNAL MEDICINE

## 2024-07-22 PROCEDURE — 93306 TTE W/DOPPLER COMPLETE: CPT

## 2024-07-22 PROCEDURE — 99215 OFFICE O/P EST HI 40 MIN: CPT | Mod: S$GLB,,, | Performed by: INTERNAL MEDICINE

## 2024-07-22 PROCEDURE — 93306 TTE W/DOPPLER COMPLETE: CPT | Mod: 26,,, | Performed by: INTERNAL MEDICINE

## 2024-07-22 PROCEDURE — 1126F AMNT PAIN NOTED NONE PRSNT: CPT | Mod: CPTII,S$GLB,, | Performed by: INTERNAL MEDICINE

## 2024-07-22 PROCEDURE — 3078F DIAST BP <80 MM HG: CPT | Mod: CPTII,S$GLB,, | Performed by: INTERNAL MEDICINE

## 2024-07-22 PROCEDURE — 1101F PT FALLS ASSESS-DOCD LE1/YR: CPT | Mod: CPTII,S$GLB,, | Performed by: INTERNAL MEDICINE

## 2024-07-22 PROCEDURE — 3288F FALL RISK ASSESSMENT DOCD: CPT | Mod: CPTII,S$GLB,, | Performed by: INTERNAL MEDICINE

## 2024-07-22 PROCEDURE — 78815 PET IMAGE W/CT SKULL-THIGH: CPT | Mod: TC

## 2024-07-22 PROCEDURE — 78815 PET IMAGE W/CT SKULL-THIGH: CPT | Mod: 26,PI,, | Performed by: NUCLEAR MEDICINE

## 2024-07-22 PROCEDURE — 3077F SYST BP >= 140 MM HG: CPT | Mod: CPTII,S$GLB,, | Performed by: INTERNAL MEDICINE

## 2024-07-22 PROCEDURE — G2211 COMPLEX E/M VISIT ADD ON: HCPCS | Mod: S$GLB,,, | Performed by: INTERNAL MEDICINE

## 2024-07-22 PROCEDURE — 99999 PR PBB SHADOW E&M-EST. PATIENT-LVL IV: CPT | Mod: PBBFAC,,, | Performed by: INTERNAL MEDICINE

## 2024-07-22 RX ORDER — FLUDEOXYGLUCOSE F18 500 MCI/ML
12 INJECTION INTRAVENOUS
Status: COMPLETED | OUTPATIENT
Start: 2024-07-22 | End: 2024-07-22

## 2024-07-22 RX ADMIN — FLUDEOXYGLUCOSE F-18 12.44 MILLICURIE: 500 INJECTION INTRAVENOUS at 12:07

## 2024-07-22 NOTE — PROGRESS NOTES
Hematology and Medical Oncology   Follow Up     07/22/2024    Primary Oncologic Diagnosis: B cell lymphoma    History of Present Ilness:   Kevin Echeverria Jr. (Kevin) is a pleasant 84 y.o.male who presents to transition care to [facility] from the [facility]. The patient presents today with his wife and daughter. Most of his issues started in August when he began experiencing horrible pain in his hands that was worse with lack of motion/rest. The pain is excruciating and has sent him to the ED several times. The pain can happen in either upper extremity and feels like it travels at times. The discomfort abates as he moves his hands more throughout the day.     Incidentally through these ER visits it was noted that he has a persistently elevated but largely stable WBC.    --Pathologist review of the peripheral smear on 12/21/19 Leukocytosis with an absolute and relative lymphocytosis.     Lymphocytes are mature, and a subset displays a slightly atypical chromatin pattern.  Smudge cells are present.  Background granulocytes are morphologically normal.  The morphology suggests a B cell lymphoproliferative disorder such as CLL.  --Flow Cytometry on 1/3/2020: A B cell lymphoproliferative disorder is present.  Mantle cell lymphoma is favored although an atypical CLL cannot be completely ruled out; correlation   with morphology and with any available cytogenetic or molecular studies (t(11;14)) is required.     --Bone marrow biopsy on 6/4/24: B-cell lymphoma with biallelic TP53 inactivation.  Differential considerations include splenic marginal zone lymphoma, splenic diffuse red pulp small B-cell lymphoma, splenic B-cell  leukemia/lymphoma with prominent nucleoli (ICC classification, 2022), and less likely atypical chronic lymphocytic leukemia/small lymphocytic lymphoma.     --PET CT showing *Left periaortic lymph node just inferior to the left renal vein measures 0.9 cm with SUV max 2.6 (series 3, image  157).  *Aortocaval lymph node approximately at L2 measures 0.9 cm with SUV max 2.6 (series 3, image 161).  Prostatectomy for prostate cancer.  No hypermetabolic foci within the prostatectomy bed.    Prominent mildly hypermetabolic retroperitoneal lymph nodes.     The Deauville Score is: 4      Interval History:  Mr. Echeverria is here today to discuss his bone marrow biopsy & PET results as noted above. He reports feeling well with no significant B symptoms at this time and is eager to start treatment when scheduled. He has been compliant with his hydrea with no significant adverse side-effects.     PAST MEDICAL HISTORY:   Past Medical History:   Diagnosis Date    Arthritis     Cancer     prostate    COPD (chronic obstructive pulmonary disease)     Hyperlipidemia     Hypertension     Leukemia        PAST SURGICAL HISTORY:   Past Surgical History:   Procedure Laterality Date    CATARACT EXTRACTION W/  INTRAOCULAR LENS IMPLANT Left 05/05/2016    COLONOSCOPY W/ BIOPSIES AND POLYPECTOMY      PROSTATE SURGERY  05/1996    TONSILLECTOMY  1956       PAST SOCIAL HISTORY:   reports that he has never smoked. He quit smokeless tobacco use about 25 years ago. He reports current alcohol use. He reports that he does not use drugs.    FAMILY HISTORY:  Family History   Problem Relation Name Age of Onset    Diabetes Mother         CURRENT MEDICATIONS:   Current Outpatient Medications   Medication Sig    albuterol (PROVENTIL) 2.5 mg /3 mL (0.083 %) nebulizer solution Take 1 vial by nebulization twice daily.    albuterol (PROVENTIL) 2.5 mg /3 mL (0.083 %) nebulizer solution Use one 3mL vial in nebulizer once every 8 hours as needed    allopurinoL (ZYLOPRIM) 300 MG tablet Take 1 tablet (300 mg total) by mouth once daily.    aspirin (ECOTRIN) 81 MG EC tablet Take 81 mg by mouth once daily.    budesonide-glycopyr-formoterol (BREZTRI AEROSPHERE) 160-9-4.8 mcg/actuation HFAA INSTILL 2 PUFFS BY MOUTH EVERY 12 HOURS    colchicine (MITIGARE) 0.6  mg Cap Take 1 capsule (0.6 mg total) by mouth once daily.    diclofenac sodium (VOLTAREN) 1 % Gel Apply 2 g topically 2 (two) times daily as needed (pain).    famotidine (PEPCID) 20 MG tablet Take 1 tablet orally 2 times a day as needed for heart burn    gabapentin (NEURONTIN) 300 MG capsule Take 1 capsule (300 mg total) by mouth once daily.    hydroxyurea (HYDREA) 500 mg Cap Take 1 capsule (500 mg total) by mouth once daily.    hydroxyurea (HYDREA) 500 mg Cap Take 2 capsules (1,000 mg total) by mouth once daily.    ipratropium (ATROVENT) 0.02 % nebulizer solution Use contents of one vial every 12 hours    L.acid/B.animalis,bifidum/FOS (PROBIOTIC COMPLEX ORAL) Take by mouth.    metoprolol succinate (TOPROL-XL) 25 MG 24 hr tablet Take 1 tablet orally once a day.    montelukast (SINGULAIR) 10 mg tablet Take 1 tablet (10 mg total) by mouth every evening.    multivitamin with minerals tablet Take 1 tablet by mouth once daily. Equate Brand with Iron    nebulizer accessories Misc Use as directed    nebulizer and compressor Hyacinth USE WITH NEBULIZER SOLUTION AS DIRECTED    nebulizer and compressor Hyacinth Use with nebulizer treatments    omeprazole (PRILOSEC) 20 MG capsule Take 1 capsule (20 mg total) by mouth every morning.    pravastatin (PRAVACHOL) 20 MG tablet Take 1 tablet orally once in the evening.    valsartan (DIOVAN) 160 MG tablet Take 1 tablet orally once a day.    fish oil-omega-3 fatty acids 300-1,000 mg capsule Take 1 g by mouth once daily. (Patient not taking: Reported on 7/22/2024)    hydrOXYzine HCL (ATARAX) 10 MG Tab Take 3 tablets (30 mg total) by mouth 2 (two) times daily. (Patient not taking: Reported on 6/3/2024)    ibrutinib (IMBRUVICA) 280 mg Tab Take 1 tablet (280 mg) by mouth once daily. (Patient not taking: Reported on 6/3/2024.)    ibrutinib (IMBRUVICA) 420 mg Tab Take 1 tablet by mouth once daily. (Patient not taking: Reported on 7/22/2024.)    ipratropium (ATROVENT) 0.02 % nebulizer solution Use  one vial by nebulization every 8 hours as needed (Patient not taking: Reported on 7/22/2024)    LORazepam (ATIVAN) 1 MG tablet Take 1 tablet (1 mg total) by mouth 2 (two) times daily. (Patient not taking: Reported on 7/22/2024)    metoprolol succinate (TOPROL-XL) 25 MG 24 hr tablet Take 1 tablet orally once a day.    oxyCODONE (ROXICODONE) 5 MG immediate release tablet Take 1 tablet (5 mg total) by mouth every 4 (four) hours as needed for Pain. (Patient not taking: Reported on 7/22/2024)     No current facility-administered medications for this visit.     ALLERGIES:   Review of patient's allergies indicates:  No Known Allergies      Review of Systems:     Review of Systems   Constitutional:  Negative for appetite change, chills, diaphoresis, fatigue, fever and unexpected weight change.   HENT:   Negative for hearing loss, mouth sores, nosebleeds, sore throat, trouble swallowing and voice change.    Eyes:  Negative for eye problems and icterus.   Respiratory:  Negative for chest tightness, cough, hemoptysis, shortness of breath and wheezing.    Cardiovascular:  Negative for chest pain, leg swelling and palpitations.   Gastrointestinal:  Negative for abdominal distention, abdominal pain, blood in stool, diarrhea, nausea and vomiting.   Endocrine: Negative for hot flashes.   Genitourinary:  Negative for bladder incontinence, difficulty urinating, dysuria and hematuria.    Musculoskeletal:  Positive for arthralgias and neck pain. Negative for back pain, flank pain, gait problem, myalgias and neck stiffness.   Skin:  Negative for itching, rash and wound.   Neurological:  Negative for dizziness, extremity weakness, gait problem, headaches, numbness, seizures and speech difficulty.   Hematological:  Negative for adenopathy. Does not bruise/bleed easily.   Psychiatric/Behavioral:  Negative for confusion, depression and sleep disturbance. The patient is not nervous/anxious.         Physical Exam:     Vitals:    07/22/24 1457    BP: (!) 164/76   Pulse: 71   Resp: 18   Temp: 97.6 °F (36.4 °C)       Physical Exam  Constitutional:       General: He is not in acute distress.     Appearance: He is well-developed. He is not diaphoretic.      Comments: Well dressed gentleman   HENT:      Head: Normocephalic and atraumatic.      Mouth/Throat:      Pharynx: No oropharyngeal exudate.   Eyes:      Conjunctiva/sclera: Conjunctivae normal.      Pupils: Pupils are equal, round, and reactive to light.   Neck:      Thyroid: No thyromegaly.      Vascular: No JVD.      Trachea: No tracheal deviation.   Cardiovascular:      Rate and Rhythm: Normal rate and regular rhythm.      Heart sounds: Normal heart sounds. No murmur heard.     No friction rub.   Pulmonary:      Effort: Pulmonary effort is normal. No respiratory distress.      Breath sounds: Normal breath sounds. No stridor. No wheezing or rales.   Chest:      Chest wall: No tenderness.   Abdominal:      General: Bowel sounds are normal. There is no distension.      Palpations: Abdomen is soft.      Tenderness: There is no abdominal tenderness. There is no guarding or rebound.   Musculoskeletal:         General: No tenderness or deformity. Normal range of motion.      Cervical back: Normal range of motion and neck supple.   Skin:     General: Skin is warm and dry.      Capillary Refill: Capillary refill takes less than 2 seconds.      Coloration: Skin is not pale.      Findings: No erythema or rash.   Neurological:      Mental Status: He is alert and oriented to person, place, and time.      Cranial Nerves: No cranial nerve deficit.      Sensory: No sensory deficit.      Motor: No abnormal muscle tone.      Coordination: Coordination normal.      Deep Tendon Reflexes: Reflexes normal.   Psychiatric:         Behavior: Behavior normal.         Thought Content: Thought content normal.         Judgment: Judgment normal.       ECOG Performance Status: (foot note - ECOG PS provided by Eastern Cooperative  Oncology Group) 1 - Symptomatic but completely ambulatory    Karnofsky Performance Score:  90%- Able to Carry on Normal Activity: Minor Symptoms of Disease    Labs:   Lab Results   Component Value Date    WBC 94.23 (HH) 07/05/2024    HGB 11.5 (L) 07/05/2024    HCT 34.4 (L) 07/05/2024     07/05/2024    CHOL 131 08/28/2023    TRIG 56 08/28/2023    HDL 56 08/28/2023    LDLDIRECT 69 08/28/2023    ALT 19 07/05/2024    AST 27 07/05/2024     (L) 07/05/2024    K 4.7 07/05/2024     07/05/2024    CREATININE 1.1 07/05/2024    BUN 13 07/05/2024    CO2 24 07/05/2024    TSH 3.184 01/31/2020    PSA 0.05 11/16/2021    INR 1.1 06/03/2024     Uric acid: 2.8  LDH: 145      Imaging: Outside imaging has been reviewed   Assessment and Plan:     Mr. 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     B-cell lymphoma with biallelic TP53 inactivation   --Seen on bone marrow biopsy, 2nd opinion pending at this visit  --Schedule for C1 of BR first available in [place]  --Return to clinic in 4 weeks prior to C2    Leukocytosis  --Adjust based on CBC, CMP at this visit   --Continue daily allopurinol      Prostate Cancer  --Treated with a prostatectomy      Juan Barajas D.O.  Hematology/Oncology Fellow, PGY-VI         BMT Chart Routing  Urgent    Follow up with physician 4 weeks. 4 weeks virtually with Dr. Quintana in AM   Follow up with MARY JANE    Provider visit type    Infusion scheduling note    Injection scheduling note Please schedule C1 & C2 of BR in [place], C1 first available   Labs CMP, CBC, LDH and uric acid   Scheduling:  Preferred lab:  Lab interval:  CBC, CMP, LDH, Uric acid in 4 weeks at [place]   Imaging    Pharmacy appointment    Other referrals

## 2024-07-29 ENCOUNTER — TELEPHONE (OUTPATIENT)
Dept: HEMATOLOGY/ONCOLOGY | Facility: CLINIC | Age: 84
End: 2024-07-29

## 2024-07-29 NOTE — TELEPHONE ENCOUNTER
----- Message from Calista Grady sent at 7/29/2024  9:25 AM CDT -----  Regarding: Pt Advice  Contact: [phone]  CONSULT/ADVISORY    Name of Caller:  FLAQUITA FLAHERTY JR. [[id number]]    Contact Preference:  [phone]      Nature of Call:  Pt was seen on 07/22/2024 and is wanting to know when his chemo appts will be scheduled at the [place] location.  Please call.

## 2024-07-29 NOTE — TELEPHONE ENCOUNTER
Attempted to return pt call. No answer. Left VM advising pt that we are working with insurance to get approval for treatment location at [facility] chemo infusion and will reach out later this week with an update.

## 2024-08-07 ENCOUNTER — TELEPHONE (OUTPATIENT)
Dept: RHEUMATOLOGY | Facility: CLINIC | Age: 84
End: 2024-08-07
Payer: MEDICARE

## 2024-08-07 ENCOUNTER — PATIENT MESSAGE (OUTPATIENT)
Dept: RHEUMATOLOGY | Facility: CLINIC | Age: 84
End: 2024-08-07
Payer: MEDICARE

## 2024-08-08 ENCOUNTER — TELEPHONE (OUTPATIENT)
Dept: HEMATOLOGY/ONCOLOGY | Facility: CLINIC | Age: 84
End: 2024-08-08
Payer: MEDICARE

## 2024-08-09 ENCOUNTER — LAB VISIT (OUTPATIENT)
Dept: LAB | Facility: HOSPITAL | Age: 84
End: 2024-08-09
Attending: INTERNAL MEDICINE
Payer: MEDICARE

## 2024-08-09 DIAGNOSIS — C91.10 CLL (CHRONIC LYMPHOCYTIC LEUKEMIA): ICD-10-CM

## 2024-08-09 LAB
ALBUMIN SERPL BCP-MCNC: 3.5 G/DL (ref 3.5–5.2)
ALP SERPL-CCNC: 81 U/L (ref 55–135)
ALT SERPL W/O P-5'-P-CCNC: 16 U/L (ref 10–44)
ANION GAP SERPL CALC-SCNC: 5 MMOL/L (ref 8–16)
ANISOCYTOSIS BLD QL SMEAR: ABNORMAL
AST SERPL-CCNC: 23 U/L (ref 10–40)
BASOPHILS NFR BLD: 0 % (ref 0–1.9)
BILIRUB SERPL-MCNC: 0.5 MG/DL (ref 0.1–1)
BUN SERPL-MCNC: 13 MG/DL (ref 8–23)
CALCIUM SERPL-MCNC: 8.9 MG/DL (ref 8.7–10.5)
CHLORIDE SERPL-SCNC: 99 MMOL/L (ref 95–110)
CO2 SERPL-SCNC: 24 MMOL/L (ref 23–29)
CREAT SERPL-MCNC: 1.1 MG/DL (ref 0.5–1.4)
DACRYOCYTES BLD QL SMEAR: ABNORMAL
DIFFERENTIAL METHOD BLD: ABNORMAL
EOSINOPHIL NFR BLD: 0.5 % (ref 0–8)
ERYTHROCYTE [DISTWIDTH] IN BLOOD BY AUTOMATED COUNT: 18.6 % (ref 11.5–14.5)
EST. GFR  (NO RACE VARIABLE): >60 ML/MIN/1.73 M^2
GLUCOSE SERPL-MCNC: 94 MG/DL (ref 70–110)
HCT VFR BLD AUTO: 30.9 % (ref 40–54)
HGB BLD-MCNC: 10.7 G/DL (ref 14–18)
HYPOCHROMIA BLD QL SMEAR: ABNORMAL
IMM GRANULOCYTES # BLD AUTO: ABNORMAL K/UL (ref 0–0.04)
IMM GRANULOCYTES NFR BLD AUTO: ABNORMAL % (ref 0–0.5)
LYMPHOCYTES NFR BLD: 96 % (ref 18–48)
MCH RBC QN AUTO: 36 PG (ref 27–31)
MCHC RBC AUTO-ENTMCNC: 34.6 G/DL (ref 32–36)
MCV RBC AUTO: 104 FL (ref 82–98)
MONOCYTES NFR BLD: 0.5 % (ref 4–15)
NEUTROPHILS NFR BLD: 2 % (ref 38–73)
NRBC BLD-RTO: 0 /100 WBC
OVALOCYTES BLD QL SMEAR: ABNORMAL
PLATELET # BLD AUTO: 133 K/UL (ref 150–450)
PLATELET BLD QL SMEAR: ABNORMAL
PMV BLD AUTO: 8.7 FL (ref 9.2–12.9)
POIKILOCYTOSIS BLD QL SMEAR: SLIGHT
POTASSIUM SERPL-SCNC: 4.4 MMOL/L (ref 3.5–5.1)
PROT SERPL-MCNC: 7.2 G/DL (ref 6–8.4)
RBC # BLD AUTO: 2.97 M/UL (ref 4.6–6.2)
SCHISTOCYTES BLD QL SMEAR: PRESENT
SODIUM SERPL-SCNC: 128 MMOL/L (ref 136–145)
WBC # BLD AUTO: 164.8 K/UL (ref 3.9–12.7)
WBC OTHER NFR BLD MANUAL: 1 %

## 2024-08-09 PROCEDURE — 80053 COMPREHEN METABOLIC PANEL: CPT | Performed by: INTERNAL MEDICINE

## 2024-08-09 PROCEDURE — 85027 COMPLETE CBC AUTOMATED: CPT | Performed by: INTERNAL MEDICINE

## 2024-08-09 PROCEDURE — 85007 BL SMEAR W/DIFF WBC COUNT: CPT | Performed by: INTERNAL MEDICINE

## 2024-08-09 PROCEDURE — 36415 COLL VENOUS BLD VENIPUNCTURE: CPT | Performed by: INTERNAL MEDICINE

## 2024-08-12 RX ORDER — EPINEPHRINE 0.3 MG/.3ML
0.3 INJECTION SUBCUTANEOUS ONCE AS NEEDED
Status: CANCELLED | OUTPATIENT
Start: 2024-08-13

## 2024-08-12 RX ORDER — HEPARIN 100 UNIT/ML
500 SYRINGE INTRAVENOUS
Status: CANCELLED | OUTPATIENT
Start: 2024-08-12

## 2024-08-12 RX ORDER — DIPHENHYDRAMINE HYDROCHLORIDE 50 MG/ML
50 INJECTION INTRAMUSCULAR; INTRAVENOUS ONCE AS NEEDED
Status: CANCELLED | OUTPATIENT
Start: 2024-08-13

## 2024-08-12 RX ORDER — EPINEPHRINE 0.3 MG/.3ML
0.3 INJECTION SUBCUTANEOUS ONCE AS NEEDED
Status: CANCELLED | OUTPATIENT
Start: 2024-08-12

## 2024-08-12 RX ORDER — SODIUM CHLORIDE 0.9 % (FLUSH) 0.9 %
10 SYRINGE (ML) INJECTION
Status: CANCELLED | OUTPATIENT
Start: 2024-08-12

## 2024-08-12 RX ORDER — FAMOTIDINE 10 MG/ML
20 INJECTION INTRAVENOUS
Status: CANCELLED | OUTPATIENT
Start: 2024-08-12

## 2024-08-12 RX ORDER — MEPERIDINE HYDROCHLORIDE 50 MG/ML
25 INJECTION INTRAMUSCULAR; INTRAVENOUS; SUBCUTANEOUS
Status: CANCELLED | OUTPATIENT
Start: 2024-08-12

## 2024-08-12 RX ORDER — ACETAMINOPHEN 325 MG/1
650 TABLET ORAL
Status: CANCELLED | OUTPATIENT
Start: 2024-08-12

## 2024-08-12 RX ORDER — HEPARIN 100 UNIT/ML
500 SYRINGE INTRAVENOUS
Status: CANCELLED | OUTPATIENT
Start: 2024-08-13

## 2024-08-12 RX ORDER — DIPHENHYDRAMINE HYDROCHLORIDE 50 MG/ML
50 INJECTION INTRAMUSCULAR; INTRAVENOUS ONCE AS NEEDED
Status: CANCELLED | OUTPATIENT
Start: 2024-08-12

## 2024-08-12 RX ORDER — SODIUM CHLORIDE 0.9 % (FLUSH) 0.9 %
10 SYRINGE (ML) INJECTION
Status: CANCELLED | OUTPATIENT
Start: 2024-08-13

## 2024-08-21 ENCOUNTER — OFFICE VISIT (OUTPATIENT)
Dept: RHEUMATOLOGY | Facility: CLINIC | Age: 84
End: 2024-08-21
Payer: MEDICARE

## 2024-08-21 VITALS
WEIGHT: 161.38 LBS | DIASTOLIC BLOOD PRESSURE: 58 MMHG | BODY MASS INDEX: 23.83 KG/M2 | HEART RATE: 69 BPM | SYSTOLIC BLOOD PRESSURE: 106 MMHG

## 2024-08-21 DIAGNOSIS — M11.849 CALCIUM PYROPHOSPHATE ARTHROPATHY OF HAND: ICD-10-CM

## 2024-08-21 DIAGNOSIS — M11.20 PSEUDOGOUT: ICD-10-CM

## 2024-08-21 DIAGNOSIS — I10 BENIGN ESSENTIAL HTN: Primary | ICD-10-CM

## 2024-08-21 PROCEDURE — 3078F DIAST BP <80 MM HG: CPT | Mod: CPTII,S$GLB,, | Performed by: INTERNAL MEDICINE

## 2024-08-21 PROCEDURE — 1101F PT FALLS ASSESS-DOCD LE1/YR: CPT | Mod: CPTII,S$GLB,, | Performed by: INTERNAL MEDICINE

## 2024-08-21 PROCEDURE — G2211 COMPLEX E/M VISIT ADD ON: HCPCS | Mod: S$GLB,,, | Performed by: INTERNAL MEDICINE

## 2024-08-21 PROCEDURE — 3074F SYST BP LT 130 MM HG: CPT | Mod: CPTII,S$GLB,, | Performed by: INTERNAL MEDICINE

## 2024-08-21 PROCEDURE — 99999 PR PBB SHADOW E&M-EST. PATIENT-LVL III: CPT | Mod: PBBFAC,,, | Performed by: INTERNAL MEDICINE

## 2024-08-21 PROCEDURE — 1126F AMNT PAIN NOTED NONE PRSNT: CPT | Mod: CPTII,S$GLB,, | Performed by: INTERNAL MEDICINE

## 2024-08-21 PROCEDURE — 3288F FALL RISK ASSESSMENT DOCD: CPT | Mod: CPTII,S$GLB,, | Performed by: INTERNAL MEDICINE

## 2024-08-21 PROCEDURE — 1159F MED LIST DOCD IN RCRD: CPT | Mod: CPTII,S$GLB,, | Performed by: INTERNAL MEDICINE

## 2024-08-21 PROCEDURE — 99214 OFFICE O/P EST MOD 30 MIN: CPT | Mod: S$GLB,,, | Performed by: INTERNAL MEDICINE

## 2024-08-21 PROCEDURE — 1160F RVW MEDS BY RX/DR IN RCRD: CPT | Mod: CPTII,S$GLB,, | Performed by: INTERNAL MEDICINE

## 2024-08-21 RX ORDER — COLCHICINE 0.6 MG/1
0.6 CAPSULE ORAL DAILY
Qty: 90 CAPSULE | Refills: 4 | Status: SHIPPED | OUTPATIENT
Start: 2024-08-21 | End: 2025-11-14

## 2024-08-21 RX ORDER — GABAPENTIN 300 MG/1
300 CAPSULE ORAL DAILY
Qty: 90 CAPSULE | Refills: 4 | Status: SHIPPED | OUTPATIENT
Start: 2024-08-21 | End: 2025-11-14

## 2024-08-21 RX ORDER — DICLOFENAC SODIUM 10 MG/G
2 GEL TOPICAL 2 TIMES DAILY PRN
Qty: 100 G | Refills: 5 | Status: SHIPPED | OUTPATIENT
Start: 2024-08-21

## 2024-08-21 NOTE — PROGRESS NOTES
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    Notes:     History of presenting illness    84 year old male presented with severe pain in both hands in aug 2019  He went to rheumatology and hematology : leukemia diagnosed  Now came to cancer center : CLL diagnosed    Has been to ER with excruciating pain in the hands   Now sees   On ibrutinib    Episodes so far  :    Pain and swelling right hand   Pain and swelling left hand  Pain and swelling b/l hands    Triggers : none   Started late evening : came on very fast   Got relief from steroids   Each episode would last for hours     One episode : he had fever +  No rash    CRP 18.8  ESR 13 during the episode    ESR 52 during another episode  CRP 71.2    EVE neg  RF neg    Uric acids 3.8 to 4.2 during the episodes    At nights : numbness both hands  Tips of fingers are numb    EMS for 10 minutes   When not in a flare he is ok,he can use his hands and he only has some soreness   He works with his hands all day and he has no problems     MRI C/T spine : 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    Right hand xray  No fracture or dislocation.  Degenerative joint space narrowing at the 1st digit MCP and IP joints, with additional moderate degenerative area at the 2nd through 4th MCP and DIP joints.  No osseous lytic or blastic lesion.  Soft tissues are unremarkable.    Right wrist xray  There is no acute fracture or dislocation.  No significant soft tissue swelling.  The carpal bones are intact with mild degenerative osteoarthrosis.  The radiocarpal articulation is intact.  The ulnar styloid is intact.    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 Doing well on medication. No longer using meloxicam. Needs refills on colchicine and gabapentin.     Hands are doing well. No flare up or symptoms.     Paraesthesias to hand have improved.     Right shoulder with decreased ROM but denies pain.     Component Ref Range & Units 1 mo ago  (8/7/23) 2 mo ago  (7/25/23) 4 mo ago  (5/19/23) 5 mo ago  (4/18/23) 11 mo ago  (10/17/22) 1 yr ago  (8/1/22) 1 yr ago  (5/16/22)   WBC 3.90 - 12.70 K/uL 24.31 High   20.89 High   14.94 High   18.42 High   12.46  11.33  13.79 High     RBC 4.60 - 6.20 M/uL 3.81 Low   3.74 Low   3.76 Low   4.09 Low   3.76 Low   3.79 Low   3.91 Low     Hemoglobin 14.0 - 18.0 g/dL 12.3 Low   12.0 Low   12.3 Low   13.3 Low   12.4 Low   12.5 Low   12.5 Low     Hematocrit 40.0 - 54.0 % 36.7 Low   36.3 Low   36.5 Low   39.9 Low   36.3 Low   36.7 Low   37.5 Low    CMP wnl         8/2024    Right shoulder hurts intermittently  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  Differential considerations include splenic marginal zone lymphoma, splenic diffuse red pulp small B-cell lymphoma, splenic B-cell leukemia/lymphoma - on Bendamustine + Rituxan.     On colchicine and no pseudogout flares      Past history  HTN,HLD,leukemia,COPD    Family history  Diabetes     Social history    Former tobacco smoker quit 1999    Review  of Systems    No skin rashes,malar rash,photosensitivity   No telangiectasias   No calcinosis   No psoriasis   No patchy alopecia   No oral and nasal ulcers   No dry eyes and dry mouth   No pleurisy or any cardiopulmonary complaints except for COPD  No dysphagia,diplopia and dysphonia and muscle weakness   No n/v/d/c   No acid reflux+   No raynaud's+   No digital ulcers   No renal issues   No blood clots   No fever,chills,night sweats,weight loss and loss of appetite   No new onset headaches   No recurrent conjunctivitis or uveitis or scleritis or episcleritis   No chronic or bloody diarrhea with no u colitis or crohn's /inflammatory bowel disease   No penile and urethral  d/c/STDs/no ulcers     Physical Exam   Constitutional: He is oriented to person, place, and time. No distress.   HENT:   Head: Normocephalic.   Mouth/Throat: Oropharynx is clear and moist.   Eyes: Pupils are equal, round, and reactive to light. Conjunctivae are normal. Right eye exhibits no discharge. Left eye exhibits no discharge. No scleral icterus.   Neck: No thyromegaly present.   Cardiovascular: Normal rate, regular rhythm and normal heart sounds.   Pulmonary/Chest: Effort normal and breath sounds normal. No stridor.   Abdominal: Soft. Bowel sounds are normal.   Musculoskeletal:         General: Normal range of motion.      Cervical back: Normal range of motion.   Lymphadenopathy:     He has no cervical adenopathy.   Neurological: He is alert and oriented to person, place, and time.   Skin: Skin is warm. No rash noted. He is not diaphoretic.   Psychiatric: Affect and judgment normal.     Assessment     84 year old male with  HTN,HLD,leukemia,COPD  comes with 3 episodes of acute onset pain and swelling in the b/l hands since august 2019  He now has a diagnosis of CLL but doesn't need treatment    Episodes so far  :    Pain and swelling right hand   Pain and swelling left hand  Pain and swelling b/l hands    Triggers : none   Started late  evening : came on very fast   Got relief from steroids   Each episode would last for hours   One episode : he had fever +  No rash    During each episode his ESR/CRP have gone high  EVE neg  RF neg  Uric acids 3.8 to 4.2 during the episodes    Last flare Dec 2019  When not in flare has some hand and arm paresthesias and no pain    Today joint exam unremarkable     Xrays right hand and wrist : moderate deg changes from 1 to 4 MCPs/DIPs    No psoriasis or inf bowel disease    Suspect Calcium pyrophosphate deposition disease with acute pseudogout flares     We initiated colchicine 0.6 mg daily and he has tolerated it well with no side effects    Also on gabapentin 300 mg bed time     He has very minimal neuropathy symptoms today overall he has done well    Left CMC OA  Hands have OA- changes    His Labs were discussed     CMP nml  Cbc abnormalities might be due to his hematologic malignancy  CLL stable     On ibrutinib now    Right shoulder advanced deg changes in GH joint       8/2024    Right shoulder hurts intermittently  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  Differential considerations include splenic marginal zone lymphoma, splenic diffuse red pulp small B-cell lymphoma, splenic B-cell leukemia/lymphoma - on Bendamustine + Rituxan.   White count is 164.80  H/h 10.7/30.9  Plts 133      On colchicine and no pseudogout flares    I see that his blood pressure is low 106/58  It has gone down to 96/58  He takes diovan and metoprolol    1. Benign essential HTN    2. Calcium pyrophosphate arthropathy of hand    3. Pseudogout          F/u problem     Plan    Right shoulder- he prefers home PT  Avoiding the repetitive activities is not an option for him now  He wishes not to do injections and outpatient PT    Continue colchicine 0.6 mg every day  Continue gabapentin 300 mg daily     Allopurinol 300 mg  daily is from hematology  Hydrea is from hematology  Rituximab is from hematology    Prn meloxicam only   Topical voltaren gel offered    Asked him to maintain a blood pressure log and make sure his blood pressure medications are adjusted     Liver and kidney function ok    Sodium tends to be a little low- it started after valsartan-he will bring it up with PCP  So may be when he reduces the blood pressure medications,he can start with dose reduction on valsartan  All these need to be discussed with PCP first    He takes metoprolol for rate control    If future flares, call me for steroids     If the hand paresthesias worsen then he will contact me and we will do EMG/NSE UE     Rtc 12 months    Kevin was seen today for follow-up.    Diagnoses and all orders for this visit:    Benign essential HTN    Calcium pyrophosphate arthropathy of hand  -     colchicine (MITIGARE) 0.6 mg Cap; Take 1 capsule (0.6 mg total) by mouth once daily.  -     gabapentin (NEURONTIN) 300 MG capsule; Take 1 capsule (300 mg total) by mouth once daily.    Pseudogout    Other orders  -     diclofenac sodium (VOLTAREN) 1 % Gel; Apply 2 g topically 2 (two) times daily as needed (pain).            rtc in 12 months

## 2024-08-26 RX ORDER — IPRATROPIUM BROMIDE 0.5 MG/2.5ML
SOLUTION RESPIRATORY (INHALATION)
Qty: 450 ML | Refills: 12 | Status: CANCELLED | OUTPATIENT
Start: 2024-08-26

## 2024-08-29 RX ORDER — IPRATROPIUM BROMIDE 0.5 MG/2.5ML
SOLUTION RESPIRATORY (INHALATION)
Qty: 450 ML | Refills: 12 | Status: CANCELLED | OUTPATIENT
Start: 2024-08-26

## 2024-09-05 ENCOUNTER — LAB VISIT (OUTPATIENT)
Dept: LAB | Facility: HOSPITAL | Age: 84
End: 2024-09-05
Attending: INTERNAL MEDICINE
Payer: MEDICARE

## 2024-09-05 ENCOUNTER — TELEPHONE (OUTPATIENT)
Dept: HEMATOLOGY/ONCOLOGY | Facility: CLINIC | Age: 84
End: 2024-09-05
Payer: MEDICARE

## 2024-09-05 DIAGNOSIS — C91.10 CLL (CHRONIC LYMPHOCYTIC LEUKEMIA): ICD-10-CM

## 2024-09-05 LAB
ALBUMIN SERPL BCP-MCNC: 3.3 G/DL (ref 3.5–5.2)
ALP SERPL-CCNC: 79 U/L (ref 55–135)
ALT SERPL W/O P-5'-P-CCNC: 26 U/L (ref 10–44)
ANION GAP SERPL CALC-SCNC: 8 MMOL/L (ref 8–16)
AST SERPL-CCNC: 23 U/L (ref 10–40)
BASOPHILS NFR BLD: 0 % (ref 0–1.9)
BILIRUB SERPL-MCNC: 0.4 MG/DL (ref 0.1–1)
BUN SERPL-MCNC: 14 MG/DL (ref 8–23)
CALCIUM SERPL-MCNC: 8.7 MG/DL (ref 8.7–10.5)
CHLORIDE SERPL-SCNC: 102 MMOL/L (ref 95–110)
CO2 SERPL-SCNC: 21 MMOL/L (ref 23–29)
CREAT SERPL-MCNC: 1.1 MG/DL (ref 0.5–1.4)
DIFFERENTIAL METHOD BLD: ABNORMAL
EOSINOPHIL NFR BLD: 0 % (ref 0–8)
ERYTHROCYTE [DISTWIDTH] IN BLOOD BY AUTOMATED COUNT: 16.7 % (ref 11.5–14.5)
EST. GFR  (NO RACE VARIABLE): >60 ML/MIN/1.73 M^2
GLUCOSE SERPL-MCNC: 108 MG/DL (ref 70–110)
HCT VFR BLD AUTO: 28.3 % (ref 40–54)
HGB BLD-MCNC: 9.9 G/DL (ref 14–18)
IMM GRANULOCYTES # BLD AUTO: ABNORMAL K/UL (ref 0–0.04)
IMM GRANULOCYTES NFR BLD AUTO: ABNORMAL % (ref 0–0.5)
LYMPHOCYTES NFR BLD: 86 % (ref 18–48)
MCH RBC QN AUTO: 38.5 PG (ref 27–31)
MCHC RBC AUTO-ENTMCNC: 35 G/DL (ref 32–36)
MCV RBC AUTO: 110 FL (ref 82–98)
MONOCYTES NFR BLD: 13 % (ref 4–15)
NEUTROPHILS NFR BLD: 1 % (ref 38–73)
NRBC BLD-RTO: 0 /100 WBC
PLATELET # BLD AUTO: 90 K/UL (ref 150–450)
PMV BLD AUTO: 8.8 FL (ref 9.2–12.9)
POTASSIUM SERPL-SCNC: 4.3 MMOL/L (ref 3.5–5.1)
PROT SERPL-MCNC: 6.8 G/DL (ref 6–8.4)
RBC # BLD AUTO: 2.57 M/UL (ref 4.6–6.2)
SODIUM SERPL-SCNC: 131 MMOL/L (ref 136–145)
WBC # BLD AUTO: 98.33 K/UL (ref 3.9–12.7)

## 2024-09-05 PROCEDURE — 80053 COMPREHEN METABOLIC PANEL: CPT | Performed by: INTERNAL MEDICINE

## 2024-09-05 PROCEDURE — 85027 COMPLETE CBC AUTOMATED: CPT | Performed by: INTERNAL MEDICINE

## 2024-09-05 PROCEDURE — 85060 BLOOD SMEAR INTERPRETATION: CPT | Mod: ,,, | Performed by: PATHOLOGY

## 2024-09-05 PROCEDURE — 36415 COLL VENOUS BLD VENIPUNCTURE: CPT | Performed by: INTERNAL MEDICINE

## 2024-09-05 PROCEDURE — 88189 FLOWCYTOMETRY/READ 16 & >: CPT | Mod: ,,, | Performed by: PATHOLOGY

## 2024-09-05 PROCEDURE — 88184 FLOWCYTOMETRY/ TC 1 MARKER: CPT | Performed by: PATHOLOGY

## 2024-09-05 PROCEDURE — 85007 BL SMEAR W/DIFF WBC COUNT: CPT | Performed by: INTERNAL MEDICINE

## 2024-09-05 PROCEDURE — 88185 FLOWCYTOMETRY/TC ADD-ON: CPT | Performed by: PATHOLOGY

## 2024-09-06 ENCOUNTER — OFFICE VISIT (OUTPATIENT)
Dept: HEMATOLOGY/ONCOLOGY | Facility: CLINIC | Age: 84
End: 2024-09-06
Payer: MEDICARE

## 2024-09-06 DIAGNOSIS — Z79.899 DRUG-INDUCED IMMUNODEFICIENCY: ICD-10-CM

## 2024-09-06 DIAGNOSIS — C83.09 SMALL B-CELL LYMPHOMA OF EXTRANODAL SITE EXCLUDING SPLEEN AND OTHER SOLID ORGANS: Primary | ICD-10-CM

## 2024-09-06 DIAGNOSIS — D84.821 DRUG-INDUCED IMMUNODEFICIENCY: ICD-10-CM

## 2024-09-06 LAB
FLOW CYTOMETRY ANTIBODIES ANALYZED - BLOOD: NORMAL
FLOW CYTOMETRY COMMENT - BLOOD: NORMAL
FLOW CYTOMETRY INTERPRETATION - BLOOD: NORMAL
PATH REV BLD -IMP: NORMAL

## 2024-09-06 PROCEDURE — G2211 COMPLEX E/M VISIT ADD ON: HCPCS | Mod: 95,,, | Performed by: INTERNAL MEDICINE

## 2024-09-06 PROCEDURE — 99215 OFFICE O/P EST HI 40 MIN: CPT | Mod: 95,,, | Performed by: INTERNAL MEDICINE

## 2024-09-06 RX ORDER — SODIUM CHLORIDE 0.9 % (FLUSH) 0.9 %
10 SYRINGE (ML) INJECTION
Status: CANCELLED | OUTPATIENT
Start: 2024-09-10

## 2024-09-06 RX ORDER — EPINEPHRINE 0.3 MG/.3ML
0.3 INJECTION SUBCUTANEOUS ONCE AS NEEDED
Status: CANCELLED | OUTPATIENT
Start: 2024-09-09

## 2024-09-06 RX ORDER — SODIUM CHLORIDE 0.9 % (FLUSH) 0.9 %
10 SYRINGE (ML) INJECTION
Status: CANCELLED | OUTPATIENT
Start: 2024-09-09

## 2024-09-06 RX ORDER — EPINEPHRINE 0.3 MG/.3ML
0.3 INJECTION SUBCUTANEOUS ONCE AS NEEDED
Status: CANCELLED | OUTPATIENT
Start: 2024-09-10

## 2024-09-06 RX ORDER — HEPARIN 100 UNIT/ML
500 SYRINGE INTRAVENOUS
Status: CANCELLED | OUTPATIENT
Start: 2024-09-09

## 2024-09-06 RX ORDER — ACETAMINOPHEN 325 MG/1
650 TABLET ORAL
Status: CANCELLED | OUTPATIENT
Start: 2024-09-09

## 2024-09-06 RX ORDER — DIPHENHYDRAMINE HYDROCHLORIDE 50 MG/ML
50 INJECTION INTRAMUSCULAR; INTRAVENOUS ONCE AS NEEDED
Status: CANCELLED | OUTPATIENT
Start: 2024-09-10

## 2024-09-06 RX ORDER — FAMOTIDINE 10 MG/ML
20 INJECTION INTRAVENOUS
Status: CANCELLED | OUTPATIENT
Start: 2024-09-09

## 2024-09-06 RX ORDER — HEPARIN 100 UNIT/ML
500 SYRINGE INTRAVENOUS
Status: CANCELLED | OUTPATIENT
Start: 2024-09-10

## 2024-09-06 RX ORDER — DIPHENHYDRAMINE HYDROCHLORIDE 50 MG/ML
50 INJECTION INTRAMUSCULAR; INTRAVENOUS ONCE AS NEEDED
Status: CANCELLED | OUTPATIENT
Start: 2024-09-09

## 2024-09-06 RX ORDER — MEPERIDINE HYDROCHLORIDE 50 MG/ML
25 INJECTION INTRAMUSCULAR; INTRAVENOUS; SUBCUTANEOUS
Status: CANCELLED | OUTPATIENT
Start: 2024-09-09

## 2024-09-06 NOTE — PROGRESS NOTES
Hematology and Medical Oncology   Follow Up     09/06/2024    Primary Oncologic Diagnosis: B cell lymphoma    Telemedicine Documentation:  The patient location is: Home  The chief complaint leading to consultation is: B cell lymphoma    Visit type: audiovisual    Face to Face time with patient: 25  40 minutes of total time spent on the encounter, which includes face to face time and non-face to face time preparing to see the patient (eg, review of tests), Obtaining and/or reviewing separately obtained history, Documenting clinical information in the electronic or other health record, Independently interpreting results (not separately reported) and communicating results to the patient/family/caregiver, or Care coordination (not separately reported).         Each patient to whom he or she provides medical services by telemedicine is:  (1) informed of the relationship between the physician and patient and the respective role of any other health care provider with respect to management of the patient; and (2) notified that he or she may decline to receive medical services by telemedicine and may withdraw from such care at any time.      History of Present Ilness:   Kevin MARSHALL Juwan Gonsalez (Kevin) is a pleasant 84 y.o.male who presents to transition care to [facility] from the [facility]. The patient presents today with his wife and daughter. Most of his issues started in August when he began experiencing horrible pain in his hands that was worse with lack of motion/rest. The pain is excruciating and has sent him to the ED several times. The pain can happen in either upper extremity and feels like it travels at times. The discomfort abates as he moves his hands more throughout the day.     Incidentally through these ER visits it was noted that he has a persistently elevated but largely stable WBC.    --Pathologist review of the peripheral smear on 12/21/19 Leukocytosis with an absolute and relative lymphocytosis.      Lymphocytes are mature, and a subset displays a slightly atypical chromatin pattern.  Smudge cells are present.  Background granulocytes are morphologically normal.  The morphology suggests a B cell lymphoproliferative disorder such as CLL.  --Flow Cytometry on 1/3/2020: A B cell lymphoproliferative disorder is present.  Mantle cell lymphoma is favored although an atypical CLL cannot be completely ruled out; correlation   with morphology and with any available cytogenetic or molecular studies (t(11;14)) is required.     --Bone marrow biopsy on 6/4/24: B-cell lymphoma with biallelic TP53 inactivation.  Differential considerations include splenic marginal zone lymphoma, splenic diffuse red pulp small B-cell lymphoma, splenic B-cell  leukemia/lymphoma with prominent nucleoli (ICC classification, 2022), and less likely atypical chronic lymphocytic leukemia/small lymphocytic lymphoma.     --PET CT showing *Left periaortic lymph node just inferior to the left renal vein measures 0.9 cm with SUV max 2.6 (series 3, image 157).  *Aortocaval lymph node approximately at L2 measures 0.9 cm with SUV max 2.6 (series 3, image 161).  Prostatectomy for prostate cancer.  No hypermetabolic foci within the prostatectomy bed.    Prominent mildly hypermetabolic retroperitoneal lymph nodes.     The Deauville Score is: 4      Interval History:  Mr. Echeverria is here today to discuss his response to cycle 1 of BR therapy.  bone marrow biopsy & PET results as noted above. He reports feeling well with no significant B symptoms at this time and is eager to start treatment when scheduled. He has been compliant with his hydrea with no significant adverse side-effects.     PAST MEDICAL HISTORY:   Past Medical History:   Diagnosis Date    Arthritis     Cancer     prostate    COPD (chronic obstructive pulmonary disease)     Hyperlipidemia     Hypertension     Leukemia        PAST SURGICAL HISTORY:   Past Surgical History:   Procedure Laterality  Date    CATARACT EXTRACTION W/  INTRAOCULAR LENS IMPLANT Left 05/05/2016    COLONOSCOPY W/ BIOPSIES AND POLYPECTOMY      PROSTATE SURGERY  05/1996    TONSILLECTOMY  1956       PAST SOCIAL HISTORY:   reports that he has never smoked. He quit smokeless tobacco use about 25 years ago.  His smokeless tobacco use included chew. He reports current alcohol use. He reports that he does not use drugs.    FAMILY HISTORY:  Family History   Problem Relation Name Age of Onset    Diabetes Mother         CURRENT MEDICATIONS:   Current Outpatient Medications   Medication Sig    albuterol (PROVENTIL) 2.5 mg /3 mL (0.083 %) nebulizer solution Take 1 vial by nebulization twice daily.    allopurinoL (ZYLOPRIM) 300 MG tablet Take 1 tablet (300 mg total) by mouth once daily.    aspirin (ECOTRIN) 81 MG EC tablet Take 81 mg by mouth once daily.    budesonide-glycopyr-formoterol (BREZTRI AEROSPHERE) 160-9-4.8 mcg/actuation HFAA INSTILL 2 PUFFS BY MOUTH EVERY 12 HOURS    colchicine (MITIGARE) 0.6 mg Cap Take 1 capsule (0.6 mg total) by mouth once daily.    diclofenac sodium (VOLTAREN) 1 % Gel Apply 2 g topically 2 (two) times daily as needed (pain).    famotidine (PEPCID) 20 MG tablet Take 1 tablet orally 2 times a day as needed for heart burn    fish oil-omega-3 fatty acids 300-1,000 mg capsule Take 1 g by mouth once daily. (Patient not taking: Reported on 7/22/2024)    gabapentin (NEURONTIN) 300 MG capsule Take 1 capsule (300 mg total) by mouth once daily.    hydroxyurea (HYDREA) 500 mg Cap Take 1 capsule (500 mg total) by mouth once daily.    hydroxyurea (HYDREA) 500 mg Cap Take 2 capsules (1,000 mg total) by mouth once daily.    ipratropium (ATROVENT) 0.02 % nebulizer solution Use contents of one vial every 12 hours    L.acid/B.animalis,bifidum/FOS (PROBIOTIC COMPLEX ORAL) Take by mouth.    LORazepam (ATIVAN) 1 MG tablet Take 1 tablet (1 mg total) by mouth 2 (two) times daily. (Patient not taking: Reported on 7/22/2024)    metoprolol  succinate (TOPROL-XL) 25 MG 24 hr tablet Take 1 tablet orally once a day.    metoprolol succinate (TOPROL-XL) 25 MG 24 hr tablet Take 1 tablet orally once a day.    montelukast (SINGULAIR) 10 mg tablet Take 1 tablet (10 mg total) by mouth every evening.    multivitamin with minerals tablet Take 1 tablet by mouth once daily. Equate Brand with Iron    nebulizer accessories Misc Use as directed    nebulizer and compressor Hyacinth USE WITH NEBULIZER SOLUTION AS DIRECTED    nebulizer and compressor Hyacinth Use with nebulizer treatments    omeprazole (PRILOSEC) 20 MG capsule Take 1 capsule (20 mg total) by mouth every morning.    pravastatin (PRAVACHOL) 20 MG tablet Take 1 tablet orally once in the evening.    valsartan (DIOVAN) 160 MG tablet Take 1 tablet orally once a day.     No current facility-administered medications for this visit.     ALLERGIES:   Review of patient's allergies indicates:  No Known Allergies      Review of Systems:     Review of Systems   Constitutional:  Negative for appetite change, chills, diaphoresis, fatigue, fever and unexpected weight change.   HENT:   Negative for hearing loss, mouth sores, nosebleeds, sore throat, trouble swallowing and voice change.    Eyes:  Negative for eye problems and icterus.   Respiratory:  Negative for chest tightness, cough, hemoptysis, shortness of breath and wheezing.    Cardiovascular:  Negative for chest pain, leg swelling and palpitations.   Gastrointestinal:  Negative for abdominal distention, abdominal pain, blood in stool, diarrhea, nausea and vomiting.   Endocrine: Negative for hot flashes.   Genitourinary:  Negative for bladder incontinence, difficulty urinating, dysuria and hematuria.    Musculoskeletal:  Positive for arthralgias and neck pain. Negative for back pain, flank pain, gait problem, myalgias and neck stiffness.   Skin:  Negative for itching, rash and wound.   Neurological:  Negative for dizziness, extremity weakness, gait problem, headaches,  numbness, seizures and speech difficulty.   Hematological:  Negative for adenopathy. Does not bruise/bleed easily.   Psychiatric/Behavioral:  Negative for confusion, depression and sleep disturbance. The patient is not nervous/anxious.         Physical Exam:     There were no vitals filed for this visit.      Physical Exam  Constitutional:       General: He is not in acute distress.     Appearance: He is well-developed. He is not diaphoretic.      Comments: Well dressed gentleman   HENT:      Head: Normocephalic and atraumatic.      Mouth/Throat:      Pharynx: No oropharyngeal exudate.   Eyes:      Conjunctiva/sclera: Conjunctivae normal.      Pupils: Pupils are equal, round, and reactive to light.   Neck:      Thyroid: No thyromegaly.      Vascular: No JVD.      Trachea: No tracheal deviation.   Cardiovascular:      Rate and Rhythm: Normal rate and regular rhythm.      Heart sounds: Normal heart sounds. No murmur heard.     No friction rub.   Pulmonary:      Effort: Pulmonary effort is normal. No respiratory distress.      Breath sounds: Normal breath sounds. No stridor. No wheezing or rales.   Chest:      Chest wall: No tenderness.   Abdominal:      General: Bowel sounds are normal. There is no distension.      Palpations: Abdomen is soft.      Tenderness: There is no abdominal tenderness. There is no guarding or rebound.   Musculoskeletal:         General: No tenderness or deformity. Normal range of motion.      Cervical back: Normal range of motion and neck supple.   Skin:     General: Skin is warm and dry.      Capillary Refill: Capillary refill takes less than 2 seconds.      Coloration: Skin is not pale.      Findings: No erythema or rash.   Neurological:      Mental Status: He is alert and oriented to person, place, and time.      Cranial Nerves: No cranial nerve deficit.      Sensory: No sensory deficit.      Motor: No abnormal muscle tone.      Coordination: Coordination normal.      Deep Tendon Reflexes:  Reflexes normal.   Psychiatric:         Behavior: Behavior normal.         Thought Content: Thought content normal.         Judgment: Judgment normal.         ECOG Performance Status: (foot note - ECOG PS provided by Eastern Cooperative Oncology Group) 1 - Symptomatic but completely ambulatory    Karnofsky Performance Score:  90%- Able to Carry on Normal Activity: Minor Symptoms of Disease    Labs:   Lab Results   Component Value Date    WBC 98.33 (HH) 09/05/2024    HGB 9.9 (L) 09/05/2024    HCT 28.3 (L) 09/05/2024    PLT 90 (L) 09/05/2024    CHOL 131 08/28/2023    TRIG 56 08/28/2023    HDL 56 08/28/2023    LDLDIRECT 69 08/28/2023    ALT 26 09/05/2024    AST 23 09/05/2024     (L) 09/05/2024    K 4.3 09/05/2024     09/05/2024    CREATININE 1.1 09/05/2024    BUN 14 09/05/2024    CO2 21 (L) 09/05/2024    TSH 3.184 01/31/2020    PSA 0.05 11/16/2021    INR 1.1 06/03/2024     Uric acid: 2.8  LDH: 145      Imaging: Outside imaging has been reviewed   Assessment and Plan:     Mr. 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     B-cell lymphoma with biallelic TP53 inactivation   --Seen on bone marrow biopsy  --Doing well on therapy. No complaints  --Okay to proceed with therapy on Monday 9/9  --Return to clinic in 4 weeks prior to C3    Leukocytosis  --Continue daily allopurinol  --White count is down trending      Prostate Cancer  --Treated with a prostatectomy      40 minutes in total were spent on this encounter of which 30 minutes were spent face to face with the patient and his  family to discuss the disease, natural history, treatment options and survival statistics. I have provided the patient with an opportunity to ask questions and have all questions answered to his satisfaction.         he will return to clinic prior to cycle 3 of therapy, but knows to call in the interim if symptoms change or should a problem arise.    Visit today included increased complexity associated with the care of  the episodic problem B cell lymphoma addressed and managing the longitudinal care of the patient due to the serious and/or complex managed problem(s) B cell lymphoma.       Jaquelin Quintana MD  Hematology and Medical Oncology  Bone Marrow Transplant  [facility]      BMT Chart Routing  Urgent    Follow up with physician 4 weeks. 1.see me on 10/3 and 10/31 at 8am with labs: cbc,cmp 2. BR [ilya/rituxan] chemo at [place] [address]40/7, 10/8, 11/4, 11/5   Follow up with MARY JANE    Provider visit type    Infusion scheduling note    Injection scheduling note    Labs    Imaging    Pharmacy appointment    Other referrals

## 2024-09-09 ENCOUNTER — TELEPHONE (OUTPATIENT)
Dept: HEMATOLOGY/ONCOLOGY | Facility: CLINIC | Age: 84
End: 2024-09-09
Payer: MEDICARE

## 2024-09-09 DIAGNOSIS — C83.09 SMALL B-CELL LYMPHOMA OF EXTRANODAL SITE EXCLUDING SPLEEN AND OTHER SOLID ORGANS: Primary | ICD-10-CM

## 2024-09-10 RX ORDER — IPRATROPIUM BROMIDE 0.5 MG/2.5ML
SOLUTION RESPIRATORY (INHALATION)
Qty: 450 ML | Refills: 12 | Status: CANCELLED | OUTPATIENT
Start: 2024-08-26

## 2024-09-16 DIAGNOSIS — C92.10 CML (CHRONIC MYELOCYTIC LEUKEMIA): ICD-10-CM

## 2024-09-16 RX ORDER — IPRATROPIUM BROMIDE 0.5 MG/2.5ML
SOLUTION RESPIRATORY (INHALATION)
Qty: 450 ML | Refills: 12 | OUTPATIENT
Start: 2024-09-16

## 2024-09-16 RX ORDER — IPRATROPIUM BROMIDE 0.5 MG/2.5ML
SOLUTION RESPIRATORY (INHALATION)
Qty: 450 ML | Refills: 12 | Status: SHIPPED | OUTPATIENT
Start: 2024-09-16

## 2024-09-16 RX ORDER — ALLOPURINOL 300 MG/1
300 TABLET ORAL DAILY
Qty: 30 TABLET | Refills: 3 | Status: CANCELLED | OUTPATIENT
Start: 2024-09-16

## 2024-09-16 RX ORDER — MONTELUKAST SODIUM 10 MG/1
10 TABLET ORAL NIGHTLY
Qty: 90 TABLET | Refills: 0 | Status: SHIPPED | OUTPATIENT
Start: 2024-09-16

## 2024-09-16 NOTE — TELEPHONE ENCOUNTER
----- Message from Anson Porras sent at 2024  8:12 AM CDT -----  Contact: self  Kevin Echeverria Jr.  MRN: [id number]  : 1940  PCP: Bridger Cao  Home Phone      Not on file.  Work Phone      Not on file.  Mobile          [phone]      MESSAGE:   Pt requesting refill or new Rx.   Is this a refill or new RX:  refill  RX name and strength: ipratropium (ATROVENT) 0.02 % nebulizer solution   Last office visit: 10.30.23  Is this a 30-day or 90-day RX:  30  Pharmacy name and location:  [facility]      Comments:      Phone:  [phone]

## 2024-09-16 NOTE — TELEPHONE ENCOUNTER
No care due was identified.  [facility] Embedded Care Due Messages. Reference number: [id number].   9/16/2024 8:15:35 AM CDT

## 2024-09-16 NOTE — TELEPHONE ENCOUNTER
LOV: 10/30/2023        Medication not prescribed by a provider in this clinic.     Patient notified through Say-Hey message.

## 2024-09-16 NOTE — TELEPHONE ENCOUNTER
No care due was identified.  [facility] Embedded Care Due Messages. Reference number: [id number].   9/16/2024 8:17:27 AM CDT

## 2024-09-16 NOTE — TELEPHONE ENCOUNTER
----- Message from Anson Porras sent at 2024  8:12 AM CDT -----  Contact: self  Kevin Echeverria Jr.  MRN: [id number]  : 1940  PCP: Bridger Cao  Home Phone      Not on file.  Work Phone      Not on file.  Mobile          [phone]      MESSAGE:   Pt requesting refill or new Rx.   Is this a refill or new RX:  refill  RX name and strength: ipratropium (ATROVENT) 0.02 % nebulizer solution   Last office visit: 10.30.23  Is this a 30-day or 90-day RX:  30  Pharmacy name and location:  [facility]      Comments:      Phone:  [phone]

## 2024-09-16 NOTE — TELEPHONE ENCOUNTER
Refill Decision Note   Kevin Echeverria  is requesting a refill authorization.  Brief Assessment and Rationale for Refill:  Approve     Medication Therapy Plan:         Comments:     Note composed:8:26 AM 09/16/2024

## 2024-09-17 RX ORDER — ALLOPURINOL 300 MG/1
300 TABLET ORAL DAILY
Qty: 30 TABLET | Refills: 3 | Status: SHIPPED | OUTPATIENT
Start: 2024-09-17

## 2024-09-19 ENCOUNTER — TELEPHONE (OUTPATIENT)
Dept: INTERNAL MEDICINE | Facility: CLINIC | Age: 84
End: 2024-09-19
Payer: MEDICARE

## 2024-09-19 NOTE — TELEPHONE ENCOUNTER
----- Message from Anson Porras sent at 2024  8:21 AM CDT -----  Contact: self  Kevin Echeverria Jr.  MRN: [id number]  : 1940  PCP: Bridger Cao  Home Phone      Not on file.  Work Phone      Not on file.  Mobile          [phone]      MESSAGE:   Patient wife Dominique Echeverria, has a apt 10/28. He is wanting to come with her for a check up    Please advise      [phone]

## 2024-09-19 NOTE — TELEPHONE ENCOUNTER
Patient is wanting to be seen with his wife who is scheduled for 10/28.    Patient is scheduled for 10/28 at 9:15 but will be seen with his wife at the same time.    Patient thanked me for calling.

## 2024-10-03 ENCOUNTER — OFFICE VISIT (OUTPATIENT)
Dept: HEMATOLOGY/ONCOLOGY | Facility: CLINIC | Age: 84
End: 2024-10-03
Payer: MEDICARE

## 2024-10-03 ENCOUNTER — LAB VISIT (OUTPATIENT)
Dept: LAB | Facility: HOSPITAL | Age: 84
End: 2024-10-03
Payer: MEDICARE

## 2024-10-03 VITALS
HEART RATE: 70 BPM | WEIGHT: 157.63 LBS | TEMPERATURE: 98 F | OXYGEN SATURATION: 100 % | HEIGHT: 69 IN | SYSTOLIC BLOOD PRESSURE: 113 MMHG | RESPIRATION RATE: 18 BRPM | DIASTOLIC BLOOD PRESSURE: 55 MMHG | BODY MASS INDEX: 23.35 KG/M2

## 2024-10-03 DIAGNOSIS — C91.10 CLL (CHRONIC LYMPHOCYTIC LEUKEMIA): Primary | ICD-10-CM

## 2024-10-03 DIAGNOSIS — Z79.899 DRUG-INDUCED IMMUNODEFICIENCY: ICD-10-CM

## 2024-10-03 DIAGNOSIS — C83.09 SMALL B-CELL LYMPHOMA OF EXTRANODAL SITE EXCLUDING SPLEEN AND OTHER SOLID ORGANS: ICD-10-CM

## 2024-10-03 DIAGNOSIS — D84.821 DRUG-INDUCED IMMUNODEFICIENCY: ICD-10-CM

## 2024-10-03 LAB
ALBUMIN SERPL BCP-MCNC: 3 G/DL (ref 3.5–5.2)
ALP SERPL-CCNC: 83 U/L (ref 55–135)
ALT SERPL W/O P-5'-P-CCNC: 13 U/L (ref 10–44)
ANION GAP SERPL CALC-SCNC: 5 MMOL/L (ref 8–16)
ANISOCYTOSIS BLD QL SMEAR: SLIGHT
AST SERPL-CCNC: 20 U/L (ref 10–40)
BASOPHILS NFR BLD: 0 % (ref 0–1.9)
BILIRUB SERPL-MCNC: 0.4 MG/DL (ref 0.1–1)
BUN SERPL-MCNC: 12 MG/DL (ref 8–23)
CALCIUM SERPL-MCNC: 8.7 MG/DL (ref 8.7–10.5)
CHLORIDE SERPL-SCNC: 103 MMOL/L (ref 95–110)
CO2 SERPL-SCNC: 23 MMOL/L (ref 23–29)
CREAT SERPL-MCNC: 1 MG/DL (ref 0.5–1.4)
DIFFERENTIAL METHOD BLD: ABNORMAL
EOSINOPHIL NFR BLD: 0 % (ref 0–8)
ERYTHROCYTE [DISTWIDTH] IN BLOOD BY AUTOMATED COUNT: 15.8 % (ref 11.5–14.5)
EST. GFR  (NO RACE VARIABLE): >60 ML/MIN/1.73 M^2
GLUCOSE SERPL-MCNC: 98 MG/DL (ref 70–110)
HCT VFR BLD AUTO: 25.2 % (ref 40–54)
HGB BLD-MCNC: 8.7 G/DL (ref 14–18)
IMM GRANULOCYTES # BLD AUTO: ABNORMAL K/UL (ref 0–0.04)
IMM GRANULOCYTES NFR BLD AUTO: ABNORMAL % (ref 0–0.5)
LYMPHOCYTES NFR BLD: 65 % (ref 18–48)
MCH RBC QN AUTO: 39.9 PG (ref 27–31)
MCHC RBC AUTO-ENTMCNC: 34.5 G/DL (ref 32–36)
MCV RBC AUTO: 116 FL (ref 82–98)
MONOCYTES NFR BLD: 6 % (ref 4–15)
NEUTROPHILS NFR BLD: 11 % (ref 38–73)
NRBC BLD-RTO: 0 /100 WBC
PLATELET # BLD AUTO: 45 K/UL (ref 150–450)
PLATELET BLD QL SMEAR: ABNORMAL
PMV BLD AUTO: 12.4 FL (ref 9.2–12.9)
POTASSIUM SERPL-SCNC: 4.2 MMOL/L (ref 3.5–5.1)
PROT SERPL-MCNC: 6.3 G/DL (ref 6–8.4)
RBC # BLD AUTO: 2.18 M/UL (ref 4.6–6.2)
SMUDGE CELLS BLD QL SMEAR: PRESENT
SODIUM SERPL-SCNC: 131 MMOL/L (ref 136–145)
WBC # BLD AUTO: 22.35 K/UL (ref 3.9–12.7)
WBC OTHER NFR BLD MANUAL: 18 %

## 2024-10-03 PROCEDURE — 3074F SYST BP LT 130 MM HG: CPT | Mod: CPTII,S$GLB,, | Performed by: INTERNAL MEDICINE

## 2024-10-03 PROCEDURE — 1126F AMNT PAIN NOTED NONE PRSNT: CPT | Mod: CPTII,S$GLB,, | Performed by: INTERNAL MEDICINE

## 2024-10-03 PROCEDURE — 1159F MED LIST DOCD IN RCRD: CPT | Mod: CPTII,S$GLB,, | Performed by: INTERNAL MEDICINE

## 2024-10-03 PROCEDURE — 3078F DIAST BP <80 MM HG: CPT | Mod: CPTII,S$GLB,, | Performed by: INTERNAL MEDICINE

## 2024-10-03 PROCEDURE — 99215 OFFICE O/P EST HI 40 MIN: CPT | Mod: S$GLB,,, | Performed by: INTERNAL MEDICINE

## 2024-10-03 PROCEDURE — G2211 COMPLEX E/M VISIT ADD ON: HCPCS | Mod: S$GLB,,, | Performed by: INTERNAL MEDICINE

## 2024-10-03 PROCEDURE — 1101F PT FALLS ASSESS-DOCD LE1/YR: CPT | Mod: CPTII,S$GLB,, | Performed by: INTERNAL MEDICINE

## 2024-10-03 PROCEDURE — 99999 PR PBB SHADOW E&M-EST. PATIENT-LVL IV: CPT | Mod: PBBFAC,,, | Performed by: INTERNAL MEDICINE

## 2024-10-03 PROCEDURE — 85027 COMPLETE CBC AUTOMATED: CPT | Performed by: INTERNAL MEDICINE

## 2024-10-03 PROCEDURE — 36415 COLL VENOUS BLD VENIPUNCTURE: CPT | Performed by: INTERNAL MEDICINE

## 2024-10-03 PROCEDURE — 3288F FALL RISK ASSESSMENT DOCD: CPT | Mod: CPTII,S$GLB,, | Performed by: INTERNAL MEDICINE

## 2024-10-03 PROCEDURE — 80053 COMPREHEN METABOLIC PANEL: CPT | Performed by: INTERNAL MEDICINE

## 2024-10-03 PROCEDURE — 85007 BL SMEAR W/DIFF WBC COUNT: CPT | Performed by: INTERNAL MEDICINE

## 2024-10-03 NOTE — PROGRESS NOTES
Hematology and Medical Oncology   Follow Up     10/03/2024    Primary Oncologic Diagnosis: B cell lymphoma      History of Present Ilness:   Kevin Echeverria Jr. (Kevin) is a pleasant 84 y.o.male who presents to transition care to [facility] from the [facility]. The patient presents today with his wife and daughter. Most of his issues started in August when he began experiencing horrible pain in his hands that was worse with lack of motion/rest. The pain is excruciating and has sent him to the ED several times. The pain can happen in either upper extremity and feels like it travels at times. The discomfort abates as he moves his hands more throughout the day.     Incidentally through these ER visits it was noted that he has a persistently elevated but largely stable WBC.    --Pathologist review of the peripheral smear on 12/21/19 Leukocytosis with an absolute and relative lymphocytosis.     Lymphocytes are mature, and a subset displays a slightly atypical chromatin pattern.  Smudge cells are present.  Background granulocytes are morphologically normal.  The morphology suggests a B cell lymphoproliferative disorder such as CLL.  --Flow Cytometry on 1/3/2020: A B cell lymphoproliferative disorder is present.  Mantle cell lymphoma is favored although an atypical CLL cannot be completely ruled out; correlation   with morphology and with any available cytogenetic or molecular studies (t(11;14)) is required.     --Bone marrow biopsy on 6/4/24: B-cell lymphoma with biallelic TP53 inactivation.  Differential considerations include splenic marginal zone lymphoma, splenic diffuse red pulp small B-cell lymphoma, splenic B-cell  leukemia/lymphoma with prominent nucleoli (ICC classification, 2022), and less likely atypical chronic lymphocytic leukemia/small lymphocytic lymphoma.     --PET CT showing *Left periaortic lymph node just inferior to the left renal vein measures 0.9 cm with SUV max 2.6 (series 3, image  157).  *Aortocaval lymph node approximately at L2 measures 0.9 cm with SUV max 2.6 (series 3, image 161).  Prostatectomy for prostate cancer.  No hypermetabolic foci within the prostatectomy bed.    Prominent mildly hypermetabolic retroperitoneal lymph nodes.     The Deauville Score is: 4      Interval History:  Mr. Echeverria is here today to discuss his response to cycle 2 of BR therapy.  Tolerating therapy nicely. Continues to work in the yard at home. After the last treatment he went right home and boarded up the house.     There has been a change in taste of food since starting treatment.     He reports feeling well with no significant B symptoms at this time and is eager to start treatment when scheduled. He has been compliant with his hydrea with no significant adverse side-effects.     PAST MEDICAL HISTORY:   Past Medical History:   Diagnosis Date    Arthritis     Cancer     prostate    COPD (chronic obstructive pulmonary disease)     Hyperlipidemia     Hypertension     Leukemia        PAST SURGICAL HISTORY:   Past Surgical History:   Procedure Laterality Date    CATARACT EXTRACTION W/  INTRAOCULAR LENS IMPLANT Left 05/05/2016    COLONOSCOPY W/ BIOPSIES AND POLYPECTOMY      PROSTATE SURGERY  05/1996    TONSILLECTOMY  1956       PAST SOCIAL HISTORY:   reports that he has never smoked. He quit smokeless tobacco use about 25 years ago.  His smokeless tobacco use included chew. He reports current alcohol use. He reports that he does not use drugs.    FAMILY HISTORY:  Family History   Problem Relation Name Age of Onset    Diabetes Mother         CURRENT MEDICATIONS:   Current Outpatient Medications   Medication Sig    albuterol (PROVENTIL) 2.5 mg /3 mL (0.083 %) nebulizer solution Take 1 vial by nebulization twice daily.    allopurinoL (ZYLOPRIM) 300 MG tablet Take 1 tablet (300 mg total) by mouth once daily.    aspirin (ECOTRIN) 81 MG EC tablet Take 81 mg by mouth once daily.    budesonide-glycopyr-formoterol  (BREZTRI AEROSPHERE) 160-9-4.8 mcg/actuation HFAA INSTILL 2 PUFFS BY MOUTH EVERY 12 HOURS    colchicine (MITIGARE) 0.6 mg Cap Take 1 capsule (0.6 mg total) by mouth once daily.    diclofenac sodium (VOLTAREN) 1 % Gel Apply 2 g topically 2 (two) times daily as needed (pain).    famotidine (PEPCID) 20 MG tablet Take 1 tablet orally 2 times a day as needed for heart burn    gabapentin (NEURONTIN) 300 MG capsule Take 1 capsule (300 mg total) by mouth once daily.    hydroxyurea (HYDREA) 500 mg Cap Take 2 capsules (1,000 mg total) by mouth once daily.    ipratropium (ATROVENT) 0.02 % nebulizer solution Use contents of one vial (2.5 mL) via nebulier every 12 hours    L.acid/B.animalis,bifidum/FOS (PROBIOTIC COMPLEX ORAL) Take by mouth.    metoprolol succinate (TOPROL-XL) 25 MG 24 hr tablet Take 1 tablet orally once a day.    montelukast (SINGULAIR) 10 mg tablet Take 1 tablet (10 mg total) by mouth every evening.    multivitamin with minerals tablet Take 1 tablet by mouth once daily. Equate Brand with Iron    nebulizer accessories Misc Use as directed    nebulizer and compressor Hyacinth USE WITH NEBULIZER SOLUTION AS DIRECTED    nebulizer and compressor Hyacinth Use with nebulizer treatments    omeprazole (PRILOSEC) 20 MG capsule Take 1 capsule (20 mg total) by mouth every morning.    pravastatin (PRAVACHOL) 20 MG tablet Take 1 tablet orally once in the evening.    valsartan (DIOVAN) 160 MG tablet Take 1 tablet orally once a day.    fish oil-omega-3 fatty acids 300-1,000 mg capsule Take 1 g by mouth once daily. (Patient not taking: Reported on 10/3/2024)    hydroxyurea (HYDREA) 500 mg Cap Take 1 capsule (500 mg total) by mouth once daily. (Patient not taking: Reported on 10/3/2024.)    LORazepam (ATIVAN) 1 MG tablet Take 1 tablet (1 mg total) by mouth 2 (two) times daily. (Patient not taking: Reported on 10/3/2024)    metoprolol succinate (TOPROL-XL) 25 MG 24 hr tablet Take 1 tablet orally once a day.     No current  facility-administered medications for this visit.     ALLERGIES:   Review of patient's allergies indicates:  No Known Allergies      Review of Systems:     Review of Systems   Constitutional:  Negative for appetite change, chills, diaphoresis, fatigue, fever and unexpected weight change.   HENT:   Negative for hearing loss, mouth sores, nosebleeds, sore throat, trouble swallowing and voice change.    Eyes:  Negative for eye problems and icterus.   Respiratory:  Negative for chest tightness, cough, hemoptysis, shortness of breath and wheezing.    Cardiovascular:  Negative for chest pain, leg swelling and palpitations.   Gastrointestinal:  Negative for abdominal distention, abdominal pain, blood in stool, diarrhea, nausea and vomiting.   Endocrine: Negative for hot flashes.   Genitourinary:  Negative for bladder incontinence, difficulty urinating, dysuria and hematuria.    Musculoskeletal:  Positive for arthralgias and neck pain. Negative for back pain, flank pain, gait problem, myalgias and neck stiffness.   Skin:  Negative for itching, rash and wound.   Neurological:  Negative for dizziness, extremity weakness, gait problem, headaches, numbness, seizures and speech difficulty.   Hematological:  Negative for adenopathy. Does not bruise/bleed easily.   Psychiatric/Behavioral:  Negative for confusion, depression and sleep disturbance. The patient is not nervous/anxious.         Physical Exam:     Vitals:    10/03/24 0726   BP: (!) 113/55   Pulse: 70   Resp: 18   Temp: 97.6 °F (36.4 °C)         Physical Exam  Constitutional:       General: He is not in acute distress.     Appearance: He is well-developed. He is not diaphoretic.      Comments: Well dressed gentleman   HENT:      Head: Normocephalic and atraumatic.      Mouth/Throat:      Pharynx: No oropharyngeal exudate.   Eyes:      Conjunctiva/sclera: Conjunctivae normal.      Pupils: Pupils are equal, round, and reactive to light.   Neck:      Thyroid: No  thyromegaly.      Vascular: No JVD.      Trachea: No tracheal deviation.   Cardiovascular:      Rate and Rhythm: Normal rate and regular rhythm.      Heart sounds: Normal heart sounds. No murmur heard.     No friction rub.   Pulmonary:      Effort: Pulmonary effort is normal. No respiratory distress.      Breath sounds: Normal breath sounds. No stridor. No wheezing or rales.   Chest:      Chest wall: No tenderness.   Abdominal:      General: Bowel sounds are normal. There is no distension.      Palpations: Abdomen is soft.      Tenderness: There is no abdominal tenderness. There is no guarding or rebound.   Musculoskeletal:         General: No tenderness or deformity. Normal range of motion.      Cervical back: Normal range of motion and neck supple.   Skin:     General: Skin is warm and dry.      Capillary Refill: Capillary refill takes less than 2 seconds.      Coloration: Skin is not pale.      Findings: No erythema or rash.   Neurological:      Mental Status: He is alert and oriented to person, place, and time.      Cranial Nerves: No cranial nerve deficit.      Sensory: No sensory deficit.      Motor: No abnormal muscle tone.      Coordination: Coordination normal.      Deep Tendon Reflexes: Reflexes normal.   Psychiatric:         Behavior: Behavior normal.         Thought Content: Thought content normal.         Judgment: Judgment normal.         ECOG Performance Status: (foot note - ECOG PS provided by Eastern Cooperative Oncology Group) 1 - Symptomatic but completely ambulatory    Karnofsky Performance Score:  90%- Able to Carry on Normal Activity: Minor Symptoms of Disease    Labs:   Lab Results   Component Value Date    WBC 22.35 (H) 10/03/2024    HGB 8.7 (L) 10/03/2024    HCT 25.2 (L) 10/03/2024    PLT 45 (L) 10/03/2024    CHOL 131 08/28/2023    TRIG 56 08/28/2023    HDL 56 08/28/2023    LDLDIRECT 69 08/28/2023    ALT 13 10/03/2024    AST 20 10/03/2024     (L) 10/03/2024    K 4.2 10/03/2024    CL  103 10/03/2024    CREATININE 1.0 10/03/2024    BUN 12 10/03/2024    CO2 23 10/03/2024    TSH 3.184 01/31/2020    PSA 0.05 11/16/2021    INR 1.1 06/03/2024     Uric acid: 2.8  LDH: 145      Imaging: Outside imaging has been reviewed   Assessment and Plan:     Mr. 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     B-cell lymphoma with biallelic TP53 inactivation   --Seen on bone marrow biopsy  --Doing well on therapy. No complaints  --Happy to see the decrease in white count  --Plan to repeat labs on Wed 10/9 to ensure resolution of thrombocytopenia  --Plan for next treatment Thurs 10/10  --Return to clinic in 4 weeks prior to C4    Leukocytosis  --Continue daily allopurinol  --White count is down trending  --Current WBC of 22.35      Prostate Cancer  --Treated with a prostatectomy      40 minutes in total were spent on this encounter of which 30 minutes were spent face to face with the patient and his  family to discuss the disease, natural history, treatment options and survival statistics. I have provided the patient with an opportunity to ask questions and have all questions answered to his satisfaction.         he will return to clinic prior to cycle 4 of therapy, but knows to call in the interim if symptoms change or should a problem arise.    Visit today included increased complexity associated with the care of the episodic problem B cell lymphoma addressed and managing the longitudinal care of the patient due to the serious and/or complex managed problem(s) B cell lymphoma.       Jaquelin Quintana MD  Hematology and Medical Oncology  Bone Marrow Transplant  [facility]      BMT Chart Routing  Urgent    Follow up with physician . 1. labs at [facility] on 10/9: cbc,cmp 2. next appts for treatment main   Follow up with MARY JANE    Provider visit type    Infusion scheduling note    Injection scheduling note    Labs    Imaging    Pharmacy appointment    Other referrals

## 2024-10-07 RX ORDER — OMEPRAZOLE 20 MG/1
20 CAPSULE, DELAYED RELEASE ORAL EVERY MORNING
Qty: 90 CAPSULE | Refills: 3 | Status: SHIPPED | OUTPATIENT
Start: 2024-10-07

## 2024-10-07 RX ORDER — OMEPRAZOLE 20 MG/1
20 CAPSULE, DELAYED RELEASE ORAL EVERY MORNING
Qty: 90 CAPSULE | Refills: 0 | Status: SHIPPED | OUTPATIENT
Start: 2024-10-07

## 2024-10-07 NOTE — TELEPHONE ENCOUNTER
Refill Decision Note   Kevin Echeverria  is requesting a refill authorization.  Brief Assessment and Rationale for Refill:  Approve     Medication Therapy Plan:         Pharmacist review requested: Yes   Extended chart review required: Yes   Comments:     Note composed:2:25 PM 10/07/2024

## 2024-10-07 NOTE — TELEPHONE ENCOUNTER
Refill Routing Note   Medication(s) are not appropriate for processing by [facility] for the following reason(s):        Drug-drug interaction    [facility] action(s):  Defer      Medication Therapy Plan: PEPCID    Pharmacist review requested: Yes     Appointments  past 12m or future 3m with PCP    Date Provider   Last Visit   10/30/2023 Bridger Cao MD   Next Visit   10/28/2024 Bridger Cao MD   ED visits in past 90 days: 0        Note composed:8:58 AM 10/07/2024

## 2024-10-07 NOTE — TELEPHONE ENCOUNTER
No care due was identified.  [facility] Embedded Care Due Messages. Reference number: [id number].   10/07/2024 8:50:09 AM CDT

## 2024-10-09 ENCOUNTER — LAB VISIT (OUTPATIENT)
Dept: LAB | Facility: HOSPITAL | Age: 84
End: 2024-10-09
Attending: INTERNAL MEDICINE
Payer: MEDICARE

## 2024-10-09 DIAGNOSIS — C91.10 CLL (CHRONIC LYMPHOCYTIC LEUKEMIA): ICD-10-CM

## 2024-10-09 LAB
ALBUMIN SERPL BCP-MCNC: 3.1 G/DL (ref 3.5–5.2)
ALP SERPL-CCNC: 93 U/L (ref 55–135)
ALT SERPL W/O P-5'-P-CCNC: 15 U/L (ref 10–44)
ANION GAP SERPL CALC-SCNC: 9 MMOL/L (ref 8–16)
ANISOCYTOSIS BLD QL SMEAR: SLIGHT
AST SERPL-CCNC: 19 U/L (ref 10–40)
BASOPHILS # BLD AUTO: 0.01 K/UL (ref 0–0.2)
BASOPHILS NFR BLD: 0.1 % (ref 0–1.9)
BILIRUB SERPL-MCNC: 0.4 MG/DL (ref 0.1–1)
BUN SERPL-MCNC: 10 MG/DL (ref 8–23)
CALCIUM SERPL-MCNC: 8.5 MG/DL (ref 8.7–10.5)
CHLORIDE SERPL-SCNC: 102 MMOL/L (ref 95–110)
CO2 SERPL-SCNC: 21 MMOL/L (ref 23–29)
CREAT SERPL-MCNC: 1.1 MG/DL (ref 0.5–1.4)
DIFFERENTIAL METHOD BLD: ABNORMAL
EOSINOPHIL # BLD AUTO: 0.2 K/UL (ref 0–0.5)
EOSINOPHIL NFR BLD: 1.2 % (ref 0–8)
ERYTHROCYTE [DISTWIDTH] IN BLOOD BY AUTOMATED COUNT: 15.8 % (ref 11.5–14.5)
EST. GFR  (NO RACE VARIABLE): >60 ML/MIN/1.73 M^2
GLUCOSE SERPL-MCNC: 108 MG/DL (ref 70–110)
HCT VFR BLD AUTO: 24.9 % (ref 40–54)
HGB BLD-MCNC: 8.7 G/DL (ref 14–18)
IMM GRANULOCYTES # BLD AUTO: 0.03 K/UL (ref 0–0.04)
IMM GRANULOCYTES NFR BLD AUTO: 0.2 % (ref 0–0.5)
LYMPHOCYTES # BLD AUTO: 7.7 K/UL (ref 1–4.8)
LYMPHOCYTES NFR BLD: 52.5 % (ref 18–48)
MCH RBC QN AUTO: 40.1 PG (ref 27–31)
MCHC RBC AUTO-ENTMCNC: 34.9 G/DL (ref 32–36)
MCV RBC AUTO: 115 FL (ref 82–98)
MONOCYTES # BLD AUTO: 5 K/UL (ref 0.3–1)
MONOCYTES NFR BLD: 34.1 % (ref 4–15)
NEUTROPHILS # BLD AUTO: 1.8 K/UL (ref 1.8–7.7)
NEUTROPHILS NFR BLD: 11.9 % (ref 38–73)
NRBC BLD-RTO: 0 /100 WBC
PLATELET # BLD AUTO: 44 K/UL (ref 150–450)
PLATELET BLD QL SMEAR: ABNORMAL
PMV BLD AUTO: 11.4 FL (ref 9.2–12.9)
POTASSIUM SERPL-SCNC: 4.3 MMOL/L (ref 3.5–5.1)
PROT SERPL-MCNC: 6.4 G/DL (ref 6–8.4)
RBC # BLD AUTO: 2.17 M/UL (ref 4.6–6.2)
SMUDGE CELLS BLD QL SMEAR: PRESENT
SODIUM SERPL-SCNC: 132 MMOL/L (ref 136–145)
WBC # BLD AUTO: 14.7 K/UL (ref 3.9–12.7)

## 2024-10-09 PROCEDURE — 80053 COMPREHEN METABOLIC PANEL: CPT | Performed by: INTERNAL MEDICINE

## 2024-10-09 PROCEDURE — 36415 COLL VENOUS BLD VENIPUNCTURE: CPT | Performed by: INTERNAL MEDICINE

## 2024-10-09 PROCEDURE — 85025 COMPLETE CBC W/AUTO DIFF WBC: CPT | Performed by: INTERNAL MEDICINE

## 2024-10-10 RX ORDER — FAMOTIDINE 10 MG/ML
20 INJECTION INTRAVENOUS
Status: CANCELLED | OUTPATIENT
Start: 2024-10-10

## 2024-10-10 RX ORDER — SODIUM CHLORIDE 0.9 % (FLUSH) 0.9 %
10 SYRINGE (ML) INJECTION
Status: CANCELLED | OUTPATIENT
Start: 2024-10-11

## 2024-10-10 RX ORDER — ACETAMINOPHEN 325 MG/1
650 TABLET ORAL
Status: CANCELLED | OUTPATIENT
Start: 2024-10-10

## 2024-10-10 RX ORDER — EPINEPHRINE 0.3 MG/.3ML
0.3 INJECTION SUBCUTANEOUS ONCE AS NEEDED
Status: CANCELLED | OUTPATIENT
Start: 2024-10-11

## 2024-10-10 RX ORDER — HEPARIN 100 UNIT/ML
500 SYRINGE INTRAVENOUS
Status: CANCELLED | OUTPATIENT
Start: 2024-10-10

## 2024-10-10 RX ORDER — EPINEPHRINE 0.3 MG/.3ML
0.3 INJECTION SUBCUTANEOUS ONCE AS NEEDED
Status: CANCELLED | OUTPATIENT
Start: 2024-10-10

## 2024-10-10 RX ORDER — HEPARIN 100 UNIT/ML
500 SYRINGE INTRAVENOUS
Status: CANCELLED | OUTPATIENT
Start: 2024-10-11

## 2024-10-10 RX ORDER — DIPHENHYDRAMINE HYDROCHLORIDE 50 MG/ML
50 INJECTION INTRAMUSCULAR; INTRAVENOUS ONCE AS NEEDED
Status: CANCELLED | OUTPATIENT
Start: 2024-10-11

## 2024-10-10 RX ORDER — SODIUM CHLORIDE 0.9 % (FLUSH) 0.9 %
10 SYRINGE (ML) INJECTION
Status: CANCELLED | OUTPATIENT
Start: 2024-10-10

## 2024-10-10 RX ORDER — DIPHENHYDRAMINE HYDROCHLORIDE 50 MG/ML
50 INJECTION INTRAMUSCULAR; INTRAVENOUS ONCE AS NEEDED
Status: CANCELLED | OUTPATIENT
Start: 2024-10-10

## 2024-10-10 RX ORDER — MEPERIDINE HYDROCHLORIDE 50 MG/ML
25 INJECTION INTRAMUSCULAR; INTRAVENOUS; SUBCUTANEOUS
Status: CANCELLED | OUTPATIENT
Start: 2024-10-10

## 2024-10-28 ENCOUNTER — OFFICE VISIT (OUTPATIENT)
Dept: FAMILY MEDICINE | Facility: CLINIC | Age: 84
End: 2024-10-28
Payer: MEDICARE

## 2024-10-28 VITALS
BODY MASS INDEX: 23.28 KG/M2 | DIASTOLIC BLOOD PRESSURE: 66 MMHG | HEIGHT: 69 IN | HEART RATE: 55 BPM | WEIGHT: 157.19 LBS | SYSTOLIC BLOOD PRESSURE: 112 MMHG | OXYGEN SATURATION: 96 % | RESPIRATION RATE: 16 BRPM

## 2024-10-28 DIAGNOSIS — C83.09 SMALL B-CELL LYMPHOMA OF EXTRANODAL SITE EXCLUDING SPLEEN AND OTHER SOLID ORGANS: Primary | ICD-10-CM

## 2024-10-28 DIAGNOSIS — I10 BENIGN ESSENTIAL HTN: ICD-10-CM

## 2024-10-28 DIAGNOSIS — Z23 IMMUNIZATION DUE: ICD-10-CM

## 2024-10-28 PROCEDURE — 90662 IIV NO PRSV INCREASED AG IM: CPT | Mod: S$GLB,,, | Performed by: FAMILY MEDICINE

## 2024-10-28 PROCEDURE — 1160F RVW MEDS BY RX/DR IN RCRD: CPT | Mod: CPTII,S$GLB,, | Performed by: FAMILY MEDICINE

## 2024-10-28 PROCEDURE — 1126F AMNT PAIN NOTED NONE PRSNT: CPT | Mod: CPTII,S$GLB,, | Performed by: FAMILY MEDICINE

## 2024-10-28 PROCEDURE — 1159F MED LIST DOCD IN RCRD: CPT | Mod: CPTII,S$GLB,, | Performed by: FAMILY MEDICINE

## 2024-10-28 PROCEDURE — 3078F DIAST BP <80 MM HG: CPT | Mod: CPTII,S$GLB,, | Performed by: FAMILY MEDICINE

## 2024-10-28 PROCEDURE — G0008 ADMIN INFLUENZA VIRUS VAC: HCPCS | Mod: S$GLB,,, | Performed by: FAMILY MEDICINE

## 2024-10-28 PROCEDURE — 99999 PR PBB SHADOW E&M-EST. PATIENT-LVL IV: CPT | Mod: PBBFAC,,, | Performed by: FAMILY MEDICINE

## 2024-10-28 PROCEDURE — 3288F FALL RISK ASSESSMENT DOCD: CPT | Mod: CPTII,S$GLB,, | Performed by: FAMILY MEDICINE

## 2024-10-28 PROCEDURE — 3074F SYST BP LT 130 MM HG: CPT | Mod: CPTII,S$GLB,, | Performed by: FAMILY MEDICINE

## 2024-10-28 PROCEDURE — 99213 OFFICE O/P EST LOW 20 MIN: CPT | Mod: 25,S$GLB,, | Performed by: FAMILY MEDICINE

## 2024-10-28 PROCEDURE — 1101F PT FALLS ASSESS-DOCD LE1/YR: CPT | Mod: CPTII,S$GLB,, | Performed by: FAMILY MEDICINE

## 2024-10-28 RX ORDER — METOPROLOL SUCCINATE 25 MG/1
25 TABLET, EXTENDED RELEASE ORAL NIGHTLY
Qty: 90 TABLET | Refills: 3 | Status: SHIPPED | OUTPATIENT
Start: 2024-10-28

## 2024-10-28 RX ORDER — VALSARTAN 160 MG/1
TABLET ORAL
Qty: 90 TABLET | Refills: 3 | Status: SHIPPED | OUTPATIENT
Start: 2024-10-28

## 2024-10-31 ENCOUNTER — LAB VISIT (OUTPATIENT)
Dept: LAB | Facility: HOSPITAL | Age: 84
End: 2024-10-31
Payer: MEDICARE

## 2024-10-31 ENCOUNTER — OFFICE VISIT (OUTPATIENT)
Dept: HEMATOLOGY/ONCOLOGY | Facility: CLINIC | Age: 84
End: 2024-10-31
Payer: MEDICARE

## 2024-10-31 VITALS
BODY MASS INDEX: 23.49 KG/M2 | HEIGHT: 69 IN | DIASTOLIC BLOOD PRESSURE: 58 MMHG | OXYGEN SATURATION: 100 % | WEIGHT: 158.63 LBS | TEMPERATURE: 98 F | HEART RATE: 70 BPM | SYSTOLIC BLOOD PRESSURE: 120 MMHG | RESPIRATION RATE: 18 BRPM

## 2024-10-31 DIAGNOSIS — D84.821 DRUG-INDUCED IMMUNODEFICIENCY: ICD-10-CM

## 2024-10-31 DIAGNOSIS — C83.09 SMALL B-CELL LYMPHOMA OF EXTRANODAL SITE EXCLUDING SPLEEN AND OTHER SOLID ORGANS: ICD-10-CM

## 2024-10-31 DIAGNOSIS — C92.10 CML (CHRONIC MYELOCYTIC LEUKEMIA): ICD-10-CM

## 2024-10-31 DIAGNOSIS — Z79.899 DRUG-INDUCED IMMUNODEFICIENCY: ICD-10-CM

## 2024-10-31 DIAGNOSIS — C83.01 SMALL B-CELL LYMPHOMA OF LYMPH NODES OF NECK: Primary | ICD-10-CM

## 2024-10-31 DIAGNOSIS — Z12.5 PROSTATE CANCER SCREENING: ICD-10-CM

## 2024-10-31 LAB
ALBUMIN SERPL BCP-MCNC: 3 G/DL (ref 3.5–5.2)
ALP SERPL-CCNC: 80 U/L (ref 40–150)
ALT SERPL W/O P-5'-P-CCNC: 15 U/L (ref 10–44)
ANION GAP SERPL CALC-SCNC: 5 MMOL/L (ref 8–16)
AST SERPL-CCNC: 23 U/L (ref 10–40)
BASOPHILS # BLD AUTO: 0.04 K/UL (ref 0–0.2)
BASOPHILS NFR BLD: 0.7 % (ref 0–1.9)
BILIRUB SERPL-MCNC: 0.5 MG/DL (ref 0.1–1)
BUN SERPL-MCNC: 11 MG/DL (ref 8–23)
CALCIUM SERPL-MCNC: 8.6 MG/DL (ref 8.7–10.5)
CHLORIDE SERPL-SCNC: 104 MMOL/L (ref 95–110)
CO2 SERPL-SCNC: 24 MMOL/L (ref 23–29)
CREAT SERPL-MCNC: 1 MG/DL (ref 0.5–1.4)
DIFFERENTIAL METHOD BLD: ABNORMAL
EOSINOPHIL # BLD AUTO: 0.2 K/UL (ref 0–0.5)
EOSINOPHIL NFR BLD: 3.9 % (ref 0–8)
ERYTHROCYTE [DISTWIDTH] IN BLOOD BY AUTOMATED COUNT: 12.4 % (ref 11.5–14.5)
EST. GFR  (NO RACE VARIABLE): >60 ML/MIN/1.73 M^2
GLUCOSE SERPL-MCNC: 88 MG/DL (ref 70–110)
HCT VFR BLD AUTO: 25.5 % (ref 40–54)
HGB BLD-MCNC: 8.8 G/DL (ref 14–18)
IMM GRANULOCYTES # BLD AUTO: 0.02 K/UL (ref 0–0.04)
IMM GRANULOCYTES NFR BLD AUTO: 0.3 % (ref 0–0.5)
LYMPHOCYTES # BLD AUTO: 2.6 K/UL (ref 1–4.8)
LYMPHOCYTES NFR BLD: 43.2 % (ref 18–48)
MCH RBC QN AUTO: 39.3 PG (ref 27–31)
MCHC RBC AUTO-ENTMCNC: 34.5 G/DL (ref 32–36)
MCV RBC AUTO: 114 FL (ref 82–98)
MONOCYTES # BLD AUTO: 0.9 K/UL (ref 0.3–1)
MONOCYTES NFR BLD: 15.9 % (ref 4–15)
NEUTROPHILS # BLD AUTO: 2.1 K/UL (ref 1.8–7.7)
NEUTROPHILS NFR BLD: 36 % (ref 38–73)
NRBC BLD-RTO: 0 /100 WBC
PLATELET # BLD AUTO: 218 K/UL (ref 150–450)
PMV BLD AUTO: 10.1 FL (ref 9.2–12.9)
POTASSIUM SERPL-SCNC: 4.4 MMOL/L (ref 3.5–5.1)
PROT SERPL-MCNC: 6.1 G/DL (ref 6–8.4)
RBC # BLD AUTO: 2.24 M/UL (ref 4.6–6.2)
SODIUM SERPL-SCNC: 133 MMOL/L (ref 136–145)
WBC # BLD AUTO: 5.93 K/UL (ref 3.9–12.7)

## 2024-10-31 PROCEDURE — 1101F PT FALLS ASSESS-DOCD LE1/YR: CPT | Mod: CPTII,S$GLB,, | Performed by: INTERNAL MEDICINE

## 2024-10-31 PROCEDURE — G2211 COMPLEX E/M VISIT ADD ON: HCPCS | Mod: S$GLB,,, | Performed by: INTERNAL MEDICINE

## 2024-10-31 PROCEDURE — 36415 COLL VENOUS BLD VENIPUNCTURE: CPT | Performed by: INTERNAL MEDICINE

## 2024-10-31 PROCEDURE — 80053 COMPREHEN METABOLIC PANEL: CPT | Performed by: INTERNAL MEDICINE

## 2024-10-31 PROCEDURE — 3078F DIAST BP <80 MM HG: CPT | Mod: CPTII,S$GLB,, | Performed by: INTERNAL MEDICINE

## 2024-10-31 PROCEDURE — 85025 COMPLETE CBC W/AUTO DIFF WBC: CPT | Performed by: INTERNAL MEDICINE

## 2024-10-31 PROCEDURE — 99999 PR PBB SHADOW E&M-EST. PATIENT-LVL III: CPT | Mod: PBBFAC,,, | Performed by: INTERNAL MEDICINE

## 2024-10-31 PROCEDURE — 3074F SYST BP LT 130 MM HG: CPT | Mod: CPTII,S$GLB,, | Performed by: INTERNAL MEDICINE

## 2024-10-31 PROCEDURE — 99215 OFFICE O/P EST HI 40 MIN: CPT | Mod: S$GLB,,, | Performed by: INTERNAL MEDICINE

## 2024-10-31 PROCEDURE — 3288F FALL RISK ASSESSMENT DOCD: CPT | Mod: CPTII,S$GLB,, | Performed by: INTERNAL MEDICINE

## 2024-10-31 PROCEDURE — 1126F AMNT PAIN NOTED NONE PRSNT: CPT | Mod: CPTII,S$GLB,, | Performed by: INTERNAL MEDICINE

## 2024-10-31 RX ORDER — DIPHENHYDRAMINE HYDROCHLORIDE 50 MG/ML
50 INJECTION INTRAMUSCULAR; INTRAVENOUS ONCE AS NEEDED
Status: CANCELLED | OUTPATIENT
Start: 2024-11-04

## 2024-10-31 RX ORDER — SODIUM CHLORIDE 0.9 % (FLUSH) 0.9 %
10 SYRINGE (ML) INJECTION
Status: CANCELLED | OUTPATIENT
Start: 2024-11-04

## 2024-10-31 RX ORDER — EPINEPHRINE 0.3 MG/.3ML
0.3 INJECTION SUBCUTANEOUS ONCE AS NEEDED
Status: CANCELLED | OUTPATIENT
Start: 2024-11-04

## 2024-10-31 RX ORDER — EPINEPHRINE 0.3 MG/.3ML
0.3 INJECTION SUBCUTANEOUS ONCE AS NEEDED
Status: CANCELLED | OUTPATIENT
Start: 2024-11-05

## 2024-10-31 RX ORDER — FAMOTIDINE 10 MG/ML
20 INJECTION INTRAVENOUS
Status: CANCELLED | OUTPATIENT
Start: 2024-11-04

## 2024-10-31 RX ORDER — HEPARIN 100 UNIT/ML
500 SYRINGE INTRAVENOUS
Status: CANCELLED | OUTPATIENT
Start: 2024-11-04

## 2024-10-31 RX ORDER — SODIUM CHLORIDE 0.9 % (FLUSH) 0.9 %
10 SYRINGE (ML) INJECTION
Status: CANCELLED | OUTPATIENT
Start: 2024-11-05

## 2024-10-31 RX ORDER — DIPHENHYDRAMINE HYDROCHLORIDE 50 MG/ML
50 INJECTION INTRAMUSCULAR; INTRAVENOUS ONCE AS NEEDED
Status: CANCELLED | OUTPATIENT
Start: 2024-11-05

## 2024-10-31 RX ORDER — HEPARIN 100 UNIT/ML
500 SYRINGE INTRAVENOUS
Status: CANCELLED | OUTPATIENT
Start: 2024-11-05

## 2024-10-31 RX ORDER — ACETAMINOPHEN 325 MG/1
650 TABLET ORAL
Status: CANCELLED | OUTPATIENT
Start: 2024-11-04

## 2024-10-31 RX ORDER — MEPERIDINE HYDROCHLORIDE 50 MG/ML
25 INJECTION INTRAMUSCULAR; INTRAVENOUS; SUBCUTANEOUS
Status: CANCELLED | OUTPATIENT
Start: 2024-11-04

## 2024-10-31 NOTE — PROGRESS NOTES
Hematology and Medical Oncology   Follow Up     10/31/2024    Primary Oncologic Diagnosis: B cell lymphoma      History of Present Ilness:   Kevin Echeverria Jr. (Kevin) is a pleasant 84 y.o.male who presents to transition care to [facility] from the [facility]. The patient presents today with his wife and daughter. Most of his issues started in August when he began experiencing horrible pain in his hands that was worse with lack of motion/rest. The pain is excruciating and has sent him to the ED several times. The pain can happen in either upper extremity and feels like it travels at times. The discomfort abates as he moves his hands more throughout the day.     Incidentally through these ER visits it was noted that he has a persistently elevated but largely stable WBC.    --Pathologist review of the peripheral smear on 12/21/19 Leukocytosis with an absolute and relative lymphocytosis.     Lymphocytes are mature, and a subset displays a slightly atypical chromatin pattern.  Smudge cells are present.  Background granulocytes are morphologically normal.  The morphology suggests a B cell lymphoproliferative disorder such as CLL.  --Flow Cytometry on 1/3/2020: A B cell lymphoproliferative disorder is present.  Mantle cell lymphoma is favored although an atypical CLL cannot be completely ruled out; correlation   with morphology and with any available cytogenetic or molecular studies (t(11;14)) is required.     --Bone marrow biopsy on 6/4/24: B-cell lymphoma with biallelic TP53 inactivation.  Differential considerations include splenic marginal zone lymphoma, splenic diffuse red pulp small B-cell lymphoma, splenic B-cell  leukemia/lymphoma with prominent nucleoli (ICC classification, 2022), and less likely atypical chronic lymphocytic leukemia/small lymphocytic lymphoma.     --PET CT showing *Left periaortic lymph node just inferior to the left renal vein measures 0.9 cm with SUV max 2.6 (series 3, image  157).  *Aortocaval lymph node approximately at L2 measures 0.9 cm with SUV max 2.6 (series 3, image 161).  Prostatectomy for prostate cancer.  No hypermetabolic foci within the prostatectomy bed.    Prominent mildly hypermetabolic retroperitoneal lymph nodes.     The Deauville Score is: 4      Interval History:  Mr. Echeverria is here today to discuss his response to cycle 4 of BR therapy.  Tolerating therapy nicely. Continues to work in and around the house. Very happy to see white count continue to fall.     No significant B symptoms at this time and is eager to start treatment when scheduled. He has been compliant with his hydrea with no significant adverse side-effects.     PAST MEDICAL HISTORY:   Past Medical History:   Diagnosis Date    Arthritis     Cancer     prostate    COPD (chronic obstructive pulmonary disease)     Hyperlipidemia     Hypertension     Leukemia        PAST SURGICAL HISTORY:   Past Surgical History:   Procedure Laterality Date    CATARACT EXTRACTION W/  INTRAOCULAR LENS IMPLANT Left 05/05/2016    COLONOSCOPY W/ BIOPSIES AND POLYPECTOMY      PROSTATE SURGERY  05/1996    TONSILLECTOMY  1956       PAST SOCIAL HISTORY:   reports that he has never smoked. He has never been exposed to tobacco smoke. He quit smokeless tobacco use about 25 years ago.  His smokeless tobacco use included chew. He reports current alcohol use. He reports that he does not use drugs.    FAMILY HISTORY:  Family History   Problem Relation Name Age of Onset    Diabetes Mother         CURRENT MEDICATIONS:   Current Outpatient Medications   Medication Sig    albuterol (PROVENTIL) 2.5 mg /3 mL (0.083 %) nebulizer solution Take 1 vial by nebulization twice daily.    allopurinoL (ZYLOPRIM) 300 MG tablet Take 1 tablet (300 mg total) by mouth once daily.    aspirin (ECOTRIN) 81 MG EC tablet Take 81 mg by mouth once daily.    budesonide-glycopyr-formoterol (BREZTRI AEROSPHERE) 160-9-4.8 mcg/actuation HFAA INSTILL 2 PUFFS BY MOUTH  EVERY 12 HOURS    colchicine (MITIGARE) 0.6 mg Cap Take 1 capsule (0.6 mg total) by mouth once daily.    diclofenac sodium (VOLTAREN) 1 % Gel Apply 2 g topically 2 (two) times daily as needed (pain).    famotidine (PEPCID) 20 MG tablet Take 1 tablet orally 2 times a day as needed for heart burn    fish oil-omega-3 fatty acids 300-1,000 mg capsule Take 1 g by mouth once daily.    gabapentin (NEURONTIN) 300 MG capsule Take 1 capsule (300 mg total) by mouth once daily.    hydroxyurea (HYDREA) 500 mg Cap Take 1 capsule (500 mg total) by mouth once daily.    hydroxyurea (HYDREA) 500 mg Cap Take 2 capsules (1,000 mg total) by mouth once daily.    ipratropium (ATROVENT) 0.02 % nebulizer solution Use contents of one vial (2.5 mL) via nebulier every 12 hours    L.acid/B.animalis,bifidum/FOS (PROBIOTIC COMPLEX ORAL) Take by mouth.    LORazepam (ATIVAN) 1 MG tablet Take 1 tablet (1 mg total) by mouth 2 (two) times daily.    metoprolol succinate (TOPROL-XL) 25 MG 24 hr tablet Take 1 tablet orally once a day.    metoprolol succinate (TOPROL-XL) 25 MG 24 hr tablet Take 1 tablet (25 mg total) by mouth every evening.    montelukast (SINGULAIR) 10 mg tablet Take 1 tablet (10 mg total) by mouth every evening.    multivitamin with minerals tablet Take 1 tablet by mouth once daily. Equate Brand with Iron    nebulizer accessories Misc Use as directed    nebulizer and compressor Hyacinth USE WITH NEBULIZER SOLUTION AS DIRECTED    nebulizer and compressor Hyacinth Use with nebulizer treatments    omeprazole (PRILOSEC) 20 MG capsule Take 1 capsule (20 mg total) by mouth every morning.    pravastatin (PRAVACHOL) 20 MG tablet Take 1 tablet orally once in the evening.    valsartan (DIOVAN) 160 MG tablet take one tablet by mouth in the morning for hypertension     No current facility-administered medications for this visit.     ALLERGIES:   Review of patient's allergies indicates:  No Known Allergies      Review of Systems:     Review of Systems    Constitutional:  Negative for appetite change, chills, diaphoresis, fatigue, fever and unexpected weight change.   HENT:   Negative for hearing loss, mouth sores, nosebleeds, sore throat, trouble swallowing and voice change.    Eyes:  Negative for eye problems and icterus.   Respiratory:  Negative for chest tightness, cough, hemoptysis, shortness of breath and wheezing.    Cardiovascular:  Negative for chest pain, leg swelling and palpitations.   Gastrointestinal:  Negative for abdominal distention, abdominal pain, blood in stool, diarrhea, nausea and vomiting.   Endocrine: Negative for hot flashes.   Genitourinary:  Negative for bladder incontinence, difficulty urinating, dysuria and hematuria.    Musculoskeletal:  Positive for arthralgias and neck pain. Negative for back pain, flank pain, gait problem, myalgias and neck stiffness.   Skin:  Negative for itching, rash and wound.   Neurological:  Negative for dizziness, extremity weakness, gait problem, headaches, numbness, seizures and speech difficulty.   Hematological:  Negative for adenopathy. Does not bruise/bleed easily.   Psychiatric/Behavioral:  Negative for confusion, depression and sleep disturbance. The patient is not nervous/anxious.         Physical Exam:     Vitals:    10/31/24 0754   BP: (!) 120/58   Pulse: 70   Resp: 18   Temp: 97.8 °F (36.6 °C)         Physical Exam  Constitutional:       General: He is not in acute distress.     Appearance: He is well-developed. He is not diaphoretic.      Comments: Well dressed gentleman   HENT:      Head: Normocephalic and atraumatic.      Mouth/Throat:      Pharynx: No oropharyngeal exudate.   Eyes:      Conjunctiva/sclera: Conjunctivae normal.      Pupils: Pupils are equal, round, and reactive to light.   Neck:      Thyroid: No thyromegaly.      Vascular: No JVD.      Trachea: No tracheal deviation.   Cardiovascular:      Rate and Rhythm: Normal rate and regular rhythm.      Heart sounds: Normal heart sounds.  No murmur heard.     No friction rub.   Pulmonary:      Effort: Pulmonary effort is normal. No respiratory distress.      Breath sounds: Normal breath sounds. No stridor. No wheezing or rales.   Chest:      Chest wall: No tenderness.   Abdominal:      General: Bowel sounds are normal. There is no distension.      Palpations: Abdomen is soft.      Tenderness: There is no abdominal tenderness. There is no guarding or rebound.   Musculoskeletal:         General: No tenderness or deformity. Normal range of motion.      Cervical back: Normal range of motion and neck supple.   Skin:     General: Skin is warm and dry.      Capillary Refill: Capillary refill takes less than 2 seconds.      Coloration: Skin is not pale.      Findings: No erythema or rash.   Neurological:      Mental Status: He is alert and oriented to person, place, and time.      Cranial Nerves: No cranial nerve deficit.      Sensory: No sensory deficit.      Motor: No abnormal muscle tone.      Coordination: Coordination normal.      Deep Tendon Reflexes: Reflexes normal.   Psychiatric:         Behavior: Behavior normal.         Thought Content: Thought content normal.         Judgment: Judgment normal.       ECOG Performance Status: (foot note - ECOG PS provided by Eastern Cooperative Oncology Group) 1 - Symptomatic but completely ambulatory    Karnofsky Performance Score:  90%- Able to Carry on Normal Activity: Minor Symptoms of Disease    Labs:   Lab Results   Component Value Date    WBC 5.93 10/31/2024    HGB 8.8 (L) 10/31/2024    HCT 25.5 (L) 10/31/2024     10/31/2024    CHOL 131 08/28/2023    TRIG 56 08/28/2023    HDL 56 08/28/2023    LDLDIRECT 69 08/28/2023    ALT 15 10/31/2024    AST 23 10/31/2024     (L) 10/31/2024    K 4.4 10/31/2024     10/31/2024    CREATININE 1.0 10/31/2024    BUN 11 10/31/2024    CO2 24 10/31/2024    TSH 3.184 01/31/2020    PSA 0.05 11/16/2021    INR 1.1 06/03/2024     Uric acid: 2.8  LDH: 145      Imaging:  Outside imaging has been reviewed   Assessment and Plan:     Mr. 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     B-cell lymphoma with biallelic TP53 inactivation   --Seen on bone marrow biopsy  --Doing well on therapy. No complaints  --Happy to see the decrease in white count  --Plan to repeat labs on Wed 10/9 to ensure resolution of thrombocytopenia  --Plan for next treatment Thurs 10/10  --Return to clinic virtually in 4 weeks prior to C5    Leukocytosis  --Continue daily allopurinol  --White count is down trending  --Current WBC of 22.35      Prostate Cancer  --Treated with a prostatectomy      40 minutes in total were spent on this encounter of which 30 minutes were spent face to face with the patient and his  family to discuss the disease, natural history, treatment options and survival statistics. I have provided the patient with an opportunity to ask questions and have all questions answered to his satisfaction.         he will return to clinic prior to cycle 5 of therapy, but knows to call in the interim if symptoms change or should a problem arise.    Visit today included increased complexity associated with the care of the episodic problem B cell lymphoma addressed and managing the longitudinal care of the patient due to the serious and/or complex managed problem(s) B cell lymphoma.       Jaqueiln Quintana MD  Hematology and Medical Oncology  Bone Marrow Transplant  [facility]      BMT Chart Routing      Follow up with physician . 1. labs at [facility] on 11/25 : cbc,cmp  2. see me virtually on 11/26 3. Mau-rituxan at [facility] on 12/2 and 12/3   Follow up with MARY JANE    Provider visit type    Infusion scheduling note    Injection scheduling note    Labs    Imaging    Pharmacy appointment    Other referrals

## 2024-11-18 RX ORDER — ALBUTEROL SULFATE 0.83 MG/ML
SOLUTION RESPIRATORY (INHALATION)
Qty: 540 ML | Refills: 3 | Status: SHIPPED | OUTPATIENT
Start: 2024-11-18

## 2024-11-18 NOTE — TELEPHONE ENCOUNTER
No care due was identified.  [facility] Embedded Care Due Messages. Reference number: [id number].   11/18/2024 2:54:34 PM CST

## 2024-11-19 NOTE — TELEPHONE ENCOUNTER
Refill Decision Note   Kevin Echeverria  is requesting a refill authorization.  Brief Assessment and Rationale for Refill:  Approve     Medication Therapy Plan:        Comments:     Note composed:10:15 PM 11/18/2024

## 2024-11-22 ENCOUNTER — TELEPHONE (OUTPATIENT)
Dept: HEMATOLOGY/ONCOLOGY | Facility: CLINIC | Age: 84
End: 2024-11-22
Payer: MEDICARE

## 2024-11-22 NOTE — TELEPHONE ENCOUNTER
Called pt and confirmed virtual appt for 11/26/24. Pt asked her to complete e-pre check and to log on 10 mins prior to her visit for virtual rooming. Pt understood directions and was given [employer] tech # if he has any technical issues during his visit.

## 2024-11-25 ENCOUNTER — LAB VISIT (OUTPATIENT)
Dept: LAB | Facility: HOSPITAL | Age: 84
End: 2024-11-25
Attending: INTERNAL MEDICINE
Payer: MEDICARE

## 2024-11-25 DIAGNOSIS — C83.09 SMALL B-CELL LYMPHOMA OF EXTRANODAL SITE EXCLUDING SPLEEN AND OTHER SOLID ORGANS: ICD-10-CM

## 2024-11-25 LAB
ALBUMIN SERPL BCP-MCNC: 3.5 G/DL (ref 3.5–5.2)
ALP SERPL-CCNC: 52 U/L (ref 40–150)
ALT SERPL W/O P-5'-P-CCNC: 12 U/L (ref 10–44)
ANION GAP SERPL CALC-SCNC: 8 MMOL/L (ref 8–16)
AST SERPL-CCNC: 21 U/L (ref 10–40)
BASOPHILS # BLD AUTO: 0.01 K/UL (ref 0–0.2)
BASOPHILS NFR BLD: 0.4 % (ref 0–1.9)
BILIRUB SERPL-MCNC: 0.5 MG/DL (ref 0.1–1)
BUN SERPL-MCNC: 10 MG/DL (ref 8–23)
CALCIUM SERPL-MCNC: 9.1 MG/DL (ref 8.7–10.5)
CHLORIDE SERPL-SCNC: 104 MMOL/L (ref 95–110)
CO2 SERPL-SCNC: 22 MMOL/L (ref 23–29)
CREAT SERPL-MCNC: 1 MG/DL (ref 0.5–1.4)
DIFFERENTIAL METHOD BLD: ABNORMAL
EOSINOPHIL # BLD AUTO: 0.2 K/UL (ref 0–0.5)
EOSINOPHIL NFR BLD: 7.2 % (ref 0–8)
ERYTHROCYTE [DISTWIDTH] IN BLOOD BY AUTOMATED COUNT: 12.3 % (ref 11.5–14.5)
EST. GFR  (NO RACE VARIABLE): >60 ML/MIN/1.73 M^2
GLUCOSE SERPL-MCNC: 118 MG/DL (ref 70–110)
HCT VFR BLD AUTO: 26.7 % (ref 40–54)
HGB BLD-MCNC: 9.5 G/DL (ref 14–18)
IMM GRANULOCYTES # BLD AUTO: 0 K/UL (ref 0–0.04)
IMM GRANULOCYTES NFR BLD AUTO: 0 % (ref 0–0.5)
LYMPHOCYTES # BLD AUTO: 1.3 K/UL (ref 1–4.8)
LYMPHOCYTES NFR BLD: 57 % (ref 18–48)
MCH RBC QN AUTO: 36.7 PG (ref 27–31)
MCHC RBC AUTO-ENTMCNC: 35.6 G/DL (ref 32–36)
MCV RBC AUTO: 103 FL (ref 82–98)
MONOCYTES # BLD AUTO: 0.2 K/UL (ref 0.3–1)
MONOCYTES NFR BLD: 10.3 % (ref 4–15)
NEUTROPHILS # BLD AUTO: 0.6 K/UL (ref 1.8–7.7)
NEUTROPHILS NFR BLD: 25.1 % (ref 38–73)
NRBC BLD-RTO: 0 /100 WBC
PLATELET # BLD AUTO: 147 K/UL (ref 150–450)
PMV BLD AUTO: 10.3 FL (ref 9.2–12.9)
POTASSIUM SERPL-SCNC: 4.3 MMOL/L (ref 3.5–5.1)
PROT SERPL-MCNC: 7 G/DL (ref 6–8.4)
RBC # BLD AUTO: 2.59 M/UL (ref 4.6–6.2)
SODIUM SERPL-SCNC: 134 MMOL/L (ref 136–145)
WBC # BLD AUTO: 2.23 K/UL (ref 3.9–12.7)

## 2024-11-25 PROCEDURE — 36415 COLL VENOUS BLD VENIPUNCTURE: CPT | Performed by: INTERNAL MEDICINE

## 2024-11-25 PROCEDURE — 85025 COMPLETE CBC W/AUTO DIFF WBC: CPT | Performed by: INTERNAL MEDICINE

## 2024-11-25 PROCEDURE — 80053 COMPREHEN METABOLIC PANEL: CPT | Performed by: INTERNAL MEDICINE

## 2024-11-25 NOTE — TELEPHONE ENCOUNTER
No care due was identified.  [facility] Embedded Care Due Messages. Reference number: [id number].   11/25/2024 3:09:22 PM CST

## 2024-11-26 ENCOUNTER — OFFICE VISIT (OUTPATIENT)
Dept: HEMATOLOGY/ONCOLOGY | Facility: CLINIC | Age: 84
End: 2024-11-26
Payer: MEDICARE

## 2024-11-26 DIAGNOSIS — C92.10 CML (CHRONIC MYELOCYTIC LEUKEMIA): Primary | ICD-10-CM

## 2024-11-26 DIAGNOSIS — C61 PROSTATE CANCER: ICD-10-CM

## 2024-11-26 DIAGNOSIS — C83.09 SMALL B-CELL LYMPHOMA OF EXTRANODAL SITE EXCLUDING SPLEEN AND OTHER SOLID ORGANS: ICD-10-CM

## 2024-11-26 PROCEDURE — 1126F AMNT PAIN NOTED NONE PRSNT: CPT | Mod: CPTII,95,, | Performed by: INTERNAL MEDICINE

## 2024-11-26 PROCEDURE — G2211 COMPLEX E/M VISIT ADD ON: HCPCS | Mod: 95,,, | Performed by: INTERNAL MEDICINE

## 2024-11-26 PROCEDURE — 99215 OFFICE O/P EST HI 40 MIN: CPT | Mod: 95,,, | Performed by: INTERNAL MEDICINE

## 2024-11-26 RX ORDER — BUDESONIDE, GLYCOPYRROLATE, AND FORMOTEROL FUMARATE 160; 9; 4.8 UG/1; UG/1; UG/1
AEROSOL, METERED RESPIRATORY (INHALATION)
Qty: 32.1 G | Refills: 3 | Status: SHIPPED | OUTPATIENT
Start: 2024-11-26

## 2024-11-26 NOTE — TELEPHONE ENCOUNTER
Refill Decision Note   Kevin Echeverria  is requesting a refill authorization.  Brief Assessment and Rationale for Refill:  Approve     Medication Therapy Plan:         Comments:     Note composed:6:27 AM 11/26/2024

## 2024-12-01 NOTE — PROGRESS NOTES
Hematology and Medical Oncology   Follow Up     12/01/2024    Primary Oncologic Diagnosis: B cell lymphoma    Telemedicine Documentation:  The patient location is: home  The chief complaint leading to consultation is: B Cell Lymphoma    Visit type: audiovisual    Face to Face time with patient: 25  40 minutes of total time spent on the encounter, which includes face to face time and non-face to face time preparing to see the patient (eg, review of tests), Obtaining and/or reviewing separately obtained history, Documenting clinical information in the electronic or other health record, Independently interpreting results (not separately reported) and communicating results to the patient/family/caregiver, or Care coordination (not separately reported).         Each patient to whom he or she provides medical services by telemedicine is:  (1) informed of the relationship between the physician and patient and the respective role of any other health care provider with respect to management of the patient; and (2) notified that he or she may decline to receive medical services by telemedicine and may withdraw from such care at any time.      History of Present Ilness:   Kevin MARSHALL Juwan Gonsalez (Kevin) is a pleasant 84 y.o.male who presents to transition care to [facility] from the [facility]. The patient presents today with his wife and daughter. Most of his issues started in August when he began experiencing horrible pain in his hands that was worse with lack of motion/rest. The pain is excruciating and has sent him to the ED several times. The pain can happen in either upper extremity and feels like it travels at times. The discomfort abates as he moves his hands more throughout the day.     Incidentally through these ER visits it was noted that he has a persistently elevated but largely stable WBC.    --Pathologist review of the peripheral smear on 12/21/19 Leukocytosis with an absolute and relative lymphocytosis.      Lymphocytes are mature, and a subset displays a slightly atypical chromatin pattern.  Smudge cells are present.  Background granulocytes are morphologically normal.  The morphology suggests a B cell lymphoproliferative disorder such as CLL.  --Flow Cytometry on 1/3/2020: A B cell lymphoproliferative disorder is present.  Mantle cell lymphoma is favored although an atypical CLL cannot be completely ruled out; correlation   with morphology and with any available cytogenetic or molecular studies (t(11;14)) is required.     --Bone marrow biopsy on 6/4/24: B-cell lymphoma with biallelic TP53 inactivation.  Differential considerations include splenic marginal zone lymphoma, splenic diffuse red pulp small B-cell lymphoma, splenic B-cell  leukemia/lymphoma with prominent nucleoli (ICC classification, 2022), and less likely atypical chronic lymphocytic leukemia/small lymphocytic lymphoma.     --PET CT showing *Left periaortic lymph node just inferior to the left renal vein measures 0.9 cm with SUV max 2.6 (series 3, image 157).  *Aortocaval lymph node approximately at L2 measures 0.9 cm with SUV max 2.6 (series 3, image 161).  Prostatectomy for prostate cancer.  No hypermetabolic foci within the prostatectomy bed.    Prominent mildly hypermetabolic retroperitoneal lymph nodes.     The Deauville Score is: 4      Interval History:  Mr. Echeverria presents virtually with his wife and daughter is here today to discuss his response to cycle 4 of BR therapy.  No new issues or symptoms.     Generally doing extremely well. Able to do all desired activities around the house.       PAST MEDICAL HISTORY:   Past Medical History:   Diagnosis Date    Arthritis     Cancer     prostate    COPD (chronic obstructive pulmonary disease)     Hyperlipidemia     Hypertension     Leukemia        PAST SURGICAL HISTORY:   Past Surgical History:   Procedure Laterality Date    CATARACT EXTRACTION W/  INTRAOCULAR LENS IMPLANT Left 05/05/2016     COLONOSCOPY W/ BIOPSIES AND POLYPECTOMY      PROSTATE SURGERY  05/1996    TONSILLECTOMY  1956       PAST SOCIAL HISTORY:   reports that he has never smoked. He has never been exposed to tobacco smoke. He quit smokeless tobacco use about 25 years ago.  His smokeless tobacco use included chew. He reports current alcohol use. He reports that he does not use drugs.    FAMILY HISTORY:  Family History   Problem Relation Name Age of Onset    Diabetes Mother         CURRENT MEDICATIONS:   Current Outpatient Medications   Medication Sig    albuterol (PROVENTIL) 2.5 mg /3 mL (0.083 %) nebulizer solution Take 1 vial by nebulization twice daily.    allopurinoL (ZYLOPRIM) 300 MG tablet Take 1 tablet (300 mg total) by mouth once daily.    aspirin (ECOTRIN) 81 MG EC tablet Take 81 mg by mouth once daily.    budesonide-glycopyr-formoterol (BREZTRI AEROSPHERE) 160-9-4.8 mcg/actuation HFAA INHALE 2 PUFFS BY MOUTH EVERY 12 HOURS    diclofenac sodium (VOLTAREN) 1 % Gel Apply 2 g topically 2 (two) times daily as needed (pain).    famotidine (PEPCID) 20 MG tablet Take 1 tablet orally 2 times a day as needed for heart burn    fish oil-omega-3 fatty acids 300-1,000 mg capsule Take 1 g by mouth once daily.    gabapentin (NEURONTIN) 300 MG capsule Take 1 capsule (300 mg total) by mouth once daily.    hydroxyurea (HYDREA) 500 mg Cap Take 1 capsule (500 mg total) by mouth once daily.    hydroxyurea (HYDREA) 500 mg Cap Take 2 capsules (1,000 mg total) by mouth once daily.    ipratropium (ATROVENT) 0.02 % nebulizer solution Use contents of one vial (2.5 mL) via nebulier every 12 hours    L.acid/B.animalis,bifidum/FOS (PROBIOTIC COMPLEX ORAL) Take by mouth.    LORazepam (ATIVAN) 1 MG tablet Take 1 tablet (1 mg total) by mouth 2 (two) times daily.    metoprolol succinate (TOPROL-XL) 25 MG 24 hr tablet Take 1 tablet orally once a day.    metoprolol succinate (TOPROL-XL) 25 MG 24 hr tablet Take 1 tablet (25 mg total) by mouth every evening.     montelukast (SINGULAIR) 10 mg tablet Take 1 tablet (10 mg total) by mouth every evening.    multivitamin with minerals tablet Take 1 tablet by mouth once daily. Equate Brand with Iron    nebulizer accessories Misc Use as directed    nebulizer and compressor Hyacinth USE WITH NEBULIZER SOLUTION AS DIRECTED    nebulizer and compressor Hyacinth Use with nebulizer treatments    omeprazole (PRILOSEC) 20 MG capsule Take 1 capsule (20 mg total) by mouth every morning.    pravastatin (PRAVACHOL) 20 MG tablet Take 1 tablet (20 mg total) by mouth every evening.    valsartan (DIOVAN) 160 MG tablet take one tablet by mouth in the morning for hypertension    colchicine (MITIGARE) 0.6 mg Cap Take 1 capsule (0.6 mg total) by mouth once daily. (Patient not taking: Reported on 11/26/2024)     No current facility-administered medications for this visit.     ALLERGIES:   Review of patient's allergies indicates:  No Known Allergies      Review of Systems:     Review of Systems   Constitutional:  Negative for appetite change, chills, diaphoresis, fatigue, fever and unexpected weight change.   HENT:   Negative for hearing loss, mouth sores, nosebleeds, sore throat, trouble swallowing and voice change.    Eyes:  Negative for eye problems and icterus.   Respiratory:  Negative for chest tightness, cough, hemoptysis, shortness of breath and wheezing.    Cardiovascular:  Negative for chest pain, leg swelling and palpitations.   Gastrointestinal:  Negative for abdominal distention, abdominal pain, blood in stool, diarrhea, nausea and vomiting.   Endocrine: Negative for hot flashes.   Genitourinary:  Negative for bladder incontinence, difficulty urinating, dysuria and hematuria.    Musculoskeletal:  Positive for arthralgias and neck pain. Negative for back pain, flank pain, gait problem, myalgias and neck stiffness.   Skin:  Negative for itching, rash and wound.   Neurological:  Negative for dizziness, extremity weakness, gait problem, headaches,  numbness, seizures and speech difficulty.   Hematological:  Negative for adenopathy. Does not bruise/bleed easily.   Psychiatric/Behavioral:  Negative for confusion, depression and sleep disturbance. The patient is not nervous/anxious.         Physical Exam:   Limited secondary to virtual visit    ECOG Performance Status: (foot note - ECOG PS provided by Eastern Cooperative Oncology Group) 1 - Symptomatic but completely ambulatory    Karnofsky Performance Score:  90%- Able to Carry on Normal Activity: Minor Symptoms of Disease    Labs:   Lab Results   Component Value Date    WBC 2.23 (L) 11/25/2024    HGB 9.5 (L) 11/25/2024    HCT 26.7 (L) 11/25/2024     (L) 11/25/2024    CHOL 131 08/28/2023    TRIG 56 08/28/2023    HDL 56 08/28/2023    LDLDIRECT 69 08/28/2023    ALT 12 11/25/2024    AST 21 11/25/2024     (L) 11/25/2024    K 4.3 11/25/2024     11/25/2024    CREATININE 1.0 11/25/2024    BUN 10 11/25/2024    CO2 22 (L) 11/25/2024    TSH 3.184 01/31/2020    PSA 0.05 11/16/2021    INR 1.1 06/03/2024     Uric acid: 2.8  LDH: 145      Imaging: Outside imaging has been reviewed   Assessment and Plan:     Mr. 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     B-cell lymphoma with biallelic TP53 inactivation   --Seen on bone marrow biopsy  --Doing well on therapy. No complaints  --Happy to see the decrease in white count  --Plan for next treatment 12/30  --Return to clinic virtually in 4 weeks prior to C6    Leukocytosis  --Resolved      Prostate Cancer  --Treated with a prostatectomy      40 minutes in total were spent on this encounter of which 30 minutes were spent face to face with the patient and his  family to discuss the disease, natural history, treatment options and survival statistics. I have provided the patient with an opportunity to ask questions and have all questions answered to his satisfaction.         he will return to clinic prior to cycle 6 of therapy, but knows to call in  the interim if symptoms change or should a problem arise.    Visit today included increased complexity associated with the care of the episodic problem B cell lymphoma addressed and managing the longitudinal care of the patient due to the serious and/or complex managed problem(s) B cell lymphoma.       Jaquelin Quintana MD  Hematology and Medical Oncology  Bone Marrow Transplant  [facility]      BMT Chart Routing  Urgent    Follow up with physician . 1. labs: cbc,cmp and see me morning of 12/26 [okay to double book]   Follow up with MARY JANE    Provider visit type    Infusion scheduling note    Injection scheduling note    Labs    Imaging    Pharmacy appointment    Other referrals

## 2024-12-02 RX ORDER — EPINEPHRINE 0.3 MG/.3ML
0.3 INJECTION SUBCUTANEOUS ONCE AS NEEDED
Status: CANCELLED | OUTPATIENT
Start: 2024-12-03

## 2024-12-02 RX ORDER — SODIUM CHLORIDE 0.9 % (FLUSH) 0.9 %
10 SYRINGE (ML) INJECTION
Status: CANCELLED | OUTPATIENT
Start: 2024-12-02

## 2024-12-02 RX ORDER — DIPHENHYDRAMINE HYDROCHLORIDE 50 MG/ML
50 INJECTION INTRAMUSCULAR; INTRAVENOUS ONCE AS NEEDED
Status: CANCELLED | OUTPATIENT
Start: 2024-12-03

## 2024-12-02 RX ORDER — SODIUM CHLORIDE 0.9 % (FLUSH) 0.9 %
10 SYRINGE (ML) INJECTION
Status: CANCELLED | OUTPATIENT
Start: 2024-12-03

## 2024-12-02 RX ORDER — HEPARIN 100 UNIT/ML
500 SYRINGE INTRAVENOUS
Status: CANCELLED | OUTPATIENT
Start: 2024-12-02

## 2024-12-02 RX ORDER — FAMOTIDINE 10 MG/ML
20 INJECTION INTRAVENOUS
Status: CANCELLED | OUTPATIENT
Start: 2024-12-02

## 2024-12-02 RX ORDER — MEPERIDINE HYDROCHLORIDE 50 MG/ML
25 INJECTION INTRAMUSCULAR; INTRAVENOUS; SUBCUTANEOUS
Status: CANCELLED | OUTPATIENT
Start: 2024-12-02

## 2024-12-02 RX ORDER — DIPHENHYDRAMINE HYDROCHLORIDE 50 MG/ML
50 INJECTION INTRAMUSCULAR; INTRAVENOUS ONCE AS NEEDED
Status: CANCELLED | OUTPATIENT
Start: 2024-12-02

## 2024-12-02 RX ORDER — ACETAMINOPHEN 325 MG/1
650 TABLET ORAL
Status: CANCELLED | OUTPATIENT
Start: 2024-12-02

## 2024-12-02 RX ORDER — EPINEPHRINE 0.3 MG/.3ML
0.3 INJECTION SUBCUTANEOUS ONCE AS NEEDED
Status: CANCELLED | OUTPATIENT
Start: 2024-12-02

## 2024-12-02 RX ORDER — HEPARIN 100 UNIT/ML
500 SYRINGE INTRAVENOUS
Status: CANCELLED | OUTPATIENT
Start: 2024-12-03

## 2024-12-26 ENCOUNTER — LAB VISIT (OUTPATIENT)
Dept: LAB | Facility: HOSPITAL | Age: 84
End: 2024-12-26
Payer: MEDICARE

## 2024-12-26 ENCOUNTER — OFFICE VISIT (OUTPATIENT)
Dept: HEMATOLOGY/ONCOLOGY | Facility: CLINIC | Age: 84
End: 2024-12-26
Payer: MEDICARE

## 2024-12-26 VITALS
RESPIRATION RATE: 18 BRPM | OXYGEN SATURATION: 98 % | SYSTOLIC BLOOD PRESSURE: 133 MMHG | DIASTOLIC BLOOD PRESSURE: 63 MMHG | WEIGHT: 158.5 LBS | HEART RATE: 68 BPM | BODY MASS INDEX: 23.47 KG/M2 | HEIGHT: 69 IN

## 2024-12-26 DIAGNOSIS — C83.09 SMALL B-CELL LYMPHOMA OF EXTRANODAL SITE EXCLUDING SPLEEN AND OTHER SOLID ORGANS: ICD-10-CM

## 2024-12-26 DIAGNOSIS — M10.012 ACUTE IDIOPATHIC GOUT OF LEFT SHOULDER: ICD-10-CM

## 2024-12-26 DIAGNOSIS — C91.10 CLL (CHRONIC LYMPHOCYTIC LEUKEMIA): Primary | ICD-10-CM

## 2024-12-26 DIAGNOSIS — D84.821 DRUG-INDUCED IMMUNODEFICIENCY: ICD-10-CM

## 2024-12-26 DIAGNOSIS — Z79.899 DRUG-INDUCED IMMUNODEFICIENCY: ICD-10-CM

## 2024-12-26 DIAGNOSIS — M11.849 CALCIUM PYROPHOSPHATE ARTHROPATHY OF HAND: ICD-10-CM

## 2024-12-26 LAB
ALBUMIN SERPL BCP-MCNC: 3.4 G/DL (ref 3.5–5.2)
ALP SERPL-CCNC: 66 U/L (ref 40–150)
ALT SERPL W/O P-5'-P-CCNC: 15 U/L (ref 10–44)
ANION GAP SERPL CALC-SCNC: 6 MMOL/L (ref 8–16)
AST SERPL-CCNC: 21 U/L (ref 10–40)
BASOPHILS # BLD AUTO: 0 K/UL (ref 0–0.2)
BASOPHILS NFR BLD: 0 % (ref 0–1.9)
BILIRUB SERPL-MCNC: 0.4 MG/DL (ref 0.1–1)
BUN SERPL-MCNC: 12 MG/DL (ref 8–23)
CALCIUM SERPL-MCNC: 8.9 MG/DL (ref 8.7–10.5)
CHLORIDE SERPL-SCNC: 102 MMOL/L (ref 95–110)
CO2 SERPL-SCNC: 25 MMOL/L (ref 23–29)
CREAT SERPL-MCNC: 0.9 MG/DL (ref 0.5–1.4)
DIFFERENTIAL METHOD BLD: ABNORMAL
EOSINOPHIL # BLD AUTO: 0.3 K/UL (ref 0–0.5)
EOSINOPHIL NFR BLD: 7 % (ref 0–8)
ERYTHROCYTE [DISTWIDTH] IN BLOOD BY AUTOMATED COUNT: 12.3 % (ref 11.5–14.5)
EST. GFR  (NO RACE VARIABLE): >60 ML/MIN/1.73 M^2
GLUCOSE SERPL-MCNC: 94 MG/DL (ref 70–110)
HCT VFR BLD AUTO: 28.7 % (ref 40–54)
HGB BLD-MCNC: 10.3 G/DL (ref 14–18)
IMM GRANULOCYTES # BLD AUTO: 0.01 K/UL (ref 0–0.04)
IMM GRANULOCYTES NFR BLD AUTO: 0.3 % (ref 0–0.5)
LYMPHOCYTES # BLD AUTO: 0.7 K/UL (ref 1–4.8)
LYMPHOCYTES NFR BLD: 18.4 % (ref 18–48)
MCH RBC QN AUTO: 35.6 PG (ref 27–31)
MCHC RBC AUTO-ENTMCNC: 35.9 G/DL (ref 32–36)
MCV RBC AUTO: 99 FL (ref 82–98)
MONOCYTES # BLD AUTO: 0.6 K/UL (ref 0.3–1)
MONOCYTES NFR BLD: 17.3 % (ref 4–15)
NEUTROPHILS # BLD AUTO: 2.1 K/UL (ref 1.8–7.7)
NEUTROPHILS NFR BLD: 57 % (ref 38–73)
NRBC BLD-RTO: 0 /100 WBC
PLATELET # BLD AUTO: 156 K/UL (ref 150–450)
PMV BLD AUTO: 9.8 FL (ref 9.2–12.9)
POTASSIUM SERPL-SCNC: 4.5 MMOL/L (ref 3.5–5.1)
PROT SERPL-MCNC: 7 G/DL (ref 6–8.4)
RBC # BLD AUTO: 2.89 M/UL (ref 4.6–6.2)
SODIUM SERPL-SCNC: 133 MMOL/L (ref 136–145)
WBC # BLD AUTO: 3.59 K/UL (ref 3.9–12.7)

## 2024-12-26 PROCEDURE — 36415 COLL VENOUS BLD VENIPUNCTURE: CPT | Performed by: INTERNAL MEDICINE

## 2024-12-26 PROCEDURE — 99999 PR PBB SHADOW E&M-EST. PATIENT-LVL V: CPT | Mod: PBBFAC,,, | Performed by: INTERNAL MEDICINE

## 2024-12-26 PROCEDURE — 85025 COMPLETE CBC W/AUTO DIFF WBC: CPT | Performed by: INTERNAL MEDICINE

## 2024-12-26 PROCEDURE — 80053 COMPREHEN METABOLIC PANEL: CPT | Performed by: INTERNAL MEDICINE

## 2024-12-26 RX ORDER — EPINEPHRINE 0.3 MG/.3ML
0.3 INJECTION SUBCUTANEOUS ONCE AS NEEDED
OUTPATIENT
Start: 2024-12-30

## 2024-12-26 RX ORDER — HEPARIN 100 UNIT/ML
500 SYRINGE INTRAVENOUS
OUTPATIENT
Start: 2024-12-30

## 2024-12-26 RX ORDER — DIPHENHYDRAMINE HYDROCHLORIDE 50 MG/ML
50 INJECTION INTRAMUSCULAR; INTRAVENOUS ONCE AS NEEDED
OUTPATIENT
Start: 2024-12-30

## 2024-12-26 RX ORDER — HEPARIN 100 UNIT/ML
500 SYRINGE INTRAVENOUS
OUTPATIENT
Start: 2024-12-31

## 2024-12-26 RX ORDER — ACETAMINOPHEN 325 MG/1
650 TABLET ORAL
OUTPATIENT
Start: 2024-12-30

## 2024-12-26 RX ORDER — SODIUM CHLORIDE 0.9 % (FLUSH) 0.9 %
10 SYRINGE (ML) INJECTION
OUTPATIENT
Start: 2024-12-30

## 2024-12-26 RX ORDER — EPINEPHRINE 0.3 MG/.3ML
0.3 INJECTION SUBCUTANEOUS ONCE AS NEEDED
OUTPATIENT
Start: 2024-12-31

## 2024-12-26 RX ORDER — COLCHICINE 0.6 MG/1
0.6 CAPSULE ORAL DAILY
Qty: 90 CAPSULE | Refills: 4 | Status: SHIPPED | OUTPATIENT
Start: 2024-12-26 | End: 2026-03-21

## 2024-12-26 RX ORDER — SODIUM CHLORIDE 0.9 % (FLUSH) 0.9 %
10 SYRINGE (ML) INJECTION
OUTPATIENT
Start: 2024-12-31

## 2024-12-26 RX ORDER — FAMOTIDINE 10 MG/ML
20 INJECTION INTRAVENOUS
OUTPATIENT
Start: 2024-12-30

## 2024-12-26 RX ORDER — DIPHENHYDRAMINE HYDROCHLORIDE 50 MG/ML
50 INJECTION INTRAMUSCULAR; INTRAVENOUS ONCE AS NEEDED
OUTPATIENT
Start: 2024-12-31

## 2024-12-26 RX ORDER — MEPERIDINE HYDROCHLORIDE 50 MG/ML
25 INJECTION INTRAMUSCULAR; INTRAVENOUS; SUBCUTANEOUS
OUTPATIENT
Start: 2024-12-30

## 2024-12-26 NOTE — PROGRESS NOTES
Hematology and Medical Oncology   Follow Up     12/26/2024    Primary Oncologic Diagnosis: B cell lymphoma      History of Present Ilness:   Kevin Echeverria Jr. (Kevin) is a pleasant 84 y.o.male who presents to transition care to [facility] from the [facility]. The patient presents today with his wife and daughter. Most of his issues started in August when he began experiencing horrible pain in his hands that was worse with lack of motion/rest. The pain is excruciating and has sent him to the ED several times. The pain can happen in either upper extremity and feels like it travels at times. The discomfort abates as he moves his hands more throughout the day.     Incidentally through these ER visits it was noted that he has a persistently elevated but largely stable WBC.    --Pathologist review of the peripheral smear on 12/21/19 Leukocytosis with an absolute and relative lymphocytosis.     Lymphocytes are mature, and a subset displays a slightly atypical chromatin pattern.  Smudge cells are present.  Background granulocytes are morphologically normal.  The morphology suggests a B cell lymphoproliferative disorder such as CLL.  --Flow Cytometry on 1/3/2020: A B cell lymphoproliferative disorder is present.  Mantle cell lymphoma is favored although an atypical CLL cannot be completely ruled out; correlation   with morphology and with any available cytogenetic or molecular studies (t(11;14)) is required.     --Bone marrow biopsy on 6/4/24: B-cell lymphoma with biallelic TP53 inactivation.  Differential considerations include splenic marginal zone lymphoma, splenic diffuse red pulp small B-cell lymphoma, splenic B-cell  leukemia/lymphoma with prominent nucleoli (ICC classification, 2022), and less likely atypical chronic lymphocytic leukemia/small lymphocytic lymphoma.     --PET CT showing *Left periaortic lymph node just inferior to the left renal vein measures 0.9 cm with SUV max 2.6 (series 3, image  157).  *Aortocaval lymph node approximately at L2 measures 0.9 cm with SUV max 2.6 (series 3, image 161).  Prostatectomy for prostate cancer.  No hypermetabolic foci within the prostatectomy bed.    Prominent mildly hypermetabolic retroperitoneal lymph nodes.     The Deauville Score is: 4      Interval History:  Mr. Echeverria presents in person with his grand daughter prior to his last treatment of B-R therapy. No new issues or symptoms.     Generally doing extremely well. Able to do all desired activities around the house.  Excited this is his last treatment.      PAST MEDICAL HISTORY:   Past Medical History:   Diagnosis Date    Arthritis     Cancer     prostate    COPD (chronic obstructive pulmonary disease)     Hyperlipidemia     Hypertension     Leukemia        PAST SURGICAL HISTORY:   Past Surgical History:   Procedure Laterality Date    CATARACT EXTRACTION W/  INTRAOCULAR LENS IMPLANT Left 05/05/2016    COLONOSCOPY W/ BIOPSIES AND POLYPECTOMY      PROSTATE SURGERY  05/1996    TONSILLECTOMY  1956       PAST SOCIAL HISTORY:   reports that he has never smoked. He has never been exposed to tobacco smoke. He quit smokeless tobacco use about 25 years ago.  His smokeless tobacco use included chew. He reports current alcohol use. He reports that he does not use drugs.    FAMILY HISTORY:  Family History   Problem Relation Name Age of Onset    Diabetes Mother         CURRENT MEDICATIONS:   Current Outpatient Medications   Medication Sig    albuterol (PROVENTIL) 2.5 mg /3 mL (0.083 %) nebulizer solution Take 1 vial by nebulization twice daily.    allopurinoL (ZYLOPRIM) 300 MG tablet Take 1 tablet (300 mg total) by mouth once daily.    aspirin (ECOTRIN) 81 MG EC tablet Take 81 mg by mouth once daily.    budesonide-glycopyr-formoterol (BREZTRI AEROSPHERE) 160-9-4.8 mcg/actuation HFAA INHALE 2 PUFFS BY MOUTH EVERY 12 HOURS    diclofenac sodium (VOLTAREN) 1 % Gel Apply 2 g topically 2 (two) times daily as needed (pain).     famotidine (PEPCID) 20 MG tablet Take 1 tablet orally 2 times a day as needed for heart burn    fish oil-omega-3 fatty acids 300-1,000 mg capsule Take 1 g by mouth once daily.    gabapentin (NEURONTIN) 300 MG capsule Take 1 capsule (300 mg total) by mouth once daily.    hydroxyurea (HYDREA) 500 mg Cap Take 1 capsule (500 mg total) by mouth once daily.    hydroxyurea (HYDREA) 500 mg Cap Take 2 capsules (1,000 mg total) by mouth once daily.    ipratropium (ATROVENT) 0.02 % nebulizer solution Use contents of one vial (2.5 mL) via nebulier every 12 hours    L.acid/B.animalis,bifidum/FOS (PROBIOTIC COMPLEX ORAL) Take by mouth.    LORazepam (ATIVAN) 1 MG tablet Take 1 tablet (1 mg total) by mouth 2 (two) times daily.    metoprolol succinate (TOPROL-XL) 25 MG 24 hr tablet Take 1 tablet orally once a day.    metoprolol succinate (TOPROL-XL) 25 MG 24 hr tablet Take 1 tablet (25 mg total) by mouth every evening.    montelukast (SINGULAIR) 10 mg tablet Take 1 tablet (10 mg total) by mouth every evening.    multivitamin with minerals tablet Take 1 tablet by mouth once daily. Equate Brand with Iron    nebulizer accessories Misc Use as directed    nebulizer and compressor Hyacinth USE WITH NEBULIZER SOLUTION AS DIRECTED    nebulizer and compressor Hyacinth Use with nebulizer treatments    omeprazole (PRILOSEC) 20 MG capsule Take 1 capsule (20 mg total) by mouth every morning.    pravastatin (PRAVACHOL) 20 MG tablet Take 1 tablet (20 mg total) by mouth every evening.    valsartan (DIOVAN) 160 MG tablet take one tablet by mouth in the morning for hypertension    colchicine (MITIGARE) 0.6 mg Cap Take 1 capsule (0.6 mg total) by mouth once daily. (Patient not taking: Reported on 12/26/2024)     No current facility-administered medications for this visit.     ALLERGIES:   Review of patient's allergies indicates:  No Known Allergies      Review of Systems:     Review of Systems   Constitutional:  Negative for appetite change, chills,  diaphoresis, fatigue, fever and unexpected weight change.   HENT:   Negative for hearing loss, mouth sores, nosebleeds, sore throat, trouble swallowing and voice change.    Eyes:  Negative for eye problems and icterus.   Respiratory:  Negative for chest tightness, cough, hemoptysis, shortness of breath and wheezing.    Cardiovascular:  Negative for chest pain, leg swelling and palpitations.   Gastrointestinal:  Negative for abdominal distention, abdominal pain, blood in stool, diarrhea, nausea and vomiting.   Endocrine: Negative for hot flashes.   Genitourinary:  Negative for bladder incontinence, difficulty urinating, dysuria and hematuria.    Musculoskeletal:  Positive for arthralgias and neck pain. Negative for back pain, flank pain, gait problem, myalgias and neck stiffness.   Skin:  Negative for itching, rash and wound.   Neurological:  Negative for dizziness, extremity weakness, gait problem, headaches, numbness, seizures and speech difficulty.   Hematological:  Negative for adenopathy. Does not bruise/bleed easily.   Psychiatric/Behavioral:  Negative for confusion, depression and sleep disturbance. The patient is not nervous/anxious.         Physical Exam:   Limited secondary to virtual visit    ECOG Performance Status: (foot note - ECOG PS provided by Eastern Cooperative Oncology Group) 1 - Symptomatic but completely ambulatory    Karnofsky Performance Score:  90%- Able to Carry on Normal Activity: Minor Symptoms of Disease    Labs:   Lab Results   Component Value Date    WBC 3.59 (L) 12/26/2024    HGB 10.3 (L) 12/26/2024    HCT 28.7 (L) 12/26/2024     12/26/2024    CHOL 131 08/28/2023    TRIG 56 08/28/2023    HDL 56 08/28/2023    LDLDIRECT 69 08/28/2023    ALT 15 12/26/2024    AST 21 12/26/2024     (L) 12/26/2024    K 4.5 12/26/2024     12/26/2024    CREATININE 0.9 12/26/2024    BUN 12 12/26/2024    CO2 25 12/26/2024    TSH 3.184 01/31/2020    PSA 0.05 11/16/2021    INR 1.1 06/03/2024      Uric acid: 2.8  LDH: 145      Imaging: Outside imaging has been reviewed   Assessment and Plan:     Mr. 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     B-cell lymphoma with biallelic TP53 inactivation   --Seen on bone marrow biopsy  --Doing well on therapy. No complaints  --Happy to see the decrease in white count  --Plan for next treatment 12/30  --Labs in 4 and 8 weeks in [place]  --Will need end of treatment PET 7-8 weeks  --Return to clinic shortly after end of treatment PET    Leukocytosis  --Resolved      Prostate Cancer  --Treated with a prostatectomy      40 minutes in total were spent on this encounter of which 30 minutes were spent face to face with the patient and his  family to discuss the disease, natural history, treatment options and survival statistics. I have provided the patient with an opportunity to ask questions and have all questions answered to his satisfaction.         he will return to clinic in roughly 8 weeks, but knows to call in the interim if symptoms change or should a problem arise.    Visit today included increased complexity associated with the care of the episodic problem B cell lymphoma addressed and managing the longitudinal care of the patient due to the serious and/or complex managed problem(s) B cell lymphoma.       Jaquelin Quintana MD  Hematology and Medical Oncology  Bone Marrow Transplant  [facility]      BMT Chart Routing      Follow up with physician 2 months. 1. labs in 1 and 2 months at [place]: cbc,cmp, ldh 2.PET in 7-8 weeks 3. see me several days after imaging and labs done   Follow up with MARY JANE    Provider visit type    Infusion scheduling note    Injection scheduling note    Labs    Imaging    Pharmacy appointment    Other referrals

## 2025-01-28 ENCOUNTER — LAB VISIT (OUTPATIENT)
Dept: LAB | Facility: HOSPITAL | Age: 85
End: 2025-01-28
Attending: INTERNAL MEDICINE
Payer: MEDICARE

## 2025-01-28 DIAGNOSIS — C91.10 CLL (CHRONIC LYMPHOCYTIC LEUKEMIA): ICD-10-CM

## 2025-01-28 LAB
ALBUMIN SERPL BCP-MCNC: 3.5 G/DL (ref 3.5–5.2)
ALP SERPL-CCNC: 62 U/L (ref 40–150)
ALT SERPL W/O P-5'-P-CCNC: 22 U/L (ref 10–44)
ANION GAP SERPL CALC-SCNC: 6 MMOL/L (ref 8–16)
AST SERPL-CCNC: 26 U/L (ref 10–40)
BASOPHILS # BLD AUTO: ABNORMAL K/UL (ref 0–0.2)
BASOPHILS NFR BLD: 0 % (ref 0–1.9)
BILIRUB SERPL-MCNC: 0.6 MG/DL (ref 0.1–1)
BUN SERPL-MCNC: 12 MG/DL (ref 8–23)
CALCIUM SERPL-MCNC: 8.9 MG/DL (ref 8.7–10.5)
CHLORIDE SERPL-SCNC: 105 MMOL/L (ref 95–110)
CO2 SERPL-SCNC: 24 MMOL/L (ref 23–29)
CREAT SERPL-MCNC: 1 MG/DL (ref 0.5–1.4)
DIFFERENTIAL METHOD BLD: ABNORMAL
EOSINOPHIL # BLD AUTO: ABNORMAL K/UL (ref 0–0.5)
EOSINOPHIL NFR BLD: 5 % (ref 0–8)
ERYTHROCYTE [DISTWIDTH] IN BLOOD BY AUTOMATED COUNT: 12.7 % (ref 11.5–14.5)
EST. GFR  (NO RACE VARIABLE): >60 ML/MIN/1.73 M^2
GLUCOSE SERPL-MCNC: 81 MG/DL (ref 70–110)
HCT VFR BLD AUTO: 27.4 % (ref 40–54)
HGB BLD-MCNC: 9.6 G/DL (ref 14–18)
IMM GRANULOCYTES # BLD AUTO: ABNORMAL K/UL (ref 0–0.04)
IMM GRANULOCYTES NFR BLD AUTO: ABNORMAL % (ref 0–0.5)
LDH SERPL L TO P-CCNC: 193 U/L (ref 110–260)
LYMPHOCYTES # BLD AUTO: ABNORMAL K/UL (ref 1–4.8)
LYMPHOCYTES NFR BLD: 27 % (ref 18–48)
MCH RBC QN AUTO: 33.4 PG (ref 27–31)
MCHC RBC AUTO-ENTMCNC: 35 G/DL (ref 32–36)
MCV RBC AUTO: 96 FL (ref 82–98)
MONOCYTES # BLD AUTO: ABNORMAL K/UL (ref 0.3–1)
MONOCYTES NFR BLD: 19 % (ref 4–15)
NEUTROPHILS NFR BLD: 43 % (ref 38–73)
NEUTS BAND NFR BLD MANUAL: 6 %
NRBC BLD-RTO: 0 /100 WBC
PLATELET # BLD AUTO: 120 K/UL (ref 150–450)
PLATELET BLD QL SMEAR: ABNORMAL
PMV BLD AUTO: 9.1 FL (ref 9.2–12.9)
POTASSIUM SERPL-SCNC: 3.9 MMOL/L (ref 3.5–5.1)
PROT SERPL-MCNC: 7.2 G/DL (ref 6–8.4)
RBC # BLD AUTO: 2.87 M/UL (ref 4.6–6.2)
SODIUM SERPL-SCNC: 135 MMOL/L (ref 136–145)
WBC # BLD AUTO: 1.68 K/UL (ref 3.9–12.7)

## 2025-01-28 PROCEDURE — 83615 LACTATE (LD) (LDH) ENZYME: CPT | Performed by: INTERNAL MEDICINE

## 2025-01-28 PROCEDURE — 80053 COMPREHEN METABOLIC PANEL: CPT | Performed by: INTERNAL MEDICINE

## 2025-01-28 PROCEDURE — 85007 BL SMEAR W/DIFF WBC COUNT: CPT | Performed by: INTERNAL MEDICINE

## 2025-01-28 PROCEDURE — 85027 COMPLETE CBC AUTOMATED: CPT | Performed by: INTERNAL MEDICINE

## 2025-01-28 PROCEDURE — 36415 COLL VENOUS BLD VENIPUNCTURE: CPT | Performed by: INTERNAL MEDICINE

## 2025-02-13 ENCOUNTER — OFFICE VISIT (OUTPATIENT)
Dept: RHEUMATOLOGY | Facility: CLINIC | Age: 85
End: 2025-02-13
Payer: MEDICARE

## 2025-02-13 VITALS
DIASTOLIC BLOOD PRESSURE: 72 MMHG | HEART RATE: 65 BPM | HEIGHT: 69 IN | SYSTOLIC BLOOD PRESSURE: 166 MMHG | WEIGHT: 163.13 LBS | BODY MASS INDEX: 24.16 KG/M2

## 2025-02-13 DIAGNOSIS — G89.29 CHRONIC LEFT SHOULDER PAIN: ICD-10-CM

## 2025-02-13 DIAGNOSIS — M11.849 CALCIUM PYROPHOSPHATE ARTHROPATHY OF HAND: Primary | ICD-10-CM

## 2025-02-13 DIAGNOSIS — M25.512 CHRONIC LEFT SHOULDER PAIN: ICD-10-CM

## 2025-02-13 DIAGNOSIS — C91.10 CLL (CHRONIC LYMPHOCYTIC LEUKEMIA): ICD-10-CM

## 2025-02-13 PROCEDURE — 99214 OFFICE O/P EST MOD 30 MIN: CPT | Mod: S$GLB,,, | Performed by: STUDENT IN AN ORGANIZED HEALTH CARE EDUCATION/TRAINING PROGRAM

## 2025-02-13 PROCEDURE — 3078F DIAST BP <80 MM HG: CPT | Mod: CPTII,S$GLB,, | Performed by: STUDENT IN AN ORGANIZED HEALTH CARE EDUCATION/TRAINING PROGRAM

## 2025-02-13 PROCEDURE — 1125F AMNT PAIN NOTED PAIN PRSNT: CPT | Mod: CPTII,S$GLB,, | Performed by: STUDENT IN AN ORGANIZED HEALTH CARE EDUCATION/TRAINING PROGRAM

## 2025-02-13 PROCEDURE — 99999 PR PBB SHADOW E&M-EST. PATIENT-LVL III: CPT | Mod: PBBFAC,,, | Performed by: STUDENT IN AN ORGANIZED HEALTH CARE EDUCATION/TRAINING PROGRAM

## 2025-02-13 PROCEDURE — 1159F MED LIST DOCD IN RCRD: CPT | Mod: CPTII,S$GLB,, | Performed by: STUDENT IN AN ORGANIZED HEALTH CARE EDUCATION/TRAINING PROGRAM

## 2025-02-13 PROCEDURE — 3077F SYST BP >= 140 MM HG: CPT | Mod: CPTII,S$GLB,, | Performed by: STUDENT IN AN ORGANIZED HEALTH CARE EDUCATION/TRAINING PROGRAM

## 2025-02-13 RX ORDER — DICLOFENAC SODIUM 10 MG/G
2 GEL TOPICAL 2 TIMES DAILY PRN
Qty: 100 G | Refills: 5 | Status: SHIPPED | OUTPATIENT
Start: 2025-02-13

## 2025-02-13 RX ORDER — GABAPENTIN 300 MG/1
300 CAPSULE ORAL DAILY
Qty: 90 CAPSULE | Refills: 3 | Status: SHIPPED | OUTPATIENT
Start: 2025-02-13 | End: 2026-02-08

## 2025-02-13 RX ORDER — COLCHICINE 0.6 MG/1
0.6 CAPSULE ORAL DAILY
Qty: 90 CAPSULE | Refills: 3 | Status: SHIPPED | OUTPATIENT
Start: 2025-02-13 | End: 2026-02-08

## 2025-02-13 NOTE — PROGRESS NOTES
Chief Complaint   Patient presents with    Calcium pyrophosphate arthropathy of hand     Interim history   -he is new to me. 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 Last visit on 8/2024  -he follows with hem/onc for B cell lymphoma. He is on bendamustine and rituximab.   -for CCPD he is on colchicine 0.6 mg daily and also on gabapentin   -he is doing well. 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 He used voltaren gel as needed.   -denies any pseudogout flares.     Disease history     Per notes:     84 year old male presented with severe pain in both hands in aug 2019  He went to rheumatology and hematology : leukemia diagnosed  Now came to cancer center : CLL diagnosed    Has been to ER with excruciating pain in the hands   Now sees   On ibrutinib    Episodes so far  :    Pain and swelling right hand   Pain and swelling left hand  Pain and swelling b/l hands    Triggers : none   Started late evening : came on very fast   Got relief from steroids   Each episode would last for hours     One episode : he had fever +  No rash    CRP 18.8  ESR 13 during the episode    ESR 52 during another episode  CRP 71.2    EVE neg  RF neg    Uric acids 3.8 to 4.2 during the episodes    At nights : numbness both hands  Tips of fingers are numb    EMS for 10 minutes   When not in a flare he is ok,he can use his hands and he only has some soreness   He works with his hands all day and he has no problems     MRI C/T spine : 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    Right hand xray  No fracture or dislocation.  Degenerative joint space narrowing at the 1st digit MCP and IP joints, with additional moderate degenerative area at the 2nd through 4th MCP and DIP  joints.  No osseous lytic or blastic lesion.  Soft tissues are unremarkable.    Right wrist xray  There is no acute fracture or dislocation.  No significant soft tissue swelling.  The carpal bones are intact with mild degenerative osteoarthrosis.  The radiocarpal articulation is intact.  The ulnar styloid is intact.    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 Doing well on medication. No longer using meloxicam. Needs refills on colchicine and gabapentin.     Hands are doing well. No flare up or symptoms.     Paraesthesias to hand have improved.     Right shoulder with decreased ROM but denies pain.     Component Ref Range & Units 1 mo ago  (8/7/23) 2 mo ago  (7/25/23) 4 mo ago  (5/19/23) 5 mo ago  (4/18/23) 11 mo ago  (10/17/22) 1 yr ago  (8/1/22) 1 yr ago  (5/16/22)   WBC 3.90 - 12.70 K/uL 24.31 High   20.89 High   14.94 High   18.42 High   12.46  11.33  13.79 High     RBC 4.60 - 6.20 M/uL 3.81 Low   3.74 Low   3.76 Low   4.09 Low   3.76 Low   3.79 Low   3.91 Low     Hemoglobin 14.0 - 18.0 g/dL 12.3 Low   12.0 Low   12.3 Low   13.3 Low   12.4 Low   12.5 Low   12.5 Low     Hematocrit 40.0 - 54.0 % 36.7 Low   36.3 Low   36.5 Low   39.9 Low   36.3 Low   36.7 Low   37.5 Low    CMP wnl     8/2024    Right shoulder hurts intermittently  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  Differential considerations include splenic marginal zone lymphoma, splenic diffuse red pulp small B-cell lymphoma, splenic B-cell leukemia/lymphoma - on Bendamustine + Rituxan.     On colchicine and no pseudogout flares    Past history  HTN,HLD,leukemia,COPD    Family history  Diabetes     Social history    Former tobacco smoker quit 1999    Review of Systems  No skin rashes,malar rash,photosensitivity   No telangiectasias   No calcinosis   No psoriasis   No patchy alopecia   No oral and nasal ulcers   No dry eyes and dry mouth   No pleurisy or any cardiopulmonary complaints except for COPD  No dysphagia,diplopia and dysphonia and muscle weakness   No n/v/d/c   No acid reflux+   No raynaud's+   No digital ulcers   No renal issues   No blood clots   No fever,chills,night sweats,weight loss and loss of appetite   No new onset headaches   No recurrent conjunctivitis or uveitis or scleritis or episcleritis   No chronic or bloody diarrhea with no u colitis or crohn's /inflammatory bowel disease   No penile and urethral  d/c/STDs/no ulcers     Physical Exam   Constitutional: He is oriented to person, place, and time. No distress.   HENT:   Head: Normocephalic.   Mouth/Throat: Oropharynx is clear and moist.   Eyes: Pupils are equal, round, and reactive to light. Conjunctivae are normal. Right eye exhibits no discharge. Left eye exhibits no discharge. No scleral icterus.   Neck: No thyromegaly present.   Cardiovascular: Normal rate, regular rhythm and normal heart sounds.   Pulmonary/Chest: Effort normal and breath sounds normal. No stridor.   Abdominal: Soft. Bowel sounds are normal.   Musculoskeletal:         General: Normal range of motion.      Cervical back: Normal range of motion.   Lymphadenopathy:     He has no cervical adenopathy.   Neurological: He is alert and oriented to person, place, and time.   Skin: Skin is warm. No rash noted. He is not diaphoretic.    Psychiatric: Affect and judgment normal.     Assessment     85 y.o. male with HTN,HLD,leukemia,COPD comes with 3 episodes of acute onset pain and swelling in the b/l hands since august 2019  He now has a diagnosis of CLL but doesn't need treatment  Episodes so far  :  Pain and swelling right hand   Pain and swelling left hand  Pain and swelling b/l hands    Triggers : none   Started late evening : came on very fast   Got relief from steroids   Each episode would last for hours   One episode : he had fever +  No rash    During each episode his ESR/CRP have gone high. EVE neg. RF neg  Uric acids 3.8 to 4.2 during the episodes    Kevin was seen today for calcium pyrophosphate arthropathy of hand.    Diagnoses and all orders for this visit:    Calcium pyrophosphate arthropathy of hand  -     colchicine (MITIGARE) 0.6 mg Cap; Take 1 capsule (0.6 mg total) by mouth once daily.  -     gabapentin (NEURONTIN) 300 MG capsule; Take 1 capsule (300 mg total) by mouth once daily.    Chronic left shoulder pain  -     Ambulatory referral/consult to Rheumatology  -     diclofenac sodium (VOLTAREN) 1 % Gel; Apply 2 g topically 2 (two) times daily as needed (pain).    CLL (chronic lymphocytic leukemia)      #CPPD arthropathy  Xrays right hand and wrist : moderate deg changes from 1 to 4 MCPs/DIPs  No psoriasis or inf bowel disease  Suspect Calcium pyrophosphate deposition disease with acute pseudogout flares   -will attempt to do colchicine every other day and see how he does. Will sent rx of colchicine daily in case QOD does not work   -Continue gabapentin 300 mg daily   -Prn meloxicam only. Does not need refills now.   -Topical voltaren gel offered. Rx sent to pharmacy   -Can use steroids as needed. He will let me know.   -if worsening hand paresthesias, will need EMG/NCS    #CLL   -follows with hematology  -on bendamustine and rituximab per hem/onc   -Allopurinol 300 mg daily is from hematology. Hydrea is from  hematology    #chronic left shoulder pain   -he prefers home PT  -Avoiding the repetitive activities is not an option for him now  -He wishes not to do injections and outpatient PT    Follow up in about 6 months (around 8/13/2025).    Method of contact with patient concerns: [facility]    Disclaimer:  This note is prepared using voice recognition software and as such is likely to have errors and has not been proof read. Please contact me for questions.     Time spent: 30 minutes in face to face discussion concerning diagnosis, prognosis, review of lab and test results, benefits of treatment as well as management of disease, counseling of patient and coordination of care between various health care providers.        Gay Henriquez M.D.  Rheumatology Department   [facility]

## 2025-02-18 ENCOUNTER — HOSPITAL ENCOUNTER (OUTPATIENT)
Dept: RADIOLOGY | Facility: HOSPITAL | Age: 85
Discharge: HOME | End: 2025-02-18
Attending: INTERNAL MEDICINE
Payer: MEDICARE

## 2025-02-18 DIAGNOSIS — C91.10 CLL (CHRONIC LYMPHOCYTIC LEUKEMIA): ICD-10-CM

## 2025-02-18 LAB — POCT GLUCOSE: 101 MG/DL (ref 70–110)

## 2025-02-18 PROCEDURE — A9552 F18 FDG: HCPCS | Performed by: INTERNAL MEDICINE

## 2025-02-18 PROCEDURE — 78815 PET IMAGE W/CT SKULL-THIGH: CPT | Mod: TC

## 2025-02-18 RX ORDER — FLUDEOXYGLUCOSE F18 500 MCI/ML
11.13 INJECTION INTRAVENOUS
Status: COMPLETED | OUTPATIENT
Start: 2025-02-18 | End: 2025-02-18

## 2025-02-18 RX ADMIN — FLUDEOXYGLUCOSE F-18 11.13 MILLICURIE: 500 INJECTION INTRAVENOUS at 08:02

## 2025-02-24 ENCOUNTER — LAB VISIT (OUTPATIENT)
Dept: LAB | Facility: HOSPITAL | Age: 85
End: 2025-02-24
Attending: INTERNAL MEDICINE
Payer: MEDICARE

## 2025-02-24 DIAGNOSIS — C91.10 CLL (CHRONIC LYMPHOCYTIC LEUKEMIA): ICD-10-CM

## 2025-02-24 LAB
ALBUMIN SERPL BCP-MCNC: 3.5 G/DL (ref 3.5–5.2)
ALP SERPL-CCNC: 52 U/L (ref 40–150)
ALT SERPL W/O P-5'-P-CCNC: 24 U/L (ref 10–44)
ANION GAP SERPL CALC-SCNC: 7 MMOL/L (ref 8–16)
AST SERPL-CCNC: 28 U/L (ref 10–40)
BASOPHILS # BLD AUTO: 0.01 K/UL (ref 0–0.2)
BASOPHILS NFR BLD: 0.4 % (ref 0–1.9)
BILIRUB SERPL-MCNC: 0.5 MG/DL (ref 0.1–1)
BUN SERPL-MCNC: 12 MG/DL (ref 8–23)
CALCIUM SERPL-MCNC: 8.8 MG/DL (ref 8.7–10.5)
CHLORIDE SERPL-SCNC: 101 MMOL/L (ref 95–110)
CO2 SERPL-SCNC: 24 MMOL/L (ref 23–29)
CREAT SERPL-MCNC: 0.9 MG/DL (ref 0.5–1.4)
DIFFERENTIAL METHOD BLD: ABNORMAL
EOSINOPHIL # BLD AUTO: 0.1 K/UL (ref 0–0.5)
EOSINOPHIL NFR BLD: 3.2 % (ref 0–8)
ERYTHROCYTE [DISTWIDTH] IN BLOOD BY AUTOMATED COUNT: 12.5 % (ref 11.5–14.5)
EST. GFR  (NO RACE VARIABLE): >60 ML/MIN/1.73 M^2
GLUCOSE SERPL-MCNC: 88 MG/DL (ref 70–110)
HCT VFR BLD AUTO: 29.1 % (ref 40–54)
HGB BLD-MCNC: 10.5 G/DL (ref 14–18)
IMM GRANULOCYTES # BLD AUTO: 0.03 K/UL (ref 0–0.04)
IMM GRANULOCYTES NFR BLD AUTO: 1.2 % (ref 0–0.5)
LDH SERPL L TO P-CCNC: 177 U/L (ref 110–260)
LYMPHOCYTES # BLD AUTO: 0.7 K/UL (ref 1–4.8)
LYMPHOCYTES NFR BLD: 27.8 % (ref 18–48)
MCH RBC QN AUTO: 34.4 PG (ref 27–31)
MCHC RBC AUTO-ENTMCNC: 36.1 G/DL (ref 32–36)
MCV RBC AUTO: 95 FL (ref 82–98)
MONOCYTES # BLD AUTO: 0.5 K/UL (ref 0.3–1)
MONOCYTES NFR BLD: 18.1 % (ref 4–15)
NEUTROPHILS # BLD AUTO: 1.2 K/UL (ref 1.8–7.7)
NEUTROPHILS NFR BLD: 49.3 % (ref 38–73)
NRBC BLD-RTO: 0 /100 WBC
PLATELET # BLD AUTO: 135 K/UL (ref 150–450)
PMV BLD AUTO: 8.7 FL (ref 9.2–12.9)
POTASSIUM SERPL-SCNC: 4.5 MMOL/L (ref 3.5–5.1)
PROT SERPL-MCNC: 7.5 G/DL (ref 6–8.4)
RBC # BLD AUTO: 3.05 M/UL (ref 4.6–6.2)
SODIUM SERPL-SCNC: 132 MMOL/L (ref 136–145)
WBC # BLD AUTO: 2.48 K/UL (ref 3.9–12.7)

## 2025-02-24 PROCEDURE — 85025 COMPLETE CBC W/AUTO DIFF WBC: CPT | Performed by: INTERNAL MEDICINE

## 2025-02-24 PROCEDURE — 36415 COLL VENOUS BLD VENIPUNCTURE: CPT | Performed by: INTERNAL MEDICINE

## 2025-02-24 PROCEDURE — 83615 LACTATE (LD) (LDH) ENZYME: CPT | Performed by: INTERNAL MEDICINE

## 2025-02-24 PROCEDURE — 80053 COMPREHEN METABOLIC PANEL: CPT | Performed by: INTERNAL MEDICINE

## 2025-02-25 ENCOUNTER — OFFICE VISIT (OUTPATIENT)
Dept: HEMATOLOGY/ONCOLOGY | Facility: CLINIC | Age: 85
End: 2025-02-25
Payer: MEDICARE

## 2025-02-25 VITALS
HEIGHT: 69 IN | HEART RATE: 68 BPM | WEIGHT: 160.69 LBS | SYSTOLIC BLOOD PRESSURE: 126 MMHG | BODY MASS INDEX: 23.8 KG/M2 | RESPIRATION RATE: 18 BRPM | TEMPERATURE: 99 F | OXYGEN SATURATION: 100 % | DIASTOLIC BLOOD PRESSURE: 58 MMHG

## 2025-02-25 DIAGNOSIS — Z79.899 DRUG-INDUCED IMMUNODEFICIENCY: ICD-10-CM

## 2025-02-25 DIAGNOSIS — D84.821 DRUG-INDUCED IMMUNODEFICIENCY: ICD-10-CM

## 2025-02-25 DIAGNOSIS — C83.09 SMALL B-CELL LYMPHOMA OF EXTRANODAL SITE EXCLUDING SPLEEN AND OTHER SOLID ORGANS: ICD-10-CM

## 2025-02-25 DIAGNOSIS — C91.10 CLL (CHRONIC LYMPHOCYTIC LEUKEMIA): Primary | ICD-10-CM

## 2025-02-25 DIAGNOSIS — C61 PROSTATE CANCER: ICD-10-CM

## 2025-02-25 PROCEDURE — 99999 PR PBB SHADOW E&M-EST. PATIENT-LVL IV: CPT | Mod: PBBFAC,,, | Performed by: INTERNAL MEDICINE

## 2025-02-25 NOTE — PROGRESS NOTES
Hematology and Medical Oncology   Follow Up     02/25/2025    Primary Oncologic Diagnosis: B cell lymphoma      History of Present Ilness:   Kevin Echeverria Jr. (Kevin) is a pleasant 85 y.o.male who presents to transition care to [facility] from the [facility]. The patient presents today with his wife and daughter. Most of his issues started in August when he began experiencing horrible pain in his hands that was worse with lack of motion/rest. The pain is excruciating and has sent him to the ED several times. The pain can happen in either upper extremity and feels like it travels at times. The discomfort abates as he moves his hands more throughout the day.     Incidentally through these ER visits it was noted that he has a persistently elevated but largely stable WBC.    --Pathologist review of the peripheral smear on 12/21/19 Leukocytosis with an absolute and relative lymphocytosis.     Lymphocytes are mature, and a subset displays a slightly atypical chromatin pattern.  Smudge cells are present.  Background granulocytes are morphologically normal.  The morphology suggests a B cell lymphoproliferative disorder such as CLL.  --Flow Cytometry on 1/3/2020: A B cell lymphoproliferative disorder is present.  Mantle cell lymphoma is favored although an atypical CLL cannot be completely ruled out; correlation   with morphology and with any available cytogenetic or molecular studies (t(11;14)) is required.     --Bone marrow biopsy on 6/4/24: B-cell lymphoma with biallelic TP53 inactivation.  Differential considerations include splenic marginal zone lymphoma, splenic diffuse red pulp small B-cell lymphoma, splenic B-cell  leukemia/lymphoma with prominent nucleoli (ICC classification, 2022), and less likely atypical chronic lymphocytic leukemia/small lymphocytic lymphoma.     --PET CT showing *Left periaortic lymph node just inferior to the left renal vein measures 0.9 cm with SUV max 2.6 (series 3, image  157).  *Aortocaval lymph node approximately at L2 measures 0.9 cm with SUV max 2.6 (series 3, image 161).  Prostatectomy for prostate cancer.  No hypermetabolic foci within the prostatectomy bed  Prominent mildly hypermetabolic retroperitoneal lymph nodes.  The Deauville Score is: 4    --End of treatment PET on 2/18/25: No suspicious hypermetabolic lymphadenopathy or hypermetabolic tumor. The Deauville Score is: 1    Interval History:  Mr. Echeverria presents in person following the last treatment of B-R therapy. No new issues or symptoms.  PET scan shows no evidence of disease.    Generally doing extremely well. Able to do all desired activities around the house.  Happy to see white count recovering.     Wakes up in the middle of the night with dry mouth.    PAST MEDICAL HISTORY:   Past Medical History:   Diagnosis Date    Arthritis     Cancer     prostate    COPD (chronic obstructive pulmonary disease)     Hyperlipidemia     Hypertension     Leukemia        PAST SURGICAL HISTORY:   Past Surgical History:   Procedure Laterality Date    CATARACT EXTRACTION W/  INTRAOCULAR LENS IMPLANT Left 05/05/2016    COLONOSCOPY W/ BIOPSIES AND POLYPECTOMY      PROSTATE SURGERY  05/1996    TONSILLECTOMY  1956       PAST SOCIAL HISTORY:   reports that he has never smoked. He has never been exposed to tobacco smoke. He quit smokeless tobacco use about 26 years ago.  His smokeless tobacco use included chew. He reports current alcohol use. He reports that he does not use drugs.    FAMILY HISTORY:  Family History   Problem Relation Name Age of Onset    Diabetes Mother         CURRENT MEDICATIONS:   Current Outpatient Medications   Medication Sig    albuterol (PROVENTIL) 2.5 mg /3 mL (0.083 %) nebulizer solution Take 1 vial by nebulization twice daily.    aspirin (ECOTRIN) 81 MG EC tablet Take 81 mg by mouth once daily.    budesonide-glycopyr-formoterol (BREZTRI AEROSPHERE) 160-9-4.8 mcg/actuation HFAA INHALE 2 PUFFS BY MOUTH EVERY 12  HOURS    colchicine (MITIGARE) 0.6 mg Cap Take 1 capsule (0.6 mg total) by mouth once daily.    famotidine (PEPCID) 20 MG tablet Take 1 tablet orally 2 times a day as needed for heart burn    gabapentin (NEURONTIN) 300 MG capsule Take 1 capsule (300 mg total) by mouth once daily.    ipratropium (ATROVENT) 0.02 % nebulizer solution Use contents of one vial (2.5 mL) via nebulier every 12 hours    L.acid/B.animalis,bifidum/FOS (PROBIOTIC COMPLEX ORAL) Take by mouth.    metoprolol succinate (TOPROL-XL) 25 MG 24 hr tablet Take 1 tablet (25 mg total) by mouth every evening.    montelukast (SINGULAIR) 10 mg tablet Take 1 tablet (10 mg total) by mouth every evening.    multivitamin with minerals tablet Take 1 tablet by mouth once daily. Equate Brand with Iron    omeprazole (PRILOSEC) 20 MG capsule Take 1 capsule (20 mg total) by mouth every morning.    pravastatin (PRAVACHOL) 20 MG tablet Take 1 tablet (20 mg total) by mouth every evening.    valsartan (DIOVAN) 160 MG tablet take one tablet by mouth in the morning for hypertension    allopurinoL (ZYLOPRIM) 300 MG tablet Take 1 tablet (300 mg total) by mouth once daily. (Patient not taking: Reported on 2/25/2025)    diclofenac sodium (VOLTAREN) 1 % Gel Apply 2 g topically 2 (two) times daily as needed (pain). (Patient not taking: Reported on 2/25/2025)    hydroxyurea (HYDREA) 500 mg Cap Take 1 capsule (500 mg total) by mouth once daily. (Patient not taking: Reported on 2/25/2025.)    hydroxyurea (HYDREA) 500 mg Cap Take 2 capsules (1,000 mg total) by mouth once daily. (Patient not taking: Reported on 2/25/2025.)     No current facility-administered medications for this visit.     ALLERGIES:   Review of patient's allergies indicates:  No Known Allergies      Review of Systems:     Review of Systems   Constitutional:  Negative for appetite change, chills, diaphoresis, fatigue, fever and unexpected weight change.   HENT:   Negative for hearing loss, mouth sores, nosebleeds,  sore throat, trouble swallowing and voice change.    Eyes:  Negative for eye problems and icterus.   Respiratory:  Negative for chest tightness, cough, hemoptysis, shortness of breath and wheezing.    Cardiovascular:  Negative for chest pain, leg swelling and palpitations.   Gastrointestinal:  Negative for abdominal distention, abdominal pain, blood in stool, diarrhea, nausea and vomiting.   Endocrine: Negative for hot flashes.   Genitourinary:  Negative for bladder incontinence, difficulty urinating, dysuria and hematuria.    Musculoskeletal:  Positive for arthralgias and neck pain. Negative for back pain, flank pain, gait problem, myalgias and neck stiffness.   Skin:  Negative for itching, rash and wound.   Neurological:  Negative for dizziness, extremity weakness, gait problem, headaches, numbness, seizures and speech difficulty.   Hematological:  Negative for adenopathy. Does not bruise/bleed easily.   Psychiatric/Behavioral:  Negative for confusion, depression and sleep disturbance. The patient is not nervous/anxious.         Physical Exam:     Vitals:    02/25/25 1125   BP: (!) 126/58   Pulse: 68   Resp: 18   Temp: 98.6 °F (37 °C)     Physical Exam  Constitutional:       General: He is not in acute distress.     Appearance: He is well-developed. He is not diaphoretic.   HENT:      Head: Normocephalic and atraumatic.      Mouth/Throat:      Pharynx: No oropharyngeal exudate.   Eyes:      Conjunctiva/sclera: Conjunctivae normal.      Pupils: Pupils are equal, round, and reactive to light.   Neck:      Thyroid: No thyromegaly.      Vascular: No JVD.      Trachea: No tracheal deviation.   Cardiovascular:      Rate and Rhythm: Normal rate and regular rhythm.      Heart sounds: Normal heart sounds. No murmur heard.     No friction rub.   Pulmonary:      Effort: Pulmonary effort is normal. No respiratory distress.      Breath sounds: Normal breath sounds. No stridor. No wheezing or rales.   Chest:      Chest wall: No  tenderness.   Abdominal:      General: Bowel sounds are normal. There is no distension.      Palpations: Abdomen is soft.      Tenderness: There is no abdominal tenderness. There is no guarding or rebound.   Musculoskeletal:         General: No tenderness or deformity. Normal range of motion.      Cervical back: Normal range of motion and neck supple.   Skin:     General: Skin is warm and dry.      Capillary Refill: Capillary refill takes less than 2 seconds.      Coloration: Skin is not pale.      Findings: No erythema or rash.   Neurological:      Mental Status: He is alert and oriented to person, place, and time.      Cranial Nerves: No cranial nerve deficit.      Sensory: No sensory deficit.      Motor: No abnormal muscle tone.      Coordination: Coordination normal.      Deep Tendon Reflexes: Reflexes normal.   Psychiatric:         Behavior: Behavior normal.         Thought Content: Thought content normal.         Judgment: Judgment normal.           ECOG Performance Status: (foot note - ECOG PS provided by Eastern Cooperative Oncology Group) 1 - Symptomatic but completely ambulatory    Karnofsky Performance Score:  90%- Able to Carry on Normal Activity: Minor Symptoms of Disease    Labs:   Lab Results   Component Value Date    WBC 2.48 (L) 02/24/2025    HGB 10.5 (L) 02/24/2025    HCT 29.1 (L) 02/24/2025     (L) 02/24/2025    CHOL 131 08/28/2023    TRIG 56 08/28/2023    HDL 56 08/28/2023    LDLDIRECT 69 08/28/2023    ALT 24 02/24/2025    AST 28 02/24/2025     (L) 02/24/2025    K 4.5 02/24/2025     02/24/2025    CREATININE 0.9 02/24/2025    BUN 12 02/24/2025    CO2 24 02/24/2025    TSH 3.184 01/31/2020    PSA 0.05 11/16/2021    INR 1.1 06/03/2024     Uric acid: 2.8  LDH: 145      Imaging: Outside imaging has been reviewed   Assessment and Plan:     Mr. Echeverria is a pleasant 85 year old male with presumed CLL.     B-cell lymphoma with biallelic TP53 inactivation   --Seen on bone marrow  biopsy  --Doing well on therapy. No complaints  --Happy to see the decrease in white count  --Plan for next treatment 12/30  --Labs in 8 weeks in [place]  --End of treatment PET with No Evidence of Disease  --Return to clinic following surveillance labs    Leukocytosis  --Resolved      Prostate Cancer  --Treated with a prostatectomy      40 minutes in total were spent on this encounter of which 30 minutes were spent face to face with the patient and his  family to discuss the disease, natural history, treatment options and survival statistics. I have provided the patient with an opportunity to ask questions and have all questions answered to his satisfaction.         he will return to clinic in roughly 8 weeks, but knows to call in the interim if symptoms change or should a problem arise.    Visit today included increased complexity associated with the care of the episodic problem B cell lymphoma addressed and managing the longitudinal care of the patient due to the serious and/or complex managed problem(s) B cell lymphoma.       Jaquelin Quintana MD  Hematology and Medical Oncology  Bone Marrow Transplant  [facility]      BMT Chart Routing      Follow up with physician 2 months. 1. labs in 2 months in [place]: cbc,cmp, ldh 2.see me several days after labs [likes 8am appts]   Follow up with MARY JANE    Provider visit type    Infusion scheduling note    Injection scheduling note    Labs    Imaging    Pharmacy appointment    Other referrals

## 2025-03-03 NOTE — TELEPHONE ENCOUNTER
No care due was identified.  [facility] Embedded Care Due Messages. Reference number: [id number].   3/03/2025 2:35:40 PM CST

## 2025-03-05 RX ORDER — MONTELUKAST SODIUM 10 MG/1
10 TABLET ORAL NIGHTLY
Qty: 90 TABLET | Refills: 2 | Status: SHIPPED | OUTPATIENT
Start: 2025-03-05

## 2025-03-05 NOTE — TELEPHONE ENCOUNTER
Refill Decision Note   Kevin Echeverria  is requesting a refill authorization.  Brief Assessment and Rationale for Refill:  Approve     Medication Therapy Plan:         Comments:     Note composed:11:48 AM 03/05/2025

## 2025-04-08 LAB
CHOLEST SERPL-MSCNC: 140 MG/DL (ref 0–200)
CHOLEST/HDLC SERPL: 2.2 {RATIO}
HDLC SERPL-MCNC: 64 MG/DL (ref 35–70)
LDL CHOLESTEROL DIRECT: 68 MG/DL
NONHDLC SERPL-MCNC: 76 MG/DL
TRIGL SERPL-MCNC: 65 MG/DL (ref 40–160)
VLDL CHOLESTEROL: 13 MG/DL

## 2025-04-28 ENCOUNTER — LAB VISIT (OUTPATIENT)
Dept: LAB | Facility: HOSPITAL | Age: 85
End: 2025-04-28
Attending: INTERNAL MEDICINE
Payer: MEDICARE

## 2025-04-28 DIAGNOSIS — C91.10 CLL (CHRONIC LYMPHOCYTIC LEUKEMIA): ICD-10-CM

## 2025-04-28 LAB
ABSOLUTE EOSINOPHIL (OHS): 0.06 K/UL
ABSOLUTE MONOCYTE (OHS): 0.33 K/UL (ref 0.3–1)
ABSOLUTE NEUTROPHIL COUNT (OHS): 1.36 K/UL (ref 1.8–7.7)
ALBUMIN SERPL BCP-MCNC: 3.6 G/DL (ref 3.5–5.2)
ALP SERPL-CCNC: 57 UNIT/L (ref 40–150)
ALT SERPL W/O P-5'-P-CCNC: 21 UNIT/L (ref 10–44)
ANION GAP (OHS): 7 MMOL/L (ref 8–16)
AST SERPL-CCNC: 24 UNIT/L (ref 11–45)
BASOPHILS # BLD AUTO: 0.02 K/UL
BASOPHILS NFR BLD AUTO: 0.8 %
BILIRUB SERPL-MCNC: 0.4 MG/DL (ref 0.1–1)
BUN SERPL-MCNC: 11 MG/DL (ref 8–23)
CALCIUM SERPL-MCNC: 8.8 MG/DL (ref 8.7–10.5)
CHLORIDE SERPL-SCNC: 101 MMOL/L (ref 95–110)
CO2 SERPL-SCNC: 23 MMOL/L (ref 23–29)
CREAT SERPL-MCNC: 1 MG/DL (ref 0.5–1.4)
ERYTHROCYTE [DISTWIDTH] IN BLOOD BY AUTOMATED COUNT: 12.3 % (ref 11.5–14.5)
GFR SERPLBLD CREATININE-BSD FMLA CKD-EPI: >60 ML/MIN/1.73/M2
GLUCOSE SERPL-MCNC: 83 MG/DL (ref 70–110)
HCT VFR BLD AUTO: 29.6 % (ref 40–54)
HGB BLD-MCNC: 10.7 GM/DL (ref 14–18)
IMM GRANULOCYTES # BLD AUTO: 0.01 K/UL (ref 0–0.04)
IMM GRANULOCYTES NFR BLD AUTO: 0.4 % (ref 0–0.5)
LDH SERPL-CCNC: 180 U/L (ref 110–260)
LYMPHOCYTES # BLD AUTO: 0.71 K/UL (ref 1–4.8)
MCH RBC QN AUTO: 33.9 PG (ref 27–31)
MCHC RBC AUTO-ENTMCNC: 36.1 G/DL (ref 32–36)
MCV RBC AUTO: 94 FL (ref 82–98)
NUCLEATED RBC (/100WBC) (OHS): 0 /100 WBC
PLATELET # BLD AUTO: 131 K/UL (ref 150–450)
PMV BLD AUTO: 8.9 FL (ref 9.2–12.9)
POTASSIUM SERPL-SCNC: 4.3 MMOL/L (ref 3.5–5.1)
PROT SERPL-MCNC: 7.7 GM/DL (ref 6–8.4)
RBC # BLD AUTO: 3.16 M/UL (ref 4.6–6.2)
RELATIVE EOSINOPHIL (OHS): 2.4 %
RELATIVE LYMPHOCYTE (OHS): 28.5 % (ref 18–48)
RELATIVE MONOCYTE (OHS): 13.3 % (ref 4–15)
RELATIVE NEUTROPHIL (OHS): 54.6 % (ref 38–73)
SODIUM SERPL-SCNC: 131 MMOL/L (ref 136–145)
WBC # BLD AUTO: 2.49 K/UL (ref 3.9–12.7)

## 2025-04-28 PROCEDURE — 36415 COLL VENOUS BLD VENIPUNCTURE: CPT

## 2025-04-28 PROCEDURE — 80053 COMPREHEN METABOLIC PANEL: CPT

## 2025-04-28 PROCEDURE — 85025 COMPLETE CBC W/AUTO DIFF WBC: CPT

## 2025-04-28 PROCEDURE — 83615 LACTATE (LD) (LDH) ENZYME: CPT

## 2025-04-29 ENCOUNTER — OFFICE VISIT (OUTPATIENT)
Dept: HEMATOLOGY/ONCOLOGY | Facility: CLINIC | Age: 85
End: 2025-04-29
Payer: MEDICARE

## 2025-04-29 VITALS
TEMPERATURE: 98 F | WEIGHT: 160.25 LBS | HEART RATE: 66 BPM | HEIGHT: 69 IN | RESPIRATION RATE: 18 BRPM | BODY MASS INDEX: 23.74 KG/M2 | SYSTOLIC BLOOD PRESSURE: 107 MMHG | DIASTOLIC BLOOD PRESSURE: 68 MMHG | OXYGEN SATURATION: 100 %

## 2025-04-29 DIAGNOSIS — C83.02 SMALL B-CELL LYMPHOMA OF INTRATHORACIC LYMPH NODES: Primary | ICD-10-CM

## 2025-04-29 DIAGNOSIS — C83.09 SMALL B-CELL LYMPHOMA OF EXTRANODAL SITE EXCLUDING SPLEEN AND OTHER SOLID ORGANS: ICD-10-CM

## 2025-04-29 DIAGNOSIS — Z79.899 DRUG-INDUCED IMMUNODEFICIENCY: ICD-10-CM

## 2025-04-29 DIAGNOSIS — D84.821 DRUG-INDUCED IMMUNODEFICIENCY: ICD-10-CM

## 2025-04-29 PROCEDURE — 99215 OFFICE O/P EST HI 40 MIN: CPT | Mod: S$GLB,,, | Performed by: INTERNAL MEDICINE

## 2025-04-29 PROCEDURE — 3074F SYST BP LT 130 MM HG: CPT | Mod: CPTII,S$GLB,, | Performed by: INTERNAL MEDICINE

## 2025-04-29 PROCEDURE — 3288F FALL RISK ASSESSMENT DOCD: CPT | Mod: CPTII,S$GLB,, | Performed by: INTERNAL MEDICINE

## 2025-04-29 PROCEDURE — 99999 PR PBB SHADOW E&M-EST. PATIENT-LVL IV: CPT | Mod: PBBFAC,,, | Performed by: INTERNAL MEDICINE

## 2025-04-29 PROCEDURE — 1101F PT FALLS ASSESS-DOCD LE1/YR: CPT | Mod: CPTII,S$GLB,, | Performed by: INTERNAL MEDICINE

## 2025-04-29 PROCEDURE — 1126F AMNT PAIN NOTED NONE PRSNT: CPT | Mod: CPTII,S$GLB,, | Performed by: INTERNAL MEDICINE

## 2025-04-29 PROCEDURE — 1159F MED LIST DOCD IN RCRD: CPT | Mod: CPTII,S$GLB,, | Performed by: INTERNAL MEDICINE

## 2025-04-29 PROCEDURE — 3078F DIAST BP <80 MM HG: CPT | Mod: CPTII,S$GLB,, | Performed by: INTERNAL MEDICINE

## 2025-04-29 PROCEDURE — G2211 COMPLEX E/M VISIT ADD ON: HCPCS | Mod: S$GLB,,, | Performed by: INTERNAL MEDICINE

## 2025-04-29 NOTE — PROGRESS NOTES
Hematology and Medical Oncology   Follow Up     04/29/2025    Primary Oncologic Diagnosis: B cell lymphoma      History of Present Ilness:   Kevin Echeverria Jr. (Kevin) is a pleasant 85 y.o.male who presents to transition care to [facility] from the [facility]. The patient presents today with his wife and daughter. Most of his issues started in August when he began experiencing horrible pain in his hands that was worse with lack of motion/rest. The pain is excruciating and has sent him to the ED several times. The pain can happen in either upper extremity and feels like it travels at times. The discomfort abates as he moves his hands more throughout the day.     Incidentally through these ER visits it was noted that he has a persistently elevated but largely stable WBC.    --Pathologist review of the peripheral smear on 12/21/19 Leukocytosis with an absolute and relative lymphocytosis.     Lymphocytes are mature, and a subset displays a slightly atypical chromatin pattern.  Smudge cells are present.  Background granulocytes are morphologically normal.  The morphology suggests a B cell lymphoproliferative disorder such as CLL.  --Flow Cytometry on 1/3/2020: A B cell lymphoproliferative disorder is present.  Mantle cell lymphoma is favored although an atypical CLL cannot be completely ruled out; correlation   with morphology and with any available cytogenetic or molecular studies (t(11;14)) is required.     --Bone marrow biopsy on 6/4/24: B-cell lymphoma with biallelic TP53 inactivation.  Differential considerations include splenic marginal zone lymphoma, splenic diffuse red pulp small B-cell lymphoma, splenic B-cell  leukemia/lymphoma with prominent nucleoli (ICC classification, 2022), and less likely atypical chronic lymphocytic leukemia/small lymphocytic lymphoma.     --PET CT showing *Left periaortic lymph node just inferior to the left renal vein measures 0.9 cm with SUV max 2.6 (series 3, image  157).  *Aortocaval lymph node approximately at L2 measures 0.9 cm with SUV max 2.6 (series 3, image 161).  Prostatectomy for prostate cancer.  No hypermetabolic foci within the prostatectomy bed  Prominent mildly hypermetabolic retroperitoneal lymph nodes.  The Deauville Score is: 4    --End of treatment PET on 2/18/25: No suspicious hypermetabolic lymphadenopathy or hypermetabolic tumor. The Deauville Score is: 1    Interval History:  Mr. Echeverria presents today with his wife. No new issues or complaints. Generally doing very well. Happy to see the improvements in his counts.      Generally doing extremely well. Able to do all desired activities around the house.  Happy to see white count recovering.         PAST MEDICAL HISTORY:   Past Medical History:   Diagnosis Date    Arthritis     Cancer     prostate    COPD (chronic obstructive pulmonary disease)     Hyperlipidemia     Hypertension     Leukemia        PAST SURGICAL HISTORY:   Past Surgical History:   Procedure Laterality Date    CATARACT EXTRACTION W/  INTRAOCULAR LENS IMPLANT Left 05/05/2016    COLONOSCOPY W/ BIOPSIES AND POLYPECTOMY      PROSTATE SURGERY  05/1996    TONSILLECTOMY  1956       PAST SOCIAL HISTORY:   reports that he has never smoked. He has never been exposed to tobacco smoke. He quit smokeless tobacco use about 26 years ago.  His smokeless tobacco use included chew. He reports current alcohol use. He reports that he does not use drugs.    FAMILY HISTORY:  Family History   Problem Relation Name Age of Onset    Diabetes Mother         CURRENT MEDICATIONS:   Current Outpatient Medications   Medication Sig    albuterol (PROVENTIL) 2.5 mg /3 mL (0.083 %) nebulizer solution Take 1 vial by nebulization twice daily.    aspirin (ECOTRIN) 81 MG EC tablet Take 81 mg by mouth once daily.    budesonide-glycopyr-formoterol (BREZTRI AEROSPHERE) 160-9-4.8 mcg/actuation HFAA INHALE 2 PUFFS BY MOUTH EVERY 12 HOURS    colchicine (MITIGARE) 0.6 mg Cap Take 1  capsule (0.6 mg total) by mouth once daily.    famotidine (PEPCID) 20 MG tablet Take 1 tablet orally 2 times a day as needed for heart burn    gabapentin (NEURONTIN) 300 MG capsule Take 1 capsule (300 mg total) by mouth once daily.    ipratropium (ATROVENT) 0.02 % nebulizer solution Use contents of one vial (2.5 mL) via nebulier every 12 hours    L.acid/B.animalis,bifidum/FOS (PROBIOTIC COMPLEX ORAL) Take by mouth.    metoprolol succinate (TOPROL-XL) 25 MG 24 hr tablet Take 1 tablet (25 mg total) by mouth every evening.    metoprolol succinate (TOPROL-XL) 25 MG 24 hr tablet Take 1 tablet orally once a day.    montelukast (SINGULAIR) 10 mg tablet Take 1 tablet (10 mg total) by mouth every evening.    multivitamin with minerals tablet Take 1 tablet by mouth once daily. Equate Brand with Iron    omeprazole (PRILOSEC) 20 MG capsule Take 1 capsule (20 mg total) by mouth every morning.    pravastatin (PRAVACHOL) 20 MG tablet Take 1 tablet (20 mg total) by mouth every evening.    pravastatin (PRAVACHOL) 20 MG tablet Take 1 tablet orally once in the evening.    valsartan (DIOVAN) 160 MG tablet take one tablet by mouth in the morning for hypertension    valsartan (DIOVAN) 160 MG tablet Take 1 tablet orally once a day.    allopurinoL (ZYLOPRIM) 300 MG tablet Take 1 tablet (300 mg total) by mouth once daily. (Patient not taking: Reported on 4/29/2025)    diclofenac sodium (VOLTAREN) 1 % Gel Apply 2 g topically 2 (two) times daily as needed (pain). (Patient not taking: Reported on 4/29/2025)    hydroxyurea (HYDREA) 500 mg Cap Take 1 capsule (500 mg total) by mouth once daily. (Patient not taking: Reported on 4/29/2025)    hydroxyurea (HYDREA) 500 mg Cap Take 2 capsules (1,000 mg total) by mouth once daily. (Patient not taking: Reported on 4/29/2025)     No current facility-administered medications for this visit.     ALLERGIES:   Review of patient's allergies indicates:  No Known Allergies      Review of Systems:     Review of  Systems   Constitutional:  Negative for appetite change, chills, diaphoresis, fatigue, fever and unexpected weight change.   HENT:   Negative for hearing loss, mouth sores, nosebleeds, sore throat, trouble swallowing and voice change.    Eyes:  Negative for eye problems and icterus.   Respiratory:  Negative for chest tightness, cough, hemoptysis, shortness of breath and wheezing.    Cardiovascular:  Negative for chest pain, leg swelling and palpitations.   Gastrointestinal:  Negative for abdominal distention, abdominal pain, blood in stool, diarrhea, nausea and vomiting.   Endocrine: Negative for hot flashes.   Genitourinary:  Negative for bladder incontinence, difficulty urinating, dysuria and hematuria.    Musculoskeletal:  Positive for arthralgias and neck pain. Negative for back pain, flank pain, gait problem, myalgias and neck stiffness.   Skin:  Negative for itching, rash and wound.   Neurological:  Negative for dizziness, extremity weakness, gait problem, headaches, numbness, seizures and speech difficulty.   Hematological:  Negative for adenopathy. Does not bruise/bleed easily.   Psychiatric/Behavioral:  Negative for confusion, depression and sleep disturbance. The patient is not nervous/anxious.         Physical Exam:     Vitals:    04/29/25 0803   BP: 107/68   Pulse: 66   Resp: 18   Temp: 97.5 °F (36.4 °C)     Physical Exam  Constitutional:       General: He is not in acute distress.     Appearance: He is well-developed. He is not diaphoretic.   HENT:      Head: Normocephalic and atraumatic.      Mouth/Throat:      Pharynx: No oropharyngeal exudate.   Eyes:      Conjunctiva/sclera: Conjunctivae normal.      Pupils: Pupils are equal, round, and reactive to light.   Neck:      Thyroid: No thyromegaly.      Vascular: No JVD.      Trachea: No tracheal deviation.   Cardiovascular:      Rate and Rhythm: Normal rate and regular rhythm.      Heart sounds: Normal heart sounds. No murmur heard.     No friction rub.    Pulmonary:      Effort: Pulmonary effort is normal. No respiratory distress.      Breath sounds: Normal breath sounds. No stridor. No wheezing or rales.   Chest:      Chest wall: No tenderness.   Abdominal:      General: Bowel sounds are normal. There is no distension.      Palpations: Abdomen is soft.      Tenderness: There is no abdominal tenderness. There is no guarding or rebound.   Musculoskeletal:         General: No tenderness or deformity. Normal range of motion.      Cervical back: Normal range of motion and neck supple.   Skin:     General: Skin is warm and dry.      Capillary Refill: Capillary refill takes less than 2 seconds.      Coloration: Skin is not pale.      Findings: No erythema or rash.   Neurological:      Mental Status: He is alert and oriented to person, place, and time.      Cranial Nerves: No cranial nerve deficit.      Sensory: No sensory deficit.      Motor: No abnormal muscle tone.      Coordination: Coordination normal.      Deep Tendon Reflexes: Reflexes normal.   Psychiatric:         Behavior: Behavior normal.         Thought Content: Thought content normal.         Judgment: Judgment normal.         ECOG Performance Status: (foot note - ECOG PS provided by Eastern Cooperative Oncology Group) 1 - Symptomatic but completely ambulatory    Karnofsky Performance Score:  90%- Able to Carry on Normal Activity: Minor Symptoms of Disease    Labs:   Lab Results   Component Value Date    WBC 2.49 (L) 04/28/2025    HGB 10.7 (L) 04/28/2025    HCT 29.6 (L) 04/28/2025     (L) 04/28/2025    CHOL 131 08/28/2023    TRIG 56 08/28/2023    HDL 56 08/28/2023    LDLDIRECT 69 08/28/2023    ALT 21 04/28/2025    AST 24 04/28/2025     (L) 04/28/2025    K 4.3 04/28/2025     04/28/2025    CREATININE 1.0 04/28/2025    BUN 11 04/28/2025    CO2 23 04/28/2025    TSH 3.184 01/31/2020    PSA 0.05 11/16/2021    INR 1.1 06/03/2024     Uric acid: 2.8  LDH: 145      Imaging: Outside imaging has been  reviewed   Assessment and Plan:     Mr. Echeverria is a pleasant 85 year old male with presumed CLL.     B-cell lymphoma with biallelic TP53 inactivation   --Seen on bone marrow biopsy  --Doing well on therapy. No complaints  --Happy to see the decrease in white count  --Plan for next treatment 12/30  --Labs in 8 weeks in [place]  --End of treatment PET with No Evidence of Disease  --Return to clinic following surveillance labs    Leukocytosis  --Resolved      Prostate Cancer  --Treated with a prostatectomy      40 minutes in total were spent on this encounter of which 30 minutes were spent face to face with the patient and his  family to discuss the disease, natural history, treatment options and survival statistics. I have provided the patient with an opportunity to ask questions and have all questions answered to his satisfaction.         he will return to clinic in roughly 8 weeks, but knows to call in the interim if symptoms change or should a problem arise.    Visit today included increased complexity associated with the care of the episodic problem B cell lymphoma addressed and managing the longitudinal care of the patient due to the serious and/or complex managed problem(s) B cell lymphoma.       Jaquelin Quintana MD  Hematology and Medical Oncology  Bone Marrow Transplant  [facility]      BMT Chart Routing      Follow up with physician 2 months. 1. labs in [place] in 2 months: cbc,cmp,ldh 2.see me several days after labs -- virtual [ likes 8a, appts]   Follow up with MARY JANE    Provider visit type    Infusion scheduling note    Injection scheduling note    Labs    Imaging    Pharmacy appointment    Other referrals

## 2025-06-30 ENCOUNTER — LAB VISIT (OUTPATIENT)
Dept: LAB | Facility: HOSPITAL | Age: 85
End: 2025-06-30
Attending: INTERNAL MEDICINE
Payer: MEDICARE

## 2025-06-30 DIAGNOSIS — C83.02 SMALL B-CELL LYMPHOMA OF INTRATHORACIC LYMPH NODES: ICD-10-CM

## 2025-06-30 LAB
ABSOLUTE EOSINOPHIL (OHS): 0.06 K/UL
ABSOLUTE MONOCYTE (OHS): 0.29 K/UL (ref 0.3–1)
ABSOLUTE NEUTROPHIL COUNT (OHS): 1.36 K/UL (ref 1.8–7.7)
ALBUMIN SERPL BCP-MCNC: 3.3 G/DL (ref 3.5–5.2)
ALP SERPL-CCNC: 48 UNIT/L (ref 40–150)
ALT SERPL W/O P-5'-P-CCNC: 19 UNIT/L (ref 10–44)
ANION GAP (OHS): 7 MMOL/L (ref 8–16)
AST SERPL-CCNC: 23 UNIT/L (ref 11–45)
BASOPHILS # BLD AUTO: 0.01 K/UL
BASOPHILS NFR BLD AUTO: 0.4 %
BILIRUB SERPL-MCNC: 0.5 MG/DL (ref 0.1–1)
BUN SERPL-MCNC: 14 MG/DL (ref 8–23)
CALCIUM SERPL-MCNC: 8.8 MG/DL (ref 8.7–10.5)
CHLORIDE SERPL-SCNC: 104 MMOL/L (ref 95–110)
CO2 SERPL-SCNC: 24 MMOL/L (ref 23–29)
CREAT SERPL-MCNC: 1 MG/DL (ref 0.5–1.4)
ERYTHROCYTE [DISTWIDTH] IN BLOOD BY AUTOMATED COUNT: 12.9 % (ref 11.5–14.5)
GFR SERPLBLD CREATININE-BSD FMLA CKD-EPI: >60 ML/MIN/1.73/M2
GLUCOSE SERPL-MCNC: 92 MG/DL (ref 70–110)
HCT VFR BLD AUTO: 29.3 % (ref 40–54)
HGB BLD-MCNC: 10.4 GM/DL (ref 14–18)
IMM GRANULOCYTES # BLD AUTO: 0.02 K/UL (ref 0–0.04)
IMM GRANULOCYTES NFR BLD AUTO: 0.8 % (ref 0–0.5)
LDH SERPL-CCNC: 177 U/L (ref 110–260)
LYMPHOCYTES # BLD AUTO: 0.78 K/UL (ref 1–4.8)
MCH RBC QN AUTO: 34.2 PG (ref 27–31)
MCHC RBC AUTO-ENTMCNC: 35.5 G/DL (ref 32–36)
MCV RBC AUTO: 96 FL (ref 82–98)
NUCLEATED RBC (/100WBC) (OHS): 0 /100 WBC
PLATELET # BLD AUTO: 138 K/UL (ref 150–450)
PMV BLD AUTO: 8.9 FL (ref 9.2–12.9)
POTASSIUM SERPL-SCNC: 4.4 MMOL/L (ref 3.5–5.1)
PROT SERPL-MCNC: 7.5 GM/DL (ref 6–8.4)
RBC # BLD AUTO: 3.04 M/UL (ref 4.6–6.2)
RELATIVE EOSINOPHIL (OHS): 2.4 %
RELATIVE LYMPHOCYTE (OHS): 31 % (ref 18–48)
RELATIVE MONOCYTE (OHS): 11.5 % (ref 4–15)
RELATIVE NEUTROPHIL (OHS): 53.9 % (ref 38–73)
SODIUM SERPL-SCNC: 135 MMOL/L (ref 136–145)
WBC # BLD AUTO: 2.52 K/UL (ref 3.9–12.7)

## 2025-06-30 PROCEDURE — 83615 LACTATE (LD) (LDH) ENZYME: CPT

## 2025-06-30 PROCEDURE — 80053 COMPREHEN METABOLIC PANEL: CPT

## 2025-06-30 PROCEDURE — 85025 COMPLETE CBC W/AUTO DIFF WBC: CPT

## 2025-06-30 PROCEDURE — 36415 COLL VENOUS BLD VENIPUNCTURE: CPT

## 2025-07-03 ENCOUNTER — OFFICE VISIT (OUTPATIENT)
Dept: HEMATOLOGY/ONCOLOGY | Facility: CLINIC | Age: 85
End: 2025-07-03
Payer: MEDICARE

## 2025-07-03 DIAGNOSIS — D84.821 DRUG-INDUCED IMMUNODEFICIENCY: ICD-10-CM

## 2025-07-03 DIAGNOSIS — Z79.899 DRUG-INDUCED IMMUNODEFICIENCY: ICD-10-CM

## 2025-07-03 DIAGNOSIS — C83.02 SMALL B-CELL LYMPHOMA OF INTRATHORACIC LYMPH NODES: Primary | ICD-10-CM

## 2025-07-03 DIAGNOSIS — C83.09 SMALL B-CELL LYMPHOMA OF EXTRANODAL SITE EXCLUDING SPLEEN AND OTHER SOLID ORGANS: ICD-10-CM

## 2025-07-03 PROCEDURE — 1159F MED LIST DOCD IN RCRD: CPT | Mod: CPTII,95,, | Performed by: INTERNAL MEDICINE

## 2025-07-03 PROCEDURE — 98007 SYNCH AUDIO-VIDEO EST HI 40: CPT | Mod: 95,,, | Performed by: INTERNAL MEDICINE

## 2025-07-03 PROCEDURE — 1126F AMNT PAIN NOTED NONE PRSNT: CPT | Mod: CPTII,95,, | Performed by: INTERNAL MEDICINE

## 2025-07-03 PROCEDURE — G2211 COMPLEX E/M VISIT ADD ON: HCPCS | Mod: 95,,, | Performed by: INTERNAL MEDICINE

## 2025-07-03 PROCEDURE — 1101F PT FALLS ASSESS-DOCD LE1/YR: CPT | Mod: CPTII,95,, | Performed by: INTERNAL MEDICINE

## 2025-07-03 PROCEDURE — 3288F FALL RISK ASSESSMENT DOCD: CPT | Mod: CPTII,95,, | Performed by: INTERNAL MEDICINE

## 2025-07-03 NOTE — PROGRESS NOTES
Hematology and Medical Oncology   Follow Up     07/03/2025    Primary Oncologic Diagnosis: B cell lymphoma    Telemedicine Documentation:  The patient location is: home  The chief complaint leading to consultation is: B cell lymphoma    Visit type: audiovisual    Face to Face time with patient: 25  40 minutes of total time spent on the encounter, which includes face to face time and non-face to face time preparing to see the patient (eg, review of tests), Obtaining and/or reviewing separately obtained history, Documenting clinical information in the electronic or other health record, Independently interpreting results (not separately reported) and communicating results to the patient/family/caregiver, or Care coordination (not separately reported).       Each patient to whom he or she provides medical services by telemedicine is:  (1) informed of the relationship between the physician and patient and the respective role of any other health care provider with respect to management of the patient; and (2) notified that he or she may decline to receive medical services by telemedicine and may withdraw from such care at any time.       History of Present Ilness:   Kevin MARSHALL Juwan Gonsalez (Kevin) is a pleasant 85 y.o.male who presents to transition care to [facility] from the [facility]. The patient presents today with his wife and daughter. Most of his issues started in August when he began experiencing horrible pain in his hands that was worse with lack of motion/rest. The pain is excruciating and has sent him to the ED several times. The pain can happen in either upper extremity and feels like it travels at times. The discomfort abates as he moves his hands more throughout the day.     Incidentally through these ER visits it was noted that he has a persistently elevated but largely stable WBC.    --Pathologist review of the peripheral smear on 12/21/19 Leukocytosis with an absolute and relative lymphocytosis.      Lymphocytes are mature, and a subset displays a slightly atypical chromatin pattern.  Smudge cells are present.  Background granulocytes are morphologically normal.  The morphology suggests a B cell lymphoproliferative disorder such as CLL.  --Flow Cytometry on 1/3/2020: A B cell lymphoproliferative disorder is present.  Mantle cell lymphoma is favored although an atypical CLL cannot be completely ruled out; correlation   with morphology and with any available cytogenetic or molecular studies (t(11;14)) is required.     --Bone marrow biopsy on 6/4/24: B-cell lymphoma with biallelic TP53 inactivation.  Differential considerations include splenic marginal zone lymphoma, splenic diffuse red pulp small B-cell lymphoma, splenic B-cell  leukemia/lymphoma with prominent nucleoli (ICC classification, 2022), and less likely atypical chronic lymphocytic leukemia/small lymphocytic lymphoma.     --PET CT showing *Left periaortic lymph node just inferior to the left renal vein measures 0.9 cm with SUV max 2.6 (series 3, image 157).  *Aortocaval lymph node approximately at L2 measures 0.9 cm with SUV max 2.6 (series 3, image 161).  Prostatectomy for prostate cancer.  No hypermetabolic foci within the prostatectomy bed  Prominent mildly hypermetabolic retroperitoneal lymph nodes.  The Deauville Score is: 4    --End of treatment PET on 2/18/25: No suspicious hypermetabolic lymphadenopathy or hypermetabolic tumor. The Deauville Score is: 1    Interval History:  Mr. Echeverria presents virtually today with his wife. No new issues or complaints. Generally doing very well. Happy to see the improvements in his counts.  Remaining active. Currently working regularly in the wood working shop.    Generally doing extremely well. Able to do all desired activities around the house.  Happy to see white count recovering.         PAST MEDICAL HISTORY:   Past Medical History:   Diagnosis Date    Arthritis     Cancer     prostate    COPD (chronic  obstructive pulmonary disease)     Hyperlipidemia     Hypertension     Leukemia        PAST SURGICAL HISTORY:   Past Surgical History:   Procedure Laterality Date    CATARACT EXTRACTION W/  INTRAOCULAR LENS IMPLANT Left 05/05/2016    COLONOSCOPY W/ BIOPSIES AND POLYPECTOMY      PROSTATE SURGERY  05/1996    TONSILLECTOMY  1956       PAST SOCIAL HISTORY:   reports that he has never smoked. He has never been exposed to tobacco smoke. He quit smokeless tobacco use about 26 years ago.  His smokeless tobacco use included chew. He reports current alcohol use. He reports that he does not use drugs.    FAMILY HISTORY:  Family History   Problem Relation Name Age of Onset    Diabetes Mother         CURRENT MEDICATIONS:   Current Outpatient Medications   Medication Sig    albuterol (PROVENTIL) 2.5 mg /3 mL (0.083 %) nebulizer solution Take 1 vial by nebulization twice daily.    allopurinoL (ZYLOPRIM) 300 MG tablet Take 1 tablet (300 mg total) by mouth once daily. (Patient not taking: Reported on 2/25/2025)    aspirin (ECOTRIN) 81 MG EC tablet Take 81 mg by mouth once daily.    budesonide-glycopyr-formoterol (BREZTRI AEROSPHERE) 160-9-4.8 mcg/actuation HFAA INHALE 2 PUFFS BY MOUTH EVERY 12 HOURS    colchicine (COLCRYS) 0.6 mg tablet Take 1 tablet (0.6 mg total) by mouth once daily.    diclofenac sodium (VOLTAREN) 1 % Gel Apply 2 g topically 2 (two) times daily as needed (pain). (Patient not taking: Reported on 2/25/2025)    famotidine (PEPCID) 20 MG tablet Take 1 tablet orally 2 times a day as needed for heart burn    gabapentin (NEURONTIN) 300 MG capsule Take 1 capsule (300 mg total) by mouth once daily.    hydroxyurea (HYDREA) 500 mg Cap Take 2 capsules (1,000 mg total) by mouth once daily. (Patient not taking: Reported on 2/25/2025.)    ipratropium (ATROVENT) 0.02 % nebulizer solution Use contents of one vial (2.5 mL) via nebulier every 12 hours    L.acid/B.animalis,bifidum/FOS (PROBIOTIC COMPLEX ORAL) Take by mouth.     metoprolol succinate (TOPROL-XL) 25 MG 24 hr tablet Take 1 tablet (25 mg total) by mouth every evening.    metoprolol succinate (TOPROL-XL) 25 MG 24 hr tablet Take 1 tablet orally once a day.    montelukast (SINGULAIR) 10 mg tablet Take 1 tablet (10 mg total) by mouth every evening.    multivitamin with minerals tablet Take 1 tablet by mouth once daily. Equate Brand with Iron    omeprazole (PRILOSEC) 20 MG capsule Take 1 capsule (20 mg total) by mouth every morning.    pravastatin (PRAVACHOL) 20 MG tablet Take 1 tablet (20 mg total) by mouth every evening.    pravastatin (PRAVACHOL) 20 MG tablet Take 1 tablet orally once in the evening.    predniSONE (DELTASONE) 10 MG tablet Take one tablet by mouth once daily    valsartan (DIOVAN) 160 MG tablet take one tablet by mouth in the morning for hypertension    valsartan (DIOVAN) 160 MG tablet Take 1 tablet orally once a day.     No current facility-administered medications for this visit.     ALLERGIES:   Review of patient's allergies indicates:  No Known Allergies      Review of Systems:     Review of Systems   Constitutional:  Negative for appetite change, chills, diaphoresis, fatigue, fever and unexpected weight change.   HENT:   Negative for hearing loss, mouth sores, nosebleeds, sore throat, trouble swallowing and voice change.    Eyes:  Negative for eye problems and icterus.   Respiratory:  Negative for chest tightness, cough, hemoptysis, shortness of breath and wheezing.    Cardiovascular:  Negative for chest pain, leg swelling and palpitations.   Gastrointestinal:  Negative for abdominal distention, abdominal pain, blood in stool, diarrhea, nausea and vomiting.   Endocrine: Negative for hot flashes.   Genitourinary:  Negative for bladder incontinence, difficulty urinating, dysuria and hematuria.    Musculoskeletal:  Positive for arthralgias and neck pain. Negative for back pain, flank pain, gait problem, myalgias and neck stiffness.   Skin:  Negative for itching,  rash and wound.   Neurological:  Negative for dizziness, extremity weakness, gait problem, headaches, numbness, seizures and speech difficulty.   Hematological:  Negative for adenopathy. Does not bruise/bleed easily.   Psychiatric/Behavioral:  Negative for confusion, depression and sleep disturbance. The patient is not nervous/anxious.         Physical Exam:   Limited secondary to virtual visit      ECOG Performance Status: (foot note - ECOG PS provided by Eastern Cooperative Oncology Group) 1 - Symptomatic but completely ambulatory    Karnofsky Performance Score:  90%- Able to Carry on Normal Activity: Minor Symptoms of Disease    Labs:   Lab Results   Component Value Date    WBC 2.52 (L) 06/30/2025    HGB 10.4 (L) 06/30/2025    HCT 29.3 (L) 06/30/2025     (L) 06/30/2025    CHOL 131 08/28/2023    TRIG 56 08/28/2023    HDL 56 08/28/2023    LDLDIRECT 69 08/28/2023    ALT 19 06/30/2025    AST 23 06/30/2025     (L) 06/30/2025    K 4.4 06/30/2025     06/30/2025    CREATININE 1.0 06/30/2025    BUN 14 06/30/2025    CO2 24 06/30/2025    TSH 3.184 01/31/2020    PSA 0.05 11/16/2021    INR 1.1 06/03/2024     Uric acid: 2.8  LDH: 145      Imaging: Outside imaging has been reviewed   Assessment and Plan:     Mr. Echeverria is a pleasant 85 year old male with presumed CLL.     B-cell lymphoma with biallelic TP53 inactivation   --Seen on bone marrow biopsy  --Doing well on therapy. No complaints  --Happy to see the decrease in white count  --Plan for next treatment 12/30  --Labs in 8 weeks in [place]  --End of treatment PET with No Evidence of Disease  --Return to clinic following surveillance labs    Leukocytosis  --Resolved      Prostate Cancer  --Treated with a prostatectomy      40 minutes in total were spent on this encounter of which 30 minutes were spent face to face with the patient and his  family to discuss the disease, natural history, treatment options and survival statistics. I have provided the  patient with an opportunity to ask questions and have all questions answered to his satisfaction.         he will return to clinic in roughly 8 weeks, but knows to call in the interim if symptoms change or should a problem arise.    Visit today included increased complexity associated with the care of the episodic problem B cell lymphoma addressed and managing the longitudinal care of the patient due to the serious and/or complex managed problem(s) B cell lymphoma.       Jaquelin Quintana MD  Hematology and Medical Oncology  Bone Marrow Transplant  [facility]      BMT Chart Routing      Follow up with physician . 1. labs at [facility] on 9/1: cbc,cmp, ldh 2. see me in person on 9/2 early morning [okay to over book me]   Follow up with MARY JANE    Provider visit type    Infusion scheduling note    Injection scheduling note    Labs    Imaging    Pharmacy appointment    Other referrals

## 2025-07-07 ENCOUNTER — OFFICE VISIT (OUTPATIENT)
Dept: FAMILY MEDICINE | Facility: CLINIC | Age: 85
End: 2025-07-07
Payer: MEDICARE

## 2025-07-07 VITALS
SYSTOLIC BLOOD PRESSURE: 124 MMHG | HEART RATE: 76 BPM | BODY MASS INDEX: 23.6 KG/M2 | OXYGEN SATURATION: 99 % | DIASTOLIC BLOOD PRESSURE: 70 MMHG | WEIGHT: 159.31 LBS | TEMPERATURE: 99 F | RESPIRATION RATE: 16 BRPM | HEIGHT: 69 IN

## 2025-07-07 DIAGNOSIS — J04.0 LARYNGITIS: Primary | ICD-10-CM

## 2025-07-07 DIAGNOSIS — J44.9 CHRONIC OBSTRUCTIVE PULMONARY DISEASE, UNSPECIFIED COPD TYPE: ICD-10-CM

## 2025-07-07 PROCEDURE — 3288F FALL RISK ASSESSMENT DOCD: CPT | Mod: CPTII,S$GLB,, | Performed by: NURSE PRACTITIONER

## 2025-07-07 PROCEDURE — 99213 OFFICE O/P EST LOW 20 MIN: CPT | Mod: 25,S$GLB,, | Performed by: NURSE PRACTITIONER

## 2025-07-07 PROCEDURE — 3074F SYST BP LT 130 MM HG: CPT | Mod: CPTII,S$GLB,, | Performed by: NURSE PRACTITIONER

## 2025-07-07 PROCEDURE — 1159F MED LIST DOCD IN RCRD: CPT | Mod: CPTII,S$GLB,, | Performed by: NURSE PRACTITIONER

## 2025-07-07 PROCEDURE — 1160F RVW MEDS BY RX/DR IN RCRD: CPT | Mod: CPTII,S$GLB,, | Performed by: NURSE PRACTITIONER

## 2025-07-07 PROCEDURE — 96372 THER/PROPH/DIAG INJ SC/IM: CPT | Mod: S$GLB,,, | Performed by: NURSE PRACTITIONER

## 2025-07-07 PROCEDURE — 99999 PR PBB SHADOW E&M-EST. PATIENT-LVL V: CPT | Mod: PBBFAC,,, | Performed by: NURSE PRACTITIONER

## 2025-07-07 PROCEDURE — 3078F DIAST BP <80 MM HG: CPT | Mod: CPTII,S$GLB,, | Performed by: NURSE PRACTITIONER

## 2025-07-07 PROCEDURE — 1126F AMNT PAIN NOTED NONE PRSNT: CPT | Mod: CPTII,S$GLB,, | Performed by: NURSE PRACTITIONER

## 2025-07-07 PROCEDURE — 1101F PT FALLS ASSESS-DOCD LE1/YR: CPT | Mod: CPTII,S$GLB,, | Performed by: NURSE PRACTITIONER

## 2025-07-07 RX ORDER — METHYLPREDNISOLONE ACETATE 40 MG/ML
40 INJECTION, SUSPENSION INTRA-ARTICULAR; INTRALESIONAL; INTRAMUSCULAR; SOFT TISSUE
Status: COMPLETED | OUTPATIENT
Start: 2025-07-07 | End: 2025-07-07

## 2025-07-07 RX ADMIN — METHYLPREDNISOLONE ACETATE 40 MG: 40 INJECTION, SUSPENSION INTRA-ARTICULAR; INTRALESIONAL; INTRAMUSCULAR; SOFT TISSUE at 10:07

## 2025-07-07 NOTE — PROGRESS NOTES
Subjective:       Patient ID: Kevin Echeverria Jr. is a 85 y.o. male.    Chief Complaint: Sore Throat (Had sore throat on Friday, but has gone away by now. No fever. ), Hoarse (Symptoms started x a couple of weeks ago, pt states he does not feel bad at all ), and Otalgia (States his ears feel stuffy)    Here with symptoms for 1 week. Sore throat, congestion, cough and hoarseness.    Sore Throat   The current episode started 1 to 4 weeks ago. The problem has been gradually improving. There has been no fever. Associated symptoms include congestion, coughing, ear pain and a hoarse voice. Pertinent negatives include no abdominal pain, diarrhea, headaches, shortness of breath or vomiting.   Otalgia   There is pain in both ears. There has been no fever. Associated symptoms include coughing and a sore throat. Pertinent negatives include no abdominal pain, diarrhea, headaches, rash or vomiting.     Review of Systems   Constitutional: Negative.  Negative for appetite change, fatigue and fever.   HENT:  Positive for congestion, ear pain, hoarse voice, postnasal drip and sore throat.    Eyes: Negative.  Negative for visual disturbance.   Respiratory:  Positive for cough. Negative for shortness of breath and wheezing.    Cardiovascular: Negative.  Negative for chest pain and palpitations.   Gastrointestinal: Negative.  Negative for abdominal pain, diarrhea, nausea and vomiting.   Genitourinary: Negative.  Negative for difficulty urinating.   Musculoskeletal: Negative.    Skin: Negative.  Negative for rash.   Neurological: Negative.  Negative for headaches.   Psychiatric/Behavioral: Negative.  Negative for sleep disturbance. The patient is not nervous/anxious.    All other systems reviewed and are negative.      Objective:      Physical Exam  Vitals and nursing note reviewed.   Constitutional:       Appearance: Normal appearance. He is well-developed.   HENT:      Head: Normocephalic and atraumatic.      Right Ear: Tympanic  membrane and external ear normal.      Left Ear: Tympanic membrane and external ear normal.      Nose: Mucosal edema and congestion present. No rhinorrhea.      Mouth/Throat:      Mouth: Mucous membranes are moist.      Pharynx: Uvula midline. Posterior oropharyngeal erythema present.   Eyes:      Conjunctiva/sclera: Conjunctivae normal.   Neck:      Thyroid: No thyromegaly.      Trachea: Trachea normal.   Cardiovascular:      Rate and Rhythm: Normal rate and regular rhythm.      Pulses: Normal pulses.      Heart sounds: Normal heart sounds, S1 normal and S2 normal. No murmur heard.  Pulmonary:      Effort: Pulmonary effort is normal. No respiratory distress.      Breath sounds: Normal breath sounds.   Abdominal:      Palpations: Abdomen is soft.   Musculoskeletal:         General: Normal range of motion.      Cervical back: Normal range of motion and neck supple.   Lymphadenopathy:      Cervical: No cervical adenopathy.   Skin:     General: Skin is warm and dry.   Neurological:      Mental Status: He is alert and oriented to person, place, and time.   Psychiatric:         Mood and Affect: Mood normal.         Speech: Speech normal.         Assessment:       1. Laryngitis    2. Chronic obstructive pulmonary disease, unspecified COPD type        Plan:     1. Laryngitis  -     methylPREDNISolone acetate injection 40 mg    2. Chronic obstructive pulmonary disease, unspecified COPD type  -     methylPREDNISolone acetate injection 40 mg    RTC PRN

## 2025-07-15 ENCOUNTER — OFFICE VISIT (OUTPATIENT)
Dept: FAMILY MEDICINE | Facility: CLINIC | Age: 85
End: 2025-07-15
Payer: MEDICARE

## 2025-07-15 VITALS
RESPIRATION RATE: 14 BRPM | HEIGHT: 69 IN | HEART RATE: 74 BPM | OXYGEN SATURATION: 99 % | SYSTOLIC BLOOD PRESSURE: 112 MMHG | WEIGHT: 157.75 LBS | DIASTOLIC BLOOD PRESSURE: 68 MMHG | BODY MASS INDEX: 23.36 KG/M2

## 2025-07-15 DIAGNOSIS — J04.0 LARYNGITIS: ICD-10-CM

## 2025-07-15 DIAGNOSIS — C83.09 SMALL B-CELL LYMPHOMA OF EXTRANODAL SITE EXCLUDING SPLEEN AND OTHER SOLID ORGANS: ICD-10-CM

## 2025-07-15 DIAGNOSIS — I10 BENIGN ESSENTIAL HTN: Primary | ICD-10-CM

## 2025-07-15 PROCEDURE — 3288F FALL RISK ASSESSMENT DOCD: CPT | Mod: CPTII,S$GLB,, | Performed by: FAMILY MEDICINE

## 2025-07-15 PROCEDURE — 3078F DIAST BP <80 MM HG: CPT | Mod: CPTII,S$GLB,, | Performed by: FAMILY MEDICINE

## 2025-07-15 PROCEDURE — 1159F MED LIST DOCD IN RCRD: CPT | Mod: CPTII,S$GLB,, | Performed by: FAMILY MEDICINE

## 2025-07-15 PROCEDURE — 96372 THER/PROPH/DIAG INJ SC/IM: CPT | Mod: S$GLB,,, | Performed by: FAMILY MEDICINE

## 2025-07-15 PROCEDURE — 1125F AMNT PAIN NOTED PAIN PRSNT: CPT | Mod: CPTII,S$GLB,, | Performed by: FAMILY MEDICINE

## 2025-07-15 PROCEDURE — 1101F PT FALLS ASSESS-DOCD LE1/YR: CPT | Mod: CPTII,S$GLB,, | Performed by: FAMILY MEDICINE

## 2025-07-15 PROCEDURE — 1160F RVW MEDS BY RX/DR IN RCRD: CPT | Mod: CPTII,S$GLB,, | Performed by: FAMILY MEDICINE

## 2025-07-15 PROCEDURE — 99213 OFFICE O/P EST LOW 20 MIN: CPT | Mod: 25,S$GLB,, | Performed by: FAMILY MEDICINE

## 2025-07-15 PROCEDURE — 3074F SYST BP LT 130 MM HG: CPT | Mod: CPTII,S$GLB,, | Performed by: FAMILY MEDICINE

## 2025-07-15 PROCEDURE — 99999 PR PBB SHADOW E&M-EST. PATIENT-LVL IV: CPT | Mod: PBBFAC,,, | Performed by: FAMILY MEDICINE

## 2025-07-15 RX ORDER — CEFTRIAXONE 500 MG/1
500 INJECTION, POWDER, FOR SOLUTION INTRAMUSCULAR; INTRAVENOUS
Status: COMPLETED | OUTPATIENT
Start: 2025-07-15 | End: 2025-07-15

## 2025-07-15 RX ORDER — DOXYCYCLINE HYCLATE 100 MG
100 TABLET ORAL 2 TIMES DAILY
Qty: 20 TABLET | Refills: 0 | Status: SHIPPED | OUTPATIENT
Start: 2025-07-15 | End: 2025-07-25

## 2025-07-15 RX ADMIN — CEFTRIAXONE 500 MG: 500 INJECTION, POWDER, FOR SOLUTION INTRAMUSCULAR; INTRAVENOUS at 09:07

## 2025-07-15 NOTE — PROGRESS NOTES
Subjective:       Patient ID: Kevin Echeverria Jr. is a 85 y.o. male.    Chief Complaint: Sore Throat (Sore throat (left side)  x 1 mo with left ear pain .  Pt states throat does not hurt as much anymore but does report hoarse voice. Received steroid shot with no relief. )    84 y/o W M for recurring hoarseness and L otalgia    Sore Throat       Review of Systems   Constitutional:         84 y/o W M with hoarse voice   HENT:  Positive for sore throat.        Objective:      Physical Exam  Constitutional:       Appearance: He is well-developed.   HENT:      Head: Normocephalic.   Eyes:      Pupils: Pupils are equal, round, and reactive to light.   Neck:      Thyroid: No thyromegaly.   Cardiovascular:      Rate and Rhythm: Normal rate and regular rhythm.      Heart sounds: No murmur heard.     No friction rub.   Pulmonary:      Effort: Pulmonary effort is normal. No respiratory distress.      Breath sounds: No wheezing.   Abdominal:      Tenderness: There is no abdominal tenderness. There is no guarding or rebound.   Musculoskeletal:         General: No tenderness. Normal range of motion.      Cervical back: Normal range of motion and neck supple.   Lymphadenopathy:      Cervical: No cervical adenopathy.   Skin:     General: Skin is warm and dry.   Neurological:      Mental Status: He is alert and oriented to person, place, and time.      Cranial Nerves: No cranial nerve deficit.      Deep Tendon Reflexes: Reflexes are normal and symmetric.   Psychiatric:         Thought Content: Thought content normal.         Judgment: Judgment normal.         Assessment:       Encounter Diagnoses   Name Primary?    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 Benign essential HTN    2. 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 Laryngitis  -     cefTRIAXone injection 500 mg  -      doxycycline (VIBRA-TABS) 100 MG tablet; Take 1 tablet (100 mg total) by mouth 2 (two) times daily. for 10 days  Dispense: 20 tablet; Refill: 0

## 2025-07-22 ENCOUNTER — TELEPHONE (OUTPATIENT)
Dept: INTERNAL MEDICINE | Facility: CLINIC | Age: 85
End: 2025-07-22

## 2025-07-22 ENCOUNTER — OFFICE VISIT (OUTPATIENT)
Dept: INTERNAL MEDICINE | Facility: CLINIC | Age: 85
End: 2025-07-22
Payer: MEDICARE

## 2025-07-22 VITALS
BODY MASS INDEX: 23.97 KG/M2 | OXYGEN SATURATION: 98 % | HEIGHT: 69 IN | SYSTOLIC BLOOD PRESSURE: 119 MMHG | RESPIRATION RATE: 12 BRPM | DIASTOLIC BLOOD PRESSURE: 72 MMHG | WEIGHT: 161.81 LBS | HEART RATE: 82 BPM

## 2025-07-22 DIAGNOSIS — I10 BENIGN ESSENTIAL HTN: Primary | ICD-10-CM

## 2025-07-22 DIAGNOSIS — R49.0 HOARSENESS OF VOICE: ICD-10-CM

## 2025-07-22 PROCEDURE — 1159F MED LIST DOCD IN RCRD: CPT | Mod: CPTII,S$GLB,, | Performed by: FAMILY MEDICINE

## 2025-07-22 PROCEDURE — 3074F SYST BP LT 130 MM HG: CPT | Mod: CPTII,S$GLB,, | Performed by: FAMILY MEDICINE

## 2025-07-22 PROCEDURE — 99213 OFFICE O/P EST LOW 20 MIN: CPT | Mod: S$GLB,,, | Performed by: FAMILY MEDICINE

## 2025-07-22 PROCEDURE — 3078F DIAST BP <80 MM HG: CPT | Mod: CPTII,S$GLB,, | Performed by: FAMILY MEDICINE

## 2025-07-22 PROCEDURE — 99999 PR PBB SHADOW E&M-EST. PATIENT-LVL IV: CPT | Mod: PBBFAC,,, | Performed by: FAMILY MEDICINE

## 2025-07-22 PROCEDURE — 3288F FALL RISK ASSESSMENT DOCD: CPT | Mod: CPTII,S$GLB,, | Performed by: FAMILY MEDICINE

## 2025-07-22 PROCEDURE — 1126F AMNT PAIN NOTED NONE PRSNT: CPT | Mod: CPTII,S$GLB,, | Performed by: FAMILY MEDICINE

## 2025-07-22 PROCEDURE — 1101F PT FALLS ASSESS-DOCD LE1/YR: CPT | Mod: CPTII,S$GLB,, | Performed by: FAMILY MEDICINE

## 2025-07-22 NOTE — PROGRESS NOTES
Subjective:       Patient ID: Kevin Echeverria Jr. is a 85 y.o. male.    Chief Complaint: Follow-up (1 wk f/u  pt states he is feeling better. Reports still on antibiotic. )    86 y/o W M  for F U for hoarseness    Review of Systems   Constitutional:  Negative for appetite change.   HENT:  Negative for congestion, ear pain, sneezing and sore throat.         Voice is much clearer   Eyes:  Negative for redness and visual disturbance.   Respiratory:  Negative for cough, chest tightness and stridor.    Cardiovascular:  Negative for chest pain.   Gastrointestinal:  Negative for abdominal pain, blood in stool, diarrhea, nausea and vomiting.   Genitourinary:  Negative for difficulty urinating, dysuria and hematuria.   Musculoskeletal:  Negative for arthralgias, back pain, joint swelling, myalgias and neck pain.   Skin:  Negative for rash.   Neurological:  Negative for dizziness.   Psychiatric/Behavioral:  Negative for agitation. The patient is not nervous/anxious.        Objective:      Physical Exam  Constitutional:       Appearance: He is well-developed.   HENT:      Head: Normocephalic.   Eyes:      Pupils: Pupils are equal, round, and reactive to light.   Neck:      Thyroid: No thyromegaly.   Cardiovascular:      Rate and Rhythm: Normal rate and regular rhythm.      Heart sounds: No murmur heard.     No friction rub.   Pulmonary:      Effort: Pulmonary effort is normal. No respiratory distress.      Breath sounds: No wheezing.   Abdominal:      Tenderness: There is no abdominal tenderness. There is no guarding or rebound.   Musculoskeletal:         General: No tenderness. Normal range of motion.      Cervical back: Normal range of motion and neck supple.   Lymphadenopathy:      Cervical: No cervical adenopathy.   Skin:     General: Skin is warm and dry.   Neurological:      Mental Status: He is alert and oriented to person, place, and time.      Cranial Nerves: No cranial nerve deficit.      Deep Tendon Reflexes:  Reflexes are normal and symmetric.   Psychiatric:         Thought Content: Thought content normal.         Judgment: Judgment normal.         Assessment:       Encounter Diagnoses   Name Primary?    Benign essential HTN Yes    Hoarseness of voice          Plan:   1. Benign essential HTN    2. Hoarseness of voice  -     Ambulatory referral/consult to ENT; Future; Expected date: 08/13/2025

## 2025-08-28 ENCOUNTER — PATIENT OUTREACH (OUTPATIENT)
Dept: ADMINISTRATIVE | Facility: HOSPITAL | Age: 85
End: 2025-08-28
Payer: MEDICARE

## 2025-08-29 ENCOUNTER — LAB VISIT (OUTPATIENT)
Dept: LAB | Facility: HOSPITAL | Age: 85
End: 2025-08-29
Attending: INTERNAL MEDICINE
Payer: MEDICARE

## 2025-08-29 DIAGNOSIS — C83.02 SMALL B-CELL LYMPHOMA OF INTRATHORACIC LYMPH NODES: ICD-10-CM

## 2025-08-29 LAB
ABSOLUTE EOSINOPHIL (OHS): 0.2 K/UL
ABSOLUTE MONOCYTE (OHS): 0.35 K/UL (ref 0.3–1)
ABSOLUTE NEUTROPHIL COUNT (OHS): 1.55 K/UL (ref 1.8–7.7)
ALBUMIN SERPL BCP-MCNC: 3.5 G/DL (ref 3.5–5.2)
ALP SERPL-CCNC: 51 UNIT/L (ref 40–150)
ALT SERPL W/O P-5'-P-CCNC: 14 UNIT/L (ref 10–44)
ANION GAP (OHS): 6 MMOL/L (ref 8–16)
AST SERPL-CCNC: 25 UNIT/L (ref 11–45)
BASOPHILS # BLD AUTO: 0.02 K/UL
BASOPHILS NFR BLD AUTO: 0.6 %
BILIRUB SERPL-MCNC: 0.5 MG/DL (ref 0.1–1)
BUN SERPL-MCNC: 9 MG/DL (ref 8–23)
CALCIUM SERPL-MCNC: 9.1 MG/DL (ref 8.7–10.5)
CHLORIDE SERPL-SCNC: 101 MMOL/L (ref 95–110)
CO2 SERPL-SCNC: 25 MMOL/L (ref 23–29)
CREAT SERPL-MCNC: 1 MG/DL (ref 0.5–1.4)
ERYTHROCYTE [DISTWIDTH] IN BLOOD BY AUTOMATED COUNT: 12.8 % (ref 11.5–14.5)
GFR SERPLBLD CREATININE-BSD FMLA CKD-EPI: >60 ML/MIN/1.73/M2
GLUCOSE SERPL-MCNC: 102 MG/DL (ref 70–110)
HCT VFR BLD AUTO: 30 % (ref 40–54)
HGB BLD-MCNC: 10.8 GM/DL (ref 14–18)
IMM GRANULOCYTES # BLD AUTO: 0.01 K/UL (ref 0–0.04)
IMM GRANULOCYTES NFR BLD AUTO: 0.3 % (ref 0–0.5)
LDH SERPL-CCNC: 152 U/L (ref 110–260)
LYMPHOCYTES # BLD AUTO: 1.24 K/UL (ref 1–4.8)
MCH RBC QN AUTO: 34 PG (ref 27–31)
MCHC RBC AUTO-ENTMCNC: 36 G/DL (ref 32–36)
MCV RBC AUTO: 94 FL (ref 82–98)
NUCLEATED RBC (/100WBC) (OHS): 0 /100 WBC
PLATELET # BLD AUTO: 164 K/UL (ref 150–450)
PMV BLD AUTO: 8.6 FL (ref 9.2–12.9)
POTASSIUM SERPL-SCNC: 4.7 MMOL/L (ref 3.5–5.1)
PROT SERPL-MCNC: 7.1 GM/DL (ref 6–8.4)
RBC # BLD AUTO: 3.18 M/UL (ref 4.6–6.2)
RELATIVE EOSINOPHIL (OHS): 5.9 %
RELATIVE LYMPHOCYTE (OHS): 36.8 % (ref 18–48)
RELATIVE MONOCYTE (OHS): 10.4 % (ref 4–15)
RELATIVE NEUTROPHIL (OHS): 46 % (ref 38–73)
SODIUM SERPL-SCNC: 132 MMOL/L (ref 136–145)
WBC # BLD AUTO: 3.37 K/UL (ref 3.9–12.7)

## 2025-08-29 PROCEDURE — 83615 LACTATE (LD) (LDH) ENZYME: CPT

## 2025-08-29 PROCEDURE — 85025 COMPLETE CBC W/AUTO DIFF WBC: CPT

## 2025-08-29 PROCEDURE — 80053 COMPREHEN METABOLIC PANEL: CPT

## 2025-08-29 PROCEDURE — 36415 COLL VENOUS BLD VENIPUNCTURE: CPT

## 2025-09-02 ENCOUNTER — OFFICE VISIT (OUTPATIENT)
Dept: HEMATOLOGY/ONCOLOGY | Facility: CLINIC | Age: 85
End: 2025-09-02
Payer: MEDICARE

## 2025-09-02 VITALS
DIASTOLIC BLOOD PRESSURE: 59 MMHG | SYSTOLIC BLOOD PRESSURE: 124 MMHG | HEART RATE: 66 BPM | BODY MASS INDEX: 23.49 KG/M2 | RESPIRATION RATE: 16 BRPM | OXYGEN SATURATION: 100 % | HEIGHT: 69 IN | TEMPERATURE: 98 F | WEIGHT: 158.63 LBS

## 2025-09-02 DIAGNOSIS — C91.10 CLL (CHRONIC LYMPHOCYTIC LEUKEMIA): Primary | ICD-10-CM

## 2025-09-02 DIAGNOSIS — C83.09 SMALL B-CELL LYMPHOMA OF EXTRANODAL SITE EXCLUDING SPLEEN AND OTHER SOLID ORGANS: ICD-10-CM

## 2025-09-02 PROCEDURE — 99999 PR PBB SHADOW E&M-EST. PATIENT-LVL III: CPT | Mod: PBBFAC,,, | Performed by: INTERNAL MEDICINE
